# Patient Record
Sex: MALE | Race: WHITE | Employment: OTHER | URBAN - METROPOLITAN AREA
[De-identification: names, ages, dates, MRNs, and addresses within clinical notes are randomized per-mention and may not be internally consistent; named-entity substitution may affect disease eponyms.]

---

## 2017-01-12 ENCOUNTER — MYC REFILL (OUTPATIENT)
Dept: CARDIOLOGY | Facility: CLINIC | Age: 77
End: 2017-01-12

## 2017-01-12 ENCOUNTER — MYC REFILL (OUTPATIENT)
Dept: FAMILY MEDICINE | Facility: CLINIC | Age: 77
End: 2017-01-12

## 2017-01-12 DIAGNOSIS — I48.0 PAROXYSMAL ATRIAL FIBRILLATION (H): ICD-10-CM

## 2017-01-12 DIAGNOSIS — K21.9 GERD (GASTROESOPHAGEAL REFLUX DISEASE): ICD-10-CM

## 2017-01-12 RX ORDER — METOPROLOL SUCCINATE 100 MG/1
100 TABLET, EXTENDED RELEASE ORAL DAILY
Qty: 90 TABLET | Refills: 1 | Status: SHIPPED | OUTPATIENT
Start: 2017-01-12 | End: 2017-05-19

## 2017-01-12 NOTE — TELEPHONE ENCOUNTER
Prescription approved per [facility] Refill Protocol.  Marisela Pierson RN, BSN

## 2017-01-13 DIAGNOSIS — I48.91 ATRIAL FIBRILLATION (H): Primary | ICD-10-CM

## 2017-01-13 DIAGNOSIS — I48.0 PAROXYSMAL ATRIAL FIBRILLATION (H): Primary | ICD-10-CM

## 2017-01-13 RX ORDER — AMIODARONE HYDROCHLORIDE 200 MG/1
TABLET ORAL
Qty: 50 TABLET | Refills: 3 | Status: SHIPPED | OUTPATIENT
Start: 2017-01-13 | End: 2018-01-08

## 2017-01-13 NOTE — TELEPHONE ENCOUNTER
Pt needing refills of Amio. Did send RX and informed Pt need to do amio testing when he is back in spring from [place]. RUSSELL Ward RN

## 2017-02-01 ENCOUNTER — MYC MEDICAL ADVICE (OUTPATIENT)
Dept: NURSING | Facility: CLINIC | Age: 77
End: 2017-02-01

## 2017-02-01 ENCOUNTER — ANTICOAGULATION THERAPY VISIT (OUTPATIENT)
Dept: FAMILY MEDICINE | Facility: CLINIC | Age: 77
End: 2017-02-01
Payer: COMMERCIAL

## 2017-02-01 DIAGNOSIS — Z79.01 LONG-TERM (CURRENT) USE OF ANTICOAGULANTS: Primary | ICD-10-CM

## 2017-02-01 DIAGNOSIS — I47.29 PAROXYSMAL VENTRICULAR TACHYCARDIA (H): ICD-10-CM

## 2017-02-01 LAB — INR POINT OF CARE: 2.4 (ref 0.86–1.14)

## 2017-02-01 PROCEDURE — 85610 PROTHROMBIN TIME: CPT | Mod: QW | Performed by: FAMILY MEDICINE

## 2017-02-01 PROCEDURE — 36416 COLLJ CAPILLARY BLOOD SPEC: CPT | Performed by: FAMILY MEDICINE

## 2017-02-01 NOTE — PROGRESS NOTES
ANTICOAGULATION FOLLOW-UP CLINIC VISIT    Patient Name:  Shahid Villalta  Date:  2/1/2017  Contact Type:  my chart with pt     SUBJECTIVE:     Patient Findings     Positives Medication Changes    Comments Pt started back on amio this past week            OBJECTIVE    INR PROTIME   Date Value Ref Range Status   02/01/2017 2.4* 0.86 - 1.14 Final       ASSESSMENT / PLAN  INR assessment THER    Recheck INR In: 1 WEEK    INR Location Outside lab      Anticoagulation Summary as of 2/1/2017     INR goal 2.0-3.0   Selected INR 2.4 (2/1/2017)   Maintenance plan 2.5 mg (5 mg x 0.5) on Wed; 5 mg (5 mg x 1) all other days   Full instructions 2.5 mg on Wed; 5 mg all other days   Weekly total 32.5 mg   No change documented Isela Hua, RN   Plan last modified Isela Hua RN (4/20/2016)   Next INR check 2/8/2017   Target end date     Indications   Long-term (current) use of anticoagulants [Z79.01] [Z79.01]  Paroxysmal ventricular tachycardia (H) [I47.2]         Anticoagulation Episode Summary     INR check location     Preferred lab     Send INR reminders to [facility]    Comments             See the Encounter Report to view Anticoagulation Flowsheet and Dosing Calendar (Go to Encounters tab in chart review, and find the Anticoagulation Therapy Visit)        Isela Hua, RN

## 2017-02-07 ENCOUNTER — MYC MEDICAL ADVICE (OUTPATIENT)
Dept: FAMILY MEDICINE | Facility: CLINIC | Age: 77
End: 2017-02-07

## 2017-02-07 LAB — INR PPP: 1.9

## 2017-02-08 ENCOUNTER — TRANSFERRED RECORDS (OUTPATIENT)
Dept: HEALTH INFORMATION MANAGEMENT | Facility: CLINIC | Age: 77
End: 2017-02-08

## 2017-02-08 ENCOUNTER — ANTICOAGULATION THERAPY VISIT (OUTPATIENT)
Dept: FAMILY MEDICINE | Facility: CLINIC | Age: 77
End: 2017-02-08
Payer: COMMERCIAL

## 2017-02-08 DIAGNOSIS — Z79.01 LONG-TERM (CURRENT) USE OF ANTICOAGULANTS: Primary | ICD-10-CM

## 2017-02-08 DIAGNOSIS — I47.29 PAROXYSMAL VENTRICULAR TACHYCARDIA (H): ICD-10-CM

## 2017-02-08 PROCEDURE — 99207 ZZC NO CHARGE NURSE ONLY: CPT | Performed by: FAMILY MEDICINE

## 2017-02-08 NOTE — PROGRESS NOTES
ANTICOAGULATION FOLLOW-UP CLINIC VISIT    Patient Name:  Shahid Villalta  Date:  2/8/2017  Contact Type:  my chart with pt     SUBJECTIVE:     Patient Findings     Positives No Problem Findings           OBJECTIVE    INR   Date Value Ref Range Status   02/08/2017 1.9  Final       ASSESSMENT / PLAN  INR assessment SUB    Recheck INR In: 1 WEEK    INR Location Outside lab      Anticoagulation Summary as of 2/8/2017     INR goal 2.0-3.0   Selected INR 1.9! (2/8/2017)   Maintenance plan 2.5 mg (5 mg x 0.5) on Wed; 5 mg (5 mg x 1) all other days   Full instructions 2/8: 5 mg; 2/15: 5 mg; Otherwise 2.5 mg on Wed; 5 mg all other days   Weekly total 32.5 mg   Plan last modified Isela Hua RN (4/20/2016)   Next INR check 2/15/2017   Target end date     Indications   Long-term (current) use of anticoagulants [Z79.01] [Z79.01]  Paroxysmal ventricular tachycardia (H) [I47.2]         Anticoagulation Episode Summary     INR check location     Preferred lab     Send INR reminders to [facility]    Comments             See the Encounter Report to view Anticoagulation Flowsheet and Dosing Calendar (Go to Encounters tab in chart review, and find the Anticoagulation Therapy Visit)    Dosage adjustment made based on physician directed care plan.    Isela Hua, RN

## 2017-02-13 ENCOUNTER — ALLIED HEALTH/NURSE VISIT (OUTPATIENT)
Dept: CARDIOLOGY | Facility: CLINIC | Age: 77
End: 2017-02-13
Payer: COMMERCIAL

## 2017-02-13 DIAGNOSIS — Z95.810 ICD (IMPLANTABLE CARDIOVERTER-DEFIBRILLATOR) IN PLACE: Primary | ICD-10-CM

## 2017-02-13 PROCEDURE — 93295 DEV INTERROG REMOTE 1/2/MLT: CPT | Performed by: INTERNAL MEDICINE

## 2017-02-13 PROCEDURE — 93296 REM INTERROG EVL PM/IDS: CPT | Performed by: INTERNAL MEDICINE

## 2017-02-13 NOTE — MR AVS SNAPSHOT
After Visit Summary   2/13/2017    Shahid Villalta    MRN: [id number]           Patient Information     Date Of Birth          1940        Visit Information        Provider Department      2/13/2017 4:30 PM [facility] DCR2 [facility]        Today's Diagnoses     ICD (implantable cardioverter-defibrillator) in place    -  1       Follow-ups after your visit        Your next 10 appointments already scheduled     May 08, 2017  9:00 AM CDT   Return Visit with RH ONCOLOGY NURSE   [facility]    [facility]  [address]  [facility] [address]   [phone]            May 10, 2017  1:30 PM CDT   Return Visit with Nahun Hilario MD   [facility]    [facility]  [address]  [facility] [address]   [phone]            May 22, 2017  4:30 PM CDT   Remote ICD Check with STEWART DCR2   [facility] ([facility])    [address] [place] [address] [address]  [facility] [address] [phone]           This appointment is for a remote check of your debrillator.  This is not an appointment at the office.              Who to contact     If you have questions or need follow up information about today's clinic visit or your schedule please contact [facility] directly at [phone].  Normal or non-critical lab and imaging results will be communicated to you by MyChart, letter or phone within 4 business days after the clinic has received the results. If you do not hear from us within 7 days, please contact the clinic through MyChart or phone. If you have a critical or abnormal lab result, we will notify you by phone as soon as possible.  Submit refill requests through [employer] or call your pharmacy and they will forward the  refill request to us. Please allow 3 business days for your refill to be completed.          Additional Information About Your Visit        MyChart Information     [employer] gives you secure access to your electronic health record. If you see a primary care provider, you can also send messages to your care team and make appointments. If you have questions, please call your primary care clinic.  If you do not have a primary care provider, please call [phone] and they will assist you.        Care EveryWhere ID     This is your Care EveryWhere ID. This could be used by other organizations to access your [place] medical records  [id number]         Blood Pressure from Last 3 Encounters:   11/10/16 120/70   10/28/16 117/69   06/24/16 98/61    Weight from Last 3 Encounters:   11/10/16 114.3 kg (252 lb)   10/28/16 112.5 kg (248 lb)   06/24/16 109.6 kg (241 lb 9.6 oz)              We Performed the Following     ICD DEVICE INTERROGAT REMOTE (23982)     INTERROGATION DEVICE EVAL REMOTE, PACER/ICD (08293)        Primary Care Provider Office Phone # Fax #    Gume Brown [phone]       [facility] [address]  [facility] [address]        Thank you!     Thank you for choosing [facility] [place]  for your care. Our goal is always to provide you with excellent care. Hearing back from our patients is one way we can continue to improve our services. Please take a few minutes to complete the written survey that you may receive in the mail after your visit with us. Thank you!             Your Updated Medication List - Protect others around you: Learn how to safely use, store and throw away your medicines at www.disposemymeds.org.          This list is accurate as of: 2/13/17 11:59 PM.  Always use your most recent med list.                   Brand Name Dispense Instructions for use    acetaminophen 325 MG tablet    TYLENOL     Take 650 mg by mouth 2 times  daily       amiodarone 200 MG tablet    PACERONE/CODARONE    50 tablet    Take PO 1/2 tablet daily-Take extra prn per MD recommendations.       ASPIRIN NOT PRESCRIBED    INTENTIONAL    0 each    1 each daily Antiplatelet medication not prescribed intentionally due to Current anticoagulant therapy (warfarin/enoxaparin)       cetirizine 10 MG tablet    zyrTEC    90 tablet    Take 1 tablet (10 mg) by mouth daily       cholecalciferol 1000 UNIT tablet    vitamin D    180 tablet    Take 2 tablets (2,000 Units) by mouth daily       digoxin 125 MCG tablet    LANOXIN    45 tablet    Take 1 tablet (125 mcg) by mouth every 48 hours       lisinopril 2.5 MG tablet    PRINIVIL/Zestril    90 tablet    Take 1 tablet (2.5 mg) by mouth daily       metoprolol 100 MG 24 hr tablet    TOPROL-XL    90 tablet    Take 1 tablet (100 mg) by mouth daily       omeprazole 20 MG CR capsule    priLOSEC    90 capsule    Take 1 capsule (20 mg) by mouth daily       polyethylene glycol powder    MIRALAX/GLYCOLAX     Take 17 g by mouth daily as needed for constipation       potassium chloride 10 MEQ tablet    K-TAB,KLOR-CON    90 tablet    Take 1 tablet (10 mEq) by mouth daily       senna-docusate 8.6-50 MG per tablet    SENOKOT-S;PERICOLACE     Take 1 tablet by mouth daily       simethicone 80 MG chewable tablet    MYLICON    180 tablet    Take 1 tablet (80 mg) by mouth every 6 hours as needed for cramping       simvastatin 20 MG tablet    ZOCOR    90 tablet    Take 1 tablet (20 mg) by mouth At Bedtime       torsemide 20 MG tablet    DEMADEX    90 tablet    Take 1 tablet (20 mg) by mouth daily       VIAGRA 25 MG cap/tab   Generic drug:  sildenafil      Take 25 mg by mouth as needed       warfarin 5 MG tablet    COUMADIN    90 tablet    Take 5 mg 6 days week, take 2.5 mg one day week, and as directed.

## 2017-02-14 NOTE — PROGRESS NOTES
[employer] CRT ICD Remote Device Check  AP: 27 % BVP: 99 %  Mode: DDDR 60-95 bpm        Presenting Rhythm: Presenting EGM on transmission showed either AS/AP with BVP  Heart Rate: Heart rate stable with adequate variability.  Sensing: P waves WNL, not performed on the RV   Pacing Threshold: not performed, auto capture not on    Impedance: WNL  Battery Status: Approximately 6 years longevity.  Atrial Arrhythmia: 1526 episodes <1 minute, 338 episode from 1 min-<1 hr, stored EGMs showed PAT or PAC's falling in refractory. 25 PMT EGMs showed  at MTR. Patient is on warfarin.  Ventricular Arrhythmia: 27 episodes logged, 4 stored EGM for review showed 4-15 beats of NSVT rates of 140-160 bpm  ATP: 0    Shocks: 0    Care Plan: Follow up with  Q 3 month remote check. Will call the patient with today's results and date of next remote check.    ADDEM: Patient states he was without his amiodarone for about 8-10 day because switching pharmacy mail services.

## 2017-02-15 ENCOUNTER — MYC MEDICAL ADVICE (OUTPATIENT)
Dept: FAMILY MEDICINE | Facility: CLINIC | Age: 77
End: 2017-02-15

## 2017-02-15 ENCOUNTER — TRANSFERRED RECORDS (OUTPATIENT)
Dept: HEALTH INFORMATION MANAGEMENT | Facility: CLINIC | Age: 77
End: 2017-02-15

## 2017-02-15 LAB
INR PPP: 2.9
PT BLD: 30 SECONDS (ref 9–11.5)

## 2017-02-16 ENCOUNTER — ANTICOAGULATION THERAPY VISIT (OUTPATIENT)
Dept: FAMILY MEDICINE | Facility: CLINIC | Age: 77
End: 2017-02-16
Payer: COMMERCIAL

## 2017-02-16 DIAGNOSIS — I47.29 PAROXYSMAL VENTRICULAR TACHYCARDIA (H): ICD-10-CM

## 2017-02-16 DIAGNOSIS — Z79.01 LONG-TERM (CURRENT) USE OF ANTICOAGULANTS: ICD-10-CM

## 2017-02-16 LAB — INR PPP: 2.9

## 2017-02-16 PROCEDURE — 99207 ZZC NO CHARGE NURSE ONLY: CPT | Performed by: FAMILY MEDICINE

## 2017-02-16 NOTE — PROGRESS NOTES
ANTICOAGULATION FOLLOW-UP CLINIC VISIT    Patient Name:  Shahid Villalta  Date:  2/16/2017  Contact Type:  wtih pt via my chart    SUBJECTIVE:     Patient Findings     Positives No Problem Findings           OBJECTIVE    INR   Date Value Ref Range Status   02/15/2017 2.9  Final       ASSESSMENT / PLAN  INR assessment THER    Recheck INR In: 1 WEEK    INR Location Outside lab      Anticoagulation Summary as of 2/16/2017     INR goal 2.0-3.0   Today's INR 2.9 (2/15/2017)   Maintenance plan 2.5 mg (5 mg x 0.5) on Wed; 5 mg (5 mg x 1) all other days   Full instructions 2.5 mg on Wed; 5 mg all other days   Weekly total 32.5 mg   Plan last modified Isela Hua RN (4/20/2016)   Next INR check 2/22/2017   Target end date     Indications   Long-term (current) use of anticoagulants [Z79.01] [Z79.01]  Paroxysmal ventricular tachycardia (H) [I47.2]         Anticoagulation Episode Summary     INR check location     Preferred lab     Send INR reminders to [facility]    Comments             See the Encounter Report to view Anticoagulation Flowsheet and Dosing Calendar (Go to Encounters tab in chart review, and find the Anticoagulation Therapy Visit)      Isela Hua, RN

## 2017-02-16 NOTE — MR AVS SNAPSHOT
Shahid Villalta   2/16/2017   Anticoagulation Therapy Visit    Description:  76 year old male   Provider:  Gume Brown MD   Department:   Family Practice           INR as of 2/16/2017     Today's INR 2.9 (2/15/2017)      Anticoagulation Summary as of 2/16/2017     INR goal 2.0-3.0   Today's INR 2.9 (2/15/2017)   Full instructions 2.5 mg on Wed; 5 mg all other days   Next INR check 2/22/2017    Indications   Long-term (current) use of anticoagulants [Z79.01] [Z79.01]  Paroxysmal ventricular tachycardia (H) [I47.2]         February 2017 Details    Sun Mon Tue Wed Thu Fri Sat        1               2               3               4                 5               6               7               8               9               10               11                 12               13               14               15               16      5 mg   See details      17      5 mg         18      5 mg           19      5 mg         20      5 mg         21      5 mg         22            23               24               25                 26               27               28                    Date Details   02/16 This INR check       Date of next INR:  2/22/2017         How to take your warfarin dose     To take:  2.5 mg Take 0.5 of a 5 mg tablet.    To take:  5 mg Take 1 of the 5 mg tablets.

## 2017-02-23 DIAGNOSIS — I35.0 NONRHEUMATIC AORTIC (VALVE) STENOSIS: Primary | ICD-10-CM

## 2017-02-24 ENCOUNTER — TRANSFERRED RECORDS (OUTPATIENT)
Dept: HEALTH INFORMATION MANAGEMENT | Facility: CLINIC | Age: 77
End: 2017-02-24

## 2017-02-25 LAB — INR PPP: 1.9

## 2017-02-27 ENCOUNTER — ANTICOAGULATION THERAPY VISIT (OUTPATIENT)
Dept: NURSING | Facility: CLINIC | Age: 77
End: 2017-02-27
Payer: COMMERCIAL

## 2017-02-27 DIAGNOSIS — Z79.01 LONG-TERM (CURRENT) USE OF ANTICOAGULANTS: ICD-10-CM

## 2017-02-27 DIAGNOSIS — I47.29 PAROXYSMAL VENTRICULAR TACHYCARDIA (H): ICD-10-CM

## 2017-02-27 PROCEDURE — 99207 ZZC NO CHARGE NURSE ONLY: CPT | Performed by: FAMILY MEDICINE

## 2017-02-27 NOTE — MR AVS SNAPSHOT
Shahid Villalta   2/27/2017   Anticoagulation Therapy Visit    Description:  76 year old male   Provider:  Gume Brown MD   Department:  [facility]           INR as of 2/27/2017     Today's INR 1.9! (2/24/2017)      Anticoagulation Summary as of 2/27/2017     INR goal 2.0-3.0   Today's INR 1.9! (2/24/2017)   Full instructions 2.5 mg on Wed; 5 mg all other days   Next INR check 3/3/2017    Indications   Long-term (current) use of anticoagulants [Z79.01] [Z79.01]  Paroxysmal ventricular tachycardia (H) [I47.2]         Contact Numbers     [facility]  Please call [phone] with any problems or questions regarding your therapy, or to cancel or reschedule your appointment.          February 2017 Details    Sun Mon Tue Wed Thu Fri Sat        1               2               3               4                 5               6               7               8               9               10               11                 12               13               14               15               16               17               18                 19               20               21               22               23               24               25                 26               27      5 mg   See details      28      5 mg              Date Details   02/27 This INR check               How to take your warfarin dose     To take:  5 mg Take 1 of the 5 mg tablets.           March 2017 Details    Sun Mon Tue Wed Thu Fri Sat        1      2.5 mg         2      5 mg         3            4                 5               6               7               8               9               10               11                 12               13               14               15               16               17               18                 19               20               21               22               23               24               25                 26               27               28               29                30               31                 Date Details   No additional details    Date of next INR:  3/3/2017         How to take your warfarin dose     To take:  2.5 mg Take 0.5 of a 5 mg tablet.    To take:  5 mg Take 1 of the 5 mg tablets.

## 2017-03-07 ENCOUNTER — TRANSFERRED RECORDS (OUTPATIENT)
Dept: HEALTH INFORMATION MANAGEMENT | Facility: CLINIC | Age: 77
End: 2017-03-07

## 2017-03-08 ENCOUNTER — MYC MEDICAL ADVICE (OUTPATIENT)
Dept: NURSING | Facility: CLINIC | Age: 77
End: 2017-03-08

## 2017-03-08 ENCOUNTER — MYC REFILL (OUTPATIENT)
Dept: CARDIOLOGY | Facility: CLINIC | Age: 77
End: 2017-03-08

## 2017-03-08 DIAGNOSIS — I48.91 ATRIAL FIBRILLATION (H): ICD-10-CM

## 2017-03-08 DIAGNOSIS — I35.0 NONRHEUMATIC AORTIC VALVE STENOSIS: ICD-10-CM

## 2017-03-08 RX ORDER — LISINOPRIL 2.5 MG/1
2.5 TABLET ORAL DAILY
Qty: 90 TABLET | Refills: 1 | Status: CANCELLED | OUTPATIENT
Start: 2017-03-08

## 2017-03-08 RX ORDER — DIGOXIN 125 MCG
125 TABLET ORAL
Qty: 45 TABLET | Refills: 3 | Status: SHIPPED | OUTPATIENT
Start: 2017-03-08 | End: 2017-05-19

## 2017-03-08 NOTE — TELEPHONE ENCOUNTER
Message from [facility]:  Original authorizing provider: MD Shahid Jones would like a refill of the following medications:  digoxin (LANOXIN) 125 MCG tablet [Adiel Aden MD]    Preferred pharmacy: [facility] [place], [place] - [address] [facility] [address] [facility]    Comment:      Medication renewals requested in this message routed to other providers:  lisinopril (PRINIVIL,ZESTRIL) 2.5 MG tablet [Gume Brown MD]

## 2017-03-24 ENCOUNTER — TRANSFERRED RECORDS (OUTPATIENT)
Dept: HEALTH INFORMATION MANAGEMENT | Facility: CLINIC | Age: 77
End: 2017-03-24

## 2017-03-24 LAB — INR PPP: 2.8

## 2017-04-03 ENCOUNTER — TELEPHONE (OUTPATIENT)
Dept: FAMILY MEDICINE | Facility: CLINIC | Age: 77
End: 2017-04-03

## 2017-04-06 ENCOUNTER — ANTICOAGULATION THERAPY VISIT (OUTPATIENT)
Dept: NURSING | Facility: CLINIC | Age: 77
End: 2017-04-06
Payer: COMMERCIAL

## 2017-04-06 DIAGNOSIS — Z79.01 LONG-TERM (CURRENT) USE OF ANTICOAGULANTS: ICD-10-CM

## 2017-04-06 DIAGNOSIS — I47.29 PAROXYSMAL VENTRICULAR TACHYCARDIA (H): ICD-10-CM

## 2017-04-06 PROCEDURE — 99207 ZZC NO CHARGE NURSE ONLY: CPT | Performed by: FAMILY MEDICINE

## 2017-04-06 NOTE — MR AVS SNAPSHOT
Shahid Villalta   4/6/2017   Anticoagulation Therapy Visit    Description:  77 year old male   Provider:  Gume Brown MD   Department:  [facility] Nurse           INR as of 4/6/2017     Today's INR 2.8 (3/24/2017)      Anticoagulation Summary as of 4/6/2017     INR goal 2.0-3.0   Today's INR 2.8 (3/24/2017)   Full instructions 2.5 mg on Wed; 5 mg all other days   Next INR check 4/13/2017    Indications   Long-term (current) use of anticoagulants [Z79.01] [Z79.01]  Paroxysmal ventricular tachycardia (H) [I47.2]         Contact Numbers     [facility]  Please call [phone] with any problems or questions regarding your therapy, or to cancel or reschedule your appointment.          April 2017 Details    Sun Mon Tue Wed Thu Fri Sat           1                 2               3               4               5               6      5 mg   See details      7      5 mg         8      5 mg           9      5 mg         10      5 mg         11      5 mg         12      2.5 mg         13            14               15                 16               17               18               19               20               21               22                 23               24               25               26               27               28               29                 30                      Date Details   04/06 This INR check       Date of next INR:  4/13/2017         How to take your warfarin dose     To take:  2.5 mg Take 0.5 of a 5 mg tablet.    To take:  5 mg Take 1 of the 5 mg tablets.

## 2017-04-06 NOTE — PROGRESS NOTES
ANTICOAGULATION FOLLOW-UP CLINIC VISIT    Patient Name:  Shahid Villalta  Date:  4/6/2017  Contact Type:  [facility]    SUBJECTIVE:     Patient Findings     Positives No Problem Findings           OBJECTIVE    INR   Date Value Ref Range Status   03/24/2017 2.8  Final       ASSESSMENT / PLAN  INR assessment THER    Recheck INR In: 3 WEEKS    INR Location Outside lab      Anticoagulation Summary as of 4/6/2017     INR goal 2.0-3.0   Today's INR 2.8 (3/24/2017)   Maintenance plan 2.5 mg (5 mg x 0.5) on Wed; 5 mg (5 mg x 1) all other days   Full instructions 2.5 mg on Wed; 5 mg all other days   Weekly total 32.5 mg   No change documented Manisha Pierson RN   Plan last modified Isela Hua RN (4/20/2016)   Next INR check 4/13/2017   Target end date     Indications   Long-term (current) use of anticoagulants [Z79.01] [Z79.01]  Paroxysmal ventricular tachycardia (H) [I47.2]         Anticoagulation Episode Summary     INR check location     Preferred lab     Send INR reminders to [facility]    Comments             See the Encounter Report to view Anticoagulation Flowsheet and Dosing Calendar (Go to Encounters tab in chart review, and find the Anticoagulation Therapy Visit)        Manisha Pierson, JUDIT

## 2017-04-28 ENCOUNTER — TRANSFERRED RECORDS (OUTPATIENT)
Dept: HEALTH INFORMATION MANAGEMENT | Facility: CLINIC | Age: 77
End: 2017-04-28

## 2017-04-29 LAB — INR PPP: 3.4

## 2017-05-01 ENCOUNTER — ANTICOAGULATION THERAPY VISIT (OUTPATIENT)
Dept: NURSING | Facility: CLINIC | Age: 77
End: 2017-05-01
Payer: COMMERCIAL

## 2017-05-01 DIAGNOSIS — I47.29 PAROXYSMAL VENTRICULAR TACHYCARDIA (H): ICD-10-CM

## 2017-05-01 DIAGNOSIS — Z79.01 LONG-TERM (CURRENT) USE OF ANTICOAGULANTS: ICD-10-CM

## 2017-05-01 PROCEDURE — 99207 ZZC NO CHARGE NURSE ONLY: CPT | Performed by: FAMILY MEDICINE

## 2017-05-01 NOTE — MR AVS SNAPSHOT
Shahid Villalta   5/1/2017   Anticoagulation Therapy Visit    Description:  77 year old male   Provider:  Guem Brown MD   Department:  [facility] Nurse           INR as of 5/1/2017     Today's INR 3.4! (4/28/2017)      Anticoagulation Summary as of 5/1/2017     INR goal 2.0-3.0   Today's INR 3.4! (4/28/2017)   Full instructions 2.5 mg on Wed; 5 mg all other days   Next INR check 5/19/2017    Indications   Long-term (current) use of anticoagulants [Z79.01] [Z79.01]  Paroxysmal ventricular tachycardia (H) [I47.2]         Contact Numbers     [facility]  Please call [phone] with any problems or questions regarding your therapy, or to cancel or reschedule your appointment.          May 2017 Details    Sun Mon Tue Wed Thu Fri Sat      1      5 mg   See details      2      5 mg         3      2.5 mg         4      5 mg         5      5 mg         6      5 mg           7      5 mg         8      5 mg         9      5 mg         10      2.5 mg         11      5 mg         12      5 mg         13      5 mg           14      5 mg         15      5 mg         16      5 mg         17      2.5 mg         18      5 mg         19            20                 21               22               23               24               25               26               27                 28               29               30               31                   Date Details   05/01 This INR check       Date of next INR:  5/19/2017         How to take your warfarin dose     To take:  2.5 mg Take 0.5 of a 5 mg tablet.    To take:  5 mg Take 1 of the 5 mg tablets.

## 2017-05-01 NOTE — PROGRESS NOTES
ANTICOAGULATION FOLLOW-UP CLINIC VISIT    Patient Name:  Shahid Villalta  Date:  5/1/2017  Contact Type:  [facility]    SUBJECTIVE:     Patient Findings     Positives No Problem Findings           OBJECTIVE    INR   Date Value Ref Range Status   04/28/2017 3.4  Final       ASSESSMENT / PLAN  INR assessment SUPRA    Recheck INR In: 3 WEEKS    INR Location Outside lab      Anticoagulation Summary as of 5/1/2017     INR goal 2.0-3.0   Today's INR 3.4! (4/28/2017)   Maintenance plan 2.5 mg (5 mg x 0.5) on Wed; 5 mg (5 mg x 1) all other days   Full instructions 2.5 mg on Wed; 5 mg all other days   Weekly total 32.5 mg   No change documented Manisha Pierson RN   Plan last modified Isela Hua RN (4/20/2016)   Next INR check 5/19/2017   Target end date     Indications   Long-term (current) use of anticoagulants [Z79.01] [Z79.01]  Paroxysmal ventricular tachycardia (H) [I47.2]         Anticoagulation Episode Summary     INR check location     Preferred lab     Send INR reminders to [facility]    Comments             See the Encounter Report to view Anticoagulation Flowsheet and Dosing Calendar (Go to Encounters tab in chart review, and find the Anticoagulation Therapy Visit)        Manisha Pierson, JUDIT

## 2017-05-08 ENCOUNTER — HOSPITAL ENCOUNTER (OUTPATIENT)
Facility: CLINIC | Age: 77
Setting detail: SPECIMEN
Discharge: HOME | End: 2017-05-08
Attending: UROLOGY | Admitting: UROLOGY
Payer: MEDICARE

## 2017-05-08 ENCOUNTER — HOSPITAL ENCOUNTER (OUTPATIENT)
Dept: GENERAL RADIOLOGY | Facility: CLINIC | Age: 77
Discharge: HOME | End: 2017-05-08
Attending: INTERNAL MEDICINE | Admitting: INTERNAL MEDICINE
Payer: MEDICARE

## 2017-05-08 ENCOUNTER — HOSPITAL ENCOUNTER (OUTPATIENT)
Dept: CARDIOLOGY | Facility: CLINIC | Age: 77
Discharge: HOME | End: 2017-05-08
Attending: INTERNAL MEDICINE | Admitting: INTERNAL MEDICINE
Payer: MEDICARE

## 2017-05-08 ENCOUNTER — ONCOLOGY VISIT (OUTPATIENT)
Dept: ONCOLOGY | Facility: CLINIC | Age: 77
End: 2017-05-08
Attending: UROLOGY
Payer: MEDICARE

## 2017-05-08 DIAGNOSIS — I48.91 ATRIAL FIBRILLATION (H): ICD-10-CM

## 2017-05-08 DIAGNOSIS — C61 PROSTATE CANCER (H): ICD-10-CM

## 2017-05-08 DIAGNOSIS — I35.0 NONRHEUMATIC AORTIC VALVE STENOSIS: ICD-10-CM

## 2017-05-08 LAB — PSA SERPL-MCNC: 0.01 UG/L (ref 0–4)

## 2017-05-08 PROCEDURE — 36415 COLL VENOUS BLD VENIPUNCTURE: CPT

## 2017-05-08 PROCEDURE — 93306 TTE W/DOPPLER COMPLETE: CPT

## 2017-05-08 PROCEDURE — 93306 TTE W/DOPPLER COMPLETE: CPT | Mod: 26 | Performed by: INTERNAL MEDICINE

## 2017-05-08 NOTE — PROGRESS NOTES
Medical Assistant Note:  Shahid Villalta presents today for labs.    Patient seen by provider today: No.   present during visit today: Not Applicable.    Concerns: No Concerns.    Procedure:  Lab draw site: RAC, Needle type: 21, Gauge: BF.    Post Assessment:  Labs drawn without difficulty: No.    Discharge Plan:  Departure Mode: Ambulatory.    Face to Face Time: 5.    Nickie Templeton

## 2017-05-08 NOTE — MR AVS SNAPSHOT
After Visit Summary   5/8/2017    Shahid Villalta    MRN: [id number]           Patient Information     Date Of Birth          1940        Visit Information        Provider Department      5/8/2017 9:00 AM RH ONCOLOGY NURSE [facility]        Today's Diagnoses     Prostate cancer           Follow-ups after your visit        Your next 10 appointments already scheduled     May 08, 2017  9:00 AM CDT   Return Visit with RH ONCOLOGY NURSE   [facility] ([facility])    [facility]  [address]  [facility] [address]   [phone]            May 08, 2017  9:45 AM CDT   XR CHEST 2 VIEWS with RSCCXR1   [facility] ([facility])    [address]  [facility] [address] [phone]           Please bring a list of your current medicines to your exam. (Include vitamins, minerals and over-thecounter medicines.) Leave your valuables at home.  Tell your doctor if there is a chance you may be pregnant.  You do not need to do anything special for this exam.            May 09, 2017 12:30 PM CDT   Pulmonary Function with RH PULMONARY FUNCTION   [facility] ([facility])    [address]  [facility] [address] [phone]           No Inhalers for 6 hours prior to test No Smoking 2 hours prior to test            May 10, 2017  1:30 PM CDT   Return Visit with Nahun Hilario MD   [facility] ([facility])    [facility]  [address]  [facility] [address] [phone]            May 17, 2017  9:15 AM CDT   Return Visit with Adiel Aden MD   [facility] [place] ([facility])    [address]  [place] [address] [phone]            May 22, 2017  4:30 PM  CDT   Remote ICD Check with STEWART DCR2   [facility] [place] ([facility])    [address]  [place] [address] [phone]           This appointment is for a remote check of your debrillator.  This is not an appointment at the office.              Who to contact     If you have questions or need follow up information about today's clinic visit or your schedule please contact [facility] directly at [phone].  Normal or non-critical lab and imaging results will be communicated to you by [employer], letter or phone within 4 business days after the clinic has received the results. If you do not hear from us within 7 days, please contact the clinic through [employer] or phone. If you have a critical or abnormal lab result, we will notify you by phone as soon as possible.  Submit refill requests through [employer] or call your pharmacy and they will forward the refill request to us. Please allow 3 business days for your refill to be completed.          Additional Information About Your Visit        [employer] Information     [employer] gives you secure access to your electronic health record. If you see a primary care provider, you can also send messages to your care team and make appointments. If you have questions, please call your primary care clinic.  If you do not have a primary care provider, please call [phone] and they will assist you.        Care EveryWhere ID     This is your Care EveryWhere ID. This could be used by other organizations to access your [place] medical records  [id number]         Blood Pressure from Last 3 Encounters:   11/10/16 120/70   10/28/16 117/69   06/24/16 98/61    Weight from Last 3 Encounters:   11/10/16 114.3 kg (252 lb)   10/28/16 112.5 kg (248 lb)   06/24/16 109.6 kg (241 lb 9.6 oz)              We Performed the Following     PSA tumor marker        Primary Care Provider Office Phone # Fax #    Gume  Jose Brown [phone]       [facility] [address]  [facility] [address]        Thank you!     Thank you for choosing [facility]  for your care. Our goal is always to provide you with excellent care. Hearing back from our patients is one way we can continue to improve our services. Please take a few minutes to complete the written survey that you may receive in the mail after your visit with us. Thank you!             Your Updated Medication List - Protect others around you: Learn how to safely use, store and throw away your medicines at www.disposemymeds.org.          This list is accurate as of: 5/8/17  8:42 AM.  Always use your most recent med list.                   Brand Name Dispense Instructions for use    acetaminophen 325 MG tablet    TYLENOL     Take 650 mg by mouth 2 times daily       amiodarone 200 MG tablet    PACERONE/CODARONE    50 tablet    Take PO 1/2 tablet daily-Take extra prn per MD recommendations.       ASPIRIN NOT PRESCRIBED    INTENTIONAL    0 each    1 each daily Antiplatelet medication not prescribed intentionally due to Current anticoagulant therapy (warfarin/enoxaparin)       cetirizine 10 MG tablet    zyrTEC    90 tablet    Take 1 tablet (10 mg) by mouth daily       cholecalciferol 1000 UNIT tablet    vitamin D    180 tablet    Take 2 tablets (2,000 Units) by mouth daily       digoxin 125 MCG tablet    LANOXIN    45 tablet    Take 1 tablet (125 mcg) by mouth every 48 hours       lisinopril 2.5 MG tablet    PRINIVIL/Zestril    90 tablet    Take 1 tablet (2.5 mg) by mouth daily       metoprolol 100 MG 24 hr tablet    TOPROL-XL    90 tablet    Take 1 tablet (100 mg) by mouth daily       omeprazole 20 MG CR capsule    priLOSEC    90 capsule    Take 1 capsule (20 mg) by mouth daily       polyethylene glycol powder    MIRALAX/GLYCOLAX     Take 17 g by mouth daily as needed for constipation       potassium chloride 10 MEQ tablet     K-TAB,KLOR-CON    90 tablet    Take 1 tablet (10 mEq) by mouth daily       senna-docusate 8.6-50 MG per tablet    SENOKOT-S;PERICOLACE     Take 1 tablet by mouth daily       simethicone 80 MG chewable tablet    MYLICON    180 tablet    Take 1 tablet (80 mg) by mouth every 6 hours as needed for cramping       simvastatin 20 MG tablet    ZOCOR    90 tablet    Take 1 tablet (20 mg) by mouth At Bedtime       torsemide 20 MG tablet    DEMADEX    90 tablet    Take 1 tablet (20 mg) by mouth daily       VIAGRA 25 MG cap/tab   Generic drug:  sildenafil      Take 25 mg by mouth as needed       warfarin 5 MG tablet    COUMADIN    90 tablet    Take 5 mg 6 days week, take 2.5 mg one day week, and as directed.

## 2017-05-09 ENCOUNTER — HOSPITAL ENCOUNTER (OUTPATIENT)
Dept: RESPIRATORY THERAPY | Facility: CLINIC | Age: 77
Discharge: HOME | End: 2017-05-09
Attending: INTERNAL MEDICINE | Admitting: INTERNAL MEDICINE
Payer: MEDICARE

## 2017-05-09 DIAGNOSIS — I35.0 NONRHEUMATIC AORTIC (VALVE) STENOSIS: ICD-10-CM

## 2017-05-09 LAB
DLCOUNC-%PRED-PRE: 91 %
DLCOUNC-PRE: 21.01 ML/MIN/MMHG
DLCOUNC-PRED: 23.08 ML/MIN/MMHG
ERV-%PRED-PRE: 282 %
ERV-PRE: 0.31 L
ERV-PRED: 0.11 L
EXPTIME-PRE: 9.52 SEC
FEF2575-%PRED-PRE: 53 %
FEF2575-PRE: 1.01 L/SEC
FEF2575-PRED: 1.88 L/SEC
FEFMAX-%PRED-PRE: 97 %
FEFMAX-PRE: 6.63 L/SEC
FEFMAX-PRED: 6.83 L/SEC
FEV1-%PRED-PRE: 71 %
FEV1-PRE: 1.75 L
FEV1FEV6-PRE: 73 %
FEV1FEV6-PRED: 77 %
FEV1FVC-PRE: 69 %
FEV1FVC-PRED: 77 %
FEV1SVC-PRE: 67 %
FEV1SVC-PRED: 63 %
FIFMAX-PRE: 3.47 L/SEC
FRCPLETH-%PRED-PRE: 94 %
FRCPLETH-PRE: 3.29 L
FRCPLETH-PRED: 3.48 L
FVC-%PRED-PRE: 79 %
FVC-PRE: 2.53 L
FVC-PRED: 3.18 L
IC-%PRED-PRE: 61 %
IC-PRE: 2.29 L
IC-PRED: 3.74 L
RVPLETH-%PRED-PRE: 114 %
RVPLETH-PRE: 2.98 L
RVPLETH-PRED: 2.6 L
TLCPLETH-%PRED-PRE: 89 %
TLCPLETH-PRE: 5.58 L
TLCPLETH-PRED: 6.21 L
VA-%PRED-PRE: 67 %
VA-PRE: 4.03 L
VC-%PRED-PRE: 67 %
VC-PRE: 2.6 L
VC-PRED: 3.85 L

## 2017-05-09 PROCEDURE — 94729 DIFFUSING CAPACITY: CPT

## 2017-05-09 PROCEDURE — 94726 PLETHYSMOGRAPHY LUNG VOLUMES: CPT

## 2017-05-09 PROCEDURE — 94010 BREATHING CAPACITY TEST: CPT

## 2017-05-09 NOTE — PROGRESS NOTES
PFT Note:  Pt completed pulmonary function testing with DLCO.  Good Pt effort and cooperation.     Spirometry  Meets all ATS-ERS recommendations.    Plethysmography  All plethysmographic measurements meet ATS-ERS recommendations.    DLCO  Meets all ATS-ERS recommendations.  DLCO is an average of 2 maneuvers.  DLCO is not corrected for Hgb.    May 9, 2017.1:19 PM  Romeo Melgar

## 2017-05-10 ENCOUNTER — ONCOLOGY VISIT (OUTPATIENT)
Dept: ONCOLOGY | Facility: CLINIC | Age: 77
End: 2017-05-10
Attending: UROLOGY
Payer: COMMERCIAL

## 2017-05-10 VITALS
BODY MASS INDEX: 39.53 KG/M2 | WEIGHT: 246 LBS | HEART RATE: 69 BPM | RESPIRATION RATE: 16 BRPM | HEIGHT: 66 IN | DIASTOLIC BLOOD PRESSURE: 68 MMHG | OXYGEN SATURATION: 96 % | SYSTOLIC BLOOD PRESSURE: 112 MMHG | TEMPERATURE: 97.8 F

## 2017-05-10 DIAGNOSIS — C61 MALIGNANT NEOPLASM OF PROSTATE (H): Primary | ICD-10-CM

## 2017-05-10 PROCEDURE — 99213 OFFICE O/P EST LOW 20 MIN: CPT | Performed by: UROLOGY

## 2017-05-10 PROCEDURE — 99211 OFF/OP EST MAY X REQ PHY/QHP: CPT

## 2017-05-10 ASSESSMENT — PAIN SCALES - GENERAL: PAINLEVEL: NO PAIN (0)

## 2017-05-10 NOTE — MR AVS SNAPSHOT
After Visit Summary   5/10/2017    Shahid Villalta    MRN: [id number]           Patient Information     Date Of Birth          1940        Visit Information        Provider Department      5/10/2017 1:30 PM Nahun Hilario MD [facility] RH Oncology [facility]      Today's Diagnoses     Malignant neoplasm of prostate (H)    -  1      Care Instructions    Plan     Follow up in 6 months based on risk of recurrence with     PSA-scheduled for Dr. Hilario on 11/8/2017 @ 1:30 and labs on 11/6/2017 @ 9am. Deedee BARRETT     Clinic Exam  AVS printed and given to PtFran HENRIQUEZ.        Follow-ups after your visit        Your next 10 appointments already scheduled     May 17, 2017  9:15 AM CDT   Return Visit with Adiel Aden MD   [facility] ([facility])    [address] [place] [address] [address]  [facility] [address]   [phone]            May 22, 2017  4:30 PM CDT   Remote ICD Check with STEWART DCR2   [facility] ([facility])    [address] [place] [address] [address]  [facility] [address]   [phone]           This appointment is for a remote check of your debrillator.  This is not an appointment at the office.            Nov 06, 2017  9:00 AM CST   Return Visit with  ONCOLOGY NURSE   [facility] [place] [facility] ([facility])    [facility]  [address]  [place] [address]   [phone]            Nov 08, 2017  1:30 PM CST   Return Visit with Nahun Hilario MD   [facility] [place] [facility] ([facility])    [facility]  [address]  [place] [address]   [phone]              Future tests that were ordered for you today     Open Future Orders        Priority Expected Expires Ordered    PSA tumor marker Routine  5/10/2018 5/10/2017            Who to contact     If you  "have questions or need follow up information about today's clinic visit or your schedule please contact [facility] CANCER CARE directly at [phone].  Normal or non-critical lab and imaging results will be communicated to you by MyChart, letter or phone within 4 business days after the clinic has received the results. If you do not hear from us within 7 days, please contact the clinic through [employer] or phone. If you have a critical or abnormal lab result, we will notify you by phone as soon as possible.  Submit refill requests through [employer] or call your pharmacy and they will forward the refill request to us. Please allow 3 business days for your refill to be completed.          Additional Information About Your Visit        [employer] Information     [employer] gives you secure access to your electronic health record. If you see a primary care provider, you can also send messages to your care team and make appointments. If you have questions, please call your primary care clinic.  If you do not have a primary care provider, please call [phone] and they will assist you.        Care EveryWhere ID     This is your Care EveryWhere ID. This could be used by other organizations to access your [place] medical records  [id number]        Your Vitals Were     Pulse Temperature Respirations Height Pulse Oximetry BMI (Body Mass Index)    69 97.8  F (36.6  C) (Oral) 16 1.676 m (5' 6\") 96% 39.71 kg/m2       Blood Pressure from Last 3 Encounters:   05/10/17 112/68   11/10/16 120/70   10/28/16 117/69    Weight from Last 3 Encounters:   05/10/17 111.6 kg (246 lb)   11/10/16 114.3 kg (252 lb)   10/28/16 112.5 kg (248 lb)               Primary Care Provider Office Phone # Fax #    Gume Brown [phone]       [facility] [address]  [facility] [address]        Thank you!     Thank you for choosing [facility]  for your care. Our goal is always to " provide you with excellent care. Hearing back from our patients is one way we can continue to improve our services. Please take a few minutes to complete the written survey that you may receive in the mail after your visit with us. Thank you!             Your Updated Medication List - Protect others around you: Learn how to safely use, store and throw away your medicines at www.disposemymeds.org.          This list is accurate as of: 5/10/17  2:21 PM.  Always use your most recent med list.                   Brand Name Dispense Instructions for use    acetaminophen 325 MG tablet    TYLENOL     Take 650 mg by mouth 2 times daily       amiodarone 200 MG tablet    PACERONE/CODARONE    50 tablet    Take PO 1/2 tablet daily-Take extra prn per MD recommendations.       ASPIRIN NOT PRESCRIBED    INTENTIONAL    0 each    1 each daily Antiplatelet medication not prescribed intentionally due to Current anticoagulant therapy (warfarin/enoxaparin)       cetirizine 10 MG tablet    zyrTEC    90 tablet    Take 1 tablet (10 mg) by mouth daily       cholecalciferol 1000 UNIT tablet    vitamin D    180 tablet    Take 2 tablets (2,000 Units) by mouth daily       digoxin 125 MCG tablet    LANOXIN    45 tablet    Take 1 tablet (125 mcg) by mouth every 48 hours       lisinopril 2.5 MG tablet    PRINIVIL/Zestril    90 tablet    Take 1 tablet (2.5 mg) by mouth daily       metoprolol 100 MG 24 hr tablet    TOPROL-XL    90 tablet    Take 1 tablet (100 mg) by mouth daily       omeprazole 20 MG CR capsule    priLOSEC    90 capsule    Take 1 capsule (20 mg) by mouth daily       polyethylene glycol powder    MIRALAX/GLYCOLAX     Take 17 g by mouth daily as needed for constipation       potassium chloride 10 MEQ tablet    K-TAB,KLOR-CON    90 tablet    Take 1 tablet (10 mEq) by mouth daily       senna-docusate 8.6-50 MG per tablet    SENOKOT-S;PERICOLACE     Take 1 tablet by mouth daily       simethicone 80 MG chewable tablet    MYLICON    180  tablet    Take 1 tablet (80 mg) by mouth every 6 hours as needed for cramping       simvastatin 20 MG tablet    ZOCOR    90 tablet    Take 1 tablet (20 mg) by mouth At Bedtime       torsemide 20 MG tablet    DEMADEX    90 tablet    Take 1 tablet (20 mg) by mouth daily       VIAGRA 25 MG cap/tab   Generic drug:  sildenafil      Take 25 mg by mouth as needed       warfarin 5 MG tablet    COUMADIN    90 tablet    Take 5 mg 6 days week, take 2.5 mg one day week, and as directed.

## 2017-05-10 NOTE — PROGRESS NOTES
"Prostate Cancer Follow up after RRP    Post op complicated by an ileus requiring hospitalization, doing much better now and normal bowel      Shahid Villalta is a very pleasant 75 year old male who presents with a history of prostate cancer.    Initial PSA:29  Pathologic Roni Score was 4 + 3  Biopsy Myrtle Score was 4 + 3  Pathologic Stage T3b.   Positive Margins: Yes Location:right side apical  Number of Lymph Nodes Removed: 13  Number of Positive Lymph Nodes: 0  Patient is status post robotic radical prostatectomy on 11/20/2015.    Risk Group: High    Predicted progression free probability at 10 years: 9, i.e. 90% chance that the PSA will rise down the road    ~10% chance of death in the next 5-10 years  (J Clin Oncol. 2005 Oct 1;23(56):1497-12.  Http://nomograms.[facility].org/Prostate/PostRadicalProstatectomy.aspx)      PSA   Date Value Ref Range Status   05/08/2017 0.01 0 - 4 ug/L Final     Comment:     Assay Method:  Chemiluminescence using Siemens Vista analyzer   10/28/2016  0 - 4 ug/L Final    <0.01  Assay Method:  Chemiluminescence using Siemens Vista analyzer     06/15/2016  0 - 4 ug/L Final    <0.01  PSA results are about 7% lower than our prior method due to a methodology change   on August 30, 2011.     01/14/2016  0 - 4 ug/L Final    <0.01  PSA results are about 7% lower than our prior method due to a methodology change   on August 30, 2011.     05/19/2014 13.50 (H) 0 - 4 ug/L Final   06/15/2012 11.50 (H) 0 - 4 ug/L Final     Comment:     PSA results are about 7% lower than our prior method due to a methodology   change   on August 30, 2011.   07/18/2005 3.6 ng/mL    07/04/2004 3.4 ng/mL    03/06/2001 4.4 ng/mL        No incontinence except with certain heavy lifting, but frequency with diuretics, control better and urgency better.    There is  evidence of disease recurrence to date.    Physical Exam    /68  Pulse 69  Temp 97.8  F (36.6  C) (Oral)  Resp 16  Ht 1.676 m (5' 6\")  Wt 111.6 kg (246 " lb)  SpO2 96%  BMI 39.71 kg/m2   Incisions healed well and no sign of hernias      Assessment GS 7 prostate cancer with no evidence of disease s/p RRP  Plan     Follow up in 6 months based on risk of recurrence with     PSA     Clinic Exam          Sold pharmaceuticals in past, also worked as a mortician, lives in [place] during winter    Nahun Hilario MD  Department of Urology  [facility]

## 2017-05-10 NOTE — PATIENT INSTRUCTIONS
Plan     Follow up in 6 months based on risk of recurrence with     PSA-scheduled for Dr. Hilario on 11/8/2017 @ 1:30 and labs on 11/6/2017 @ 9am. Deedee HENRIQUEZ.     Clinic Exam  AVS printed and given to Jad HENRIQUEZ.

## 2017-05-10 NOTE — NURSING NOTE
"Oncology Rooming Note    May 10, 2017 1:26 PM   Shahid Villalta is a 77 year old male who presents for:    Chief Complaint   Patient presents with     Oncology Clinic Visit     Prostate Cancer - follow up     Initial Vitals: /68  Pulse 69  Temp 97.8  F (36.6  C) (Oral)  Resp 16  Ht 1.676 m (5' 6\")  Wt 111.6 kg (246 lb)  SpO2 96%  BMI 39.71 kg/m2 Estimated body mass index is 39.71 kg/(m^2) as calculated from the following:    Height as of this encounter: 1.676 m (5' 6\").    Weight as of this encounter: 111.6 kg (246 lb). Body surface area is 2.28 meters squared.  No Pain (0) Comment: Data Unavailable   No LMP for male patient.  Allergies reviewed: Yes  Medications reviewed: Yes    Medications: Medication refills not needed today.  Pharmacy name entered into [employer]:    [id number] - [place], [place] - [address] [place], [place] - [address] [facility]    Clinical concerns: Follow up     8 minutes for nursing intake (face to face time)     Joselin Finnegan CMA     DISCHARGE PLAN:  Next appointments: See patient instruction section  Departure Mode: Ambulatory  Accompanied by: self  0 minutes for nursing discharge (face to face time)   Joselin Finnegan CMA                  "

## 2017-05-11 NOTE — PROCEDURES
Please see medical chart for graphs and statistics related to this report.       REFERRING PHYSICIAN:   Adiel Aden                   TECHNICIAN:   Romeo Melgar   DIAGNOSIS:   Non-rheumatic aortic valve stenosis, V-tach, cardiomyopathy, HTN, GERD, A-Fib, recent prostate cancer   HEIGHT:   65.50 inches                      WEIGHT:   240.00 Lbs.        DYSPNEA: No dyspnea       COUGH: No cough       WHEEZE: No wheeze      TOBACCO PROD: Never smoked         YEARS SMOKED:               PKS/DAY:         MEDICATIONS:   Amiodarone, Lisinopril       POST-TEST COMMENTS:   Patient completed pulmonary function testing with DLCO.  Good patient effort and cooperation.  All testing meets ATS-ERS recommendations.   DLCO is an average of 2 maneuvers.  DLCO is not corrected for Hgb.       INTERPRETATION:      1.  Mild obstruction   2.  Air trapping   3.  Normal gas transfer   4.  Improved since 2016   5.  Rule out cardiac asthma versus mild obstructive disease         RAUL LOZANO MD             D: 2017 10:15   T: 2017 10:26   MT: [place]      Name:     LUCIO VINCENT   MRN:      [id number]        Account:        [id number]   :      1940           Procedure Date: 2017      Document: [id number]       cc: Adiel Aden MD

## 2017-05-14 ENCOUNTER — MYC REFILL (OUTPATIENT)
Dept: FAMILY MEDICINE | Facility: CLINIC | Age: 77
End: 2017-05-14

## 2017-05-14 DIAGNOSIS — I35.0 NONRHEUMATIC AORTIC VALVE STENOSIS: ICD-10-CM

## 2017-05-15 RX ORDER — LISINOPRIL 2.5 MG/1
2.5 TABLET ORAL DAILY
Qty: 90 TABLET | Refills: 0 | Status: SHIPPED | OUTPATIENT
Start: 2017-05-15 | End: 2017-07-13

## 2017-05-15 NOTE — TELEPHONE ENCOUNTER
Pending Prescriptions:                       Disp   Refills    lisinopril (PRINIVIL/ZESTRIL) 2.5 MG tabl*90 tab*1            Sig: Take 1 tablet (2.5 mg) by mouth daily       Last Written Prescription Date: 08/12/2016  Last Fill Quantity: 90, # refills: 1  [place] Office Visit with [facility] or [facility] prescribing provider: 06/03/2016  Next 5 appointments (look out 90 days)     May 17, 2017  9:15 AM CDT   Return Visit with Adiel Aden MD   [facility] ([facility])    [address] [place] [address] [phone]                   Potassium   Date Value Ref Range Status   11/02/2016 4.7 3.4 - 5.3 mmol/L Final     Creatinine   Date Value Ref Range Status   11/02/2016 1.58 (H) 0.66 - 1.25 mg/dL Final     BP Readings from Last 3 Encounters:   05/10/17 112/68   11/10/16 120/70   10/28/16 117/69     Liana Delong

## 2017-05-15 NOTE — TELEPHONE ENCOUNTER
Message from [employer]:  Original authorizing provider: MD Shahid Delarosa would like a refill of the following medications:  lisinopril (PRINIVIL,ZESTRIL) 2.5 MG tablet [Gume Brown MD]    Preferred pharmacy: [facility] [place], [place] - [address] AT [facility]    Comment:  Send to "[employer]. Errors are still not corrected at mail-order, thanks for your additional help. Shahid

## 2017-05-17 ENCOUNTER — TELEPHONE (OUTPATIENT)
Dept: CARDIOLOGY | Facility: CLINIC | Age: 77
End: 2017-05-17

## 2017-05-17 ENCOUNTER — OFFICE VISIT (OUTPATIENT)
Dept: CARDIOLOGY | Facility: CLINIC | Age: 77
End: 2017-05-17
Attending: INTERNAL MEDICINE
Payer: COMMERCIAL

## 2017-05-17 VITALS
HEIGHT: 66 IN | DIASTOLIC BLOOD PRESSURE: 68 MMHG | SYSTOLIC BLOOD PRESSURE: 124 MMHG | HEART RATE: 72 BPM | BODY MASS INDEX: 39.62 KG/M2 | WEIGHT: 246.5 LBS

## 2017-05-17 DIAGNOSIS — I10 HYPERTENSION GOAL BP (BLOOD PRESSURE) < 140/90: ICD-10-CM

## 2017-05-17 DIAGNOSIS — I25.10 CORONARY ARTERY DISEASE INVOLVING NATIVE CORONARY ARTERY OF NATIVE HEART WITHOUT ANGINA PECTORIS: ICD-10-CM

## 2017-05-17 DIAGNOSIS — E78.5 HYPERLIPIDEMIA LDL GOAL <100: ICD-10-CM

## 2017-05-17 DIAGNOSIS — I35.0 NONRHEUMATIC AORTIC VALVE STENOSIS: Primary | ICD-10-CM

## 2017-05-17 DIAGNOSIS — I35.0 NONRHEUMATIC AORTIC VALVE STENOSIS: ICD-10-CM

## 2017-05-17 DIAGNOSIS — I42.9 CARDIOMYOPATHY (H): ICD-10-CM

## 2017-05-17 DIAGNOSIS — I47.19 ATRIAL TACHYCARDIA, PAROXYSMAL (H): ICD-10-CM

## 2017-05-17 LAB
ALT SERPL W P-5'-P-CCNC: <5 U/L (ref 5–30)
AST SERPL W P-5'-P-CCNC: 14 U/L (ref 0–45)

## 2017-05-17 PROCEDURE — 99215 OFFICE O/P EST HI 40 MIN: CPT | Performed by: INTERNAL MEDICINE

## 2017-05-17 PROCEDURE — 36415 COLL VENOUS BLD VENIPUNCTURE: CPT | Performed by: INTERNAL MEDICINE

## 2017-05-17 PROCEDURE — 84460 ALANINE AMINO (ALT) (SGPT): CPT | Performed by: INTERNAL MEDICINE

## 2017-05-17 PROCEDURE — 84450 TRANSFERASE (AST) (SGOT): CPT | Performed by: INTERNAL MEDICINE

## 2017-05-17 NOTE — MR AVS SNAPSHOT
After Visit Summary   5/17/2017    Shahid Villalta    MRN: [id number]           Patient Information     Date Of Birth          1940        Visit Information        Provider Department      5/17/2017 9:15 AM Adiel Aden MD [facility] [place]        Today's Diagnoses     Nonrheumatic aortic valve stenosis    -  1    Coronary artery disease involving native coronary artery of native heart without angina pectoris        Hypertension goal BP (blood pressure) < 140/90        Hyperlipidemia LDL goal <100        Cardiomyopathy (H)        Atrial tachycardia, paroxysmal (H)           Follow-ups after your visit        Additional Services     Follow-Up with Cardiologist                 Your next 10 appointments already scheduled     May 22, 2017  4:30 PM CDT   Remote ICD Check with STEWART DCR2   [facility] ([facility])    [address] [place] [address] [address]  [facility] [address] [phone]           This appointment is for a remote check of your debrillator.  This is not an appointment at the office.            Nov 06, 2017  9:00 AM CST   Return Visit with [facility] ONCOLOGY NURSE   [facility] ([facility])    [facility]  [address] [place] [address] [address]  [place] [address]   [phone]            Nov 08, 2017  1:30 PM CST   Return Visit with Nahun Hilario MD   [facility] ([facility])    [facility]  [address]  [facility] [address]   [phone]              Future tests that were ordered for you today     Open Future Orders        Priority Expected Expires Ordered    Follow-Up with Cardiologist Routine 11/13/2017 5/17/2018 5/17/2017    Echocardiogram Routine 11/13/2017 5/17/2018 5/17/2017    AST Routine 5/17/2017 5/17/2018 5/17/2017    ALT Routine 5/17/2017 5/17/2018 5/17/2017     "Basic metabolic panel Routine 11/13/2017 5/17/2018 5/17/2017    Hepatic panel Routine 11/13/2017 5/17/2018 5/17/2017    TSH Routine 11/13/2017 5/17/2018 5/17/2017            Who to contact     If you have questions or need follow up information about today's clinic visit or your schedule please contact [facility] AT [place] directly at [phone].  Normal or non-critical lab and imaging results will be communicated to you by Dondehart, letter or phone within 4 business days after the clinic has received the results. If you do not hear from us within 7 days, please contact the clinic through Hot Dott or phone. If you have a critical or abnormal lab result, we will notify you by phone as soon as possible.  Submit refill requests through Sonopia or call your pharmacy and they will forward the refill request to us. Please allow 3 business days for your refill to be completed.          Additional Information About Your Visit        DondeharPasspack Information     Sonopia gives you secure access to your electronic health record. If you see a primary care provider, you can also send messages to your care team and make appointments. If you have questions, please call your primary care clinic.  If you do not have a primary care provider, please call [phone] and they will assist you.        Care EveryWhere ID     This is your Care EveryWhere ID. This could be used by other organizations to access your [place] medical records  [id number]        Your Vitals Were     Pulse Height BMI (Body Mass Index)             72 1.676 m (5' 6\") 39.79 kg/m2          Blood Pressure from Last 3 Encounters:   05/17/17 124/68   05/10/17 112/68   11/10/16 120/70    Weight from Last 3 Encounters:   05/17/17 111.8 kg (246 lb 8 oz)   05/10/17 111.6 kg (246 lb)   11/10/16 114.3 kg (252 lb)              We Performed the Following     Follow-Up with Cardiologist        Primary Care Provider Office Phone # Fax #    Gume " Jose Brown [phone]       [facility] [address]  [facility] [address]        Thank you!     Thank you for choosing [facility] AT [place]  for your care. Our goal is always to provide you with excellent care. Hearing back from our patients is one way we can continue to improve our services. Please take a few minutes to complete the written survey that you may receive in the mail after your visit with us. Thank you!             Your Updated Medication List - Protect others around you: Learn how to safely use, store and throw away your medicines at www.disposemymeds.org.          This list is accurate as of: 5/17/17  9:28 AM.  Always use your most recent med list.                   Brand Name Dispense Instructions for use    acetaminophen 325 MG tablet    TYLENOL     Take 650 mg by mouth 3 times daily       amiodarone 200 MG tablet    PACERONE/CODARONE    50 tablet    Take PO 1/2 tablet daily-Take extra prn per MD recommendations.       ASPIRIN NOT PRESCRIBED    INTENTIONAL    0 each    1 each daily Antiplatelet medication not prescribed intentionally due to Current anticoagulant therapy (warfarin/enoxaparin)       cetirizine 10 MG tablet    zyrTEC    90 tablet    Take 1 tablet (10 mg) by mouth daily       cholecalciferol 1000 UNIT tablet    vitamin D    180 tablet    Take 2 tablets (2,000 Units) by mouth daily       digoxin 125 MCG tablet    LANOXIN    45 tablet    Take 1 tablet (125 mcg) by mouth every 48 hours       lisinopril 2.5 MG tablet    PRINIVIL/Zestril    90 tablet    Take 1 tablet (2.5 mg) by mouth daily       metoprolol 100 MG 24 hr tablet    TOPROL-XL    90 tablet    Take 1 tablet (100 mg) by mouth daily       omeprazole 20 MG CR capsule    priLOSEC    90 capsule    Take 1 capsule (20 mg) by mouth daily       polyethylene glycol powder    MIRALAX/GLYCOLAX     Take 17 g by mouth daily as needed for constipation       potassium chloride  10 MEQ tablet    K-TAB,KLOR-CON    90 tablet    Take 1 tablet (10 mEq) by mouth daily       senna-docusate 8.6-50 MG per tablet    SENOKOT-S;PERICOLACE     Take 1 tablet by mouth daily       simethicone 80 MG chewable tablet    MYLICON    180 tablet    Take 1 tablet (80 mg) by mouth every 6 hours as needed for cramping       simvastatin 20 MG tablet    ZOCOR    90 tablet    Take 1 tablet (20 mg) by mouth At Bedtime       torsemide 20 MG tablet    DEMADEX    90 tablet    Take 1 tablet (20 mg) by mouth daily       VIAGRA 25 MG cap/tab   Generic drug:  sildenafil      Take 25 mg by mouth as needed       warfarin 5 MG tablet    COUMADIN    90 tablet    Take 5 mg 6 days week, take 2.5 mg one day week, and as directed.

## 2017-05-17 NOTE — PROGRESS NOTES
HPI and Plan:   See dictation  [id number]    Orders Placed This Encounter   Procedures     Hepatic panel     TSH     Basic metabolic panel     AST     ALT     Follow-Up with Cardiologist     Echocardiogram       No orders of the defined types were placed in this encounter.      There are no discontinued medications.      Encounter Diagnoses   Name Primary?     Nonrheumatic aortic valve stenosis Yes     Coronary artery disease involving native coronary artery of native heart without angina pectoris      Hypertension goal BP (blood pressure) < 140/90      Hyperlipidemia LDL goal <100      Cardiomyopathy (H)      Atrial tachycardia, paroxysmal (H)        CURRENT MEDICATIONS:  Current Outpatient Prescriptions   Medication Sig Dispense Refill     lisinopril (PRINIVIL/ZESTRIL) 2.5 MG tablet Take 1 tablet (2.5 mg) by mouth daily 90 tablet 0     digoxin (LANOXIN) 125 MCG tablet Take 1 tablet (125 mcg) by mouth every 48 hours 45 tablet 3     amiodarone (PACERONE/CODARONE) 200 MG tablet Take PO 1/2 tablet daily-Take extra prn per MD recommendations. 50 tablet 3     metoprolol (TOPROL-XL) 100 MG 24 hr tablet Take 1 tablet (100 mg) by mouth daily 90 tablet 1     omeprazole (PRILOSEC) 20 MG CR capsule Take 1 capsule (20 mg) by mouth daily 90 capsule 1     potassium chloride (K-TAB,KLOR-CON) 10 MEQ tablet Take 1 tablet (10 mEq) by mouth daily 90 tablet 1     acetaminophen (TYLENOL) 325 MG tablet Take 650 mg by mouth 3 times daily        warfarin (COUMADIN) 5 MG tablet Take 5 mg 6 days week, take 2.5 mg one day week, and as directed. 90 tablet 1     simvastatin (ZOCOR) 20 MG tablet Take 1 tablet (20 mg) by mouth At Bedtime 90 tablet 3     torsemide (DEMADEX) 20 MG tablet Take 1 tablet (20 mg) by mouth daily 90 tablet 0     senna-docusate (SENOKOT-S;PERICOLACE) 8.6-50 MG per tablet Take 1 tablet by mouth daily        polyethylene glycol (MIRALAX/GLYCOLAX) powder Take 17 g by mouth daily as needed for constipation       simethicone  (MYLICON) 80 MG chewable tablet Take 1 tablet (80 mg) by mouth every 6 hours as needed for cramping 180 tablet      cetirizine (ZYRTEC) 10 MG tablet Take 1 tablet (10 mg) by mouth daily 90 tablet 1     cholecalciferol (VITAMIN D) 1000 UNIT tablet Take 2 tablets (2,000 Units) by mouth daily 180 tablet 1     sildenafil (VIAGRA) 25 MG tablet Take 25 mg by mouth as needed       ASPIRIN NOT PRESCRIBED, INTENTIONAL, 1 each daily Antiplatelet medication not prescribed intentionally due to Current anticoagulant therapy (warfarin/enoxaparin) (Patient not taking: Reported on 5/17/2017) 0 each 0       ALLERGIES     Allergies   Allergen Reactions     Latex Rash     Seasonal Allergies      hayfever - wheezing -        PAST MEDICAL HISTORY:  Past Medical History:   Diagnosis Date     Aortic valve disorders      Aortic valve disorders      Arrhythmia      Atrial fibrillation (H)      Atrial fibrillation (H)      Automatic implantable cardiac defibrillator in situ      Cancer (H)      Congestive heart failure (H)      Coronary artery disease      Essential hypertension, benign      GERD (gastroesophageal reflux disease) 3/16/2010     Heart failure (H)      History of blood transfusion      Hyperlipidaemia      Hypertension      Obese      Other and unspecified hyperlipidemia      Other primary cardiomyopathies      Other primary cardiomyopathies      Pacemaker      Sleep apnea      Ventricular tachycardia (H) 6/17/2013       PAST SURGICAL HISTORY:  Past Surgical History:   Procedure Laterality Date     BIOPSY  annually    prostate biopsy     CARDIAC SURGERY  2012    A fib Ablation     CARDIAC SURGERY      cardioversion     COLONOSCOPY  2007     DAVINCI PROSTATECTOMY N/A 11/20/2015    Procedure: DAVINCI PROSTATECTOMY;  Surgeon: Nahun Hilario MD;  Location: [facility] OR     GENITOURINARY SURGERY      bladder surgery 2011     H ABLATION AV NODE  6/25/13     H ABLATION FOCAL AFIB  6/25/13     HC TOOTH EXTRACTION W/FORCEP        "TONSILLECTOMY & ADENOIDECTOMY         FAMILY HISTORY:  Family History   Problem Relation Age of Onset     HAIDER Father      CHF  at 74     CEREBROVASCULAR DISEASE Father      C.APRATIBHA Mother      CHF at 91 still living     CEREBROVASCULAR DISEASE Mother      TIAs     Breast Cancer Mother      HEART DISEASE Son      arrythmia       SOCIAL HISTORY:  Social History     Social History     Marital status:      Spouse name: N/A     Number of children: 2     Years of education: N/A     Occupational History      Retired     Social History Main Topics     Smoking status: Never Smoker     Smokeless tobacco: Never Used     Alcohol use No      Comment: AA since      Drug use: No     Sexual activity: Not Currently     Partners: Female     Birth control/ protection: Abstinence     Other Topics Concern     Parent/Sibling W/ Cabg, Mi Or Angioplasty Before 65f 55m? No     Caffeine Concern Yes     2-3 cups caffeine per day     Sleep Concern Yes     sleep apnea, wears cpap at night     Stress Concern No     Weight Concern No     weight fluctuates     Special Diet No     Exercise Yes     walking daily     Seat Belt Yes     Social History Narrative       Review of Systems:  Skin:  Negative       Eyes:  Positive for glasses    ENT:  Positive for hearing loss    Respiratory:  Positive for sleep apnea;CPAP     Cardiovascular:  Negative;chest pain;palpitations;lightheadedness;dizziness;cyanosis;syncope or near-syncope exercise intolerance;Positive for;edema    Gastroenterology: Negative      Genitourinary:  Positive for urinary frequency prostate removed  Musculoskeletal:  Positive for joint pain    Neurologic:  Negative      Psychiatric:  Positive for sleep disturbances    Heme/Lymph/Imm:  Positive for allergies    Endocrine:  Negative        Physical Exam:  Vitals: /68 (BP Location: Right arm, Cuff Size: Adult Large)  Pulse 72  Ht 1.676 m (5' 6\")  Wt 111.8 kg (246 lb 8 oz)  BMI 39.79 kg/m2    Constitutional:  " cooperative;alert and oriented overweight      Skin:  warm and dry to the touch        Head:  normocephalic        Eyes:  pupils equal and round        ENT:  no pallor or cyanosis        Neck:  carotid pulses are full and equal bilaterally;JVP normal        Chest:  clear to auscultation          Cardiac: normal S1 and S2   soft or inaudible A2   systolic ejection murmur;crescendo;grade 3;radiation to the carotid   diastolic murmur;early diastolic murmur;grade 2;RUSB      Abdomen:  abdomen soft;BS normoactive        Vascular: not assessed this visit                                        Extremities and Back:      trace;bilateral LE edema          Neurological:  no gross motor deficits              FEDERICO Aden MD   [facility]  [address]  [address]  [place] [address]

## 2017-05-17 NOTE — TELEPHONE ENCOUNTER
After speaking with sleep center was informed Pt needs appointment to see DR Ashraf to renew C-pap RX. Left phone number for  [phone] to call scheduling and make appointment. RUSSELL Ward RN

## 2017-05-17 NOTE — LETTER
5/17/2017    Gume Brown MD  [facility]   [address]  [facility] [address]    RE: Shahid Villalta       Dear Colleague,    I had the pleasure of seeing Shahid Pawan in Cardiology Clinic in followup, a 77-year-old male with history of moderate to severe aortic valve stenosis, mild cardiomyopathy, coronary artery disease, paroxysmal atrial fibrillation and tachycardia status post AV dario ablation and post-biventricular AICD placement, previous SVT ablation, returns for followup.  He is on amiodarone for management of atrial arrhythmias.  Today we discussed his PFTs, which have remained stable.  There is mild obstruction but normal gas transfer or normal DLCO.      Chest x-ray showed no evidence of scarring or fibrosis.  His TSH was normal.  His creatinine remained stable at 1.58.  He has mild chronic renal insufficiency.  With respect to his aortic valve, his echocardiogram results were reviewed with him.  It shows again an aortic valve area of 1.1 cm squared with an aortic valve area index of 0.5 cm2/m2, mean gradient remains stable at 38 mm.  Ejection fraction is mildly reduced at 45%-50%, unchanged from before.  He continues to have no increase in his symptoms.  He has class 1-2 exertional fatigue.  Lately he is limited by a sprain of his knee.  Denies any orthopnea or PND.  No palpitations.  His amiodarone is 100 mg daily.  His last device check was in February, and that notes was reviewed.  He had 99% BiV pacing.  There is some evidence of PATs.  He has been on warfarin.      PHYSICAL EXAMINATION:   VITAL SIGNS:  Blood pressure is 124/68, pulse 72 per minute and regular.   CARDIAC:  A 3/6 ejection systolic murmur in the aortic area with a soft A2 component of the second heart sound.   CHEST:  Clear to auscultation except for scattered right basal crackles.     Outpatient Encounter Prescriptions as of 5/17/2017   Medication Sig Dispense Refill     lisinopril (PRINIVIL/ZESTRIL) 2.5 MG tablet  Take 1 tablet (2.5 mg) by mouth daily 90 tablet 0     digoxin (LANOXIN) 125 MCG tablet Take 1 tablet (125 mcg) by mouth every 48 hours 45 tablet 3     amiodarone (PACERONE/CODARONE) 200 MG tablet Take PO 1/2 tablet daily-Take extra prn per MD recommendations. 50 tablet 3     metoprolol (TOPROL-XL) 100 MG 24 hr tablet Take 1 tablet (100 mg) by mouth daily 90 tablet 1     omeprazole (PRILOSEC) 20 MG CR capsule Take 1 capsule (20 mg) by mouth daily 90 capsule 1     potassium chloride (K-TAB,KLOR-CON) 10 MEQ tablet Take 1 tablet (10 mEq) by mouth daily 90 tablet 1     acetaminophen (TYLENOL) 325 MG tablet Take 650 mg by mouth 3 times daily        warfarin (COUMADIN) 5 MG tablet Take 5 mg 6 days week, take 2.5 mg one day week, and as directed. 90 tablet 1     simvastatin (ZOCOR) 20 MG tablet Take 1 tablet (20 mg) by mouth At Bedtime 90 tablet 3     torsemide (DEMADEX) 20 MG tablet Take 1 tablet (20 mg) by mouth daily 90 tablet 0     senna-docusate (SENOKOT-S;PERICOLACE) 8.6-50 MG per tablet Take 1 tablet by mouth daily        polyethylene glycol (MIRALAX/GLYCOLAX) powder Take 17 g by mouth daily as needed for constipation       simethicone (MYLICON) 80 MG chewable tablet Take 1 tablet (80 mg) by mouth every 6 hours as needed for cramping 180 tablet      cetirizine (ZYRTEC) 10 MG tablet Take 1 tablet (10 mg) by mouth daily 90 tablet 1     cholecalciferol (VITAMIN D) 1000 UNIT tablet Take 2 tablets (2,000 Units) by mouth daily 180 tablet 1     sildenafil (VIAGRA) 25 MG tablet Take 25 mg by mouth as needed       ASPIRIN NOT PRESCRIBED, INTENTIONAL, 1 each daily Antiplatelet medication not prescribed intentionally due to Current anticoagulant therapy (warfarin/enoxaparin) (Patient not taking: Reported on 5/17/2017) 0 each 0     No facility-administered encounter medications on file as of 5/17/2017.       IMPRESSION:   1.  Aortic stenosis.  Again, the patient has near severe aortic valve stenosis but no worsening symptoms.   His echocardiograms have been stable over the last 1-1/2 to 2 years.  Given the stability and no clear symptoms, I would suggest a followup echocardiogram in 6 months.  Once he develops symptoms, we will refer him to [facility] for TAVR.  He understands that.   2.  Mild cardiomyopathy, stable EF 40%-50%, no overt congestive heart failure.   3.  Paroxysmal atrial tachycardia and fibrillation, stable and in sinus rhythm on amiodarone 100 mg daily.  His PFTs and chest x-rays are stable.  I would recommend a repeat chest x-ray in a year and routine amiodarone labs in 6 months.  Recent ALT, AST is not available, and we will obtain that today.  He remains on warfarin for stroke prophylaxis.   4.  Chronic renal insufficiency, stable.   5.  Hypertension, well controlled on lisinopril and metoprolol.   6.  Status post BiV AICD.  Continue followup in the Device Clinic.        PLAN:   1.  Echocardiogram in 6 months.   2.  Amiodarone labs in 6 months.   3.  ALT, AST today.   4.  Call with worsening chest pain or shortness of breath or leg edema.  We will see him in followup in 6 months.  He has some concerns of not having followup for his sleep apnea and has some difficulty getting supplies.  I have asked my nurse to call our sleep lab in [place] and have them contact him.     Again, thank you for allowing me to participate in the care of your patient.      Sincerely,    Adiel Aden MD     [facility]

## 2017-05-18 NOTE — PROGRESS NOTES
HISTORY OF PRESENT ILLNESS:  I had the pleasure of seeing Shahid Villalta in Cardiology Clinic in followup, a 77-year-old male with history of moderate to severe aortic valve stenosis, mild cardiomyopathy, coronary artery disease, paroxysmal atrial fibrillation and tachycardia status post AV dario ablation and post-biventricular AICD placement, previous SVT ablation, returns for followup.  He is on amiodarone for management of atrial arrhythmias.  Today we discussed his PFTs, which have remained stable.  There is mild obstruction but normal gas transfer or normal DLCO.      Chest x-ray showed no evidence of scarring or fibrosis.  His TSH was normal.  His creatinine remained stable at 1.58.  He has mild chronic renal insufficiency.  With respect to his aortic valve, his echocardiogram results were reviewed with him.  It shows again an aortic valve area of 1.1 cm squared with an aortic valve area index of 0.5 cm2/m2, mean gradient remains stable at 38 mm.  Ejection fraction is mildly reduced at 45%-50%, unchanged from before.  He continues to have no increase in his symptoms.  He has class 1-2 exertional fatigue.  Lately he is limited by a sprain of his knee.  Denies any orthopnea or PND.  No palpitations.  His amiodarone is 100 mg daily.  His last device check was in February, and that notes was reviewed.  He had 99% BiV pacing.  There is some evidence of PATs.  He has been on warfarin.      PHYSICAL EXAMINATION:   VITAL SIGNS:  Blood pressure is 124/68, pulse 72 per minute and regular.   CARDIAC:  A 3/6 ejection systolic murmur in the aortic area with a soft A2 component of the second heart sound.   CHEST:  Clear to auscultation except for scattered right basal crackles.      IMPRESSION:   1.  Aortic stenosis.  Again, the patient has near severe aortic valve stenosis but no worsening symptoms.  His echocardiograms have been stable over the last 1-1/2 to 2 years.  Given the stability and no clear symptoms, I would  suggest a followup echocardiogram in 6 months.  Once he develops symptoms, we will refer him to [facility] for TAVR.  He understands that.   2.  Mild cardiomyopathy, stable EF 40%-50%, no overt congestive heart failure.   3.  Paroxysmal atrial tachycardia and fibrillation, stable and in sinus rhythm on amiodarone 100 mg daily.  His PFTs and chest x-rays are stable.  I would recommend a repeat chest x-ray in a year and routine amiodarone labs in 6 months.  Recent ALT, AST is not available, and we will obtain that today.  He remains on warfarin for stroke prophylaxis.   4.  Chronic renal insufficiency, stable.   5.  Hypertension, well controlled on lisinopril and metoprolol.   6.  Status post BiV AICD.  Continue followup in the Device Clinic.        PLAN:   1.  Echocardiogram in 6 months.   2.  Amiodarone labs in 6 months.   3.  ALT, AST today.   4.  Call with worsening chest pain or shortness of breath or leg edema.  We will see him in followup in 6 months.  He has some concerns of not having followup for his sleep apnea and has some difficulty getting supplies.  I have asked my nurse to call our sleep lab in [place] and have them contact him.      cc:   [place] [address]         KHAI WAGNER MD             D: 2017 09:37   T: 2017 23:54   MT: SUNNY      Name:     LUCIO VINCENT   MRN:      [id number]        Account:      [id number]   :      1940           Service Date: 2017      Document: [id number]

## 2017-05-19 ENCOUNTER — ANTICOAGULATION THERAPY VISIT (OUTPATIENT)
Dept: NURSING | Facility: CLINIC | Age: 77
End: 2017-05-19
Payer: COMMERCIAL

## 2017-05-19 ENCOUNTER — OFFICE VISIT (OUTPATIENT)
Dept: FAMILY MEDICINE | Facility: CLINIC | Age: 77
End: 2017-05-19
Payer: COMMERCIAL

## 2017-05-19 VITALS
WEIGHT: 247.3 LBS | HEART RATE: 72 BPM | SYSTOLIC BLOOD PRESSURE: 122 MMHG | BODY MASS INDEX: 39.92 KG/M2 | TEMPERATURE: 97.7 F | RESPIRATION RATE: 20 BRPM | DIASTOLIC BLOOD PRESSURE: 66 MMHG

## 2017-05-19 DIAGNOSIS — C61 PROSTATE CANCER (H): ICD-10-CM

## 2017-05-19 DIAGNOSIS — Z95.810 AUTOMATIC IMPLANTABLE CARDIOVERTER-DEFIBRILLATOR IN SITU: ICD-10-CM

## 2017-05-19 DIAGNOSIS — I42.9 CARDIOMYOPATHY (H): ICD-10-CM

## 2017-05-19 DIAGNOSIS — I25.10 CORONARY ARTERY DISEASE INVOLVING NATIVE CORONARY ARTERY OF NATIVE HEART WITHOUT ANGINA PECTORIS: ICD-10-CM

## 2017-05-19 DIAGNOSIS — I48.0 PAROXYSMAL ATRIAL FIBRILLATION (H): Primary | ICD-10-CM

## 2017-05-19 DIAGNOSIS — I50.22 CHRONIC SYSTOLIC HEART FAILURE (H): ICD-10-CM

## 2017-05-19 DIAGNOSIS — E78.5 HYPERLIPIDEMIA LDL GOAL <100: ICD-10-CM

## 2017-05-19 DIAGNOSIS — S83.411A SPRAIN OF MEDIAL COLLATERAL LIGAMENT OF RIGHT KNEE, INITIAL ENCOUNTER: ICD-10-CM

## 2017-05-19 DIAGNOSIS — I35.0 NONRHEUMATIC AORTIC VALVE STENOSIS: ICD-10-CM

## 2017-05-19 DIAGNOSIS — Z79.01 LONG-TERM (CURRENT) USE OF ANTICOAGULANTS: ICD-10-CM

## 2017-05-19 DIAGNOSIS — I47.29 PAROXYSMAL VENTRICULAR TACHYCARDIA (H): ICD-10-CM

## 2017-05-19 DIAGNOSIS — K56.609 SMALL BOWEL OBSTRUCTION (H): ICD-10-CM

## 2017-05-19 DIAGNOSIS — N18.30 CKD (CHRONIC KIDNEY DISEASE) STAGE 3, GFR 30-59 ML/MIN (H): ICD-10-CM

## 2017-05-19 DIAGNOSIS — K21.9 GASTROESOPHAGEAL REFLUX DISEASE WITHOUT ESOPHAGITIS: ICD-10-CM

## 2017-05-19 DIAGNOSIS — I42.9 PRIMARY CARDIOMYOPATHY (H): ICD-10-CM

## 2017-05-19 DIAGNOSIS — I10 HYPERTENSION GOAL BP (BLOOD PRESSURE) < 140/90: ICD-10-CM

## 2017-05-19 DIAGNOSIS — I35.2 NONRHEUMATIC AORTIC INSUFFICIENCY WITH AORTIC STENOSIS: ICD-10-CM

## 2017-05-19 LAB
CREAT UR-MCNC: 24 MG/DL
ERYTHROCYTE [DISTWIDTH] IN BLOOD BY AUTOMATED COUNT: 15.4 % (ref 10–15)
HCT VFR BLD AUTO: 41.3 % (ref 40–53)
HGB BLD-MCNC: 13 G/DL (ref 13.3–17.7)
INR POINT OF CARE: 4.8 (ref 0.86–1.14)
MCH RBC QN AUTO: 30.1 PG (ref 26.5–33)
MCHC RBC AUTO-ENTMCNC: 31.5 G/DL (ref 31.5–36.5)
MCV RBC AUTO: 96 FL (ref 78–100)
MICROALBUMIN UR-MCNC: 5 MG/L
MICROALBUMIN/CREAT UR: 22.97 MG/G CR (ref 0–17)
PLATELET # BLD AUTO: 260 10E9/L (ref 150–450)
RBC # BLD AUTO: 4.32 10E12/L (ref 4.4–5.9)
WBC # BLD AUTO: 8.1 10E9/L (ref 4–11)

## 2017-05-19 PROCEDURE — 82043 UR ALBUMIN QUANTITATIVE: CPT | Performed by: FAMILY MEDICINE

## 2017-05-19 PROCEDURE — 36416 COLLJ CAPILLARY BLOOD SPEC: CPT

## 2017-05-19 PROCEDURE — 99214 OFFICE O/P EST MOD 30 MIN: CPT | Performed by: FAMILY MEDICINE

## 2017-05-19 PROCEDURE — 85027 COMPLETE CBC AUTOMATED: CPT | Performed by: FAMILY MEDICINE

## 2017-05-19 PROCEDURE — 80048 BASIC METABOLIC PNL TOTAL CA: CPT | Performed by: FAMILY MEDICINE

## 2017-05-19 PROCEDURE — 85610 PROTHROMBIN TIME: CPT | Mod: QW

## 2017-05-19 RX ORDER — WARFARIN SODIUM 5 MG/1
TABLET ORAL
Qty: 90 TABLET | Refills: 1 | Status: CANCELLED | OUTPATIENT
Start: 2017-05-19

## 2017-05-19 RX ORDER — METOPROLOL SUCCINATE 100 MG/1
100 TABLET, EXTENDED RELEASE ORAL DAILY
Qty: 90 TABLET | Refills: 3 | Status: SHIPPED | OUTPATIENT
Start: 2017-05-19 | End: 2018-05-31

## 2017-05-19 RX ORDER — SIMVASTATIN 20 MG
20 TABLET ORAL AT BEDTIME
Qty: 90 TABLET | Refills: 3 | Status: SHIPPED | OUTPATIENT
Start: 2017-05-19 | End: 2018-05-31

## 2017-05-19 RX ORDER — DIGOXIN 125 MCG
125 TABLET ORAL
Qty: 45 TABLET | Refills: 3 | Status: SHIPPED | OUTPATIENT
Start: 2017-05-19 | End: 2018-05-14

## 2017-05-19 RX ORDER — POTASSIUM CHLORIDE 750 MG/1
10 TABLET, EXTENDED RELEASE ORAL DAILY
Qty: 90 TABLET | Refills: 3 | Status: SHIPPED | OUTPATIENT
Start: 2017-05-19 | End: 2018-05-31

## 2017-05-19 RX ORDER — TORSEMIDE 20 MG/1
20 TABLET ORAL DAILY
Qty: 90 TABLET | Refills: 3 | Status: SHIPPED | OUTPATIENT
Start: 2017-05-19 | End: 2018-05-31

## 2017-05-19 NOTE — PROGRESS NOTES
SUBJECTIVE:                                                    Shahid Villalta is a 77 year old male who presents to clinic today for the following health issues:    Patient presents with:  Musculoskeletal Problem    Patient with history of chronic systolic CHF with recent echocardiogram 5/2017 showing mildly reduced EF at 45-50%.  Biventricular ICD in place.    History of coronary artery disease with occluded right coronary artery and ischemic cardiomyopathy.    Patient with history of aortic stenosis that has been stable over the past 1-1/2 years but is severe. Followed by cardiology with echocardiogram for 6 months as he is continuing to be asymptomatic from a valve standpoint.    History of atrial fibrillation currently rate controlled on metoprolol and on Coumadin for thrombosis prevention.    History of prostate cancer status post prostatectomy with Dr. Hilario.     Previous small bowel obstruction with hospitalization last year after prostate surgery. Patient doing well from GI perspective.    Patient Active Problem List   Diagnosis     Atrial fibrillation (H)     Anticoagulation monitoring, INR range 2-3     Hyperlipidemia LDL goal <100     Chronic rhinitis     GERD (gastroesophageal reflux disease)     Prostate cancer     Microalbuminuria     Elevated serum creatinine     Impaired fasting glucose     Parathyroid hormone excess (H)     Vitamin D deficiency     Advanced directives, counseling/discussion     Aortic insufficiency with aortic stenosis     Hypertension goal BP (blood pressure) < 140/90     Health Care Home     Ventricular tachycardia     Heart failure (H)     CKD (chronic kidney disease) stage 3, GFR 30-59 ml/min     Cardiomyopathy (H)     Primary cardiomyopathy (H)     Automatic implantable cardioverter-defibrillator in situ     Aortic valve stenosis     Paroxysmal ventricular tachycardia (H)     Atrial tachycardia, paroxysmal (H)     Cardiomyopathy in other diseases classified elsewhere     Need  for prophylactic measure     Anemia     Coronary artery disease     Pelvic hematoma, male     S/P prostatectomy     Physical deconditioning     Small bowel obstruction (H)     Long-term (current) use of anticoagulants [Z79.01]     Past Surgical History:   Procedure Laterality Date     BIOPSY  annually    prostate biopsy     CARDIAC SURGERY  2012    A fib Ablation     CARDIAC SURGERY      cardioversion     COLONOSCOPY       DAVINCI PROSTATECTOMY N/A 2015    Procedure: DAVINCI PROSTATECTOMY;  Surgeon: Nahun Hilario MD;  Location: [facility] OR     GENITOURINARY SURGERY      bladder surgery      H ABLATION AV NODE  13     H ABLATION FOCAL AFIB  13     HC TOOTH EXTRACTION W/FORCEP       TONSILLECTOMY & ADENOIDECTOMY         Social History   Substance Use Topics     Smoking status: Never Smoker     Smokeless tobacco: Never Used     Alcohol use No      Comment: AA since      Family History   Problem Relation Age of Onset     C.A.D. Father      CHF  at 74     CEREBROVASCULAR DISEASE Father      C.A.D. Mother      CHF at 91 still living     CEREBROVASCULAR DISEASE Mother      TIAs     Breast Cancer Mother      HEART DISEASE Son      arrythmia         ROS:  RESP: Stable dyspnea with exertion, no cough  CV: Denies chest pain or palpitations    OBJECTIVE:                                                    /66 (BP Location: Right arm, Patient Position: Chair, Cuff Size: Adult Large)  Pulse 72  Temp 97.7  F (36.5  C) (Oral)  Resp 20  Wt 247 lb 4.8 oz (112.2 kg)  BMI 39.92 kg/m2Body mass index is 39.92 kg/(m^2).  GENERAL APPEARANCE: alert, no distress and obese  RESP: lungs clear to auscultation - no rales, rhonchi or wheezes  CV: regular rates and rhythm and no murmur, click or rub     ASSESSMENT/PLAN:                                                    1. Paroxysmal atrial fibrillation (H)  Continue beta blocker, digoxin, Coumadin for thrombosis prevention. Continue amiodarone.  Follow up with cardiology as planned.  - metoprolol (TOPROL-XL) 100 MG 24 hr tablet; Take 1 tablet (100 mg) by mouth daily  Dispense: 90 tablet; Refill: 3    2. Cardiomyopathy (H)  Stable with current medications, continue diuretic.  - torsemide (DEMADEX) 20 MG tablet; Take 1 tablet (20 mg) by mouth daily  Dispense: 90 tablet; Refill: 3    3. Chronic systolic heart failure (H)  - digoxin (LANOXIN) 125 MCG tablet; Take 1 tablet (125 mcg) by mouth every 48 hours  Dispense: 45 tablet; Refill: 3  - potassium chloride (K-TAB,KLOR-CON) 10 MEQ tablet; Take 1 tablet (10 mEq) by mouth daily  Dispense: 90 tablet; Refill: 3  - torsemide (DEMADEX) 20 MG tablet; Take 1 tablet (20 mg) by mouth daily  Dispense: 90 tablet; Refill: 3  - CBC with platelets    4. Hypertension goal BP (blood pressure) < 140/90  - Basic metabolic panel    5. CKD (chronic kidney disease) stage 3, GFR 30-59 ml/min  - Albumin Random Urine Quantitative  - CBC with platelets    6. Hyperlipidemia LDL goal <100  - simvastatin (ZOCOR) 20 MG tablet; Take 1 tablet (20 mg) by mouth At Bedtime  Dispense: 90 tablet; Refill: 3    7. Gastroesophageal reflux disease without esophagitis  - omeprazole (PRILOSEC) 20 MG CR capsule; Take 1 capsule (20 mg) by mouth daily  Dispense: 90 capsule; Refill: 3    8. Prostate cancer    9. Coronary artery disease involving native coronary artery of native heart without angina pectoris    10. Small bowel obstruction (H)    11. Sprain of medial collateral ligament of right knee, initial encounter    Gume Brown MD  [facility]

## 2017-05-19 NOTE — PROGRESS NOTES
ANTICOAGULATION FOLLOW-UP CLINIC VISIT    Patient Name:  Shahid Villalta  Date:  5/19/2017  Contact Type:  Face to Face    SUBJECTIVE:     Patient Findings     Positives Extra doses           OBJECTIVE    INR Protime   Date Value Ref Range Status   05/19/2017 4.8 (A) 0.86 - 1.14 Final       ASSESSMENT / PLAN  INR assessment SUPRA    Recheck INR In: 10 DAYS    INR Location Clinic      Anticoagulation Summary as of 5/19/2017     INR goal 2.0-3.0   Today's INR 4.8!   Maintenance plan 2.5 mg (5 mg x 0.5) on Wed; 5 mg (5 mg x 1) all other days   Full instructions 5/19: Hold; Otherwise 2.5 mg on Wed; 5 mg all other days   Weekly total 32.5 mg   Plan last modified Isela Hua RN (4/20/2016)   Next INR check 5/31/2017   Target end date     Indications   Long-term (current) use of anticoagulants [Z79.01] [Z79.01]  Paroxysmal ventricular tachycardia (H) [I47.2]         Anticoagulation Episode Summary     INR check location     Preferred lab     Send INR reminders to [facility] TRI[facility]    Comments             See the Encounter Report to view Anticoagulation Flowsheet and Dosing Calendar (Go to Encounters tab in chart review, and find the Anticoagulation Therapy Visit)    Dosage adjustment made based on physician directed care plan.    Manisha Pierson RN

## 2017-05-19 NOTE — MR AVS SNAPSHOT
Shahid Villalta   5/19/2017 9:00 AM   Anticoagulation Therapy Visit    Description:  77 year old male   Provider:   ANTICOAGULATION CLINIC   Department:  [facility]           INR as of 5/19/2017     Today's INR 4.8!      Anticoagulation Summary as of 5/19/2017     INR goal 2.0-3.0   Today's INR 4.8!   Full instructions 5/19: Hold; Otherwise 2.5 mg on Wed; 5 mg all other days   Next INR check 5/31/2017    Indications   Long-term (current) use of anticoagulants [Z79.01] [Z79.01]  Paroxysmal ventricular tachycardia (H) [I47.2]         Your next Anticoagulation Clinic appointment(s)     May 31, 2017  8:30 AM CDT   Anticoagulation Visit with  ANTICOAGULATION CLINIC   [facility]    [address] [facility] [address] [phone]              Contact Numbers     [facility]  Please call [phone] with any problems or questions regarding your therapy, or to cancel or reschedule your appointment.          May 2017 Details    Sun Mon Tue Wed Thu Fri Sat      1               2               3               4               5               6                 7               8               9               10               11               12               13                 14               15               16               17               18               19      Hold   See details      20      5 mg           21      5 mg         22      5 mg         23      5 mg         24      2.5 mg         25      5 mg         26      5 mg         27      5 mg           28      5 mg         29      5 mg         30      5 mg         31                Date Details   05/19 This INR check       Date of next INR:  5/31/2017         How to take your warfarin dose     To take:  2.5 mg Take 0.5 of a 5 mg tablet.    To take:  5 mg Take 1 of the 5 mg tablets.    Hold Do not take your warfarin dose. See the Details table to the right for additional instructions.

## 2017-05-19 NOTE — MR AVS SNAPSHOT
After Visit Summary   5/19/2017    Shahid Villalta    MRN: [id number]           Patient Information     Date Of Birth          1940        Visit Information        Provider Department      5/19/2017 8:30 AM Gume Brown MD [facility]        Today's Diagnoses     Paroxysmal atrial fibrillation (H)    -  1    Cardiomyopathy (H)        Chronic systolic heart failure (H)        Hypertension goal BP (blood pressure) < 140/90        CKD (chronic kidney disease) stage 3, GFR 30-59 ml/min        Hyperlipidemia LDL goal <100        Gastroesophageal reflux disease without esophagitis        Prostate cancer        Coronary artery disease involving native coronary artery of native heart without angina pectoris        Small bowel obstruction (H)        Sprain of medial collateral ligament of right knee, initial encounter           Follow-ups after your visit        Your next 10 appointments already scheduled     May 22, 2017  4:30 PM CDT   Remote ICD Check with STEWART DCR2   [facility] [place] ([facility])    [address] [place] [address] [address]  [facility] [address]   [phone]           This appointment is for a remote check of your debrillator.  This is not an appointment at the office.            Nov 06, 2017  9:00 AM CST   Return Visit with [facility] ONCOLOGY NURSE   [facility] ([facility])    [facility]  [address]  [facility] [address]   [phone]            Nov 08, 2017  1:30 PM CST   Return Visit with Nahun Hilario MD   [facility] ([facility])    [facility]  [address]  [place] [address]   [phone]              Who to contact     If you have questions or need follow up information about today's clinic visit or your schedule please contact [facility] directly at  [phone].  Normal or non-critical lab and imaging results will be communicated to you by MyChart, letter or phone within 4 business days after the clinic has received the results. If you do not hear from us within 7 days, please contact the clinic through [employer] or phone. If you have a critical or abnormal lab result, we will notify you by phone as soon as possible.  Submit refill requests through [employer] or call your pharmacy and they will forward the refill request to us. Please allow 3 business days for your refill to be completed.          Additional Information About Your Visit        [employer] Information     [employer] gives you secure access to your electronic health record. If you see a primary care provider, you can also send messages to your care team and make appointments. If you have questions, please call your primary care clinic.  If you do not have a primary care provider, please call [phone] and they will assist you.        Care EveryWhere ID     This is your Care EveryWhere ID. This could be used by other organizations to access your [place] medical records  [id number]        Your Vitals Were     Pulse Temperature Respirations BMI (Body Mass Index)          72 97.7  F (36.5  C) (Oral) 20 39.92 kg/m2         Blood Pressure from Last 3 Encounters:   05/19/17 122/66   05/17/17 124/68   05/10/17 112/68    Weight from Last 3 Encounters:   05/19/17 247 lb 4.8 oz (112.2 kg)   05/17/17 246 lb 8 oz (111.8 kg)   05/10/17 246 lb (111.6 kg)              We Performed the Following     Albumin Random Urine Quantitative     Basic metabolic panel     CBC with platelets          Where to get your medicines      These medications were sent to [facility] - MARIANO Dietz - [address] AT [facility]  [address] [place] [address]     Phone:  [phone]     digoxin 125 MCG tablet    metoprolol 100 MG 24 hr tablet    omeprazole 20 MG CR capsule    potassium chloride  10 MEQ tablet    simvastatin 20 MG tablet    torsemide 20 MG tablet          Primary Care Provider Office Phone # Fax #    Gume Brown [phone]       [facility] [address]  [facility] [address]        Thank you!     Thank you for choosing [facility]  for your care. Our goal is always to provide you with excellent care. Hearing back from our patients is one way we can continue to improve our services. Please take a few minutes to complete the written survey that you may receive in the mail after your visit with us. Thank you!             Your Updated Medication List - Protect others around you: Learn how to safely use, store and throw away your medicines at www.disposemymeds.org.          This list is accurate as of: 5/19/17  9:01 AM.  Always use your most recent med list.                   Brand Name Dispense Instructions for use    acetaminophen 325 MG tablet    TYLENOL     Take 650 mg by mouth 3 times daily       amiodarone 200 MG tablet    PACERONE/CODARONE    50 tablet    Take PO 1/2 tablet daily-Take extra prn per MD recommendations.       ASPIRIN NOT PRESCRIBED    INTENTIONAL    0 each    1 each daily Antiplatelet medication not prescribed intentionally due to Current anticoagulant therapy (warfarin/enoxaparin)       cetirizine 10 MG tablet    zyrTEC    90 tablet    Take 1 tablet (10 mg) by mouth daily       cholecalciferol 1000 UNIT tablet    vitamin D    180 tablet    Take 2 tablets (2,000 Units) by mouth daily       digoxin 125 MCG tablet    LANOXIN    45 tablet    Take 1 tablet (125 mcg) by mouth every 48 hours       lisinopril 2.5 MG tablet    PRINIVIL/Zestril    90 tablet    Take 1 tablet (2.5 mg) by mouth daily       metoprolol 100 MG 24 hr tablet    TOPROL-XL    90 tablet    Take 1 tablet (100 mg) by mouth daily       omeprazole 20 MG CR capsule    priLOSEC    90 capsule    Take 1 capsule (20 mg) by mouth daily       polyethylene glycol  powder    MIRALAX/GLYCOLAX     Take 17 g by mouth daily as needed for constipation       potassium chloride 10 MEQ tablet    K-TAB,KLOR-CON    90 tablet    Take 1 tablet (10 mEq) by mouth daily       senna-docusate 8.6-50 MG per tablet    SENOKOT-S;PERICOLACE     Take 1 tablet by mouth daily       simethicone 80 MG chewable tablet    MYLICON    180 tablet    Take 1 tablet (80 mg) by mouth every 6 hours as needed for cramping       simvastatin 20 MG tablet    ZOCOR    90 tablet    Take 1 tablet (20 mg) by mouth At Bedtime       torsemide 20 MG tablet    DEMADEX    90 tablet    Take 1 tablet (20 mg) by mouth daily       VIAGRA 25 MG cap/tab   Generic drug:  sildenafil      Take 25 mg by mouth as needed       warfarin 5 MG tablet    COUMADIN    90 tablet    Take 5 mg 6 days week, take 2.5 mg one day week, and as directed.

## 2017-05-20 LAB
ANION GAP SERPL CALCULATED.3IONS-SCNC: 7 MMOL/L (ref 3–14)
BUN SERPL-MCNC: 32 MG/DL (ref 7–30)
CALCIUM SERPL-MCNC: 9.5 MG/DL (ref 8.5–10.1)
CHLORIDE SERPL-SCNC: 107 MMOL/L (ref 94–109)
CO2 SERPL-SCNC: 29 MMOL/L (ref 20–32)
CREAT SERPL-MCNC: 1.58 MG/DL (ref 0.66–1.25)
GFR SERPL CREATININE-BSD FRML MDRD: 43 ML/MIN/1.7M2
GLUCOSE SERPL-MCNC: 102 MG/DL (ref 70–99)
POTASSIUM SERPL-SCNC: 5 MMOL/L (ref 3.4–5.3)
SODIUM SERPL-SCNC: 143 MMOL/L (ref 133–144)

## 2017-05-22 ENCOUNTER — ALLIED HEALTH/NURSE VISIT (OUTPATIENT)
Dept: CARDIOLOGY | Facility: CLINIC | Age: 77
End: 2017-05-22
Payer: COMMERCIAL

## 2017-05-22 DIAGNOSIS — Z95.810 ICD (IMPLANTABLE CARDIOVERTER-DEFIBRILLATOR) IN PLACE: Primary | ICD-10-CM

## 2017-05-22 PROCEDURE — 93296 REM INTERROG EVL PM/IDS: CPT | Performed by: INTERNAL MEDICINE

## 2017-05-22 PROCEDURE — 93295 DEV INTERROG REMOTE 1/2/MLT: CPT | Performed by: INTERNAL MEDICINE

## 2017-05-22 NOTE — PROGRESS NOTES
[employer]/ ICD Remote Device Check  AP: 32 % BVP: 99 %  Mode: DDDR        Presenting Rhythm: AS/BVP  Heart Rate: Adequate variation per histogram. But upper rate set at 95.   Sensing: stable    Pacing Threshold: na    Impedance: stable  Battery Status: 6 years  Atrial Arrhythmia: 863 episodes of PACs or ST stored as mode switches. No afib. Mode switch rate is at 100.   Ventricular Arrhythmia: 10 NSVT episodes. One with EGM was 3 beats of VT at rate 180s.   ATP: none    Shocks: none    Care Plan: f/u 3 months remote ICD check. Called and informed pt of results. He is feeling well. IRENE

## 2017-05-22 NOTE — MR AVS SNAPSHOT
After Visit Summary   5/22/2017    Shahid Villalta    MRN: [id number]           Patient Information     Date Of Birth          1940        Visit Information        Provider Department      5/22/2017 4:30 PM STEWART DCR2 [facility]        Today's Diagnoses     ICD (implantable cardioverter-defibrillator) in place    -  1       Follow-ups after your visit        Your next 10 appointments already scheduled     May 22, 2017  4:30 PM CDT   Remote ICD Check with STEWART DCR2   [facility] ([facility])    [address] [place] [address]   [phone]           This appointment is for a remote check of your debrillator.  This is not an appointment at the office.            May 31, 2017  8:30 AM CDT   Anticoagulation Visit with [facility]   [facility]    [address] [facility] [address]   [phone]            Aug 28, 2017  4:30 PM CDT   Remote ICD Check with STEWART DCR2   [facility] ([facility])    [address] [place] [address] [address]  [facility] [address] [phone]           This appointment is for a remote check of your debrillator.  This is not an appointment at the office.            Nov 06, 2017  9:00 AM CST   Return Visit with  ONCOLOGY NURSE   [facility] ([facility])    [facility]  [address]  [facility] [address]   [phone]            Nov 08, 2017  1:30 PM CST   Return Visit with Nahun Hilario MD   [facility] ([facility])    [facility]  [address]  [facility] [address]   [phone]              Who to contact     If you have questions or need follow up information about today's clinic visit or your  schedule please contact [facility] directly at [phone].  Normal or non-critical lab and imaging results will be communicated to you by MyChart, letter or phone within 4 business days after the clinic has received the results. If you do not hear from us within 7 days, please contact the clinic through [employer] or phone. If you have a critical or abnormal lab result, we will notify you by phone as soon as possible.  Submit refill requests through [employer] or call your pharmacy and they will forward the refill request to us. Please allow 3 business days for your refill to be completed.          Additional Information About Your Visit        [employer] Information     [employer] gives you secure access to your electronic health record. If you see a primary care provider, you can also send messages to your care team and make appointments. If you have questions, please call your primary care clinic.  If you do not have a primary care provider, please call [phone] and they will assist you.        Care EveryWhere ID     This is your Care EveryWhere ID. This could be used by other organizations to access your [place] medical records  [id number]         Blood Pressure from Last 3 Encounters:   05/19/17 122/66   05/17/17 124/68   05/10/17 112/68    Weight from Last 3 Encounters:   05/19/17 112.2 kg (247 lb 4.8 oz)   05/17/17 111.8 kg (246 lb 8 oz)   05/10/17 111.6 kg (246 lb)              We Performed the Following     ICD DEVICE INTERROGAT REMOTE (17053)     INTERROGATION DEVICE EVAL REMOTE, PACER/ICD (33022)        Primary Care Provider Office Phone # Fax #    Gume Brown [phone]       [facility] [address]  [facility] [address]        Thank you!     Thank you for choosing [facility]  for your care. Our goal is always to provide you with excellent care. Hearing back from our patients is one  way we can continue to improve our services. Please take a few minutes to complete the written survey that you may receive in the mail after your visit with us. Thank you!             Your Updated Medication List - Protect others around you: Learn how to safely use, store and throw away your medicines at www.disposemymeds.org.          This list is accurate as of: 5/22/17  2:02 PM.  Always use your most recent med list.                   Brand Name Dispense Instructions for use    acetaminophen 325 MG tablet    TYLENOL     Take 650 mg by mouth 3 times daily       amiodarone 200 MG tablet    PACERONE/CODARONE    50 tablet    Take PO 1/2 tablet daily-Take extra prn per MD recommendations.       ASPIRIN NOT PRESCRIBED    INTENTIONAL    0 each    1 each daily Antiplatelet medication not prescribed intentionally due to Current anticoagulant therapy (warfarin/enoxaparin)       cetirizine 10 MG tablet    zyrTEC    90 tablet    Take 1 tablet (10 mg) by mouth daily       cholecalciferol 1000 UNIT tablet    vitamin D    180 tablet    Take 2 tablets (2,000 Units) by mouth daily       digoxin 125 MCG tablet    LANOXIN    45 tablet    Take 1 tablet (125 mcg) by mouth every 48 hours       lisinopril 2.5 MG tablet    PRINIVIL/Zestril    90 tablet    Take 1 tablet (2.5 mg) by mouth daily       metoprolol 100 MG 24 hr tablet    TOPROL-XL    90 tablet    Take 1 tablet (100 mg) by mouth daily       omeprazole 20 MG CR capsule    priLOSEC    90 capsule    Take 1 capsule (20 mg) by mouth daily       polyethylene glycol powder    MIRALAX/GLYCOLAX     Take 17 g by mouth daily as needed for constipation       potassium chloride 10 MEQ tablet    K-TAB,KLOR-CON    90 tablet    Take 1 tablet (10 mEq) by mouth daily       senna-docusate 8.6-50 MG per tablet    SENOKOT-S;PERICOLACE     Take 1 tablet by mouth daily       simethicone 80 MG chewable tablet    MYLICON    180 tablet    Take 1 tablet (80 mg) by mouth every 6 hours as needed for  cramping       simvastatin 20 MG tablet    ZOCOR    90 tablet    Take 1 tablet (20 mg) by mouth At Bedtime       torsemide 20 MG tablet    DEMADEX    90 tablet    Take 1 tablet (20 mg) by mouth daily       VIAGRA 25 MG cap/tab   Generic drug:  sildenafil      Take 25 mg by mouth as needed       warfarin 5 MG tablet    COUMADIN    90 tablet    Take 5 mg 6 days week, take 2.5 mg one day week, and as directed.

## 2017-05-31 ENCOUNTER — ANTICOAGULATION THERAPY VISIT (OUTPATIENT)
Dept: NURSING | Facility: CLINIC | Age: 77
End: 2017-05-31
Payer: COMMERCIAL

## 2017-05-31 DIAGNOSIS — I47.29 PAROXYSMAL VENTRICULAR TACHYCARDIA (H): ICD-10-CM

## 2017-05-31 DIAGNOSIS — Z79.01 LONG-TERM (CURRENT) USE OF ANTICOAGULANTS: ICD-10-CM

## 2017-05-31 LAB — INR POINT OF CARE: 3.9 (ref 0.86–1.14)

## 2017-05-31 PROCEDURE — 36416 COLLJ CAPILLARY BLOOD SPEC: CPT

## 2017-05-31 PROCEDURE — 85610 PROTHROMBIN TIME: CPT | Mod: QW

## 2017-05-31 PROCEDURE — 99207 ZZC NO CHARGE NURSE ONLY: CPT

## 2017-05-31 NOTE — MR AVS SNAPSHOT
Shahid Villalta   5/31/2017 8:30 AM   Anticoagulation Therapy Visit    Description:  77 year old male   Provider:   ANTICOAGULATION CLINIC   Department:   Nurse           INR as of 5/31/2017     Today's INR 3.9!      Anticoagulation Summary as of 5/31/2017     INR goal 2.0-3.0   Today's INR 3.9!   Full instructions 5/31: Hold; 6/2: 2.5 mg; 6/9: 2.5 mg; Otherwise 2.5 mg on Wed; 5 mg all other days   Next INR check 6/12/2017    Indications   Long-term (current) use of anticoagulants [Z79.01] [Z79.01]  Paroxysmal ventricular tachycardia (H) [I47.2]         Your next Anticoagulation Clinic appointment(s)     Jun 12, 2017  8:30 AM CDT   Anticoagulation Visit with  ANTICOAGULATION CLINIC   [facility]    [address] [address] [phone]              Contact Numbers     [facility]  Please call [phone] with any problems or questions regarding your therapy, or to cancel or reschedule your appointment.          May 2017 Details    Sun Mon Tue Wed Thu Fri Sat      1               2               3               4               5               6                 7               8               9               10               11               12               13                 14               15               16               17               18               19               20                 21               22               23               24               25               26               27                 28               29               30               31      Hold   See details          Date Details   05/31 This INR check               How to take your warfarin dose     Hold Do not take your warfarin dose. See the Details table to the right for additional instructions.                June 2017 Details    Sun Mon Tue Wed Thu Fri Sat         1      5 mg         2      2.5 mg         3      5 mg           4      5 mg         5       5 mg         6      5 mg         7      2.5 mg         8      5 mg         9      2.5 mg         10      5 mg           11      5 mg         12            13               14               15               16               17                 18               19               20               21               22               23               24                 25               26               27               28               29               30                 Date Details   No additional details    Date of next INR:  6/12/2017         How to take your warfarin dose     To take:  2.5 mg Take 0.5 of a 5 mg tablet.    To take:  5 mg Take 1 of the 5 mg tablets.

## 2017-05-31 NOTE — PROGRESS NOTES
ANTICOAGULATION FOLLOW-UP CLINIC VISIT    Patient Name:  Shahid Villalta  Date:  5/31/2017  Contact Type:  Face to Face    SUBJECTIVE:     Patient Findings     Positives Change in diet/appetite, No Problem Findings    Comments Pt cuts his amio in half and states that it doesn't cut evenly so sometimes he thinks he gets more one day and less the other.           OBJECTIVE    INR Protime   Date Value Ref Range Status   05/31/2017 3.9 (A) 0.86 - 1.14 Final       ASSESSMENT / PLAN  INR assessment SUPRA    Recheck INR In: 10 DAYS    INR Location Clinic      Anticoagulation Summary as of 5/31/2017     INR goal 2.0-3.0   Today's INR 3.9!   Maintenance plan 2.5 mg (5 mg x 0.5) on Wed; 5 mg (5 mg x 1) all other days   Full instructions 5/31: Hold; 6/2: 2.5 mg; 6/9: 2.5 mg; Otherwise 2.5 mg on Wed; 5 mg all other days   Weekly total 32.5 mg   Plan last modified Isela Hua RN (4/20/2016)   Next INR check 6/12/2017   Target end date     Indications   Long-term (current) use of anticoagulants [Z79.01] [Z79.01]  Paroxysmal ventricular tachycardia (H) [I47.2]         Anticoagulation Episode Summary     INR check location     Preferred lab     Send INR reminders to [facility]    Comments             See the Encounter Report to view Anticoagulation Flowsheet and Dosing Calendar (Go to Encounters tab in chart review, and find the Anticoagulation Therapy Visit)    Dosage adjustment made based on physician directed care plan.    Manisha Pierson RN

## 2017-06-12 ENCOUNTER — ANTICOAGULATION THERAPY VISIT (OUTPATIENT)
Dept: NURSING | Facility: CLINIC | Age: 77
End: 2017-06-12
Payer: COMMERCIAL

## 2017-06-12 DIAGNOSIS — Z79.01 LONG-TERM (CURRENT) USE OF ANTICOAGULANTS: ICD-10-CM

## 2017-06-12 DIAGNOSIS — I47.29 PAROXYSMAL VENTRICULAR TACHYCARDIA (H): ICD-10-CM

## 2017-06-12 LAB — INR POINT OF CARE: 3 (ref 0.86–1.14)

## 2017-06-12 PROCEDURE — 36416 COLLJ CAPILLARY BLOOD SPEC: CPT

## 2017-06-12 PROCEDURE — 99207 ZZC NO CHARGE NURSE ONLY: CPT

## 2017-06-12 PROCEDURE — 85610 PROTHROMBIN TIME: CPT | Mod: QW

## 2017-06-12 RX ORDER — WARFARIN SODIUM 5 MG/1
TABLET ORAL
Qty: 90 TABLET | Refills: 1 | Status: SHIPPED | OUTPATIENT
Start: 2017-06-12 | End: 2017-09-06

## 2017-06-12 NOTE — MR AVS SNAPSHOT
Shahid Villalta   6/12/2017 8:30 AM   Anticoagulation Therapy Visit    Description:  77 year old male   Provider:   ANTICOAGULATION CLINIC   Department:  [facility] Nurse           INR as of 6/12/2017     Today's INR 3.0      Anticoagulation Summary as of 6/12/2017     INR goal 2.0-3.0   Today's INR 3.0   Full instructions 2.5 mg on Wed, Fri; 5 mg all other days   Next INR check 6/26/2017    Indications   Long-term (current) use of anticoagulants [Z79.01] [Z79.01]  Paroxysmal ventricular tachycardia (H) [I47.2]         Your next Anticoagulation Clinic appointment(s)     Jun 26, 2017  8:30 AM CDT   Anticoagulation Visit with  ANTICOAGULATION CLINIC   [facility]    [address] [address] [phone]              Contact Numbers     [facility]  Please call [phone] with any problems or questions regarding your therapy, or to cancel or reschedule your appointment.          June 2017 Details    Sun Mon Tue Wed Thu Fri Sat         1               2               3                 4               5               6               7               8               9               10                 11               12      5 mg   See details      13      5 mg         14      2.5 mg         15      5 mg         16      2.5 mg         17      5 mg           18      5 mg         19      5 mg         20      5 mg         21      2.5 mg         22      5 mg         23      2.5 mg         24      5 mg           25      5 mg         26            27               28               29               30                 Date Details   06/12 This INR check       Date of next INR:  6/26/2017         How to take your warfarin dose     To take:  2.5 mg Take 0.5 of a 5 mg tablet.    To take:  5 mg Take 1 of the 5 mg tablets.

## 2017-06-12 NOTE — PROGRESS NOTES
ANTICOAGULATION FOLLOW-UP CLINIC VISIT    Patient Name:  Shahid Villalta  Date:  6/12/2017  Contact Type:  Face to Face    SUBJECTIVE:     Patient Findings     Positives Missed doses           OBJECTIVE    INR Protime   Date Value Ref Range Status   06/12/2017 3.0 (A) 0.86 - 1.14 Final       ASSESSMENT / PLAN  INR assessment THER    Recheck INR In: 2 WEEKS    INR Location Clinic      Anticoagulation Summary as of 6/12/2017     INR goal 2.0-3.0   Today's INR 3.0   Maintenance plan 2.5 mg (5 mg x 0.5) on Wed, Fri; 5 mg (5 mg x 1) all other days   Full instructions 2.5 mg on Wed, Fri; 5 mg all other days   Weekly total 30 mg   Plan last modified Manisha Pierson RN (6/12/2017)   Next INR check 6/26/2017   Target end date     Indications   Long-term (current) use of anticoagulants [Z79.01] [Z79.01]  Paroxysmal ventricular tachycardia (H) [I47.2]         Anticoagulation Episode Summary     INR check location     Preferred lab     Send INR reminders to [facility]    Comments             See the Encounter Report to view Anticoagulation Flowsheet and Dosing Calendar (Go to Encounters tab in chart review, and find the Anticoagulation Therapy Visit)        Manisha Pierson RN

## 2017-06-23 ENCOUNTER — OFFICE VISIT (OUTPATIENT)
Dept: SLEEP MEDICINE | Facility: CLINIC | Age: 77
End: 2017-06-23
Payer: COMMERCIAL

## 2017-06-23 VITALS
OXYGEN SATURATION: 98 % | WEIGHT: 247 LBS | TEMPERATURE: 98.1 F | SYSTOLIC BLOOD PRESSURE: 98 MMHG | DIASTOLIC BLOOD PRESSURE: 69 MMHG | HEART RATE: 80 BPM | BODY MASS INDEX: 37.44 KG/M2 | HEIGHT: 68 IN

## 2017-06-23 DIAGNOSIS — G47.31 CENTRAL SLEEP APNEA: Primary | ICD-10-CM

## 2017-06-23 DIAGNOSIS — G47.33 OSA (OBSTRUCTIVE SLEEP APNEA): ICD-10-CM

## 2017-06-23 PROCEDURE — 99213 OFFICE O/P EST LOW 20 MIN: CPT | Performed by: INTERNAL MEDICINE

## 2017-06-23 NOTE — PROGRESS NOTES
Sleep Apnea - PAP Follow-Up Visit:    Chief complaint: Follow up of ASV therapy for central sleep apnea    Shahid Villalta comes in today for follow-up of their severe sleep apnea, managed with ASV. He has been on ASV since 07/2013.  His sleep study which was done on 07/16/2013 had shown an RDI of 60 and residual central events on CPAP titration.    Overall, he rates the experience with ASV as 10 (0 poor, 10 great). The mask is comfortable. The mask is not leaking.  He is not snoring with the mask on. He is not having gasp arousals.  He is not having significant oral/nasal dryness. The pressure settings are comfortable.     He uses full-face mask.      Patient is using PAP therapy 7-8 hours per night. The patient is usually getting 7-8 hours of sleep per night.    He does feel rested in the morning.    Total score - [place]: 9 (6/23/2017  9:00 AM)    Respironics  ASV download, min EPAP 5 cm, max EPAP 15 cm, min PS 0, max PS 20 and max pressure 25:  30 total days of use. 0 nonuse days.  Average use 7 hours 26 minutes per day.  96.7% days with >4 hours use.  Large leak 2 hrs 22mins per day average.  AHI 3.4.       Reviewed by team: Allergies  Meds       Reviewed by provider:        Problem List:  Patient Active Problem List    Diagnosis Date Noted     Prostate cancer 05/14/2010     Priority: High     Stage T1c prostate cancer - followed by Urology Dr. Abarca       Chronic systolic congestive heart failure (H) 05/19/2017     Priority: Medium     Long-term (current) use of anticoagulants [Z79.01] 12/21/2015     Priority: Medium     S/P prostatectomy 12/12/2015     Priority: Medium     Pelvic hematoma, male 11/24/2015     Priority: Medium     Coronary artery disease      Priority: Medium     Occluded right coronary artery       Anemia 11/14/2015     Priority: Medium     Need for prophylactic measure 06/24/2015     Priority: Medium     Problem list name updated by automated process. Provider to review       CKD (chronic  kidney disease) stage 3, GFR 30-59 ml/min 06/06/2014     Priority: Medium     Health Care Home 07/20/2011     Priority: Low     Jacinta Nowak RN-BSN, [facility]  [phone].  FPA / [facility]G [facility]             Advanced directives, counseling/discussion 07/18/2011     Priority: Low     Advance Directive Problem List Overview:   Name Relationship Phone    Primary Health Care Agent Gume segovia [phone]         Alternative Health Care Agent          Patient states has Advance Directive and will bring in a copy to clinic. 7/18/2011   Advance Care Planning:   Receipt of ACP document:  Received: Health Care Directive which was witnessed or notarized on 7/10/2013.  Document not previously scanned.  Validation form completed and sent with document to be scanned.  Code Status reflects choices in most recent ACP document.  Confirmed/documented designated decision maker(s). See permanent comments section of demographics in clinical tab. View document(s) and details by clicking on code status.   Added by Manisha Pierson on 7/26/2013.             Cardiomyopathy in other diseases classified elsewhere 06/02/2015     Aortic valve stenosis 10/29/2014     Severe, followed with echocardiogram every 6 months       Paroxysmal ventricular tachycardia (H) 10/29/2014     Atrial tachycardia, paroxysmal (H) 10/29/2014     Cardiomyopathy (H)      Automatic implantable cardioverter-defibrillator in situ      Ventricular tachycardia 06/17/2013     Aortic insufficiency with aortic stenosis 07/18/2011     Hypertension goal BP (blood pressure) < 140/90 07/18/2011     Parathyroid hormone excess (H) 04/25/2011     Vitamin D deficiency 04/25/2011     Microalbuminuria 03/20/2011     Impaired fasting glucose 03/20/2011     GERD (gastroesophageal reflux disease) 03/16/2010     Hyperlipidemia LDL goal <100 08/07/2009     Atrial fibrillation (H) 10/08/2008     Anticoagulation monitoring, INR range 2-3 10/08/2008      "     BP 98/69  Pulse 80  Temp 98.1  F (36.7  C) (Oral)  Ht 1.727 m (5' 8\")  Wt 112 kg (247 lb)  SpO2 98%  BMI 37.56 kg/m2    Impression/Plan:     Severe sleep apnea.   CHF    Tolerating ASV well. Daytime symptoms are improved. AHI on download is 3.4 per hour.     Patient is fully informed about safety risk identified with the use of ASV therapy in patient with heart failure. Today, we agian had a discussion regarding this. Most recent echocardiogram was reviewed. Echo from 5/8/2017 showed ejection fraction at 45-50%. We decided to continue ASV therapy but reassess if EF drops below 45%.     Plan:     1. Continue ASV therapy   2. Obtain new mask and supplies       Shahid Villalta will follow up in about 1 year(s).     Fifteen minutes spent with patient, all of which were spent face-to-face counseling, consulting, coordinating plan of care.      Nadir Ashraf MD, MD    CC:  Gume Brown,   "

## 2017-06-23 NOTE — NURSING NOTE
"No chief complaint on file.      Initial BP 98/69  Pulse 80  Temp 98.1  F (36.7  C) (Oral)  Ht 1.727 m (5' 8\")  Wt 112 kg (247 lb)  SpO2 98%  BMI 37.56 kg/m2 Estimated body mass index is 37.56 kg/(m^2) as calculated from the following:    Height as of this encounter: 1.727 m (5' 8\").    Weight as of this encounter: 112 kg (247 lb).  Medication Reconciliation: complete   ESS 9/24  Patsy Malone MA      "

## 2017-06-23 NOTE — PATIENT INSTRUCTIONS

## 2017-06-23 NOTE — MR AVS SNAPSHOT
After Visit Summary   6/23/2017    Shahid Villalta    MRN: [id number]           Patient Information     Date Of Birth          1940        Visit Information        Provider Department      6/23/2017 9:30 AM Nadir Ashraf MD [facility]        Today's Diagnoses     Central sleep apnea    -  1    SHIMON (obstructive sleep apnea)          Care Instructions      Your BMI is Body mass index is 37.56 kg/(m^2).  Weight management is a personal decision.  If you are interested in exploring weight loss strategies, the following discussion covers the approaches that may be successful. Body mass index (BMI) is one way to tell whether you are at a healthy weight, overweight, or obese. It measures your weight in relation to your height.  A BMI of 18.5 to 24.9 is in the healthy range. A person with a BMI of 25 to 29.9 is considered overweight, and someone with a BMI of 30 or greater is considered obese. More than two-thirds of American adults are considered overweight or obese.  Being overweight or obese increases the risk for further weight gain. Excess weight may lead to heart disease and diabetes.  Creating and following plans for healthy eating and physical activity may help you improve your health.  Weight control is part of healthy lifestyle and includes exercise, emotional health, and healthy eating habits. Careful eating habits lifelong are the mainstay of weight control. Though there are significant health benefits from weight loss, long-term weight loss with diet alone may be very difficult to achieve- studies show long-term success with dietary management in less than 10% of people. Attaining a healthy weight may be especially difficult to achieve in those with severe obesity. In some cases, medications, devices and surgical management might be considered.  What can you do?  If you are overweight or obese and are interested in methods for weight loss, you should discuss this with your  provider.     Consider reducing daily calorie intake by 500 calories.     Keep a food journal.     Avoiding skipping meals, consider cutting portions instead.    Diet combined with exercise helps maintain muscle while optimizing fat loss. Strength training is particularly important for building and maintaining muscle mass. Exercise helps reduce stress, increase energy, and improves fitness. Increasing exercise without diet control, however, may not burn enough calories to loose weight.       Start walking three days a week 10-20 minutes at a time    Work towards walking thirty minutes five days a week     Eventually, increase the speed of your walking for 1-2 minutes at time    In addition, we recommend that you review healthy lifestyles and methods for weight loss available through the National Institutes of Health patient information sites:  http://win.niddk.nih.gov/publications/index.htm    And look into health and wellness programs that may be available through your health insurance provider, employer, local community center, or farzaneh club.    Weight management plan: Patient was referred to their PCP to discuss a diet and exercise plan.              Follow-ups after your visit        Your next 10 appointments already scheduled     Jun 26, 2017  8:30 AM CDT   Anticoagulation Visit with [facility]   [facility]    [id number] [place] [address] [phone]            Aug 28, 2017  4:30 PM CDT   Remote ICD Check with STEWART DCR2   [facility] ([facility])    [address] [place] [id number] [phone]           This appointment is for a remote check of your debrillator.  This is not an appointment at the office.            Nov 06, 2017  9:00 AM CST   Return Visit with [facility] ONCOLOGY NURSE   [facility] ([facility])    [facility]  "[facility]  [address]  [facility] [id number] [phone]            Nov 08, 2017  1:30 PM CST   Return Visit with Nahun Hilario MD   [facility] ([facility])    [facility]  [address]  [facility] [id number] [phone]              Who to contact     If you have questions or need follow up information about today's clinic visit or your schedule please contact [facility] directly at [phone].  Normal or non-critical lab and imaging results will be communicated to you by Treeve[employer]art, letter or phone within 4 business days after the clinic has received the results. If you do not hear from us within 7 days, please contact the clinic through Treeveohart or phone. If you have a critical or abnormal lab result, we will notify you by phone as soon as possible.  Submit refill requests through [employer] or call your pharmacy and they will forward the refill request to us. Please allow 3 business days for your refill to be completed.          Additional Information About Your Visit        MyChart Information     [employer] gives you secure access to your electronic health record. If you see a primary care provider, you can also send messages to your care team and make appointments. If you have questions, please call your primary care clinic.  If you do not have a primary care provider, please call [phone] and they will assist you.        Care EveryWhere ID     This is your Care EveryWhere ID. This could be used by other organizations to access your [facility] medical records  [id number]        Your Vitals Were     Pulse Temperature Height Pulse Oximetry BMI (Body Mass Index)       80 98.1  F (36.7  C) (Oral) 1.727 m (5' 8\") 98% 37.56 kg/m2        Blood Pressure from Last 3 Encounters:   06/23/17 98/69   05/19/17 122/66   05/17/17 124/68    Weight from Last 3 Encounters:   06/23/17 112 kg (247 lb) "   05/19/17 112.2 kg (247 lb 4.8 oz)   05/17/17 111.8 kg (246 lb 8 oz)              We Performed the Following     Comprehensive DME        Primary Care Provider Office Phone # Fax #    Gume Brown [phone]       [facility] [address]  [facility] [address]        Equal Access to Services     [place]A SHEARER : Hadii aad ku hadasho Soomaali, waaxda luqadaha, qaybta kaalmada adeegyada, waxay idiin hayaan adeeg kharash la'aan ah. So [facility] [phone].    ATENCIÓN: Si habla español, tiene a irving disposición servicios gratuitos de asistencia lingüística. Llame al [phone].    We comply with applicable federal civil rights laws and [place] laws. We do not discriminate on the basis of race, color, national origin, age, disability sex, sexual orientation or gender identity.            Thank you!     Thank you for choosing [facility]  for your care. Our goal is always to provide you with excellent care. Hearing back from our patients is one way we can continue to improve our services. Please take a few minutes to complete the written survey that you may receive in the mail after your visit with us. Thank you!             Your Updated Medication List - Protect others around you: Learn how to safely use, store and throw away your medicines at www.disposemymeds.org.          This list is accurate as of: 6/23/17  9:59 AM.  Always use your most recent med list.                   Brand Name Dispense Instructions for use Diagnosis    acetaminophen 325 MG tablet    TYLENOL     Take 650 mg by mouth 3 times daily        amiodarone 200 MG tablet    PACERONE/CODARONE    50 tablet    Take PO 1/2 tablet daily-Take extra prn per MD recommendations.    Paroxysmal atrial fibrillation (H)       ASPIRIN NOT PRESCRIBED    INTENTIONAL    0 each    1 each daily Antiplatelet medication not prescribed intentionally due to Current anticoagulant therapy (warfarin/enoxaparin)    Atrial  fibrillation, unspecified       cetirizine 10 MG tablet    zyrTEC    90 tablet    Take 1 tablet (10 mg) by mouth daily    Allergic rhinitis       cholecalciferol 1000 UNIT tablet    vitamin D    180 tablet    Take 2 tablets (2,000 Units) by mouth daily    Vitamin D deficiency, Parathyroid hormone excess (H)       digoxin 125 MCG tablet    LANOXIN    45 tablet    Take 1 tablet (125 mcg) by mouth every 48 hours    Chronic systolic heart failure (H)       lisinopril 2.5 MG tablet    PRINIVIL/Zestril    90 tablet    Take 1 tablet (2.5 mg) by mouth daily    Nonrheumatic aortic valve stenosis       metoprolol 100 MG 24 hr tablet    TOPROL-XL    90 tablet    Take 1 tablet (100 mg) by mouth daily    Paroxysmal atrial fibrillation (H)       omeprazole 20 MG CR capsule    priLOSEC    90 capsule    Take 1 capsule (20 mg) by mouth daily    Gastroesophageal reflux disease without esophagitis       polyethylene glycol powder    MIRALAX/GLYCOLAX     Take 17 g by mouth daily as needed for constipation        potassium chloride 10 MEQ tablet    K-TAB,KLOR-CON    90 tablet    Take 1 tablet (10 mEq) by mouth daily    Chronic systolic heart failure (H)       senna-docusate 8.6-50 MG per tablet    SENOKOT-S;PERICOLACE     Take 1 tablet by mouth daily        simethicone 80 MG chewable tablet    MYLICON    180 tablet    Take 1 tablet (80 mg) by mouth every 6 hours as needed for cramping    Abdominal bloating       simvastatin 20 MG tablet    ZOCOR    90 tablet    Take 1 tablet (20 mg) by mouth At Bedtime    Hyperlipidemia LDL goal <100       torsemide 20 MG tablet    DEMADEX    90 tablet    Take 1 tablet (20 mg) by mouth daily    Cardiomyopathy (H), Chronic systolic heart failure (H)       VIAGRA 25 MG cap/tab   Generic drug:  sildenafil      Take 25 mg by mouth as needed        * warfarin 5 MG tablet    COUMADIN    90 tablet    Take 5 mg 6 days week, take 2.5 mg one day week, and as directed.    Long-term (current) use of anticoagulants        * warfarin 5 MG tablet    COUMADIN    90 tablet    2.5mg W/F and 5 AOD or as directed    Long-term (current) use of anticoagulants, Paroxysmal ventricular tachycardia (H)       * Notice:  This list has 2 medication(s) that are the same as other medications prescribed for you. Read the directions carefully, and ask your doctor or other care provider to review them with you.

## 2017-06-26 ENCOUNTER — ANTICOAGULATION THERAPY VISIT (OUTPATIENT)
Dept: NURSING | Facility: CLINIC | Age: 77
End: 2017-06-26
Payer: COMMERCIAL

## 2017-06-26 DIAGNOSIS — I47.29 PAROXYSMAL VENTRICULAR TACHYCARDIA (H): ICD-10-CM

## 2017-06-26 DIAGNOSIS — Z79.01 LONG-TERM (CURRENT) USE OF ANTICOAGULANTS: ICD-10-CM

## 2017-06-26 LAB — INR POINT OF CARE: 1.8 (ref 0.86–1.14)

## 2017-06-26 PROCEDURE — 85610 PROTHROMBIN TIME: CPT | Mod: QW

## 2017-06-26 PROCEDURE — 99207 ZZC NO CHARGE NURSE ONLY: CPT

## 2017-06-26 PROCEDURE — 36416 COLLJ CAPILLARY BLOOD SPEC: CPT

## 2017-06-26 NOTE — MR AVS SNAPSHOT
Shahid Villalta   6/26/2017 8:30 AM   Anticoagulation Therapy Visit    Description:  77 year old male   Provider:   ANTICOAGULATION CLINIC   Department:  Lv Nurse           INR as of 6/26/2017     Today's INR 1.8!      Anticoagulation Summary as of 6/26/2017     INR goal 2.0-3.0   Today's INR 1.8!   Full instructions 6/26: 7.5 mg; Otherwise 2.5 mg on Wed, Fri; 5 mg all other days   Next INR check 7/3/2017    Indications   Long-term (current) use of anticoagulants [Z79.01] [Z79.01]  Paroxysmal ventricular tachycardia (H) [I47.2]         Your next Anticoagulation Clinic appointment(s)     Jul 03, 2017  8:45 AM CDT   Anticoagulation Visit with  ANTICOAGULATION CLINIC   [facility] ([facility])    [address] [address] [phone]              Contact Numbers     [facility]  Please call [phone] with any problems or questions regarding your therapy, or to cancel or reschedule your appointment.          June 2017 Details    Sun Mon Tue Wed Thu Fri Sat         1               2               3                 4               5               6               7               8               9               10                 11               12               13               14               15               16               17                 18               19               20               21               22               23               24                 25               26      7.5 mg   See details      27      5 mg         28      2.5 mg         29      5 mg         30      2.5 mg           Date Details   06/26 This INR check               How to take your warfarin dose     To take:  2.5 mg Take 0.5 of a 5 mg tablet.    To take:  5 mg Take 1 of the 5 mg tablets.    To take:  7.5 mg Take 1.5 of the 5 mg tablets.           July 2017 Details    Sun Mon Tue Wed Thu Fri Sat           1      5 mg           2      5 mg         3            4                5               6               7               8                 9               10               11               12               13               14               15                 16               17               18               19               20               21               22                 23               24               25               26               27               28               29                 30               31                     Date Details   No additional details    Date of next INR:  7/3/2017         How to take your warfarin dose     To take:  5 mg Take 1 of the 5 mg tablets.

## 2017-06-26 NOTE — PROGRESS NOTES
ANTICOAGULATION FOLLOW-UP CLINIC VISIT    Patient Name:  Shahid Villalta  Date:  6/26/2017  Contact Type:  Face to Face    SUBJECTIVE:     Patient Findings     Positives No Problem Findings           OBJECTIVE    INR Protime   Date Value Ref Range Status   06/26/2017 1.8 (A) 0.86 - 1.14 Final       ASSESSMENT / PLAN  INR assessment SUB    Recheck INR In: 1 WEEK    INR Location Clinic      Anticoagulation Summary as of 6/26/2017     INR goal 2.0-3.0   Today's INR 1.8!   Maintenance plan 2.5 mg (5 mg x 0.5) on Wed, Fri; 5 mg (5 mg x 1) all other days   Full instructions 6/26: 7.5 mg; Otherwise 2.5 mg on Wed, Fri; 5 mg all other days   Weekly total 30 mg   Plan last modified Manisha Pierson RN (6/12/2017)   Next INR check    Target end date     Indications   Long-term (current) use of anticoagulants [Z79.01] [Z79.01]  Paroxysmal ventricular tachycardia (H) [I47.2]         Anticoagulation Episode Summary     INR check location     Preferred lab     Send INR reminders to [facility]    Comments             See the Encounter Report to view Anticoagulation Flowsheet and Dosing Calendar (Go to Encounters tab in chart review, and find the Anticoagulation Therapy Visit)    Dosage adjustment made based on physician directed care plan.    Isela Hua, RN

## 2017-07-03 ENCOUNTER — ANTICOAGULATION THERAPY VISIT (OUTPATIENT)
Dept: NURSING | Facility: CLINIC | Age: 77
End: 2017-07-03
Payer: COMMERCIAL

## 2017-07-03 DIAGNOSIS — Z79.01 LONG-TERM (CURRENT) USE OF ANTICOAGULANTS: ICD-10-CM

## 2017-07-03 DIAGNOSIS — I47.29 PAROXYSMAL VENTRICULAR TACHYCARDIA (H): ICD-10-CM

## 2017-07-03 LAB — INR POINT OF CARE: 2.5 (ref 0.86–1.14)

## 2017-07-03 PROCEDURE — 99207 ZZC NO CHARGE NURSE ONLY: CPT

## 2017-07-03 PROCEDURE — 36416 COLLJ CAPILLARY BLOOD SPEC: CPT

## 2017-07-03 PROCEDURE — 85610 PROTHROMBIN TIME: CPT | Mod: QW

## 2017-07-03 NOTE — PROGRESS NOTES
ANTICOAGULATION FOLLOW-UP CLINIC VISIT    Patient Name:  Shahid Villalta  Date:  7/3/2017  Contact Type:  Face to Face    SUBJECTIVE:     Patient Findings     Positives No Problem Findings           OBJECTIVE    INR Protime   Date Value Ref Range Status   07/03/2017 2.5 (A) 0.86 - 1.14 Final       ASSESSMENT / PLAN  INR assessment THER    Recheck INR In: 2 WEEKS    INR Location Clinic      Anticoagulation Summary as of 7/3/2017     INR goal 2.0-3.0   Today's INR 2.5   Maintenance plan 2.5 mg (5 mg x 0.5) on Wed, Fri; 5 mg (5 mg x 1) all other days   Full instructions 7/5: 5 mg; 7/12: 5 mg; Otherwise 2.5 mg on Wed, Fri; 5 mg all other days   Weekly total 30 mg   Plan last modified Manisha Pierson RN (6/12/2017)   Next INR check 7/17/2017   Target end date     Indications   Long-term (current) use of anticoagulants [Z79.01] [Z79.01]  Paroxysmal ventricular tachycardia (H) [I47.2]         Anticoagulation Episode Summary     INR check location     Preferred lab     Send INR reminders to [facility]    Comments             See the Encounter Report to view Anticoagulation Flowsheet and Dosing Calendar (Go to Encounters tab in chart review, and find the Anticoagulation Therapy Visit)        Manisha Pierson RN

## 2017-07-03 NOTE — MR AVS SNAPSHOT
Shahid Villalta   7/3/2017 8:45 AM   Anticoagulation Therapy Visit    Description:  77 year old male   Provider:   ANTICOAGULATION CLINIC   Department:   Nurse           INR as of 7/3/2017     Today's INR 2.5      Anticoagulation Summary as of 7/3/2017     INR goal 2.0-3.0   Today's INR 2.5   Full instructions 7/5: 5 mg; 7/12: 5 mg; Otherwise 2.5 mg on Wed, Fri; 5 mg all other days   Next INR check 7/17/2017    Indications   Long-term (current) use of anticoagulants [Z79.01] [Z79.01]  Paroxysmal ventricular tachycardia (H) [I47.2]         Your next Anticoagulation Clinic appointment(s)     Jul 17, 2017  8:45 AM CDT   Anticoagulation Visit with  ANTICOAGULATION CLINIC   [facility] ([facility])    [address] [address] [phone]              Contact Numbers     [facility]  Please call [phone] with any problems or questions regarding your therapy, or to cancel or reschedule your appointment.          July 2017 Details    Sun Mon Tue Wed Thu Fri Sat           1                 2               3      5 mg   See details      4      5 mg         5      5 mg         6      5 mg         7      2.5 mg         8      5 mg           9      5 mg         10      5 mg         11      5 mg         12      5 mg         13      5 mg         14      2.5 mg         15      5 mg           16      5 mg         17            18               19               20               21               22                 23               24               25               26               27               28               29                 30               31                     Date Details   07/03 This INR check       Date of next INR:  7/17/2017         How to take your warfarin dose     To take:  2.5 mg Take 0.5 of a 5 mg tablet.    To take:  5 mg Take 1 of the 5 mg tablets.

## 2017-07-13 DIAGNOSIS — I35.0 NONRHEUMATIC AORTIC VALVE STENOSIS: ICD-10-CM

## 2017-07-13 RX ORDER — LISINOPRIL 2.5 MG/1
2.5 TABLET ORAL DAILY
Qty: 90 TABLET | Refills: 3 | Status: SHIPPED | OUTPATIENT
Start: 2017-07-13 | End: 2018-05-14

## 2017-07-13 NOTE — TELEPHONE ENCOUNTER
lisinopril      Last Written Prescription Date: 5-15-17  Last Fill Quantity: 90, # refills: 0  Last Office Visit with [facility], [facility] or [facility] prescribing provider: 5-19-17       Potassium   Date Value Ref Range Status   05/19/2017 5.0 3.4 - 5.3 mmol/L Final     Creatinine   Date Value Ref Range Status   05/19/2017 1.58 (H) 0.66 - 1.25 mg/dL Final     BP Readings from Last 3 Encounters:   06/23/17 98/69   05/19/17 122/66   05/17/17 124/68

## 2017-07-13 NOTE — TELEPHONE ENCOUNTER
Prescription approved per [facility] Refill Protocol.  Marisela Pierson RN, BSN

## 2017-07-17 ENCOUNTER — ANTICOAGULATION THERAPY VISIT (OUTPATIENT)
Dept: NURSING | Facility: CLINIC | Age: 77
End: 2017-07-17
Payer: COMMERCIAL

## 2017-07-17 DIAGNOSIS — I47.29 PAROXYSMAL VENTRICULAR TACHYCARDIA (H): ICD-10-CM

## 2017-07-17 DIAGNOSIS — Z79.01 LONG-TERM (CURRENT) USE OF ANTICOAGULANTS: ICD-10-CM

## 2017-07-17 LAB — INR POINT OF CARE: 3.3 (ref 0.86–1.14)

## 2017-07-17 PROCEDURE — 36416 COLLJ CAPILLARY BLOOD SPEC: CPT

## 2017-07-17 PROCEDURE — 85610 PROTHROMBIN TIME: CPT | Mod: QW

## 2017-07-17 NOTE — MR AVS SNAPSHOT
Shahid Villalta   7/17/2017 8:45 AM   Anticoagulation Therapy Visit    Description:  77 year old male   Provider:   ANTICOAGULATION CLINIC   Department:  Lv Nurse           INR as of 7/17/2017     Today's INR 3.3!      Anticoagulation Summary as of 7/17/2017     INR goal 2.0-3.0   Today's INR 3.3!   Full instructions 2.5 mg on Wed, Fri; 5 mg all other days   Next INR check 7/31/2017    Indications   Long-term (current) use of anticoagulants [Z79.01] [Z79.01]  Paroxysmal ventricular tachycardia (H) [I47.2]         Your next Anticoagulation Clinic appointment(s)     Jul 31, 2017  8:45 AM CDT   Anticoagulation Visit with  ANTICOAGULATION CLINIC   [facility] ([facility])    [address] [address] [phone]              Contact Numbers     [facility]  Please call [phone] with any problems or questions regarding your therapy, or to cancel or reschedule your appointment.          July 2017 Details    Sun Mon Tue Wed Thu Fri Sat           1                 2               3               4               5               6               7               8                 9               10               11               12               13               14               15                 16               17      5 mg   See details      18      5 mg         19      2.5 mg         20      5 mg         21      2.5 mg         22      5 mg           23      5 mg         24      5 mg         25      5 mg         26      2.5 mg         27      5 mg         28      2.5 mg         29      5 mg           30      5 mg         31                  Date Details   07/17 This INR check       Date of next INR:  7/31/2017         How to take your warfarin dose     To take:  2.5 mg Take 0.5 of a 5 mg tablet.    To take:  5 mg Take 1 of the 5 mg tablets.

## 2017-07-17 NOTE — PROGRESS NOTES
ANTICOAGULATION FOLLOW-UP CLINIC VISIT    Patient Name:  Shahid Villalta  Date:  7/17/2017  Contact Type:  Face to Face    SUBJECTIVE:     Patient Findings     Positives No Problem Findings    Comments Pt had a cut on his lip that bled slowly most of the day yesterday so will adjust back to maintenance dosing           OBJECTIVE    INR Protime   Date Value Ref Range Status   07/17/2017 3.3 (A) 0.86 - 1.14 Final       ASSESSMENT / PLAN  INR assessment SUPRA    Recheck INR In: 2 WEEKS    INR Location Clinic      Anticoagulation Summary as of 7/17/2017     INR goal 2.0-3.0   Today's INR 3.3!   Maintenance plan 2.5 mg (5 mg x 0.5) on Wed, Fri; 5 mg (5 mg x 1) all other days   Full instructions 2.5 mg on Wed, Fri; 5 mg all other days   Weekly total 30 mg   No change documented Manisha Pierson RN   Plan last modified Manisha Pierson RN (6/12/2017)   Next INR check 7/31/2017   Target end date     Indications   Long-term (current) use of anticoagulants [Z79.01] [Z79.01]  Paroxysmal ventricular tachycardia (H) [I47.2]         Anticoagulation Episode Summary     INR check location     Preferred lab     Send INR reminders to [facility]    Comments             See the Encounter Report to view Anticoagulation Flowsheet and Dosing Calendar (Go to Encounters tab in chart review, and find the Anticoagulation Therapy Visit)    Dosage adjustment made based on physician directed care plan.    Manisha Pierson RN

## 2017-07-31 ENCOUNTER — ANTICOAGULATION THERAPY VISIT (OUTPATIENT)
Dept: NURSING | Facility: CLINIC | Age: 77
End: 2017-07-31
Payer: COMMERCIAL

## 2017-07-31 DIAGNOSIS — Z79.01 LONG-TERM (CURRENT) USE OF ANTICOAGULANTS: ICD-10-CM

## 2017-07-31 DIAGNOSIS — I47.29 PAROXYSMAL VENTRICULAR TACHYCARDIA (H): ICD-10-CM

## 2017-07-31 DIAGNOSIS — R79.1 ELEVATED INR: Primary | ICD-10-CM

## 2017-07-31 LAB
FACT X ACT/NOR PPP CHRO: 17 % (ref 70–130)
INR POINT OF CARE: 4 (ref 0.86–1.14)

## 2017-07-31 PROCEDURE — 36416 COLLJ CAPILLARY BLOOD SPEC: CPT

## 2017-07-31 PROCEDURE — 85610 PROTHROMBIN TIME: CPT | Mod: QW

## 2017-07-31 PROCEDURE — 85260 CLOT FACTOR X STUART-POWER: CPT | Performed by: FAMILY MEDICINE

## 2017-07-31 NOTE — PROGRESS NOTES
ANTICOAGULATION FOLLOW-UP CLINIC VISIT    Patient Name:  Shahid Villalta  Date:  7/31/2017  Contact Type:  Face to Face Factor 10 pending.  Results came back and pt elevated.  Pt informed of plan via telephone    SUBJECTIVE:     Patient Findings     Positives Unexplained INR or factor level change           OBJECTIVE    INR Protime   Date Value Ref Range Status   07/31/2017 4.0 (A) 0.86 - 1.14 Final       ASSESSMENT / PLAN  INR assessment SUPRA    Recheck INR In: 1 WEEK    INR Location Clinic      Anticoagulation Summary as of 7/31/2017     INR goal 2.0-3.0   Today's INR 4.0!   Maintenance plan 2.5 mg (5 mg x 0.5) on Wed, Fri; 5 mg (5 mg x 1) all other days   Full instructions 2.5 mg on Wed, Fri; 5 mg all other days   Weekly total 30 mg   Plan last modified Manisha Pierson RN (6/12/2017)   Next INR check    Target end date     Indications   Long-term (current) use of anticoagulants [Z79.01] [Z79.01]  Paroxysmal ventricular tachycardia (H) [I47.2]         Anticoagulation Episode Summary     INR check location     Preferred lab     Send INR reminders to [facility]    Comments             See the Encounter Report to view Anticoagulation Flowsheet and Dosing Calendar (Go to Encounters tab in chart review, and find the Anticoagulation Therapy Visit)    Dosage adjustment made based on physician directed care plan.    Manisha Pierson, RN

## 2017-07-31 NOTE — MR AVS SNAPSHOT
Shahid Villalta   7/31/2017 8:45 AM   Anticoagulation Therapy Visit    Description:  77 year old male   Provider:   ANTICOAGULATION CLINIC   Department:   Nurse           INR as of 7/31/2017     Today's INR 4.0!      Anticoagulation Summary as of 7/31/2017     INR goal 2.0-3.0   Today's INR 4.0!   Full instructions 7/31: Hold; Otherwise 2.5 mg on Wed, Fri; 5 mg all other days   Next INR check 8/7/2017    Indications   Long-term (current) use of anticoagulants [Z79.01] [Z79.01]  Paroxysmal ventricular tachycardia (H) [I47.2]         Description     Factor x came back elevated as well      Your next Anticoagulation Clinic appointment(s)     Aug 07, 2017  8:45 AM CDT   Anticoagulation Visit with  ANTICOAGULATION CLINIC   [facility] ([facility]    [address] [place] [address] [phone]              Contact Numbers     [facility]  Please call [phone] with any problems or questions regarding your therapy, or to cancel or reschedule your appointment.          July 2017 Details    Sun Mon Tue Wed Thu Fri Sat           1                 2               3               4               5               6               7               8                 9               10               11               12               13               14               15                 16               17               18               19               20               21               22                 23               24               25               26               27               28               29                 30               31      Hold   See details            Date Details   07/31 This INR check               How to take your warfarin dose     Hold Do not take your warfarin dose. See the Details table to the right for additional instructions.                August 2017 Details    Sun Mon Tue Wed Thu Fri Sat       1      5 mg         2      2.5 mg          3      5 mg         4      2.5 mg         5      5 mg           6      5 mg         7            8               9               10               11               12                 13               14               15               16               17               18               19                 20               21               22               23               24               25               26                 27               28               29               30               31                  Date Details   No additional details    Date of next INR:  8/7/2017         How to take your warfarin dose     To take:  2.5 mg Take 0.5 of a 5 mg tablet.    To take:  5 mg Take 1 of the 5 mg tablets.

## 2017-08-07 ENCOUNTER — ANTICOAGULATION THERAPY VISIT (OUTPATIENT)
Dept: NURSING | Facility: CLINIC | Age: 77
End: 2017-08-07
Payer: COMMERCIAL

## 2017-08-07 DIAGNOSIS — I47.29 PAROXYSMAL VENTRICULAR TACHYCARDIA (H): ICD-10-CM

## 2017-08-07 DIAGNOSIS — Z79.01 LONG-TERM (CURRENT) USE OF ANTICOAGULANTS: ICD-10-CM

## 2017-08-07 LAB — INR POINT OF CARE: 1.4 (ref 0.86–1.14)

## 2017-08-07 PROCEDURE — 36416 COLLJ CAPILLARY BLOOD SPEC: CPT

## 2017-08-07 PROCEDURE — 85610 PROTHROMBIN TIME: CPT | Mod: QW

## 2017-08-07 PROCEDURE — 99207 ZZC NO CHARGE NURSE ONLY: CPT

## 2017-08-07 NOTE — PROGRESS NOTES
ANTICOAGULATION FOLLOW-UP CLINIC VISIT    Patient Name:  Shahid Villalta  Date:  8/7/2017  Contact Type:  Face to Face    SUBJECTIVE:     Patient Findings     Positives No Problem Findings           OBJECTIVE    INR Protime   Date Value Ref Range Status   08/07/2017 1.4 (A) 0.86 - 1.14 Final     Chromogenic Factor 10   Date Value Ref Range Status   07/31/2017 17 (L) 70 - 130 % Final     Comment:     Therapeutic Range:  A Chromogenic Factor 10 level of approximately 20-40%   inversely correlates with an INR of 2-3 for patients receiving Warfarin.   Chromogenic Factor 10 levels below 20% indicate an INR greater than 3 and   levels above 40% indicate an INR less than 2.         ASSESSMENT / PLAN  INR assessment SUB    Recheck INR In: 1 WEEK    INR Location Clinic      Anticoagulation Summary as of 8/7/2017     INR goal 2.0-3.0   Today's INR 1.4!   Maintenance plan 2.5 mg (5 mg x 0.5) on Wed, Fri; 5 mg (5 mg x 1) all other days   Full instructions 8/7: 10 mg; Otherwise 2.5 mg on Wed, Fri; 5 mg all other days   Weekly total 30 mg   Plan last modified Manisha Pierson RN (6/12/2017)   Next INR check 8/14/2017   Target end date     Indications   Long-term (current) use of anticoagulants [Z79.01] [Z79.01]  Paroxysmal ventricular tachycardia (H) [I47.2]         Anticoagulation Episode Summary     INR check location     Preferred lab     Send INR reminders to [facility]    Comments             See the Encounter Report to view Anticoagulation Flowsheet and Dosing Calendar (Go to Encounters tab in chart review, and find the Anticoagulation Therapy Visit)    Dosage adjustment made based on physician directed care plan.    Isela Hua, RN

## 2017-08-07 NOTE — MR AVS SNAPSHOT
Shahid Villalta   8/7/2017 8:45 AM   Anticoagulation Therapy Visit    Description:  77 year old male   Provider:   ANTICOAGULATION CLINIC   Department:   Nurse           INR as of 8/7/2017     Today's INR 1.4!      Anticoagulation Summary as of 8/7/2017     INR goal 2.0-3.0   Today's INR 1.4!   Full instructions 8/7: 10 mg; Otherwise 2.5 mg on Wed, Fri; 5 mg all other days   Next INR check 8/14/2017    Indications   Long-term (current) use of anticoagulants [Z79.01] [Z79.01]  Paroxysmal ventricular tachycardia (H) [I47.2]         Your next Anticoagulation Clinic appointment(s)     Aug 14, 2017  8:45 AM CDT   Anticoagulation Visit with  ANTICOAGULATION CLINIC   [facility]    [address] [address] [phone]              Contact Numbers     [facility]  Please call [phone] with any problems or questions regarding your therapy, or to cancel or reschedule your appointment.          August 2017 Details    Sun Mon Tue Wed Thu Fri Sat       1               2               3               4               5                 6               7      10 mg   See details      8      5 mg         9      2.5 mg         10      5 mg         11      2.5 mg         12      5 mg           13      5 mg         14            15               16               17               18               19                 20               21               22               23               24               25               26                 27               28               29               30               31                  Date Details   08/07 This INR check       Date of next INR:  8/14/2017         How to take your warfarin dose     To take:  2.5 mg Take 0.5 of a 5 mg tablet.    To take:  5 mg Take 1 of the 5 mg tablets.    To take:  10 mg Take 2 of the 5 mg tablets.

## 2017-08-07 NOTE — MR AVS SNAPSHOT
After Visit Summary   8/7/2017    Shahid Villalta    MRN: [id number]           Patient Information     Date Of Birth          1940        Visit Information        Provider Department      8/7/2017 8:45 AM [facility] ANTICOAGULATION CLINIC [facility]        Today's Diagnoses     Long-term (current) use of anticoagulants        Paroxysmal ventricular tachycardia (H)           Follow-ups after your visit        Your next 10 appointments already scheduled     Aug 14, 2017  8:45 AM CDT   Anticoagulation Visit with [facility] ANTICOAGULATION CLINIC   [facility]    [address] [facility] [address] [phone]            Aug 28, 2017  4:30 PM CDT   Remote ICD Check with STEWART DCR2   [facility] [place] ([facility])    [address]  [place] [id number] [phone]           This appointment is for a remote check of your debrillator.  This is not an appointment at the office.            Nov 06, 2017  9:00 AM CST   Return Visit with RH ONCOLOGY NURSE   [facility] ([facility])    [facility]  [address]  [facility] [address]   [phone]            Nov 08, 2017  1:30 PM CST   Return Visit with Nahun Hilario MD   [facility] ([facility])    [facility]  [address]  [facility] [address]   [phone]            Jun 29, 2018  9:30 AM CDT   Return Sleep Patient with Nadir Ashraf MD   [facility] ([facility])    [address]  [facility] [address] [phone]              Who to contact     If you have questions or need follow up information about today's clinic visit or your schedule please contact [facility] directly at [phone].  Normal or non-critical  lab and imaging results will be communicated to you by MyChart, letter or phone within 4 business days after the clinic has received the results. If you do not hear from us within 7 days, please contact the clinic through Good Deal[employer] or phone. If you have a critical or abnormal lab result, we will notify you by phone as soon as possible.  Submit refill requests through BeeTV or call your pharmacy and they will forward the refill request to us. Please allow 3 business days for your refill to be completed.          Additional Information About Your Visit        Good Deal[employer] Information     BeeTV gives you secure access to your electronic health record. If you see a primary care provider, you can also send messages to your care team and make appointments. If you have questions, please call your primary care clinic.  If you do not have a primary care provider, please call [phone] and they will assist you.        Care EveryWhere ID     This is your Care EveryWhere ID. This could be used by other organizations to access your [facility] medical records  [id number]         Blood Pressure from Last 3 Encounters:   06/23/17 98/69   05/19/17 122/66   05/17/17 124/68    Weight from Last 3 Encounters:   06/23/17 247 lb (112 kg)   05/19/17 247 lb 4.8 oz (112.2 kg)   05/17/17 246 lb 8 oz (111.8 kg)              We Performed the Following     INR point of care        Primary Care Provider Office Phone # Fax #    Gume Brown [phone]       [facility] [address] [address] [address]        Equal Access to Services     AUGUSTA SHEARER : Hadii aad ku hadasho Soomaali, waaxda luqadaha, qaybta kaalmada adeegyada, emily garcia. So [facility] [phone].    ATENCIÓN: Si habla español, tiene a irving disposición servicios gratuitos de asistencia lingüística. Llame al [phone].    We comply with applicable federal civil rights laws and [place] laws. We do not discriminate  on the basis of race, color, national origin, age, disability sex, sexual orientation or gender identity.            Thank you!     Thank you for choosing [facility]  for your care. Our goal is always to provide you with excellent care. Hearing back from our patients is one way we can continue to improve our services. Please take a few minutes to complete the written survey that you may receive in the mail after your visit with us. Thank you!             Your Updated Medication List - Protect others around you: Learn how to safely use, store and throw away your medicines at www.disposemymeds.org.          This list is accurate as of: 8/7/17  9:02 AM.  Always use your most recent med list.                   Brand Name Dispense Instructions for use Diagnosis    acetaminophen 325 MG tablet    TYLENOL     Take 650 mg by mouth 3 times daily        amiodarone 200 MG tablet    PACERONE/CODARONE    50 tablet    Take PO 1/2 tablet daily-Take extra prn per MD recommendations.    Paroxysmal atrial fibrillation (H)       ASPIRIN NOT PRESCRIBED    INTENTIONAL    0 each    1 each daily Antiplatelet medication not prescribed intentionally due to Current anticoagulant therapy (warfarin/enoxaparin)    Atrial fibrillation, unspecified       cetirizine 10 MG tablet    zyrTEC    90 tablet    Take 1 tablet (10 mg) by mouth daily    Allergic rhinitis       cholecalciferol 1000 UNIT tablet    vitamin D    180 tablet    Take 2 tablets (2,000 Units) by mouth daily    Vitamin D deficiency, Parathyroid hormone excess (H)       digoxin 125 MCG tablet    LANOXIN    45 tablet    Take 1 tablet (125 mcg) by mouth every 48 hours    Chronic systolic heart failure (H)       lisinopril 2.5 MG tablet    PRINIVIL/Zestril    90 tablet    Take 1 tablet (2.5 mg) by mouth daily    Nonrheumatic aortic valve stenosis       metoprolol 100 MG 24 hr tablet    TOPROL-XL    90 tablet    Take 1 tablet (100 mg) by mouth daily    Paroxysmal atrial  fibrillation (H)       omeprazole 20 MG CR capsule    priLOSEC    90 capsule    Take 1 capsule (20 mg) by mouth daily    Gastroesophageal reflux disease without esophagitis       polyethylene glycol powder    MIRALAX/GLYCOLAX     Take 17 g by mouth daily as needed for constipation        potassium chloride 10 MEQ tablet    K-TAB,KLOR-CON    90 tablet    Take 1 tablet (10 mEq) by mouth daily    Chronic systolic heart failure (H)       senna-docusate 8.6-50 MG per tablet    SENOKOT-S;PERICOLACE     Take 1 tablet by mouth daily        simethicone 80 MG chewable tablet    MYLICON    180 tablet    Take 1 tablet (80 mg) by mouth every 6 hours as needed for cramping    Abdominal bloating       simvastatin 20 MG tablet    ZOCOR    90 tablet    Take 1 tablet (20 mg) by mouth At Bedtime    Hyperlipidemia LDL goal <100       torsemide 20 MG tablet    DEMADEX    90 tablet    Take 1 tablet (20 mg) by mouth daily    Cardiomyopathy (H), Chronic systolic heart failure (H)       VIAGRA 25 MG cap/tab   Generic drug:  sildenafil      Take 25 mg by mouth as needed        warfarin 5 MG tablet    COUMADIN    90 tablet    2.5mg W/F and 5 AOD or as directed    Long-term (current) use of anticoagulants, Paroxysmal ventricular tachycardia (H)

## 2017-08-14 ENCOUNTER — ANTICOAGULATION THERAPY VISIT (OUTPATIENT)
Dept: NURSING | Facility: CLINIC | Age: 77
End: 2017-08-14
Payer: COMMERCIAL

## 2017-08-14 DIAGNOSIS — Z79.01 LONG-TERM (CURRENT) USE OF ANTICOAGULANTS: ICD-10-CM

## 2017-08-14 DIAGNOSIS — I47.29 PAROXYSMAL VENTRICULAR TACHYCARDIA (H): ICD-10-CM

## 2017-08-14 LAB — INR POINT OF CARE: 2.1 (ref 0.86–1.14)

## 2017-08-14 PROCEDURE — 85610 PROTHROMBIN TIME: CPT | Mod: QW

## 2017-08-14 PROCEDURE — 36416 COLLJ CAPILLARY BLOOD SPEC: CPT

## 2017-08-14 NOTE — MR AVS SNAPSHOT
Shahid Villalat   8/14/2017 8:45 AM   Anticoagulation Therapy Visit    Description:  77 year old male   Provider:   ANTICOAGULATION CLINIC   Department:   Nurse           INR as of 8/14/2017     Today's INR 2.1      Anticoagulation Summary as of 8/14/2017     INR goal 2.0-3.0   Today's INR 2.1   Full instructions 8/16: 5 mg; 8/18: 5 mg; Otherwise 2.5 mg on Wed, Fri; 5 mg all other days   Next INR check 8/21/2017    Indications   Long-term (current) use of anticoagulants [Z79.01] [Z79.01]  Paroxysmal ventricular tachycardia (H) [I47.2]         Your next Anticoagulation Clinic appointment(s)     Aug 21, 2017  8:30 AM CDT   Anticoagulation Visit with  ANTICOAGULATION CLINIC   [facility]    [address] [facility] [address] [phone]              Contact Numbers     [facility]  Please call [phone] with any problems or questions regarding your therapy, or to cancel or reschedule your appointment.          August 2017 Details    Sun Mon Tue Wed Thu Fri Sat       1               2               3               4               5                 6               7               8               9               10               11               12                 13               14      5 mg   See details      15      5 mg         16      5 mg         17      5 mg         18      5 mg         19      5 mg           20      5 mg         21 22               23               24               25               26                 27               28               29               30               31                  Date Details   08/14 This INR check       Date of next INR:  8/21/2017         How to take your warfarin dose     To take:  5 mg Take 1 of the 5 mg tablets.

## 2017-08-14 NOTE — PROGRESS NOTES
ANTICOAGULATION FOLLOW-UP CLINIC VISIT    Patient Name:  Shhaid Villalta  Date:  8/14/2017  Contact Type:  Face to Face    SUBJECTIVE:     Patient Findings     Positives No Problem Findings           OBJECTIVE    INR Protime   Date Value Ref Range Status   08/14/2017 2.1 (A) 0.86 - 1.14 Final     Chromogenic Factor 10   Date Value Ref Range Status   07/31/2017 17 (L) 70 - 130 % Final     Comment:     Therapeutic Range:  A Chromogenic Factor 10 level of approximately 20-40%   inversely correlates with an INR of 2-3 for patients receiving Warfarin.   Chromogenic Factor 10 levels below 20% indicate an INR greater than 3 and   levels above 40% indicate an INR less than 2.         ASSESSMENT / PLAN  INR assessment THER    Recheck INR In: 1 WEEK    INR Location Clinic      Anticoagulation Summary as of 8/14/2017     INR goal 2.0-3.0   Today's INR 2.1   Maintenance plan 2.5 mg (5 mg x 0.5) on Wed, Fri; 5 mg (5 mg x 1) all other days   Full instructions 8/16: 5 mg; 8/18: 5 mg; Otherwise 2.5 mg on Wed, Fri; 5 mg all other days   Weekly total 30 mg   Plan last modified Manisha Pierson RN (6/12/2017)   Next INR check 8/21/2017   Target end date     Indications   Long-term (current) use of anticoagulants [Z79.01] [Z79.01]  Paroxysmal ventricular tachycardia (H) [I47.2]         Anticoagulation Episode Summary     INR check location     Preferred lab     Send INR reminders to LV TRIAGE    Comments             See the Encounter Report to view Anticoagulation Flowsheet and Dosing Calendar (Go to Encounters tab in chart review, and find the Anticoagulation Therapy Visit)    Dosage adjustment made based on physician directed care plan.    Manisha Pierson RN

## 2017-08-21 ENCOUNTER — ANTICOAGULATION THERAPY VISIT (OUTPATIENT)
Dept: NURSING | Facility: CLINIC | Age: 77
End: 2017-08-21
Payer: COMMERCIAL

## 2017-08-21 DIAGNOSIS — I47.29 PAROXYSMAL VENTRICULAR TACHYCARDIA (H): ICD-10-CM

## 2017-08-21 DIAGNOSIS — Z79.01 LONG-TERM (CURRENT) USE OF ANTICOAGULANTS: ICD-10-CM

## 2017-08-21 LAB — INR POINT OF CARE: 2.3 (ref 0.86–1.14)

## 2017-08-21 PROCEDURE — 36416 COLLJ CAPILLARY BLOOD SPEC: CPT

## 2017-08-21 PROCEDURE — 85610 PROTHROMBIN TIME: CPT | Mod: QW

## 2017-08-21 NOTE — PROGRESS NOTES
ANTICOAGULATION FOLLOW-UP CLINIC VISIT    Patient Name:  Shahid Villalta  Date:  8/21/2017  Contact Type:  Face to Face    SUBJECTIVE:     Patient Findings     Positives No Problem Findings           OBJECTIVE    INR Protime   Date Value Ref Range Status   08/21/2017 2.3 (A) 0.86 - 1.14 Final     Chromogenic Factor 10   Date Value Ref Range Status   07/31/2017 17 (L) 70 - 130 % Final     Comment:     Therapeutic Range:  A Chromogenic Factor 10 level of approximately 20-40%   inversely correlates with an INR of 2-3 for patients receiving Warfarin.   Chromogenic Factor 10 levels below 20% indicate an INR greater than 3 and   levels above 40% indicate an INR less than 2.         ASSESSMENT / PLAN  No question data found.  Anticoagulation Summary as of 8/21/2017     INR goal 2.0-3.0   Today's INR 2.3   Maintenance plan 5 mg (5 mg x 1) every day   Full instructions 5 mg every day   Weekly total 35 mg   Plan last modified Manisha Pierson RN (8/21/2017)   Next INR check 9/6/2017   Target end date     Indications   Long-term (current) use of anticoagulants [Z79.01] [Z79.01]  Paroxysmal ventricular tachycardia (H) [I47.2]         Anticoagulation Episode Summary     INR check location     Preferred lab     Send INR reminders to [facility] TRIAGE    Comments             See the Encounter Report to view Anticoagulation Flowsheet and Dosing Calendar (Go to Encounters tab in chart review, and find the Anticoagulation Therapy Visit)        Manisha Pierson RN

## 2017-08-21 NOTE — MR AVS SNAPSHOT
Shahid Villalta   8/21/2017 8:30 AM   Anticoagulation Therapy Visit    Description:  77 year old male   Provider:   ANTICOAGULATION CLINIC   Department:  [facility] Nurse           INR as of 8/21/2017     Today's INR 2.3      Anticoagulation Summary as of 8/21/2017     INR goal 2.0-3.0   Today's INR 2.3   Full instructions 5 mg every day   Next INR check 9/6/2017    Indications   Long-term (current) use of anticoagulants [Z79.01] [Z79.01]  Paroxysmal ventricular tachycardia (H) [I47.2]         Your next Anticoagulation Clinic appointment(s)     Sep 06, 2017  8:30 AM CDT   Anticoagulation Visit with  ANTICOAGULATION CLINIC   [facility]    [address] [address] [phone]              Contact Numbers     [facility]  Please call [phone] with any problems or questions regarding your therapy, or to cancel or reschedule your appointment.          August 2017 Details    Sun Mon Tue Wed Thu Fri Sat       1               2               3               4               5                 6               7               8               9               10               11               12                 13               14               15               16               17               18               19                 20               21      5 mg   See details      22      5 mg         23      5 mg         24      5 mg         25      5 mg         26      5 mg           27      5 mg         28      5 mg         29      5 mg         30      5 mg         31      5 mg            Date Details   08/21 This INR check               How to take your warfarin dose     To take:  5 mg Take 1 of the 5 mg tablets.           September 2017 Details    Sun Mon Tue Wed Thu Fri Sat          1      5 mg         2      5 mg           3      5 mg         4      5 mg         5      5 mg         6            7               8               9                 10                11               12               13               14               15               16                 17               18               19               20               21               22               23                 24               25               26               27               28               29               30                Date Details   No additional details    Date of next INR:  9/6/2017         How to take your warfarin dose     To take:  5 mg Take 1 of the 5 mg tablets.

## 2017-08-28 ENCOUNTER — ALLIED HEALTH/NURSE VISIT (OUTPATIENT)
Dept: CARDIOLOGY | Facility: CLINIC | Age: 77
End: 2017-08-28
Payer: COMMERCIAL

## 2017-08-28 DIAGNOSIS — Z95.810 ICD (IMPLANTABLE CARDIOVERTER-DEFIBRILLATOR) IN PLACE: Primary | ICD-10-CM

## 2017-08-28 PROCEDURE — 93295 DEV INTERROG REMOTE 1/2/MLT: CPT | Performed by: INTERNAL MEDICINE

## 2017-08-28 PROCEDURE — 93296 REM INTERROG EVL PM/IDS: CPT | Performed by: INTERNAL MEDICINE

## 2017-08-28 NOTE — PROGRESS NOTES
[employer]/ ICD Remote Device Check  AP: 32 % BVP: 99 %  Mode: DDDR        Presenting Rhythm: AS/BVP  Heart Rate: Adequate variation per histogram  Sensing: stable    Pacing Threshold: na    Impedance: stable  Battery Status: 5.5 years  Atrial Arrhythmia: frequent mode switches (1212)due to PACs and rates up to 100. PMT episodes were due to pt reaching URL that is set to 95.   Ventricular Arrhythmia: 83 NSVT episodes for VT lasting 4 - 6 beats at rate 160s.   ATP: none    Shocks: none    Care Plan: fu 3 months office ICD check. Called and reviewed results and set up next office ICD check.  Neha

## 2017-08-28 NOTE — MR AVS SNAPSHOT
After Visit Summary   8/28/2017    Shahid Villalta    MRN: [id number]           Patient Information     Date Of Birth          1940        Visit Information        Provider Department      8/28/2017 4:30 PM STEWART DCR2 [facility] AT [place]        Today's Diagnoses     ICD (implantable cardioverter-defibrillator) in place    -  1       Follow-ups after your visit        Your next 10 appointments already scheduled     Sep 06, 2017  8:30 AM CDT   Anticoagulation Visit with LV ANTICOAGULATION CLINIC   [facility]    [address] [address] [phone]            Oct 11, 2017  8:10 AM CDT   ICD Check with RU DCRN   [facility] ([facility])    [address]  [facility] [address] [phone]            Nov 06, 2017  9:00 AM CST   Return Visit with RH ONCOLOGY NURSE   [facility])    [facility]  [address]  [facility] [address] [phone]            Nov 08, 2017  1:30 PM CST   Return Visit with Nahun Hilario MD   [facility] ([facility])    [facility]  [address]  [facility] [id number] [phone]            Jun 29, 2018  9:30 AM CDT   Return Sleep Patient with Nadir Ashraf MD   [facility]    [address]  [facility] [id number] [phone]              Who to contact     If you have questions or need follow up information about today's clinic visit or your schedule please contact [facility] directly at [phone].  Normal or non-critical lab and imaging results will be communicated to you by MyChart, letter or phone within 4 business days after the  clinic has received the results. If you do not hear from us within 7 days, please contact the clinic through [employer] or phone. If you have a critical or abnormal lab result, we will notify you by phone as soon as possible.  Submit refill requests through Seeking [employer] or call your pharmacy and they will forward the refill request to us. Please allow 3 business days for your refill to be completed.          Additional Information About Your Visit        CogbooksharSpime Information     [employer] gives you secure access to your electronic health record. If you see a primary care provider, you can also send messages to your care team and make appointments. If you have questions, please call your primary care clinic.  If you do not have a primary care provider, please call [phone] and they will assist you.        Care EveryWhere ID     This is your Care EveryWhere ID. This could be used by other organizations to access your [facility] medical records  [id number]         Blood Pressure from Last 3 Encounters:   06/23/17 98/69   05/19/17 122/66   05/17/17 124/68    Weight from Last 3 Encounters:   06/23/17 112 kg (247 lb)   05/19/17 112.2 kg (247 lb 4.8 oz)   05/17/17 111.8 kg (246 lb 8 oz)              We Performed the Following     ICD DEVICE INTERROGAT REMOTE (49413)     INTERROGATION DEVICE EVAL REMOTE, PACER/ICD (60188)        Primary Care Provider Office Phone # Fax #    Gume Brown [phone] [address]  [facility] [address]        Equal Access to Services     [place] SHEARER : Hadii aad ku hadasho Soomaali, waaxda luqadaha, qaybta kaalmada adeegyada, emily girard haysee mesa . So [facility] [phone].    ATENCIÓN: Si habla español, tiene a irving disposición servicios gratuitos de asistencia lingüística. Llame al [phone].    We comply with applicable federal civil rights laws and [place] laws. We do not discriminate on the basis of race, color, national origin, age, disability  sex, sexual orientation or gender identity.            Thank you!     Thank you for choosing [facility] [place]  for your care. Our goal is always to provide you with excellent care. Hearing back from our patients is one way we can continue to improve our services. Please take a few minutes to complete the written survey that you may receive in the mail after your visit with us. Thank you!             Your Updated Medication List - Protect others around you: Learn how to safely use, store and throw away your medicines at www.disposemymeds.org.          This list is accurate as of: 8/28/17 11:59 PM.  Always use your most recent med list.                   Brand Name Dispense Instructions for use Diagnosis    acetaminophen 325 MG tablet    TYLENOL     Take 650 mg by mouth 3 times daily        amiodarone 200 MG tablet    PACERONE/CODARONE    50 tablet    Take PO 1/2 tablet daily-Take extra prn per MD recommendations.    Paroxysmal atrial fibrillation (H)       ASPIRIN NOT PRESCRIBED    INTENTIONAL    0 each    1 each daily Antiplatelet medication not prescribed intentionally due to Current anticoagulant therapy (warfarin/enoxaparin)    Atrial fibrillation, unspecified       cetirizine 10 MG tablet    zyrTEC    90 tablet    Take 1 tablet (10 mg) by mouth daily    Allergic rhinitis       cholecalciferol 1000 UNIT tablet    vitamin D    180 tablet    Take 2 tablets (2,000 Units) by mouth daily    Vitamin D deficiency, Parathyroid hormone excess (H)       digoxin 125 MCG tablet    LANOXIN    45 tablet    Take 1 tablet (125 mcg) by mouth every 48 hours    Chronic systolic heart failure (H)       lisinopril 2.5 MG tablet    PRINIVIL/Zestril    90 tablet    Take 1 tablet (2.5 mg) by mouth daily    Nonrheumatic aortic valve stenosis       metoprolol 100 MG 24 hr tablet    TOPROL-XL    90 tablet    Take 1 tablet (100 mg) by mouth daily    Paroxysmal atrial fibrillation (H)       omeprazole 20  MG CR capsule    priLOSEC    90 capsule    Take 1 capsule (20 mg) by mouth daily    Gastroesophageal reflux disease without esophagitis       polyethylene glycol powder    MIRALAX/GLYCOLAX     Take 17 g by mouth daily as needed for constipation        potassium chloride 10 MEQ tablet    K-TAB,KLOR-CON    90 tablet    Take 1 tablet (10 mEq) by mouth daily    Chronic systolic heart failure (H)       senna-docusate 8.6-50 MG per tablet    SENOKOT-S;PERICOLACE     Take 1 tablet by mouth daily        simethicone 80 MG chewable tablet    MYLICON    180 tablet    Take 1 tablet (80 mg) by mouth every 6 hours as needed for cramping    Abdominal bloating       simvastatin 20 MG tablet    ZOCOR    90 tablet    Take 1 tablet (20 mg) by mouth At Bedtime    Hyperlipidemia LDL goal <100       torsemide 20 MG tablet    DEMADEX    90 tablet    Take 1 tablet (20 mg) by mouth daily    Cardiomyopathy (H), Chronic systolic heart failure (H)       VIAGRA 25 MG cap/tab   Generic drug:  sildenafil      Take 25 mg by mouth as needed        warfarin 5 MG tablet    COUMADIN    90 tablet    2.5mg W/F and 5 AOD or as directed    Long-term (current) use of anticoagulants, Paroxysmal ventricular tachycardia (H)

## 2017-09-06 ENCOUNTER — ANTICOAGULATION THERAPY VISIT (OUTPATIENT)
Dept: NURSING | Facility: CLINIC | Age: 77
End: 2017-09-06
Payer: COMMERCIAL

## 2017-09-06 DIAGNOSIS — Z79.01 LONG-TERM (CURRENT) USE OF ANTICOAGULANTS: ICD-10-CM

## 2017-09-06 DIAGNOSIS — I47.29 PAROXYSMAL VENTRICULAR TACHYCARDIA (H): ICD-10-CM

## 2017-09-06 LAB — INR POINT OF CARE: 3.3 (ref 0.86–1.14)

## 2017-09-06 PROCEDURE — 36416 COLLJ CAPILLARY BLOOD SPEC: CPT

## 2017-09-06 PROCEDURE — 85610 PROTHROMBIN TIME: CPT | Mod: QW

## 2017-09-06 PROCEDURE — 99207 ZZC NO CHARGE NURSE ONLY: CPT

## 2017-09-06 RX ORDER — WARFARIN SODIUM 5 MG/1
TABLET ORAL
Qty: 90 TABLET | Refills: 1 | Status: SHIPPED | OUTPATIENT
Start: 2017-09-06 | End: 2018-05-29

## 2017-09-06 NOTE — PROGRESS NOTES
"  ANTICOAGULATION FOLLOW-UP CLINIC VISIT    Patient Name:  Shahid Villalta  Date:  9/6/2017  Contact Type:  Face to Face    SUBJECTIVE:        OBJECTIVE    INR Protime   Date Value Ref Range Status   09/06/2017 3.3 (A) 0.86 - 1.14 Final     Chromogenic Factor 10   Date Value Ref Range Status   07/31/2017 17 (L) 70 - 130 % Final     Comment:     Therapeutic Range:  A Chromogenic Factor 10 level of approximately 20-40%   inversely correlates with an INR of 2-3 for patients receiving Warfarin.   Chromogenic Factor 10 levels below 20% indicate an INR greater than 3 and   levels above 40% indicate an INR less than 2.         ASSESSMENT / PLAN    Pt will increase greens in diet this week -   Likely increased INR due to fluctuation amio dosing as he cuts pill in half and \"the pills are not equal in size\"  INR assessment SUPRA    Recheck INR In: 4 WEEKS    INR Location Clinic      Anticoagulation Summary as of 9/6/2017     INR goal 2.0-3.0   Today's INR 3.3!   Maintenance plan 5 mg (5 mg x 1) every day   Full instructions 5 mg every day   Weekly total 35 mg   No change documented Isela Hua RN   Plan last modified Manisha Pierson RN (8/21/2017)   Next INR check 10/4/2017   Target end date     Indications   Long-term (current) use of anticoagulants [Z79.01] [Z79.01]  Paroxysmal ventricular tachycardia (H) [I47.2]         Anticoagulation Episode Summary     INR check location     Preferred lab     Send INR reminders to [facility] TRI[facility]    Comments             See the Encounter Report to view Anticoagulation Flowsheet and Dosing Calendar (Go to Encounters tab in chart review, and find the Anticoagulation Therapy Visit)      Isela Hua RN               "

## 2017-09-06 NOTE — MR AVS SNAPSHOT
"     Shahid Villalta   9/6/2017 8:30 AM   Anticoagulation Therapy Visit    Description:  77 year old male   Provider:   ANTICOAGULATION CLINIC   Department:  [facility] Nurse           INR as of 9/6/2017     Today's INR 3.3!      Anticoagulation Summary as of 9/6/2017     INR goal 2.0-3.0   Today's INR 3.3!   Full instructions 5 mg every day   Next INR check 10/4/2017    Indications   Long-term (current) use of anticoagulants [Z79.01] [Z79.01]  Paroxysmal ventricular tachycardia (H) [I47.2]         Description     Pt cuts amio and \"the halves are not always the same size\"      Your next Anticoagulation Clinic appointment(s)     Oct 04, 2017  8:30 AM CDT   Anticoagulation Visit with  ANTICOAGULATION CLINIC   [facility] ([facility]    [address] [address] [phone]              Contact Numbers     [facility]  Please call [phone] with any problems or questions regarding your therapy, or to cancel or reschedule your appointment.          September 2017 Details    Sun Mon Tue Wed Thu Fri Sat          1               2                 3               4               5               6      5 mg   See details      7      5 mg         8      5 mg         9      5 mg           10      5 mg         11      5 mg         12      5 mg         13      5 mg         14      5 mg         15      5 mg         16      5 mg           17      5 mg         18      5 mg         19      5 mg         20      5 mg         21      5 mg         22      5 mg         23      5 mg           24      5 mg         25      5 mg         26      5 mg         27      5 mg         28      5 mg         29      5 mg         30      5 mg          Date Details   09/06 This INR check               How to take your warfarin dose     To take:  5 mg Take 1 of the 5 mg tablets.           October 2017 Details    Sun Mon Tue Wed Thu Fri Sat     1      5 mg         2      5 mg         3      5 mg    "      4            5               6               7                 8               9               10               11               12               13               14                 15               16               17               18               19               20               21                 22               23               24               25               26               27               28                 29               30               31                    Date Details   No additional details    Date of next INR:  10/4/2017         How to take your warfarin dose     To take:  5 mg Take 1 of the 5 mg tablets.

## 2017-10-04 ENCOUNTER — ANTICOAGULATION THERAPY VISIT (OUTPATIENT)
Dept: NURSING | Facility: CLINIC | Age: 77
End: 2017-10-04
Payer: COMMERCIAL

## 2017-10-04 DIAGNOSIS — Z79.01 LONG-TERM (CURRENT) USE OF ANTICOAGULANTS: ICD-10-CM

## 2017-10-04 DIAGNOSIS — I47.29 PAROXYSMAL VENTRICULAR TACHYCARDIA (H): ICD-10-CM

## 2017-10-04 LAB — INR POINT OF CARE: 2.9 (ref 0.86–1.14)

## 2017-10-04 PROCEDURE — 85610 PROTHROMBIN TIME: CPT | Mod: QW

## 2017-10-04 PROCEDURE — 36416 COLLJ CAPILLARY BLOOD SPEC: CPT

## 2017-10-04 NOTE — PROGRESS NOTES
ANTICOAGULATION FOLLOW-UP CLINIC VISIT    Patient Name:  Shahid Villalta  Date:  10/4/2017  Contact Type:  Face to Face    SUBJECTIVE:     Patient Findings     Positives No Problem Findings           OBJECTIVE    INR Protime   Date Value Ref Range Status   10/04/2017 2.9 (A) 0.86 - 1.14 Final     Chromogenic Factor 10   Date Value Ref Range Status   07/31/2017 17 (L) 70 - 130 % Final     Comment:     Therapeutic Range:  A Chromogenic Factor 10 level of approximately 20-40%   inversely correlates with an INR of 2-3 for patients receiving Warfarin.   Chromogenic Factor 10 levels below 20% indicate an INR greater than 3 and   levels above 40% indicate an INR less than 2.         ASSESSMENT / PLAN  INR assessment THER    Recheck INR In: 4 WEEKS    INR Location Clinic      Anticoagulation Summary as of 10/4/2017     INR goal 2.0-3.0   Today's INR 2.9   Maintenance plan 5 mg (5 mg x 1) every day   Full instructions 5 mg every day   Weekly total 35 mg   No change documented Manisha Pierson RN   Plan last modified Manisha Pierson RN (8/21/2017)   Next INR check 11/1/2017   Target end date     Indications   Long-term (current) use of anticoagulants [Z79.01] [Z79.01]  Paroxysmal ventricular tachycardia (H) [I47.2]         Anticoagulation Episode Summary     INR check location     Preferred lab     Send INR reminders to [facility]    Comments             See the Encounter Report to view Anticoagulation Flowsheet and Dosing Calendar (Go to Encounters tab in chart review, and find the Anticoagulation Therapy Visit)        Manisha Pierson RN

## 2017-10-04 NOTE — MR AVS SNAPSHOT
Shahid Villalta   10/4/2017 8:30 AM   Anticoagulation Therapy Visit    Description:  77 year old male   Provider:   ANTICOAGULATION CLINIC   Department:   Nurse           INR as of 10/4/2017     Today's INR 2.9      Anticoagulation Summary as of 10/4/2017     INR goal 2.0-3.0   Today's INR 2.9   Full instructions 5 mg every day   Next INR check 11/1/2017    Indications   Long-term (current) use of anticoagulants [Z79.01] [Z79.01]  Paroxysmal ventricular tachycardia (H) [I47.2]         Your next Anticoagulation Clinic appointment(s)     Nov 01, 2017  9:00 AM CDT   Anticoagulation Visit with  ANTICOAGULATION CLINIC   [facility]    [address] [address] [phone]              Contact Numbers     [facility]  Please call [phone] with any problems or questions regarding your therapy, or to cancel or reschedule your appointment.          October 2017 Details    Sun Mon Tue Wed Thu Fri Sat     1               2               3               4      5 mg   See details      5      5 mg         6      5 mg         7      5 mg           8      5 mg         9      5 mg         10      5 mg         11      5 mg         12      5 mg         13      5 mg         14      5 mg           15      5 mg         16      5 mg         17      5 mg         18      5 mg         19      5 mg         20      5 mg         21      5 mg           22      5 mg         23      5 mg         24      5 mg         25      5 mg         26      5 mg         27      5 mg         28      5 mg           29      5 mg         30      5 mg         31      5 mg              Date Details   10/04 This INR check               How to take your warfarin dose     To take:  5 mg Take 1 of the 5 mg tablets.           November 2017 Details    Sun Mon Tue Wed Thu Fri Sat        1            2               3               4                 5               6               7                8               9               10               11                 12               13               14               15               16               17               18                 19               20               21               22               23               24               25                 26               27               28               29               30                  Date Details   No additional details    Date of next INR:  11/1/2017         How to take your warfarin dose     To take:  5 mg Take 1 of the 5 mg tablets.

## 2017-10-11 ENCOUNTER — ALLIED HEALTH/NURSE VISIT (OUTPATIENT)
Dept: CARDIOLOGY | Facility: CLINIC | Age: 77
End: 2017-10-11
Payer: COMMERCIAL

## 2017-10-11 DIAGNOSIS — Z95.810 ICD (IMPLANTABLE CARDIOVERTER-DEFIBRILLATOR) IN PLACE: Primary | ICD-10-CM

## 2017-10-11 PROCEDURE — 93284 PRGRMG EVAL IMPLANTABLE DFB: CPT | Performed by: INTERNAL MEDICINE

## 2017-10-11 NOTE — PROGRESS NOTES
[place] [employer] CRT-D Device Check  AP: 32 % BIV Paced: 99 %  Mode: DDDR 60        Underlying Rhythm: CHB with junctional escape rhythm at VVI 30  Heart Rate: Stable with adequate variability  Sensing: Stable    Pacing Threshold: Stable    Impedance: Stable  Battery Status: 5.5 yrs with charge time of 10.1 seconds  Atrial Arrhythmia: 10 AHR logged since last device check in August, comprising less than 1% of the total time, longest lasting 5 minutes (No EGM for this episode). Available EGM's show short runs of AT, rates 100's bpm. Mode switch rate is already set for 100 bpm secondary to frequent AT.  Ventricular Arrhythmia: 10 episodes detected and logged as NSVT. 2 EGM 's for review and show 6 and 7 beat runs of NSVT, rates 150's bpm.  ATP: None    Shocks: None  Setting Change: Atrial pacing threshold has increased slightly to 1.6 V at 1.0 ms. Threshold adjusted per protocol.    Care Plan: Pt has no complaints. Next Lat[employer] scheduled for Edil Lovelace RN.

## 2017-10-11 NOTE — MR AVS SNAPSHOT
After Visit Summary   10/11/2017    Shahid Villalta    MRN: [id number]           Patient Information     Date Of Birth          1940        Visit Information        Provider Department      10/11/2017 8:10 AM RU DCRN [facility] [place]        Today's Diagnoses     ICD (implantable cardioverter-defibrillator) in place    -  1       Follow-ups after your visit        Your next 10 appointments already scheduled     Nov 01, 2017  9:00 AM CDT   Anticoagulation Visit with [facility] ANTICOAGULATION CLINIC   [facility] ([facility])    [address] [place] [address]   [phone]            Nov 06, 2017  9:00 AM CST   Return Visit with RH ONCOLOGY NURSE   [facility] ([facility])    [facility]  [address] [place] [address] [address]  [facility] [address]   [phone]            Nov 08, 2017  1:30 PM CST   Return Visit with Nahun Hilario MD   [facility] ([facility])    [facility]  [address]  [facility] [address]   [phone]            Nov 13, 2017 10:00 AM CST   Ech Complete with [id number] ([facility])    [address]  [facility] [address]   [phone]           1. Please bring or wear a comfortable two-piece outfit. 2. You may eat, drink and take your normal medicines. 3. For any questions that cannot be answered, please contact the ordering physician ***Please check-in at the [place] Registration Office located in [address] in the [facility] building. When you are finished registering, please go to [address] and have a seat. The technician will call your name for the test.            Nov 13, 2017 11:00 AM CST   LAB with RU LAB   [facility] ([facility])    [address]  [facility] [id number] [phone]           Patient must bring picture ID. Patient should be prepared to give a urine specimen  Please do not eat 10-12 hours before your appointment if you are coming in fasting for labs on lipids, cholesterol, or glucose (sugar). Pregnant women should follow their Care Team instructions. Water with medications is okay. Do not drink coffee or other fluids. If you have concerns about taking  your medications, please ask at office or if scheduling via [employer], send a message by clicking on Secure Messaging, Message Your Care Team.            Nov 15, 2017 10:15 AM CST   Return Visit with Adiel Aden MD   [facility] ([facility])    [address] [place] [address] [address]  [facility] [address] [phone]            Jan 31, 2018  4:30 PM CST   Remote ICD Check with STEWART DCR2   [facility] ([facility])    [address] [place], [place] [address]  [facility] [id number] [phone]           This appointment is for a remote check of your debrillator.  This is not an appointment at the office.            Jun 29, 2018  9:30 AM CDT   Return Sleep Patient with Nadir Ashraf MD   [facility] ([facility])    [address]  [facility] [address] [phone]              Who to contact     If you have questions or need follow up information about today's clinic visit or your schedule please contact [facility] directly at [phone].  Normal or non-critical lab and imaging results will be communicated to you by MyChart, letter or phone within 4 business days after the clinic has received the results. If you do not hear from us within 7 days, please contact the clinic through MyChart or phone. If you have a critical or abnormal lab result, we will notify you  by phone as soon as possible.  Submit refill requests through [employer] or call your pharmacy and they will forward the refill request to us. Please allow 3 business days for your refill to be completed.          Additional Information About Your Visit        [employer] Information     [employer] gives you secure access to your electronic health record. If you see a primary care provider, you can also send messages to your care team and make appointments. If you have questions, please call your primary care clinic.  If you do not have a primary care provider, please call [phone] and they will assist you.        Care EveryWhere ID     This is your Care EveryWhere ID. This could be used by other organizations to access your [facility] medical records  [id number]         Blood Pressure from Last 3 Encounters:   06/23/17 98/69   05/19/17 122/66   05/17/17 124/68    Weight from Last 3 Encounters:   06/23/17 112 kg (247 lb)   05/19/17 112.2 kg (247 lb 4.8 oz)   05/17/17 111.8 kg (246 lb 8 oz)              We Performed the Following     ICD DEVICE PROGRAMMING EVAL, MULTI LEAD ICD (68452)        Primary Care Provider Office Phone # Fax #    Gume Brown [phone] [address]  [place] [id number]        Equal Access to Services     [place] SHEARER : Hadii aad ku hadasho Soomaali, waaxda luqadaha, qaybta kaalmada adeegyada, waxay idiin hayaan mitchel kharafarnaz garcia. So [facility] [phone].    ATENCIÓN: Si habla español, tiene a irving disposición servicios gratuitos de asistencia lingüística. Llame al [phone].    We comply with applicable federal civil rights laws and [place] laws. We do not discriminate on the basis of race, color, national origin, age, disability, sex, sexual orientation, or gender identity.            Thank you!     Thank you for choosing [facility] [place]  for your care. Our goal is always to provide you with excellent care. Hearing back from  our patients is one way we can continue to improve our services. Please take a few minutes to complete the written survey that you may receive in the mail after your visit with us. Thank you!             Your Updated Medication List - Protect others around you: Learn how to safely use, store and throw away your medicines at www.disposemymeds.org.          This list is accurate as of: 10/11/17  8:50 AM.  Always use your most recent med list.                   Brand Name Dispense Instructions for use Diagnosis    acetaminophen 325 MG tablet    TYLENOL     Take 650 mg by mouth 3 times daily        amiodarone 200 MG tablet    PACERONE/CODARONE    50 tablet    Take PO 1/2 tablet daily-Take extra prn per MD recommendations.    Paroxysmal atrial fibrillation (H)       ASPIRIN NOT PRESCRIBED    INTENTIONAL    0 each    1 each daily Antiplatelet medication not prescribed intentionally due to Current anticoagulant therapy (warfarin/enoxaparin)    Atrial fibrillation, unspecified       cetirizine 10 MG tablet    zyrTEC    90 tablet    Take 1 tablet (10 mg) by mouth daily    Allergic rhinitis       cholecalciferol 1000 UNIT tablet    vitamin D    180 tablet    Take 2 tablets (2,000 Units) by mouth daily    Vitamin D deficiency, Parathyroid hormone excess (H)       digoxin 125 MCG tablet    LANOXIN    45 tablet    Take 1 tablet (125 mcg) by mouth every 48 hours    Chronic systolic heart failure (H)       lisinopril 2.5 MG tablet    PRINIVIL/Zestril    90 tablet    Take 1 tablet (2.5 mg) by mouth daily    Nonrheumatic aortic valve stenosis       metoprolol 100 MG 24 hr tablet    TOPROL-XL    90 tablet    Take 1 tablet (100 mg) by mouth daily    Paroxysmal atrial fibrillation (H)       omeprazole 20 MG CR capsule    priLOSEC    90 capsule    Take 1 capsule (20 mg) by mouth daily    Gastroesophageal reflux disease without esophagitis       polyethylene glycol powder    MIRALAX/GLYCOLAX     Take 17 g by mouth daily as needed for  constipation        potassium chloride 10 MEQ tablet    K-TAB,KLOR-CON    90 tablet    Take 1 tablet (10 mEq) by mouth daily    Chronic systolic heart failure (H)       senna-docusate 8.6-50 MG per tablet    SENOKOT-S;PERICOLACE     Take 1 tablet by mouth daily        simethicone 80 MG chewable tablet    MYLICON    180 tablet    Take 1 tablet (80 mg) by mouth every 6 hours as needed for cramping    Abdominal bloating       simvastatin 20 MG tablet    ZOCOR    90 tablet    Take 1 tablet (20 mg) by mouth At Bedtime    Hyperlipidemia LDL goal <100       torsemide 20 MG tablet    DEMADEX    90 tablet    Take 1 tablet (20 mg) by mouth daily    Cardiomyopathy (H), Chronic systolic heart failure (H)       VIAGRA 25 MG tablet   Generic drug:  sildenafil      Take 25 mg by mouth as needed        warfarin 5 MG tablet    COUMADIN    90 tablet    5 mg daily    Long-term (current) use of anticoagulants, Paroxysmal ventricular tachycardia (H)

## 2017-11-01 ENCOUNTER — ANTICOAGULATION THERAPY VISIT (OUTPATIENT)
Dept: NURSING | Facility: CLINIC | Age: 77
End: 2017-11-01
Payer: COMMERCIAL

## 2017-11-01 DIAGNOSIS — Z23 NEED FOR PROPHYLACTIC VACCINATION AND INOCULATION AGAINST INFLUENZA: Primary | ICD-10-CM

## 2017-11-01 DIAGNOSIS — Z79.01 LONG-TERM (CURRENT) USE OF ANTICOAGULANTS: ICD-10-CM

## 2017-11-01 DIAGNOSIS — I47.29 PAROXYSMAL VENTRICULAR TACHYCARDIA (H): ICD-10-CM

## 2017-11-01 LAB — INR POINT OF CARE: 3.9 (ref 0.86–1.14)

## 2017-11-01 PROCEDURE — 36416 COLLJ CAPILLARY BLOOD SPEC: CPT

## 2017-11-01 PROCEDURE — G0008 ADMIN INFLUENZA VIRUS VAC: HCPCS

## 2017-11-01 PROCEDURE — 90662 IIV NO PRSV INCREASED AG IM: CPT

## 2017-11-01 PROCEDURE — 85610 PROTHROMBIN TIME: CPT | Mod: QW

## 2017-11-01 NOTE — MR AVS SNAPSHOT
Shahid Villalta   11/1/2017 9:00 AM   Anticoagulation Therapy Visit    Description:  77 year old male   Provider:   ANTICOAGULATION CLINIC   Department:   Nurse           INR as of 11/1/2017     Today's INR 3.9!      Anticoagulation Summary as of 11/1/2017     INR goal 2.0-3.0   Today's INR 3.9!   Full instructions 11/1: 2.5 mg; Otherwise 5 mg every day   Next INR check 11/13/2017    Indications   Long-term (current) use of anticoagulants [Z79.01] [Z79.01]  Paroxysmal ventricular tachycardia (H) [I47.2]         Contact Numbers     [facility]  Please call [phone] with any problems or questions regarding your therapy, or to cancel or reschedule your appointment.          November 2017 Details    Sun Mon Tue Wed Thu Fri Sat        1      2.5 mg   See details      2      5 mg         3      5 mg         4      5 mg           5      5 mg         6      5 mg         7      5 mg         8      5 mg         9      5 mg         10      5 mg         11      5 mg           12      5 mg         13            14               15               16               17               18                 19               20               21               22               23               24               25                 26               27               28               29               30                  Date Details   11/01 This INR check       Date of next INR:  11/13/2017         How to take your warfarin dose     To take:  2.5 mg Take 0.5 of a 5 mg tablet.    To take:  5 mg Take 1 of the 5 mg tablets.

## 2017-11-01 NOTE — PROGRESS NOTES
ANTICOAGULATION FOLLOW-UP CLINIC VISIT    Patient Name:  Shahid Villalta  Date:  11/1/2017  Contact Type:  Face to Face    SUBJECTIVE:     Patient Findings     Positives Unexplained INR or factor level change           OBJECTIVE    INR Protime   Date Value Ref Range Status   11/01/2017 3.9 (A) 0.86 - 1.14 Final     Chromogenic Factor 10   Date Value Ref Range Status   07/31/2017 17 (L) 70 - 130 % Final     Comment:     Therapeutic Range:  A Chromogenic Factor 10 level of approximately 20-40%   inversely correlates with an INR of 2-3 for patients receiving Warfarin.   Chromogenic Factor 10 levels below 20% indicate an INR greater than 3 and   levels above 40% indicate an INR less than 2.         ASSESSMENT / PLAN  No question data found.  Anticoagulation Summary as of 11/1/2017     INR goal 2.0-3.0   Today's INR 3.9!   Maintenance plan 5 mg (5 mg x 1) every day   Full instructions 11/1: 2.5 mg; Otherwise 5 mg every day   Weekly total 35 mg   Plan last modified Manisha Pierson RN (8/21/2017)   Next INR check 11/13/2017   Target end date     Indications   Long-term (current) use of anticoagulants [Z79.01] [Z79.01]  Paroxysmal ventricular tachycardia (H) [I47.2]         Anticoagulation Episode Summary     INR check location     Preferred lab     Send INR reminders to [facility] TRIAGE    Comments             See the Encounter Report to view Anticoagulation Flowsheet and Dosing Calendar (Go to Encounters tab in chart review, and find the Anticoagulation Therapy Visit)    Dosage adjustment made based on physician directed care plan.    Manisha Pierson RN               Injectable Influenza Immunization Documentation    1.  Is the person to be vaccinated sick today?   No    2. Does the person to be vaccinated have an allergy to a component   of the vaccine?   No  Egg Allergy Algorithm Link    3. Has the person to be vaccinated ever had a serious reaction   to influenza vaccine in the past?   No    4. Has the person to be vaccinated  ever had Guillain-Barré syndrome?   No    Form completed by patient

## 2017-11-06 ENCOUNTER — HOSPITAL ENCOUNTER (OUTPATIENT)
Facility: CLINIC | Age: 77
Setting detail: SPECIMEN
Discharge: HOME | End: 2017-11-06
Attending: UROLOGY | Admitting: UROLOGY
Payer: MEDICARE

## 2017-11-06 ENCOUNTER — ONCOLOGY VISIT (OUTPATIENT)
Dept: ONCOLOGY | Facility: CLINIC | Age: 77
End: 2017-11-06
Attending: UROLOGY
Payer: MEDICARE

## 2017-11-06 DIAGNOSIS — C61 PROSTATE CANCER (H): ICD-10-CM

## 2017-11-06 LAB — PSA SERPL-MCNC: 0.02 UG/L (ref 0–4)

## 2017-11-06 PROCEDURE — 84153 ASSAY OF PSA TOTAL: CPT | Performed by: UROLOGY

## 2017-11-06 PROCEDURE — 36415 COLL VENOUS BLD VENIPUNCTURE: CPT

## 2017-11-06 NOTE — MR AVS SNAPSHOT
After Visit Summary   11/6/2017    Shahid Villalta    MRN: [id number]           Patient Information     Date Of Birth          1940        Visit Information        Provider Department      11/6/2017 9:00 AM  ONCOLOGY NURSE [facility]        Today's Diagnoses     Prostate cancer          Care Instructions    Labs drawn - ljt          Follow-ups after your visit        Your next 10 appointments already scheduled     Nov 08, 2017  1:30 PM CST   Return Visit with Nahun Hilario MD   [facility] ([facility])    [facility]  [address]  [facility] [address] [phone]            Nov 13, 2017 10:00 AM CST   Ech Complete with [address] ([facility])    [address]  [facility] [address] [phone]           1. Please bring or wear a comfortable two-piece outfit. 2. You may eat, drink and take your normal medicines. 3. For any questions that cannot be answered, please contact the ordering physician ***Please check-in at the [place] Registration Office located in [address] in the [facility] building. When you are finished registering, please go to [address] and have a seat. The technician will call your name for the test.            Nov 13, 2017 11:00 AM CST   LAB with RU LAB   [facility] PHYSICIANS [facility] AT [place] ([facility])    [address]  [facility] [id number] [phone]           Please do not eat 10-12 hours before your appointment if you are coming in fasting for labs on lipids, cholesterol, or glucose (sugar). This does not apply to pregnant women. Water, hot tea and black coffee (with nothing added) are okay. Do not drink other fluids, diet soda or chew gum.            Nov 17, 2017 12:15 PM CST   Return Visit with Adiel Aden MD    [facility] ([facility])    [address]  [place] [address] [phone]            Jan 31, 2018  4:30 PM CST   Remote ICD Check with STEWART DCR2   [facility] ([facility])    [address]  [place] [address] [phone]           This appointment is for a remote check of your debrillator.  This is not an appointment at the office.            Jun 29, 2018  9:30 AM CDT   Return Sleep Patient with Nadir Ashraf MD   [facility] ([facility])    [address]  [address]  [place] [address] [phone]              Who to contact     If you have questions or need follow up information about today's clinic visit or your schedule please contact [facility] CARE directly at [phone].  Normal or non-critical lab and imaging results will be communicated to you by AQSt, letter or phone within 4 business days after the clinic has received the results. If you do not hear from us within 7 days, please contact the clinic through Band Digital or phone. If you have a critical or abnormal lab result, we will notify you by phone as soon as possible.  Submit refill requests through Band Digital or call your pharmacy and they will forward the refill request to us. Please allow 3 business days for your refill to be completed.          Additional Information About Your Visit        Band Digital Information     Band Digital gives you secure access to your electronic health record. If you see a primary care provider, you can also send messages to your care team and make appointments. If you have questions, please call your primary care clinic.  If you do not have a primary care provider, please call [phone] and they will assist you.        Care EveryWhere ID     This is your Care EveryWhere ID. This could be used by other organizations to  access your [place] medical records  [id number]         Blood Pressure from Last 3 Encounters:   06/23/17 98/69   05/19/17 122/66   05/17/17 124/68    Weight from Last 3 Encounters:   06/23/17 112 kg (247 lb)   05/19/17 112.2 kg (247 lb 4.8 oz)   05/17/17 111.8 kg (246 lb 8 oz)              We Performed the Following     PSA tumor marker        Primary Care Provider Office Phone # Fax #    Gume Brown [phone] [address]  [facility] [address]        Equal Access to Services     [facility]: Hadii aad ku hadasho Soangelina, waaxda luqadaha, qaybta kaalmada adeegyada, emily mesa . So [facility] [phone].    ATENCIÓN: Si habla español, tiene a irving disposición servicios gratuitos de asistencia lingüística. [facility] [phone].    We comply with applicable federal civil rights laws and [place] laws. We do not discriminate on the basis of race, color, national origin, age, disability, sex, sexual orientation, or gender identity.            Thank you!     Thank you for choosing [facility]CER CARE  for your care. Our goal is always to provide you with excellent care. Hearing back from our patients is one way we can continue to improve our services. Please take a few minutes to complete the written survey that you may receive in the mail after your visit with us. Thank you!             Your Updated Medication List - Protect others around you: Learn how to safely use, store and throw away your medicines at www.disposemymeds.org.          This list is accurate as of: 11/6/17  9:27 AM.  Always use your most recent med list.                   Brand Name Dispense Instructions for use Diagnosis    acetaminophen 325 MG tablet    TYLENOL     Take 650 mg by mouth 3 times daily        amiodarone 200 MG tablet    PACERONE/CODARONE    50 tablet    Take PO 1/2 tablet daily-Take extra prn per MD recommendations.    Paroxysmal atrial fibrillation (H)        ASPIRIN NOT PRESCRIBED    INTENTIONAL    0 each    1 each daily Antiplatelet medication not prescribed intentionally due to Current anticoagulant therapy (warfarin/enoxaparin)    Atrial fibrillation, unspecified       cetirizine 10 MG tablet    zyrTEC    90 tablet    Take 1 tablet (10 mg) by mouth daily    Allergic rhinitis       cholecalciferol 1000 UNIT tablet    vitamin D3    180 tablet    Take 2 tablets (2,000 Units) by mouth daily    Vitamin D deficiency, Parathyroid hormone excess (H)       digoxin 125 MCG tablet    LANOXIN    45 tablet    Take 1 tablet (125 mcg) by mouth every 48 hours    Chronic systolic heart failure (H)       lisinopril 2.5 MG tablet    PRINIVIL/Zestril    90 tablet    Take 1 tablet (2.5 mg) by mouth daily    Nonrheumatic aortic valve stenosis       metoprolol 100 MG 24 hr tablet    TOPROL-XL    90 tablet    Take 1 tablet (100 mg) by mouth daily    Paroxysmal atrial fibrillation (H)       omeprazole 20 MG CR capsule    priLOSEC    90 capsule    Take 1 capsule (20 mg) by mouth daily    Gastroesophageal reflux disease without esophagitis       polyethylene glycol powder    MIRALAX/GLYCOLAX     Take 17 g by mouth daily as needed for constipation        potassium chloride 10 MEQ tablet    K-TAB,KLOR-CON    90 tablet    Take 1 tablet (10 mEq) by mouth daily    Chronic systolic heart failure (H)       senna-docusate 8.6-50 MG per tablet    SENOKOT-S;PERICOLACE     Take 1 tablet by mouth daily        simethicone 80 MG chewable tablet    MYLICON    180 tablet    Take 1 tablet (80 mg) by mouth every 6 hours as needed for cramping    Abdominal bloating       simvastatin 20 MG tablet    ZOCOR    90 tablet    Take 1 tablet (20 mg) by mouth At Bedtime    Hyperlipidemia LDL goal <100       torsemide 20 MG tablet    DEMADEX    90 tablet    Take 1 tablet (20 mg) by mouth daily    Cardiomyopathy (H), Chronic systolic heart failure (H)       VIAGRA 25 MG tablet   Generic drug:  sildenafil      Take 25 mg  by mouth as needed        warfarin 5 MG tablet    COUMADIN    90 tablet    5 mg daily    Long-term (current) use of anticoagulants, Paroxysmal ventricular tachycardia (H)

## 2017-11-06 NOTE — PROGRESS NOTES
Medical Assistant Note:  Shahid Villalta presents today for lab draw.    Patient seen by provider today: No.   present during visit today: Not Applicable.    Concerns: No Concerns.    Procedure:  Labs drawn: .    Post Assessment:  Labs drawn without difficulty: Yes.    Face to Face Time: 10.    Joselin Finnegan

## 2017-11-06 NOTE — LETTER
11/6/2017         RE: Shahid Villalta  [address] [facility] [address]        Dear Colleague,    Thank you for referring your patient, Shahid Villalta, to the [facility]. Please see a copy of my visit note below.    Medical Assistant Note:  Shahid Villalta presents today for lab draw.    Patient seen by provider today: No.   present during visit today: Not Applicable.    Concerns: No Concerns.    Procedure:  Labs drawn: .    Post Assessment:  Labs drawn without difficulty: Yes.    Face to Face Time: 10.    Joselin Finnegan              Again, thank you for allowing me to participate in the care of your patient.        Sincerely,        [facility] Nurse

## 2017-11-08 ENCOUNTER — ONCOLOGY VISIT (OUTPATIENT)
Dept: ONCOLOGY | Facility: CLINIC | Age: 77
End: 2017-11-08
Attending: UROLOGY
Payer: COMMERCIAL

## 2017-11-08 VITALS
OXYGEN SATURATION: 85 % | WEIGHT: 248 LBS | TEMPERATURE: 96.7 F | HEIGHT: 68 IN | RESPIRATION RATE: 16 BRPM | SYSTOLIC BLOOD PRESSURE: 127 MMHG | DIASTOLIC BLOOD PRESSURE: 80 MMHG | BODY MASS INDEX: 37.59 KG/M2 | HEART RATE: 85 BPM

## 2017-11-08 DIAGNOSIS — C61 PROSTATE CANCER (H): Primary | ICD-10-CM

## 2017-11-08 PROCEDURE — 99213 OFFICE O/P EST LOW 20 MIN: CPT | Performed by: UROLOGY

## 2017-11-08 PROCEDURE — 99211 OFF/OP EST MAY X REQ PHY/QHP: CPT

## 2017-11-08 ASSESSMENT — PAIN SCALES - GENERAL: PAINLEVEL: NO PAIN (0)

## 2017-11-08 NOTE — PROGRESS NOTES
Prostate Cancer Follow up after RRP    Post op complicated by an ileus requiring hospitalization, doing much better now and normal bowel      Shahid Villalta is a very pleasant 75 year old male who presents with a history of prostate cancer.    Initial PSA:29  Pathologic Sells Score was 4 + 3  Biopsy Sells Score was 4 + 3  Pathologic Stage T3b.   Positive Margins: Yes Location:right side apical  Number of Lymph Nodes Removed: 13  Number of Positive Lymph Nodes: 0  Patient is status post robotic radical prostatectomy on 11/20/2015.    Risk Group: High    Predicted progression free probability at 10 years: 9, i.e. 90% chance that the PSA will rise down the road    ~10% chance of death in the next 5-10 years  (J Clin Oncol. 2005 Oct 1;23(58):1899-12.  Http://nomograms.[facility].org/Prostate/PostRadicalProstatectomy.aspx)      PSA   Date Value Ref Range Status   11/06/2017 0.02 0 - 4 ug/L Final     Comment:     Assay Method:  Chemiluminescence using Siemens Vista analyzer   05/08/2017 0.01 0 - 4 ug/L Final     Comment:     Assay Method:  Chemiluminescence using Siemens Vista analyzer   10/28/2016  0 - 4 ug/L Final    <0.01  Assay Method:  Chemiluminescence using Siemens Vista analyzer     06/15/2016  0 - 4 ug/L Final    <0.01  PSA results are about 7% lower than our prior method due to a methodology change   on August 30, 2011.     01/14/2016  0 - 4 ug/L Final    <0.01  PSA results are about 7% lower than our prior method due to a methodology change   on August 30, 2011.     05/19/2014 13.50 (H) 0 - 4 ug/L Final   06/15/2012 11.50 (H) 0 - 4 ug/L Final     Comment:     PSA results are about 7% lower than our prior method due to a methodology   change   on August 30, 2011.   07/18/2005 3.6 ng/mL    07/04/2004 3.4 ng/mL    03/06/2001 4.4 ng/mL        No incontinence except with certain heavy lifting, but frequency with diuretics, control better and urgency better.    There is evidence of slightly rising PSA    Physical  "Exam    /80  Pulse 85  Temp 96.7  F (35.9  C) (Tympanic)  Resp 16  Ht 1.727 m (5' 8\")  Wt 112.5 kg (248 lb)  SpO2 (!) 85%  BMI 37.71 kg/m2   Incisions healed well and no sign of hernias      Assessment GS 7 prostate cancer with evidence of very low PSA of disease s/p robotic radical prostatectomy 11/2017    Plan     Follow up in 6 months      PSA prior to visit     Clinic Exam      Discussed radiation again but okay to hold off right now given his rather low PSAs          Sold pharmaceuticals in past, also worked as a mortician, lives in [place] during winter    Nahun Hilario MD  Department of Urology  [facility]  "

## 2017-11-08 NOTE — LETTER
11/8/2017         RE: Shahid Villalta  [address] [facility] [facility] [address]        Dear Colleague,    Thank you for referring your patient, Shahid Villalta, to the [facility]. Please see a copy of my visit note below.    Prostate Cancer Follow up after RRP    Post op complicated by an ileus requiring hospitalization, doing much better now and normal bowel      Shahid Villalta is a very pleasant 75 year old male who presents with a history of prostate cancer.    Initial PSA:29  Pathologic Yulan Score was 4 + 3  Biopsy Yulan Score was 4 + 3  Pathologic Stage T3b.   Positive Margins: Yes Location:right side apical  Number of Lymph Nodes Removed: 13  Number of Positive Lymph Nodes: 0  Patient is status post robotic radical prostatectomy on 11/20/2015.    Risk Group: High    Predicted progression free probability at 10 years: 9, i.e. 90% chance that the PSA will rise down the road    ~10% chance of death in the next 5-10 years  (J Clin Oncol. 2005 Oct 1;23(84):7000-12.  Http://nomograms.[facility].org/Prostate/PostRadicalProstatectomy.aspx)      PSA   Date Value Ref Range Status   11/06/2017 0.02 0 - 4 ug/L Final     Comment:     Assay Method:  Chemiluminescence using Siemens Vista analyzer   05/08/2017 0.01 0 - 4 ug/L Final     Comment:     Assay Method:  Chemiluminescence using Siemens Vista analyzer   10/28/2016  0 - 4 ug/L Final    <0.01  Assay Method:  Chemiluminescence using Siemens Vista analyzer     06/15/2016  0 - 4 ug/L Final    <0.01  PSA results are about 7% lower than our prior method due to a methodology change   on August 30, 2011.     01/14/2016  0 - 4 ug/L Final    <0.01  PSA results are about 7% lower than our prior method due to a methodology change   on August 30, 2011.     05/19/2014 13.50 (H) 0 - 4 ug/L Final   06/15/2012 11.50 (H) 0 - 4 ug/L Final     Comment:     PSA results are about 7% lower than our prior method due to a methodology   change   on August 30, 2011.  "  07/18/2005 3.6 ng/mL    07/04/2004 3.4 ng/mL    03/06/2001 4.4 ng/mL        No incontinence except with certain heavy lifting, but frequency with diuretics, control better and urgency better.    There is evidence of slightly rising PSA    Physical Exam    /80  Pulse 85  Temp 96.7  F (35.9  C) (Tympanic)  Resp 16  Ht 1.727 m (5' 8\")  Wt 112.5 kg (248 lb)  SpO2 (!) 85%  BMI 37.71 kg/m2   Incisions healed well and no sign of hernias      Assessment GS 7 prostate cancer with evidence of very low PSA of disease s/p robotic radical prostatectomy 11/2017    Plan     Follow up in 6 months      PSA prior to visit     Clinic Exam      Discussed radiation again but okay to hold off right now given his rather low PSAs          Sold pharmaceuticals in past, also worked as a mortician, lives in [place] during winter    Nahun Hilario MD  Department of Urology  [facility]    Again, thank you for allowing me to participate in the care of your patient.        Sincerely,        Nahun Hilario MD    "

## 2017-11-08 NOTE — MR AVS SNAPSHOT
After Visit Summary   11/8/2017    Shahid Villalta    MRN: [id number]           Patient Information     Date Of Birth          1940        Visit Information        Provider Department      11/8/2017 1:30 PM Weight, Nahun Ashraf MD [facility]      Today's Diagnoses     Prostate cancer    -  1      Care Instructions    Plan     Follow up in 6 months      PSA     Clinic Exam  This was placed in the remind me           Follow-ups after your visit        Your next 10 appointments already scheduled     Nov 13, 2017 10:00 AM CST   Ech Complete with [address] ([facility])    [address]  [facility] [address] [phone]           1. Please bring or wear a comfortable two-piece outfit. 2. You may eat, drink and take your normal medicines. 3. For any questions that cannot be answered, please contact the ordering physician ***Please check-in at the [place] Registration Office located in [address] in the [facility] building. When you are finished registering, please go to [address] and have a seat. The technician will call your name for the test.            Nov 13, 2017 11:00 AM CST   LAB with RU LAB   [facility] AT [place] ([facility])    [address]  [facility] [address] [phone]           Please do not eat 10-12 hours before your appointment if you are coming in fasting for labs on lipids, cholesterol, or glucose (sugar). This does not apply to pregnant women. Water, hot tea and black coffee (with nothing added) are okay. Do not drink other fluids, diet soda or chew gum.            Nov 17, 2017 12:15 PM CST   Return Visit with Adiel Aden MD   [facility]   [place] ([facility])    [address] [place] [address] [address]  [facility] [address] [phone]             Jan 31, 2018  4:30 PM CST   Remote ICD Check with STEWART DCR2   [facility]   [place] ([facility])    [address]  [place] [address] [phone]           This appointment is for a remote check of your debrillator.  This is not an appointment at the office.            Jun 29, 2018  9:30 AM CDT   Return Sleep Patient with Nadir Ashraf MD   [facility] [place] ([facility])    [address]  [address]  [place] [address] [phone]              Who to contact     If you have questions or need follow up information about today's clinic visit or your schedule please contact [facility] CANCER CARE directly at [phone].  Normal or non-critical lab and imaging results will be communicated to you by Zinghart, letter or phone within 4 business days after the clinic has received the results. If you do not hear from us within 7 days, please contact the clinic through Zinghart or phone. If you have a critical or abnormal lab result, we will notify you by phone as soon as possible.  Submit refill requests through Elasticsearch or call your pharmacy and they will forward the refill request to us. Please allow 3 business days for your refill to be completed.          Additional Information About Your Visit        Elasticsearch Information     Elasticsearch gives you secure access to your electronic health record. If you see a primary care provider, you can also send messages to your care team and make appointments. If you have questions, please call your primary care clinic.  If you do not have a primary care provider, please call [phone] and they will assist you.        Care EveryWhere ID     This is your Care EveryWhere ID. This could be used by other organizations to access your [place] medical records  [id number]        Your Vitals Were     Pulse Temperature Respirations Height Pulse Oximetry BMI (Body Mass Index)     "85 96.7  F (35.9  C) (Tympanic) 16 1.727 m (5' 8\") 85% 37.71 kg/m2       Blood Pressure from Last 3 Encounters:   11/08/17 127/80   06/23/17 98/69   05/19/17 122/66    Weight from Last 3 Encounters:   11/08/17 112.5 kg (248 lb)   06/23/17 112 kg (247 lb)   05/19/17 112.2 kg (247 lb 4.8 oz)              Today, you had the following     No orders found for display       Primary Care Provider Office Phone # Fax #    Gume Brown [phone] [address]  [facility] [address]        Equal Access to Services     [facility] : Hadii aad ku hadasho Soomaali, waaxda luqadaha, qaybta kaalmada adeegyada, waxay shaji mesa . So [facility] [phone].    ATENCIÓN: Si habla español, tiene a irving disposición servicios gratuitos de asistencia lingüística. LlCleveland Clinic Foundation [phone].    We comply with applicable federal civil rights laws and [place] laws. We do not discriminate on the basis of race, color, national origin, age, disability, sex, sexual orientation, or gender identity.            Thank you!     Thank you for choosing [facility] CANCER [facility]  for your care. Our goal is always to provide you with excellent care. Hearing back from our patients is one way we can continue to improve our services. Please take a few minutes to complete the written survey that you may receive in the mail after your visit with us. Thank you!             Your Updated Medication List - Protect others around you: Learn how to safely use, store and throw away your medicines at www.disposemymeds.org.          This list is accurate as of: 11/8/17  3:15 PM.  Always use your most recent med list.                   Brand Name Dispense Instructions for use Diagnosis    acetaminophen 325 MG tablet    TYLENOL     Take 650 mg by mouth 3 times daily        amiodarone 200 MG tablet    PACERONE/CODARONE    50 tablet    Take PO 1/2 tablet daily-Take extra prn per MD recommendations.    Paroxysmal atrial " fibrillation (H)       ASPIRIN NOT PRESCRIBED    INTENTIONAL    0 each    1 each daily Antiplatelet medication not prescribed intentionally due to Current anticoagulant therapy (warfarin/enoxaparin)    Atrial fibrillation, unspecified       cetirizine 10 MG tablet    zyrTEC    90 tablet    Take 1 tablet (10 mg) by mouth daily    Allergic rhinitis       cholecalciferol 1000 UNIT tablet    vitamin D3    180 tablet    Take 2 tablets (2,000 Units) by mouth daily    Vitamin D deficiency, Parathyroid hormone excess (H)       digoxin 125 MCG tablet    LANOXIN    45 tablet    Take 1 tablet (125 mcg) by mouth every 48 hours    Chronic systolic heart failure (H)       lisinopril 2.5 MG tablet    PRINIVIL/Zestril    90 tablet    Take 1 tablet (2.5 mg) by mouth daily    Nonrheumatic aortic valve stenosis       metoprolol 100 MG 24 hr tablet    TOPROL-XL    90 tablet    Take 1 tablet (100 mg) by mouth daily    Paroxysmal atrial fibrillation (H)       omeprazole 20 MG CR capsule    priLOSEC    90 capsule    Take 1 capsule (20 mg) by mouth daily    Gastroesophageal reflux disease without esophagitis       polyethylene glycol powder    MIRALAX/GLYCOLAX     Take 17 g by mouth daily as needed for constipation        potassium chloride 10 MEQ tablet    K-TAB,KLOR-CON    90 tablet    Take 1 tablet (10 mEq) by mouth daily    Chronic systolic heart failure (H)       senna-docusate 8.6-50 MG per tablet    SENOKOT-S;PERICOLACE     Take 1 tablet by mouth daily        simethicone 80 MG chewable tablet    MYLICON    180 tablet    Take 1 tablet (80 mg) by mouth every 6 hours as needed for cramping    Abdominal bloating       simvastatin 20 MG tablet    ZOCOR    90 tablet    Take 1 tablet (20 mg) by mouth At Bedtime    Hyperlipidemia LDL goal <100       torsemide 20 MG tablet    DEMADEX    90 tablet    Take 1 tablet (20 mg) by mouth daily    Cardiomyopathy (H), Chronic systolic heart failure (H)       VIAGRA 25 MG tablet   Generic drug:   sildenafil      Take 25 mg by mouth as needed        warfarin 5 MG tablet    COUMADIN    90 tablet    5 mg daily    Long-term (current) use of anticoagulants, Paroxysmal ventricular tachycardia (H)

## 2017-11-08 NOTE — NURSING NOTE
"Oncology Rooming Note    November 8, 2017 1:34 PM   Shahid Villalta is a 77 year old male who presents for:    Chief Complaint   Patient presents with     Oncology Clinic Visit     Follow up     Initial Vitals: /80  Pulse 85  Temp 96.7  F (35.9  C) (Tympanic)  Resp 16  Ht 1.727 m (5' 8\")  Wt 112.5 kg (248 lb)  SpO2 (!) 85%  BMI 37.71 kg/m2 Estimated body mass index is 37.71 kg/(m^2) as calculated from the following:    Height as of this encounter: 1.727 m (5' 8\").    Weight as of this encounter: 112.5 kg (248 lb). Body surface area is 2.32 meters squared.  No Pain (0) Comment: Data Unavailable   No LMP for male patient.  Allergies reviewed: Yes  Medications reviewed: Yes    Medications: Medication refills not needed today.  Pharmacy name entered into [facility]:    [id number] - [place], [place] - [address] [facility] - [place], [place] - [address] AT [facility]    Clinical concerns: Follow up     8 minutes for nursing intake (face to face time)     Joselin Finnegan CMA     DISCHARGE PLAN:  Next appointments: See patient instruction section  Departure Mode: Ambulatory  Accompanied by: self  0 minutes for nursing discharge (face to face time)   Joselin Finnegan CMA                  "

## 2017-11-13 ENCOUNTER — ANTICOAGULATION THERAPY VISIT (OUTPATIENT)
Dept: NURSING | Facility: CLINIC | Age: 77
End: 2017-11-13
Payer: COMMERCIAL

## 2017-11-13 ENCOUNTER — HOSPITAL ENCOUNTER (OUTPATIENT)
Dept: CARDIOLOGY | Facility: CLINIC | Age: 77
Discharge: HOME | End: 2017-11-13
Attending: INTERNAL MEDICINE | Admitting: INTERNAL MEDICINE
Payer: MEDICARE

## 2017-11-13 DIAGNOSIS — I35.0 NONRHEUMATIC AORTIC VALVE STENOSIS: ICD-10-CM

## 2017-11-13 DIAGNOSIS — I42.9 CARDIOMYOPATHY (H): ICD-10-CM

## 2017-11-13 DIAGNOSIS — Z79.01 LONG-TERM (CURRENT) USE OF ANTICOAGULANTS: ICD-10-CM

## 2017-11-13 DIAGNOSIS — I47.29 PAROXYSMAL VENTRICULAR TACHYCARDIA (H): ICD-10-CM

## 2017-11-13 DIAGNOSIS — I47.19 ATRIAL TACHYCARDIA, PAROXYSMAL (H): ICD-10-CM

## 2017-11-13 LAB
ALBUMIN SERPL-MCNC: 3.4 G/DL (ref 3.4–5)
ALP SERPL-CCNC: 55 U/L (ref 40–150)
ALT SERPL W P-5'-P-CCNC: 17 U/L (ref 0–70)
ANION GAP SERPL CALCULATED.3IONS-SCNC: 6 MMOL/L (ref 3–14)
AST SERPL W P-5'-P-CCNC: 14 U/L (ref 0–45)
BILIRUB DIRECT SERPL-MCNC: 0.1 MG/DL (ref 0–0.2)
BILIRUB SERPL-MCNC: 0.4 MG/DL (ref 0.2–1.3)
BUN SERPL-MCNC: 29 MG/DL (ref 7–30)
CALCIUM SERPL-MCNC: 8.7 MG/DL (ref 8.5–10.1)
CHLORIDE SERPL-SCNC: 107 MMOL/L (ref 94–109)
CO2 SERPL-SCNC: 29 MMOL/L (ref 20–32)
CREAT SERPL-MCNC: 1.49 MG/DL (ref 0.66–1.25)
GFR SERPL CREATININE-BSD FRML MDRD: 46 ML/MIN/1.7M2
GLUCOSE SERPL-MCNC: 105 MG/DL (ref 70–99)
INR PPP: 2.93 (ref 0.86–1.14)
POTASSIUM SERPL-SCNC: 4.6 MMOL/L (ref 3.4–5.3)
PROT SERPL-MCNC: 6.8 G/DL (ref 6.8–8.8)
SODIUM SERPL-SCNC: 142 MMOL/L (ref 133–144)
TSH SERPL DL<=0.005 MIU/L-ACNC: 0.41 MU/L (ref 0.4–4)

## 2017-11-13 PROCEDURE — 93306 TTE W/DOPPLER COMPLETE: CPT | Mod: 26 | Performed by: INTERNAL MEDICINE

## 2017-11-13 PROCEDURE — 25500064 ZZH RX 255 OP 636: Performed by: INTERNAL MEDICINE

## 2017-11-13 PROCEDURE — 40000264 ECHO COMPLETE WITH OPTISON

## 2017-11-13 PROCEDURE — 99207 ZZC NO CHARGE NURSE ONLY: CPT | Performed by: FAMILY MEDICINE

## 2017-11-13 PROCEDURE — 80048 BASIC METABOLIC PNL TOTAL CA: CPT | Performed by: INTERNAL MEDICINE

## 2017-11-13 PROCEDURE — 80076 HEPATIC FUNCTION PANEL: CPT | Performed by: INTERNAL MEDICINE

## 2017-11-13 PROCEDURE — 84443 ASSAY THYROID STIM HORMONE: CPT | Performed by: INTERNAL MEDICINE

## 2017-11-13 PROCEDURE — 36415 COLL VENOUS BLD VENIPUNCTURE: CPT | Performed by: INTERNAL MEDICINE

## 2017-11-13 PROCEDURE — 85610 PROTHROMBIN TIME: CPT | Performed by: INTERNAL MEDICINE

## 2017-11-13 RX ADMIN — HUMAN ALBUMIN MICROSPHERES AND PERFLUTREN 3 ML: 10; .22 INJECTION, SOLUTION INTRAVENOUS at 11:15

## 2017-11-13 NOTE — PROGRESS NOTES
ANTICOAGULATION FOLLOW-UP CLINIC VISIT    Patient Name:  Shahid Villalta  Date:  11/13/2017  Contact Type:  Rosemaryhart    SUBJECTIVE:     Patient Findings     Positives No Problem Findings           OBJECTIVE    INR   Date Value Ref Range Status   11/13/2017 2.93 (H) 0.86 - 1.14 Final     Chromogenic Factor 10   Date Value Ref Range Status   07/31/2017 17 (L) 70 - 130 % Final     Comment:     Therapeutic Range:  A Chromogenic Factor 10 level of approximately 20-40%   inversely correlates with an INR of 2-3 for patients receiving Warfarin.   Chromogenic Factor 10 levels below 20% indicate an INR greater than 3 and   levels above 40% indicate an INR less than 2.         ASSESSMENT / PLAN  No question data found.  Anticoagulation Summary as of 11/13/2017     INR goal 2.0-3.0   Today's INR 2.93   Maintenance plan 5 mg (5 mg x 1) every day   Full instructions 5 mg every day   Weekly total 35 mg   Plan last modified Manisha Pierson RN (8/21/2017)   Next INR check 12/11/2017   Target end date     Indications   Long-term (current) use of anticoagulants [Z79.01] [Z79.01]  Paroxysmal ventricular tachycardia (H) [I47.2]         Anticoagulation Episode Summary     INR check location     Preferred lab     Send INR reminders to [facility] TRIAGE    Comments             See the Encounter Report to view Anticoagulation Flowsheet and Dosing Calendar (Go to Encounters tab in chart review, and find the Anticoagulation Therapy Visit)        Manisha Pierson, RN

## 2017-11-13 NOTE — MR AVS SNAPSHOT
Shahid Villalta   11/13/2017   Anticoagulation Therapy Visit    Description:  77 year old male   Provider:  Gume Brown MD   Department:  [facility]           INR as of 11/13/2017     Today's INR 2.93      Anticoagulation Summary as of 11/13/2017     INR goal 2.0-3.0   Today's INR 2.93   Full instructions 5 mg every day   Next INR check 12/11/2017    Indications   Long-term (current) use of anticoagulants [Z79.01] [Z79.01]  Paroxysmal ventricular tachycardia (H) [I47.2]         Contact Numbers     [facility]  Please call [phone] with any problems or questions regarding your therapy, or to cancel or reschedule your appointment.          November 2017 Details    Sun Mon Tue Wed Thu Fri Sat        1               2               3               4                 5               6               7               8               9               10               11                 12               13      5 mg   See details      14      5 mg         15      5 mg         16      5 mg         17      5 mg         18      5 mg           19      5 mg         20      5 mg         21      5 mg         22      5 mg         23      5 mg         24      5 mg         25      5 mg           26      5 mg         27      5 mg         28      5 mg         29      5 mg         30      5 mg            Date Details   11/13 This INR check               How to take your warfarin dose     To take:  5 mg Take 1 of the 5 mg tablets.           December 2017 Details    Sun Mon Tue Wed Thu Fri Sat          1      5 mg         2      5 mg           3      5 mg         4      5 mg         5      5 mg         6      5 mg         7      5 mg         8      5 mg         9      5 mg           10      5 mg         11            12               13               14               15               16                 17               18               19               20               21               22               23                 24                25               26               27               28               29               30                 31                      Date Details   No additional details    Date of next INR:  12/11/2017         How to take your warfarin dose     To take:  5 mg Take 1 of the 5 mg tablets.

## 2017-11-17 ENCOUNTER — CARE COORDINATION (OUTPATIENT)
Dept: CARDIOLOGY | Facility: CLINIC | Age: 77
End: 2017-11-17

## 2017-11-17 ENCOUNTER — OFFICE VISIT (OUTPATIENT)
Dept: CARDIOLOGY | Facility: CLINIC | Age: 77
End: 2017-11-17
Attending: INTERNAL MEDICINE
Payer: COMMERCIAL

## 2017-11-17 VITALS
BODY MASS INDEX: 37.86 KG/M2 | HEART RATE: 60 BPM | SYSTOLIC BLOOD PRESSURE: 118 MMHG | DIASTOLIC BLOOD PRESSURE: 79 MMHG | WEIGHT: 249.8 LBS | HEIGHT: 68 IN

## 2017-11-17 DIAGNOSIS — I42.8 OTHER CARDIOMYOPATHY (H): ICD-10-CM

## 2017-11-17 DIAGNOSIS — I48.0 PAROXYSMAL ATRIAL FIBRILLATION (H): ICD-10-CM

## 2017-11-17 DIAGNOSIS — I35.1 NONRHEUMATIC AORTIC VALVE INSUFFICIENCY: ICD-10-CM

## 2017-11-17 DIAGNOSIS — I35.0 NONRHEUMATIC AORTIC VALVE STENOSIS: Primary | ICD-10-CM

## 2017-11-17 PROCEDURE — 99214 OFFICE O/P EST MOD 30 MIN: CPT | Performed by: INTERNAL MEDICINE

## 2017-11-17 NOTE — MR AVS SNAPSHOT
After Visit Summary   11/17/2017    Shahid Villalta    MRN: [id number]           Patient Information     Date Of Birth          1940        Visit Information        Provider Department      11/17/2017 12:15 PM Adiel Aden MD [facility]        Today's Diagnoses     Other cardiomyopathy (H)    -  1    Nonrheumatic aortic valve stenosis        Paroxysmal atrial fibrillation (H)        Nonrheumatic aortic valve insufficiency           Follow-ups after your visit        Additional Services     Follow-Up with Cardiologist                 Your next 10 appointments already scheduled     Jan 31, 2018  4:30 PM CST   Remote ICD Check with STEWART DCR2   [facility] ([facility])    [address]  [place] [id number] [phone]           This appointment is for a remote check of your debrillator.  This is not an appointment at the office.            Jun 29, 2018  9:30 AM CDT   Return Sleep Patient with Nadir Ashraf MD   [facility] [place] ([facility] [place])    [address]  [address]  [place] [id number] [phone]              Future tests that were ordered for you today     Open Future Orders        Priority Expected Expires Ordered    TSH Routine 3/17/2018 11/17/2018 11/17/2017    Basic metabolic panel Routine 5/16/2018 11/17/2018 11/17/2017    Follow-Up with Cardiologist Routine 3/17/2018 11/17/2018 11/17/2017    Echocardiogram Routine 3/17/2018 11/17/2018 11/17/2017            Who to contact     If you have questions or need follow up information about today's clinic visit or your schedule please contact [facility] directly at [phone].  Normal or non-critical lab and imaging results will be communicated to you by MyChart, letter or phone within 4 business days after the clinic has received the results. If you do  "not hear from us within 7 days, please contact the clinic through [employer] or phone. If you have a critical or abnormal lab result, we will notify you by phone as soon as possible.  Submit refill requests through [employer] or call your pharmacy and they will forward the refill request to us. Please allow 3 business days for your refill to be completed.          Additional Information About Your Visit        iStory[employer] Information     [employer] gives you secure access to your electronic health record. If you see a primary care provider, you can also send messages to your care team and make appointments. If you have questions, please call your primary care clinic.  If you do not have a primary care provider, please call [phone] and they will assist you.        Care EveryWhere ID     This is your Care EveryWhere ID. This could be used by other organizations to access your [place] medical records  [id number]        Your Vitals Were     Pulse Height BMI (Body Mass Index)             60 1.727 m (5' 8\") 37.98 kg/m2          Blood Pressure from Last 3 Encounters:   11/17/17 118/79   11/08/17 127/80   06/23/17 98/69    Weight from Last 3 Encounters:   11/17/17 113.3 kg (249 lb 12.8 oz)   11/08/17 112.5 kg (248 lb)   06/23/17 112 kg (247 lb)              We Performed the Following     Follow-Up with Cardiologist        Primary Care Provider Office Phone # Fax #    Gume Brown [phone] [address]  [facility] [address]        Equal Access to Services     JEFFERSON SHEARER AH: Hadii aad ku hadasho Soomaali, waaxda luqadaha, qaybta kaalmada adeegyada, waxay shaji garcia. So [facility] [phone].    ATENCIÓN: Si habla español, tiene a irving disposición servicios gratuitos de asistencia lingüística. Llame al [phone].    We comply with applicable federal civil rights laws and [place] laws. We do not discriminate on the basis of race, color, national origin, age, disability, sex, " sexual orientation, or gender identity.            Thank you!     Thank you for choosing [facility]  for your care. Our goal is always to provide you with excellent care. Hearing back from our patients is one way we can continue to improve our services. Please take a few minutes to complete the written survey that you may receive in the mail after your visit with us. Thank you!             Your Updated Medication List - Protect others around you: Learn how to safely use, store and throw away your medicines at www.disposemymeds.org.          This list is accurate as of: 11/17/17 12:45 PM.  Always use your most recent med list.                   Brand Name Dispense Instructions for use Diagnosis    acetaminophen 325 MG tablet    TYLENOL     Take 1,300 mg by mouth 2 times daily        amiodarone 200 MG tablet    PACERONE/CODARONE    50 tablet    Take PO 1/2 tablet daily-Take extra prn per MD recommendations.    Paroxysmal atrial fibrillation (H)       ASPIRIN NOT PRESCRIBED    INTENTIONAL    0 each    1 each daily Antiplatelet medication not prescribed intentionally due to Current anticoagulant therapy (warfarin/enoxaparin)    Atrial fibrillation, unspecified       cetirizine 10 MG tablet    zyrTEC    90 tablet    Take 1 tablet (10 mg) by mouth daily    Allergic rhinitis       cholecalciferol 1000 UNIT tablet    vitamin D3    180 tablet    Take 2 tablets (2,000 Units) by mouth daily    Vitamin D deficiency, Parathyroid hormone excess (H)       digoxin 125 MCG tablet    LANOXIN    45 tablet    Take 1 tablet (125 mcg) by mouth every 48 hours    Chronic systolic heart failure (H)       lisinopril 2.5 MG tablet    PRINIVIL/Zestril    90 tablet    Take 1 tablet (2.5 mg) by mouth daily    Nonrheumatic aortic valve stenosis       metoprolol 100 MG 24 hr tablet    TOPROL-XL    90 tablet    Take 1 tablet (100 mg) by mouth daily    Paroxysmal atrial fibrillation (H)       omeprazole 20 MG  CR capsule    priLOSEC    90 capsule    Take 1 capsule (20 mg) by mouth daily    Gastroesophageal reflux disease without esophagitis       polyethylene glycol powder    MIRALAX/GLYCOLAX     Take 17 g by mouth daily as needed for constipation        potassium chloride 10 MEQ tablet    K-TAB,KLOR-CON    90 tablet    Take 1 tablet (10 mEq) by mouth daily    Chronic systolic heart failure (H)       senna-docusate 8.6-50 MG per tablet    SENOKOT-S;PERICOLACE     Take 2 tablets by mouth daily        simethicone 80 MG chewable tablet    MYLICON    180 tablet    Take 1 tablet (80 mg) by mouth every 6 hours as needed for cramping    Abdominal bloating       simvastatin 20 MG tablet    ZOCOR    90 tablet    Take 1 tablet (20 mg) by mouth At Bedtime    Hyperlipidemia LDL goal <100       torsemide 20 MG tablet    DEMADEX    90 tablet    Take 1 tablet (20 mg) by mouth daily    Cardiomyopathy (H), Chronic systolic heart failure (H)       VIAGRA 25 MG tablet   Generic drug:  sildenafil      Take 25 mg by mouth as needed        warfarin 5 MG tablet    COUMADIN    90 tablet    5 mg daily    Long-term (current) use of anticoagulants, Paroxysmal ventricular tachycardia (H)

## 2017-11-17 NOTE — PROGRESS NOTES
Patient calling with questions regarding TAVR. All questions answered to the patients satisfaction. Plan for the patient to come back in the spring 2018 for follow up and possible TAVR workup. patient will call in the interim if any questions or concerns arise. Sangeeta Ivory

## 2017-11-17 NOTE — PROGRESS NOTES
HPI and Plan:    I had the pleasure of seeing Shahid Villalta in Cardiology Clinic in followup, a 77-year-old male with history of moderate to severe aortic valve stenosis, mild cardiomyopathy, coronary artery disease, paroxysmal atrial fibrillation and tachycardia status post AV dario ablation and post-biventricular AICD placement, previous SVT ablation, returns for followup.  He is on amiodarone for management of atrial arrhythmias.  Today we discussed his PFTs, which have remained stable.  There is mild obstruction but normal gas transfer or normal DLCO.       He has had a good summer. He's had no worsening of his chest pain or shortness of breath. No orthopnea or PND. I reviewed the repeat echocardiogram that was done on 11/13/17. It shows again moderate severe aortic valve stenosis with a mean rate of 36 mm. On my review of the echo, the aortic regurgitation appears at least moderate to moderately severe. Ejection fraction was 45%.  LV was mildly dilated although it appears to be an off axis measurement and therefore overestimated.    His BMP shows stable creatinine 1.49. K was 4.6. TSH, ALT and AST were normal. Repeat PFT showed 91% DLCO and is stable.       PHYSICAL EXAMINATION:   VITAL SIGNS:  Blood pressure is 118/79, pulse 60 per minute and regular.   CARDIAC:  A 3/6 ejection systolic murmur in the aortic area with a soft A2 component of the second heart sound. 2 x 6 early diastolic murmur was noted.  CHEST:  Clear to auscultation.       IMPRESSION:   1.  Aortic stenosis and aortic regurgitation consistent with mixed aortic valve disease .  Again, the patient has near severe aortic valve stenosis but no worsening symptoms.  His echocardiograms have been stable over the last 1-1/2 to 2 years except for some worsening of the aortic regurgitation. I think he is getting close to a point where we might have to consider transfemoral aortic valve replacement. I discussed that with him. Although they'll be measurement  was off axis, increased LV size is somewhat concerning. I offered him a visit to TAVR in the next month or so but he wants to go to [place] and come back in March or April and then will visit with me with a repeat echocardiogram as well as be seen in TAVR clinic.  If there is worsening shortness of breath or worsening edema in the interim, he'll call us and come back from [place] earlier.    2.  Mild cardiomyopathy, stable EF 40%-50%, no overt congestive heart failure.     3.  Paroxysmal atrial tachycardia and fibrillation, stable and in sinus rhythm on amiodarone 100 mg daily.  His PFTs and chest x-rays are stable.   we will repeat TSH in 6 months.    4.  Chronic renal insufficiency, stable.     5.  Hypertension, well controlled on lisinopril and metoprolol.     6.  Status post BiV AICD.  Continue followup in the Device Clinic.         Thank you for allowing us to part spelled in the care of this nice patient. I spent a long time discussing the implications of worsening aortic valve disease, transfemoral aortic valve replacement, reviewed his echo from last week and in May. At this time, he prefers to go to [place] and come back in March or April to see me as well as the aortic stenosis intervention clinic.      cc:   [place] [address]           KHAI WAGNER MD              Orders Placed This Encounter   Procedures     Basic metabolic panel     TSH     Follow-Up with Cardiologist     Follow-Up with [facility]     Echocardiogram       No orders of the defined types were placed in this encounter.      There are no discontinued medications.      Encounter Diagnoses   Name Primary?     Nonrheumatic aortic valve stenosis Yes     Other cardiomyopathy (H)      Paroxysmal atrial fibrillation (H)      Nonrheumatic aortic valve insufficiency        CURRENT MEDICATIONS:  Current Outpatient Prescriptions   Medication Sig Dispense  Refill     warfarin (COUMADIN) 5 MG tablet 5 mg daily 90 tablet 1     lisinopril (PRINIVIL/ZESTRIL) 2.5 MG tablet Take 1 tablet (2.5 mg) by mouth daily 90 tablet 3     digoxin (LANOXIN) 125 MCG tablet Take 1 tablet (125 mcg) by mouth every 48 hours 45 tablet 3     metoprolol (TOPROL-XL) 100 MG 24 hr tablet Take 1 tablet (100 mg) by mouth daily 90 tablet 3     omeprazole (PRILOSEC) 20 MG CR capsule Take 1 capsule (20 mg) by mouth daily 90 capsule 3     potassium chloride (K-TAB,KLOR-CON) 10 MEQ tablet Take 1 tablet (10 mEq) by mouth daily 90 tablet 3     simvastatin (ZOCOR) 20 MG tablet Take 1 tablet (20 mg) by mouth At Bedtime 90 tablet 3     torsemide (DEMADEX) 20 MG tablet Take 1 tablet (20 mg) by mouth daily 90 tablet 3     amiodarone (PACERONE/CODARONE) 200 MG tablet Take PO 1/2 tablet daily-Take extra prn per MD recommendations. 50 tablet 3     acetaminophen (TYLENOL) 325 MG tablet Take 1,300 mg by mouth 2 times daily        senna-docusate (SENOKOT-S;PERICOLACE) 8.6-50 MG per tablet Take 2 tablets by mouth daily        polyethylene glycol (MIRALAX/GLYCOLAX) powder Take 17 g by mouth daily as needed for constipation       simethicone (MYLICON) 80 MG chewable tablet Take 1 tablet (80 mg) by mouth every 6 hours as needed for cramping 180 tablet      cetirizine (ZYRTEC) 10 MG tablet Take 1 tablet (10 mg) by mouth daily 90 tablet 1     cholecalciferol (VITAMIN D) 1000 UNIT tablet Take 2 tablets (2,000 Units) by mouth daily 180 tablet 1     sildenafil (VIAGRA) 25 MG tablet Take 25 mg by mouth as needed       ASPIRIN NOT PRESCRIBED, INTENTIONAL, 1 each daily Antiplatelet medication not prescribed intentionally due to Current anticoagulant therapy (warfarin/enoxaparin) (Patient not taking: Reported on 11/17/2017) 0 each 0       ALLERGIES     Allergies   Allergen Reactions     Latex Rash     Seasonal Allergies      hayfever - wheezing -        PAST MEDICAL HISTORY:  Past Medical History:   Diagnosis Date     Aortic  valve disorders      Aortic valve disorders      Arrhythmia      Atrial fibrillation (H)      Atrial fibrillation (H)      Automatic implantable cardiac defibrillator in situ      Cancer (H)      Congestive heart failure (H)      Coronary artery disease      Essential hypertension, benign      GERD (gastroesophageal reflux disease) 3/16/2010     Heart failure (H)      History of blood transfusion      Hyperlipidaemia      Hypertension      Obese      Other and unspecified hyperlipidemia      Other primary cardiomyopathies      Other primary cardiomyopathies      Pacemaker      Sleep apnea      Small bowel obstruction 2015     Ventricular tachycardia (H) 2013       PAST SURGICAL HISTORY:  Past Surgical History:   Procedure Laterality Date     BIOPSY  annually    prostate biopsy     CARDIAC SURGERY      A fib Ablation     CARDIAC SURGERY      cardioversion     COLONOSCOPY       DAVINCI PROSTATECTOMY N/A 2015    Procedure: DAVINCI PROSTATECTOMY;  Surgeon: Nahun Hilario MD;  Location: [facility] OR     GENITOURINARY SURGERY      bladder surgery      H ABLATION AV NODE  13     H ABLATION FOCAL AFIB  13     HC TOOTH EXTRACTION W/FORCEP       TONSILLECTOMY & ADENOIDECTOMY         FAMILY HISTORY:  Family History   Problem Relation Age of Onset     C.A.D. Father      CHF  at 74     CEREBROVASCULAR DISEASE Father      C.A.D. Mother      CHF at 91 still living     CEREBROVASCULAR DISEASE Mother      TIAs     Breast Cancer Mother      HEART DISEASE Son      arrythmia       SOCIAL HISTORY:  Social History     Social History     Marital status:      Spouse name: N/A     Number of children: 2     Years of education: N/A     Occupational History      Retired     Social History Main Topics     Smoking status: Never Smoker     Smokeless tobacco: Never Used     Alcohol use No      Comment: AA since      Drug use: No     Sexual activity: Not Currently     Partners: Female      "Birth control/ protection: Abstinence     Other Topics Concern     Parent/Sibling W/ Cabg, Mi Or Angioplasty Before 65f 55m? No     Caffeine Concern Yes     2-3 cups caffeine per day     Sleep Concern Yes     sleep apnea, wears cpap at night     Stress Concern No     Weight Concern No     weight fluctuates     Special Diet No     Exercise Yes     walking daily     Seat Belt Yes     Social History Narrative       Review of Systems:  Skin:  Negative       Eyes:  Positive for glasses;cataracts    ENT:  Positive for hearing loss    Respiratory:  Positive for sleep apnea;CPAP     Cardiovascular:  Negative;palpitations;chest pain;dizziness;lightheadedness;syncope or near-syncope;cyanosis;fatigue;exercise intolerance edema;Positive for    Gastroenterology: Negative      Genitourinary:  Positive for urinary frequency    Musculoskeletal:  Positive for joint pain    Neurologic:  Negative      Psychiatric:  Negative      Heme/Lymph/Imm:  Positive for allergies    Endocrine:  Negative        Physical Exam:  Vitals: /79 (BP Location: Left arm, Cuff Size: Adult Large)  Pulse 60  Ht 1.727 m (5' 8\")  Wt 113.3 kg (249 lb 12.8 oz)  BMI 37.98 kg/m2    Constitutional:           Skin:             Head:           Eyes:           Lymph:      ENT:           Neck:           Respiratory:            Cardiac:                                                           GI:           Extremities and Muscular Skeletal:                 Neurological:           Psych:           CC  Adiel Aden MD  [address]  [address]  [place] [phone]              "

## 2017-11-17 NOTE — LETTER
11/17/2017    Gume Brown MD  [address]  [facility] [address]    RE: Shahid DEE Villalta       Dear Colleague,    I had the pleasure of seeing Shahid Villalta in the [facility].    HPI and Plan:    I had the pleasure of seeing Shahid Villalta in Cardiology Clinic in followup, a 77-year-old male with history of moderate to severe aortic valve stenosis, mild cardiomyopathy, coronary artery disease, paroxysmal atrial fibrillation and tachycardia status post AV dario ablation and post-biventricular AICD placement, previous SVT ablation, returns for followup.  He is on amiodarone for management of atrial arrhythmias.  Today we discussed his PFTs, which have remained stable.  There is mild obstruction but normal gas transfer or normal DLCO.       He has had a good summer. He's had no worsening of his chest pain or shortness of breath. No orthopnea or PND. I reviewed the repeat echocardiogram that was done on 11/13/17. It shows again moderate severe aortic valve stenosis with a mean rate of 36 mm. On my review of the echo, the aortic regurgitation appears at least moderate to moderately severe. Ejection fraction was 45%.  LV was mildly dilated although it appears to be an off axis measurement and therefore overestimated.    His BMP shows stable creatinine 1.49. K was 4.6. TSH, ALT and AST were normal. Repeat PFT showed 91% DLCO and is stable.       PHYSICAL EXAMINATION:   VITAL SIGNS:  Blood pressure is 118/79, pulse 60 per minute and regular.   CARDIAC:  A 3/6 ejection systolic murmur in the aortic area with a soft A2 component of the second heart sound. 2 x 6 early diastolic murmur was noted.  CHEST:  Clear to auscultation.       IMPRESSION:   1.  Aortic stenosis and aortic regurgitation consistent with mixed aortic valve disease .  Again, the patient has near severe aortic valve stenosis but no worsening symptoms.  His echocardiograms have been stable over the last 1-1/2 to 2 years except  for some worsening of the aortic regurgitation. I think he is getting close to a point where we might have to consider transfemoral aortic valve replacement. I discussed that with him. Although they'll be measurement was off axis, increased LV size is somewhat concerning. I offered him a visit to [facility] in the next month or so but he wants to go to [place] and come back in March or April and then will visit with me with a repeat echocardiogram as well as be seen in [facility] clinic.  If there is worsening shortness of breath or worsening edema in the interim, he'll call us and come back from [place] earlier.    2.  Mild cardiomyopathy, stable EF 40%-50%, no overt congestive heart failure.     3.  Paroxysmal atrial tachycardia and fibrillation, stable and in sinus rhythm on amiodarone 100 mg daily.  His PFTs and chest x-rays are stable.    we will repeat TSH in 6 months.    4.  Chronic renal insufficiency, stable.     5.  Hypertension, well controlled on lisinopril and metoprolol.     6.  Status post BiV AICD.  Continue followup in the Device Clinic.         Thank you for allowing us to part spelled in the care of this nice patient. I spent a long time discussing the implications of worsening aortic valve disease, transfemoral aortic valve replacement, reviewed his echo from last week and in May. At this time, he prefers to go to [place] and come back in March or April to see me as well as the aortic stenosis intervention clinic.      cc:   Gume Brown MD    [facility]    [address] [place]  [address]           KHAI WAGNER MD              Orders Placed This Encounter   Procedures     Basic metabolic panel     TSH     Follow-Up with Cardiologist     Follow-Up with [facility]     Echocardiogram       No orders of the defined types were placed in this encounter.      There are no discontinued medications.      Encounter Diagnoses   Name Primary?     Nonrheumatic aortic  valve stenosis Yes     Other cardiomyopathy (H)      Paroxysmal atrial fibrillation (H)      Nonrheumatic aortic valve insufficiency        CURRENT MEDICATIONS:  Current Outpatient Prescriptions   Medication Sig Dispense Refill     warfarin (COUMADIN) 5 MG tablet 5 mg daily 90 tablet 1     lisinopril (PRINIVIL/ZESTRIL) 2.5 MG tablet Take 1 tablet (2.5 mg) by mouth daily 90 tablet 3     digoxin (LANOXIN) 125 MCG tablet Take 1 tablet (125 mcg) by mouth every 48 hours 45 tablet 3     metoprolol (TOPROL-XL) 100 MG 24 hr tablet Take 1 tablet (100 mg) by mouth daily 90 tablet 3     omeprazole (PRILOSEC) 20 MG CR capsule Take 1 capsule (20 mg) by mouth daily 90 capsule 3     potassium chloride (K-TAB,KLOR-CON) 10 MEQ tablet Take 1 tablet (10 mEq) by mouth daily 90 tablet 3     simvastatin (ZOCOR) 20 MG tablet Take 1 tablet (20 mg) by mouth At Bedtime 90 tablet 3     torsemide (DEMADEX) 20 MG tablet Take 1 tablet (20 mg) by mouth daily 90 tablet 3     amiodarone (PACERONE/CODARONE) 200 MG tablet Take PO 1/2 tablet daily-Take extra prn per MD recommendations. 50 tablet 3     acetaminophen (TYLENOL) 325 MG tablet Take 1,300 mg by mouth 2 times daily        senna-docusate (SENOKOT-S;PERICOLACE) 8.6-50 MG per tablet Take 2 tablets by mouth daily        polyethylene glycol (MIRALAX/GLYCOLAX) powder Take 17 g by mouth daily as needed for constipation       simethicone (MYLICON) 80 MG chewable tablet Take 1 tablet (80 mg) by mouth every 6 hours as needed for cramping 180 tablet      cetirizine (ZYRTEC) 10 MG tablet Take 1 tablet (10 mg) by mouth daily 90 tablet 1     cholecalciferol (VITAMIN D) 1000 UNIT tablet Take 2 tablets (2,000 Units) by mouth daily 180 tablet 1     sildenafil (VIAGRA) 25 MG tablet Take 25 mg by mouth as needed       ASPIRIN NOT PRESCRIBED, INTENTIONAL, 1 each daily Antiplatelet medication not prescribed intentionally due to Current anticoagulant therapy (warfarin/enoxaparin) (Patient not taking: Reported on  2017) 0 each 0       ALLERGIES     Allergies   Allergen Reactions     Latex Rash     Seasonal Allergies      hayfever - wheezing -        PAST MEDICAL HISTORY:  Past Medical History:   Diagnosis Date     Aortic valve disorders      Aortic valve disorders      Arrhythmia      Atrial fibrillation (H)      Atrial fibrillation (H)      Automatic implantable cardiac defibrillator in situ      Cancer (H)      Congestive heart failure (H)      Coronary artery disease      Essential hypertension, benign      GERD (gastroesophageal reflux disease) 3/16/2010     Heart failure (H)      History of blood transfusion      Hyperlipidaemia      Hypertension      Obese      Other and unspecified hyperlipidemia      Other primary cardiomyopathies      Other primary cardiomyopathies      Pacemaker      Sleep apnea      Small bowel obstruction 2015     Ventricular tachycardia (H) 2013       PAST SURGICAL HISTORY:  Past Surgical History:   Procedure Laterality Date     BIOPSY  annually    prostate biopsy     CARDIAC SURGERY      A fib Ablation     CARDIAC SURGERY      cardioversion     COLONOSCOPY       DAVINCI PROSTATECTOMY N/A 2015    Procedure: DAVINCI PROSTATECTOMY;  Surgeon: Nahun Hilario MD;  Location: [facility] OR     GENITOURINARY SURGERY      bladder surgery      H ABLATION AV NODE  13     H ABLATION FOCAL AFIB  13     HC TOOTH EXTRACTION W/FORCEP       TONSILLECTOMY & ADENOIDECTOMY         FAMILY HISTORY:  Family History   Problem Relation Age of Onset     C.A.D. Father      CHF  at 74     CEREBROVASCULAR DISEASE Father      C.A.D. Mother      CHF at 91 still living     CEREBROVASCULAR DISEASE Mother      TIAs     Breast Cancer Mother      HEART DISEASE Son      arrythmia       SOCIAL HISTORY:  Social History     Social History     Marital status:      Spouse name: N/A     Number of children: 2     Years of education: N/A     Occupational History      Retired  "    Social History Main Topics     Smoking status: Never Smoker     Smokeless tobacco: Never Used     Alcohol use No      Comment: AA since 1975     Drug use: No     Sexual activity: Not Currently     Partners: Female     Birth control/ protection: Abstinence     Other Topics Concern     Parent/Sibling W/ Cabg, Mi Or Angioplasty Before 65f 55m? No     Caffeine Concern Yes     2-3 cups caffeine per day     Sleep Concern Yes     sleep apnea, wears cpap at night     Stress Concern No     Weight Concern No     weight fluctuates     Special Diet No     Exercise Yes     walking daily     Seat Belt Yes     Social History Narrative       Review of Systems:  Skin:  Negative       Eyes:  Positive for glasses;cataracts    ENT:  Positive for hearing loss    Respiratory:  Positive for sleep apnea;CPAP     Cardiovascular:  Negative;palpitations;chest pain;dizziness;lightheadedness;syncope or near-syncope;cyanosis;fatigue;exercise intolerance edema;Positive for    Gastroenterology: Negative      Genitourinary:  Positive for urinary frequency    Musculoskeletal:  Positive for joint pain    Neurologic:  Negative      Psychiatric:  Negative      Heme/Lymph/Imm:  Positive for allergies    Endocrine:  Negative        Physical Exam:  Vitals: /79 (BP Location: Left arm, Cuff Size: Adult Large)  Pulse 60  Ht 1.727 m (5' 8\")  Wt 113.3 kg (249 lb 12.8 oz)  BMI 37.98 kg/m2    Constitutional:           Skin:             Head:           Eyes:           Lymph:      ENT:           Neck:           Respiratory:            Cardiac:                                                           GI:           Extremities and Muscular Skeletal:                 Neurological:           Psych:         Thank you for allowing me to participate in the care of your patient.    Sincerely,     Adiel Aden MD     [facility] [place]    "

## 2017-12-08 ENCOUNTER — TRANSFERRED RECORDS (OUTPATIENT)
Dept: HEALTH INFORMATION MANAGEMENT | Facility: CLINIC | Age: 77
End: 2017-12-08

## 2017-12-08 LAB — INR PPP: 2.9

## 2017-12-11 ENCOUNTER — ANTICOAGULATION THERAPY VISIT (OUTPATIENT)
Dept: FAMILY MEDICINE | Facility: CLINIC | Age: 77
End: 2017-12-11
Payer: COMMERCIAL

## 2017-12-11 DIAGNOSIS — I47.29 PAROXYSMAL VENTRICULAR TACHYCARDIA (H): ICD-10-CM

## 2017-12-11 DIAGNOSIS — Z79.01 LONG-TERM (CURRENT) USE OF ANTICOAGULANTS: ICD-10-CM

## 2017-12-11 PROCEDURE — 99207 ZZC NO CHARGE NURSE ONLY: CPT | Performed by: FAMILY MEDICINE

## 2017-12-11 NOTE — MR AVS SNAPSHOT
Shahid Villalta   12/11/2017   Anticoagulation Therapy Visit    Description:  77 year old male   Provider:  Gume Brown MD   Department:   Family Practice           INR as of 12/11/2017     Today's INR 2.9 (12/8/2017)      Anticoagulation Summary as of 12/11/2017     INR goal 2.0-3.0   Today's INR 2.9 (12/8/2017)   Full instructions 5 mg every day   Next INR check 1/8/2018    Indications   Long-term (current) use of anticoagulants [Z79.01] [Z79.01]  Paroxysmal ventricular tachycardia (H) [I47.2]         Description     Copy of lab sent to abstraction       December 2017 Details    Sun Mon Tue Wed Thu Fri Sat          1               2                 3               4               5               6               7               8               9                 10               11      5 mg   See details      12      5 mg         13      5 mg         14      5 mg         15      5 mg         16      5 mg           17      5 mg         18      5 mg         19      5 mg         20      5 mg         21      5 mg         22      5 mg         23      5 mg           24      5 mg         25      5 mg         26      5 mg         27      5 mg         28      5 mg         29      5 mg         30      5 mg           31      5 mg                Date Details   12/11 This INR check               How to take your warfarin dose     To take:  5 mg Take 1 of the 5 mg tablets.           January 2018 Details    Sun Mon Tue Wed Thu Fri Sat      1      5 mg         2      5 mg         3      5 mg         4      5 mg         5      5 mg         6      5 mg           7      5 mg         8            9               10               11               12               13                 14               15               16               17               18               19               20                 21               22               23               24               25               26               27                 28                29               30               31                   Date Details   No additional details    Date of next INR:  1/8/2018         How to take your warfarin dose     To take:  5 mg Take 1 of the 5 mg tablets.

## 2017-12-11 NOTE — PROGRESS NOTES
ANTICOAGULATION FOLLOW-UP CLINIC VISIT    Patient Name:  Shahid Villalta  Date:  12/11/2017  Contact Type:  My chart with pt-       SUBJECTIVE:     Patient Findings     Positives No Problem Findings    Comments [place] is now in [place] -              OBJECTIVE    INR   Date Value Ref Range Status   11/13/2017 2.93 (H) 0.86 - 1.14 Final     Chromogenic Factor 10   Date Value Ref Range Status   07/31/2017 17 (L) 70 - 130 % Final     Comment:     Therapeutic Range:  A Chromogenic Factor 10 level of approximately 20-40%   inversely correlates with an INR of 2-3 for patients receiving Warfarin.   Chromogenic Factor 10 levels below 20% indicate an INR greater than 3 and   levels above 40% indicate an INR less than 2.         ASSESSMENT / PLAN  INR assessment THER    Recheck INR In: 4 WEEKS    INR Location Outside lab      Anticoagulation Summary as of 12/11/2017     INR goal 2.0-3.0   Today's INR    Plan last modified Manisha Pierson RN (8/21/2017)   Next INR check    Target end date     Indications   Long-term (current) use of anticoagulants [Z79.01] [Z79.01]  Paroxysmal ventricular tachycardia (H) [I47.2]         Anticoagulation Episode Summary     INR check location     Preferred lab     Send INR reminders to [facility]    Comments             See the Encounter Report to view Anticoagulation Flowsheet and Dosing Calendar (Go to Encounters tab in chart review, and find the Anticoagulation Therapy Visit)    Isela Hua, RN

## 2018-01-08 DIAGNOSIS — I48.0 PAROXYSMAL ATRIAL FIBRILLATION (H): ICD-10-CM

## 2018-01-08 RX ORDER — AMIODARONE HYDROCHLORIDE 200 MG/1
TABLET ORAL
Qty: 50 TABLET | Refills: 3 | Status: SHIPPED | OUTPATIENT
Start: 2018-01-08 | End: 2018-06-06

## 2018-01-09 ENCOUNTER — TRANSFERRED RECORDS (OUTPATIENT)
Dept: HEALTH INFORMATION MANAGEMENT | Facility: CLINIC | Age: 78
End: 2018-01-09

## 2018-01-10 ENCOUNTER — ANTICOAGULATION THERAPY VISIT (OUTPATIENT)
Dept: FAMILY MEDICINE | Facility: CLINIC | Age: 78
End: 2018-01-10
Payer: COMMERCIAL

## 2018-01-10 DIAGNOSIS — Z79.01 LONG-TERM (CURRENT) USE OF ANTICOAGULANTS: ICD-10-CM

## 2018-01-10 DIAGNOSIS — I47.29 PAROXYSMAL VENTRICULAR TACHYCARDIA (H): ICD-10-CM

## 2018-01-10 LAB — INR PPP: 4.5

## 2018-01-10 PROCEDURE — 99207 ZZC NO CHARGE NURSE ONLY: CPT | Performed by: FAMILY MEDICINE

## 2018-01-10 NOTE — MR AVS SNAPSHOT
Shahid Villalta   1/10/2018   Anticoagulation Therapy Visit    Description:  77 year old male   Provider:  Gume Brown MD   Department:   Family Practice           INR as of 1/10/2018     Today's INR 4.5! (1/9/2018)      Anticoagulation Summary as of 1/10/2018     INR goal 2.0-3.0   Today's INR 4.5! (1/9/2018)   Full instructions 1/10: Hold; Otherwise 5 mg every day   Next INR check 1/19/2018    Indications   Long-term (current) use of anticoagulants [Z79.01] [Z79.01]  Paroxysmal ventricular tachycardia (H) [I47.2]         January 2018 Details    Sun Mon Tue Wed Thu Fri Sat      1               2               3               4               5               6                 7               8               9               10      Hold   See details      11      5 mg         12      5 mg         13      5 mg           14      5 mg         15      5 mg         16      5 mg         17      5 mg         18      5 mg         19            20                 21               22               23               24               25               26               27                 28               29               30               31                   Date Details   01/10 This INR check       Date of next INR:  1/19/2018         How to take your warfarin dose     To take:  5 mg Take 1 of the 5 mg tablets.    Hold Do not take your warfarin dose. See the Details table to the right for additional instructions.

## 2018-01-10 NOTE — PROGRESS NOTES
ANTICOAGULATION FOLLOW-UP CLINIC VISIT    Patient Name:  Shahid Villalta  Date:  1/10/2018  Contact Type:  Rosemary[facility]    SUBJECTIVE:     Patient Findings     Positives Unexplained INR or factor level change    Comments [place] is currently in [place]           OBJECTIVE    INR   Date Value Ref Range Status   01/09/2018 4.5  Final     Chromogenic Factor 10   Date Value Ref Range Status   07/31/2017 17 (L) 70 - 130 % Final     Comment:     Therapeutic Range:  A Chromogenic Factor 10 level of approximately 20-40%   inversely correlates with an INR of 2-3 for patients receiving Warfarin.   Chromogenic Factor 10 levels below 20% indicate an INR greater than 3 and   levels above 40% indicate an INR less than 2.         ASSESSMENT / PLAN  No question data found.  Anticoagulation Summary as of 1/10/2018     INR goal 2.0-3.0   Today's INR 4.5! (1/9/2018)   Maintenance plan 5 mg (5 mg x 1) every day   Full instructions 1/10: Hold; Otherwise 5 mg every day   Weekly total 35 mg   Plan last modified Manisha Pierson RN (8/21/2017)   Next INR check 1/19/2018   Target end date     Indications   Long-term (current) use of anticoagulants [Z79.01] [Z79.01]  Paroxysmal ventricular tachycardia (H) [I47.2]         Anticoagulation Episode Summary     INR check location     Preferred lab     Send INR reminders to [facility]    Comments             See the Encounter Report to view Anticoagulation Flowsheet and Dosing Calendar (Go to Encounters tab in chart review, and find the Anticoagulation Therapy Visit)    Dosage adjustment made based on physician directed care plan.    Manisha Pierson RN

## 2018-01-16 ENCOUNTER — TRANSFERRED RECORDS (OUTPATIENT)
Dept: HEALTH INFORMATION MANAGEMENT | Facility: CLINIC | Age: 78
End: 2018-01-16

## 2018-01-16 LAB — INR PPP: 2.2 (ref 0.9–1.1)

## 2018-01-17 ENCOUNTER — ANTICOAGULATION THERAPY VISIT (OUTPATIENT)
Dept: NURSING | Facility: CLINIC | Age: 78
End: 2018-01-17
Payer: COMMERCIAL

## 2018-01-17 DIAGNOSIS — I47.29 PAROXYSMAL VENTRICULAR TACHYCARDIA (H): ICD-10-CM

## 2018-01-17 DIAGNOSIS — Z79.01 LONG-TERM (CURRENT) USE OF ANTICOAGULANTS: ICD-10-CM

## 2018-01-17 PROCEDURE — 99207 ZZC NO CHARGE NURSE ONLY: CPT | Performed by: FAMILY MEDICINE

## 2018-01-17 NOTE — MR AVS SNAPSHOT
Shahid Villalta   1/17/2018   Anticoagulation Therapy Visit    Description:  77 year old male   Provider:  Gume Brown MD   Department:  [facility]           INR as of 1/17/2018     Today's INR 2.2 (1/16/2018)      Anticoagulation Summary as of 1/17/2018     INR goal 2.0-3.0   Today's INR 2.2 (1/16/2018)   Full instructions 1/17: 2.5 mg; 1/19: 2.5 mg; 1/24: 2.5 mg; Otherwise 5 mg every day   Next INR check 1/26/2018    Indications   Long-term (current) use of anticoagulants [Z79.01] [Z79.01]  Paroxysmal ventricular tachycardia (H) [I47.2]         Contact Numbers     [facility]  Please call [phone] with any problems or questions regarding your therapy, or to cancel or reschedule your appointment.          January 2018 Details    Sun Mon Tue Wed Thu Fri Sat      1               2               3               4               5               6                 7               8               9               10               11               12               13                 14               15               16               17      2.5 mg   See details      18      5 mg         19      2.5 mg         20      5 mg           21      5 mg         22      5 mg         23      5 mg         24      2.5 mg         25      5 mg         26            27                 28               29               30               31                   Date Details   01/17 This INR check       Date of next INR:  1/26/2018         How to take your warfarin dose     To take:  2.5 mg Take 0.5 of a 5 mg tablet.    To take:  5 mg Take 1 of the 5 mg tablets.

## 2018-01-17 NOTE — PROGRESS NOTES
ANTICOAGULATION FOLLOW-UP CLINIC VISIT    Patient Name:  Shahid Villalat  Date:  1/17/2018  Contact Type:  Mychart    SUBJECTIVE:     Patient Findings     Positives Intentional hold of therapy           OBJECTIVE    INR   Date Value Ref Range Status   01/16/2018 2.2  Final     Chromogenic Factor 10   Date Value Ref Range Status   07/31/2017 17 (L) 70 - 130 % Final     Comment:     Therapeutic Range:  A Chromogenic Factor 10 level of approximately 20-40%   inversely correlates with an INR of 2-3 for patients receiving Warfarin.   Chromogenic Factor 10 levels below 20% indicate an INR greater than 3 and   levels above 40% indicate an INR less than 2.         ASSESSMENT / PLAN  No question data found.  Anticoagulation Summary as of 1/17/2018     INR goal 2.0-3.0   Today's INR 2.2 (1/16/2018)   Maintenance plan 5 mg (5 mg x 1) every day   Full instructions 1/17: 2.5 mg; 1/19: 2.5 mg; 1/24: 2.5 mg; Otherwise 5 mg every day   Weekly total 35 mg   Plan last modified Manisha Pierson RN (8/21/2017)   Next INR check 1/26/2018   Target end date     Indications   Long-term (current) use of anticoagulants [Z79.01] [Z79.01]  Paroxysmal ventricular tachycardia (H) [I47.2]         Anticoagulation Episode Summary     INR check location     Preferred lab     Send INR reminders to [facility]    Comments             See the Encounter Report to view Anticoagulation Flowsheet and Dosing Calendar (Go to Encounters tab in chart review, and find the Anticoagulation Therapy Visit)        Manisha Pierson RN

## 2018-01-30 ENCOUNTER — TRANSFERRED RECORDS (OUTPATIENT)
Dept: HEALTH INFORMATION MANAGEMENT | Facility: CLINIC | Age: 78
End: 2018-01-30

## 2018-01-31 ENCOUNTER — ALLIED HEALTH/NURSE VISIT (OUTPATIENT)
Dept: CARDIOLOGY | Facility: CLINIC | Age: 78
End: 2018-01-31
Payer: COMMERCIAL

## 2018-01-31 ENCOUNTER — TELEPHONE (OUTPATIENT)
Dept: NURSING | Facility: CLINIC | Age: 78
End: 2018-01-31

## 2018-01-31 ENCOUNTER — ANTICOAGULATION THERAPY VISIT (OUTPATIENT)
Dept: NURSING | Facility: CLINIC | Age: 78
End: 2018-01-31

## 2018-01-31 DIAGNOSIS — I42.9 CARDIOMYOPATHY (H): ICD-10-CM

## 2018-01-31 DIAGNOSIS — Z95.810 ICD (IMPLANTABLE CARDIOVERTER-DEFIBRILLATOR) IN PLACE: Primary | ICD-10-CM

## 2018-01-31 DIAGNOSIS — Z79.01 LONG-TERM (CURRENT) USE OF ANTICOAGULANTS: ICD-10-CM

## 2018-01-31 DIAGNOSIS — I47.29 PAROXYSMAL VENTRICULAR TACHYCARDIA (H): ICD-10-CM

## 2018-01-31 LAB — INR PPP: 3.9

## 2018-01-31 PROCEDURE — 99207 ZZC NO CHARGE NURSE ONLY: CPT | Performed by: FAMILY MEDICINE

## 2018-01-31 PROCEDURE — 93296 REM INTERROG EVL PM/IDS: CPT | Performed by: INTERNAL MEDICINE

## 2018-01-31 PROCEDURE — 93295 DEV INTERROG REMOTE 1/2/MLT: CPT | Performed by: INTERNAL MEDICINE

## 2018-01-31 NOTE — PROGRESS NOTES
[employer] CRT-D Remote Device Check    AP: 22 % BiVP: 99 %  Mode: DDDR 60-95        Presenting Rhythm: AS/BiVP  Heart Rate: stable with adequate variability, patient is active 1.9 hours/day  Sensing: WNL    Pacing Threshold: not obtained    Impedance: WNL  Battery Status: estimated 5 years longevity remaining with a 10.2 second charge time  Atrial Arrhythmia: 19 AHR episodes logged, EGMs for 6 of them. EGMs show AT/PACs, regular, A's mostly equal to V's. Rates in the 100s. 1920 total episodes since 10/11/2017. 10 episodes logged as PMT, 6 with EGMs - all show rate at upper limit of 95.   Ventricular Arrhythmia: 10 episodes logged as NSVT, 2 with EGMs- 4-6 beats, starting with a PVC, rates in the 150s.  ATP: 0    Shocks: 0    Care Plan: Continue with Q3 month remote checks on 5/17/2018. Called patient to update him with results, verbalized understanding. He was a bit annoyed with being wait listed for Dr. Aden - stated that he was supposed to see him in March, but scheduling didn't call him in time and they had to wait list him for the May schedule, and he says they told him May is already full. Let him know that if there is a cancellation scheduling should be calling him. Anuel

## 2018-01-31 NOTE — MR AVS SNAPSHOT
After Visit Summary   1/31/2018    Shahid Villalta    MRN: [id number]           Patient Information     Date Of Birth          1940        Visit Information        Provider Department      1/31/2018 4:30 PM ST[facility] DCR2 [facility]        Today's Diagnoses     ICD (implantable cardioverter-defibrillator) in place    -  1    Cardiomyopathy (H)           Follow-ups after your visit        Your next 10 appointments already scheduled     Jan 31, 2018  4:30 PM CST   Remote ICD Check with STEWART DCR2   [facility] ([facility])    [address]  [place] [id number] [phone]           This appointment is for a remote check of your debrillator.  This is not an appointment at the office.            May 17, 2018  4:30 PM CDT   Remote ICD Check with STEWART DCR2   [facility] ([facility])    [address]  [place] [id number] [phone]           This appointment is for a remote check of your debrillator.  This is not an appointment at the office.            Jun 29, 2018  9:30 AM CDT   Return Sleep Patient with Nadir Ashraf MD   [facility] ([facility]    [address]  [place] [id number] [phone]              Who to contact     If you have questions or need follow up information about today's clinic visit or your schedule please contact [facility] directly at [phone].  Normal or non-critical lab and imaging results will be communicated to you by MyChart, letter or phone within 4 business days after the clinic has received the results. If you do not hear from us within 7 days, please contact the clinic through MyChart or phone. If you have a critical or abnormal lab result, we will notify you by phone as soon as possible.  Submit refill  requests through Supportie or call your pharmacy and they will forward the refill request to us. Please allow 3 business days for your refill to be completed.          Additional Information About Your Visit        Other Machine[employer] Information     Supportie gives you secure access to your electronic health record. If you see a primary care provider, you can also send messages to your care team and make appointments. If you have questions, please call your primary care clinic.  If you do not have a primary care provider, please call [phone] and they will assist you.        Care EveryWhere ID     This is your Care EveryWhere ID. This could be used by other organizations to access your [place] medical records  [id number]         Blood Pressure from Last 3 Encounters:   11/17/17 118/79   11/08/17 127/80   06/23/17 98/69    Weight from Last 3 Encounters:   11/17/17 113.3 kg (249 lb 12.8 oz)   11/08/17 112.5 kg (248 lb)   06/23/17 112 kg (247 lb)              We Performed the Following     ICD DEVICE INTERROGAT REMOTE (26875)     INTERROGATION DEVICE EVAL REMOTE, PACER/ICD (89514)        Primary Care Provider Office Phone # Fax #    Gume Brown [phone] [address]  [place] [id number]        Equal Access to Services     AUGUSTA SHEARER : Hadii aad ku hadasho Soomaali, waaxda luqadaha, qaybta kaalmada adeegyada, waxay shaji haysee garcia. So [facility] [phone].    ATENCIÓN: Si habla español, tiene a irving disposición servicios gratuitos de asistencia lingüística. ame al [phone].    We comply with applicable federal civil rights laws and [place] laws. We do not discriminate on the basis of race, color, national origin, age, disability, sex, sexual orientation, or gender identity.            Thank you!     Thank you for choosing [facility]  for your care. Our goal is always to provide you with excellent care. Hearing back from our  patients is one way we can continue to improve our services. Please take a few minutes to complete the written survey that you may receive in the mail after your visit with us. Thank you!             Your Updated Medication List - Protect others around you: Learn how to safely use, store and throw away your medicines at www.disposemymeds.org.          This list is accurate as of 1/31/18 10:43 AM.  Always use your most recent med list.                   Brand Name Dispense Instructions for use Diagnosis    acetaminophen 325 MG tablet    TYLENOL     Take 1,300 mg by mouth 2 times daily        amiodarone 200 MG tablet    PACERONE/CODARONE    50 tablet    Take PO 1/2 tablet daily-Take extra prn per MD recommendations.    Paroxysmal atrial fibrillation (H)       ASPIRIN NOT PRESCRIBED    INTENTIONAL    0 each    1 each daily Antiplatelet medication not prescribed intentionally due to Current anticoagulant therapy (warfarin/enoxaparin)    Atrial fibrillation, unspecified       cetirizine 10 MG tablet    zyrTEC    90 tablet    Take 1 tablet (10 mg) by mouth daily    Allergic rhinitis       cholecalciferol 1000 UNIT tablet    vitamin D3    180 tablet    Take 2 tablets (2,000 Units) by mouth daily    Vitamin D deficiency, Parathyroid hormone excess (H)       digoxin 125 MCG tablet    LANOXIN    45 tablet    Take 1 tablet (125 mcg) by mouth every 48 hours    Chronic systolic heart failure (H)       lisinopril 2.5 MG tablet    PRINIVIL/Zestril    90 tablet    Take 1 tablet (2.5 mg) by mouth daily    Nonrheumatic aortic valve stenosis       metoprolol succinate 100 MG 24 hr tablet    TOPROL-XL    90 tablet    Take 1 tablet (100 mg) by mouth daily    Paroxysmal atrial fibrillation (H)       omeprazole 20 MG CR capsule    priLOSEC    90 capsule    Take 1 capsule (20 mg) by mouth daily    Gastroesophageal reflux disease without esophagitis       polyethylene glycol powder    MIRALAX/GLYCOLAX     Take 17 g by mouth daily as needed  for constipation        potassium chloride 10 MEQ tablet    K-TAB,KLOR-CON    90 tablet    Take 1 tablet (10 mEq) by mouth daily    Chronic systolic heart failure (H)       senna-docusate 8.6-50 MG per tablet    SENOKOT-S;PERICOLACE     Take 2 tablets by mouth daily        simethicone 80 MG chewable tablet    MYLICON    180 tablet    Take 1 tablet (80 mg) by mouth every 6 hours as needed for cramping    Abdominal bloating       simvastatin 20 MG tablet    ZOCOR    90 tablet    Take 1 tablet (20 mg) by mouth At Bedtime    Hyperlipidemia LDL goal <100       torsemide 20 MG tablet    DEMADEX    90 tablet    Take 1 tablet (20 mg) by mouth daily    Cardiomyopathy (H), Chronic systolic heart failure (H)       VIAGRA 25 MG tablet   Generic drug:  sildenafil      Take 25 mg by mouth as needed        warfarin 5 MG tablet    COUMADIN    90 tablet    5 mg daily    Long-term (current) use of anticoagulants, Paroxysmal ventricular tachycardia (H)

## 2018-01-31 NOTE — MR AVS SNAPSHOT
Shahid Villalta   1/31/2018   Anticoagulation Therapy Visit    Description:  77 year old male   Provider:  Gume Brown MD   Department:  [facility]           INR as of 1/31/2018     Today's INR 3.9! (1/30/2018)      Anticoagulation Summary as of 1/31/2018     INR goal 2.0-3.0   Today's INR 3.9! (1/30/2018)   Full instructions 2/1: 2.5 mg; 2/6: 2.5 mg; 2/8: 2.5 mg; Otherwise 5 mg every day   Next INR check 2/13/2018    Indications   Long-term (current) use of anticoagulants [Z79.01] [Z79.01]  Paroxysmal ventricular tachycardia (H) [I47.2]         Contact Numbers     [facility]  Please call [phone] with any problems or questions regarding your therapy, or to cancel or reschedule your appointment.          January 2018 Details    Sun Mon Tue Wed Thu Fri Sat      1               2               3               4               5               6                 7               8               9               10               11               12               13                 14               15               16               17               18               19               20                 21               22               23               24               25               26               27                 28               29               30               31      5 mg   See details          Date Details   01/31 This INR check               How to take your warfarin dose     To take:  5 mg Take 1 of the 5 mg tablets.           February 2018 Details    Sun Mon Tue Wed Thu Fri Sat         1      2.5 mg         2      5 mg         3      5 mg           4      5 mg         5      5 mg         6      2.5 mg         7      5 mg         8      2.5 mg         9      5 mg         10      5 mg           11      5 mg         12      5 mg         13            14               15               16               17                 18               19               20               21               22                23               24                 25               26               27               28                   Date Details   No additional details    Date of next INR:  2/13/2018         How to take your warfarin dose     To take:  2.5 mg Take 0.5 of a 5 mg tablet.    To take:  5 mg Take 1 of the 5 mg tablets.

## 2018-01-31 NOTE — PROGRESS NOTES
ANTICOAGULATION FOLLOW-UP CLINIC VISIT    Patient Name:  Shahid Villalta  Date:  1/31/2018  Contact Type:  Telephone    SUBJECTIVE:     Patient Findings     Positives Med error    Comments Pt went back to regular maintenance dosing and checked later than previously directed.           OBJECTIVE    INR   Date Value Ref Range Status   01/30/2018 3.9  Final     Chromogenic Factor 10   Date Value Ref Range Status   07/31/2017 17 (L) 70 - 130 % Final     Comment:     Therapeutic Range:  A Chromogenic Factor 10 level of approximately 20-40%   inversely correlates with an INR of 2-3 for patients receiving Warfarin.   Chromogenic Factor 10 levels below 20% indicate an INR greater than 3 and   levels above 40% indicate an INR less than 2.         ASSESSMENT / PLAN  No question data found.  Anticoagulation Summary as of 1/31/2018     INR goal 2.0-3.0   Today's INR 3.9! (1/30/2018)   Maintenance plan 5 mg (5 mg x 1) every day   Full instructions 2/1: 2.5 mg; 2/6: 2.5 mg; 2/8: 2.5 mg; Otherwise 5 mg every day   Weekly total 35 mg   Plan last modified Manisha Pierson RN (8/21/2017)   Next INR check 2/13/2018   Target end date     Indications   Long-term (current) use of anticoagulants [Z79.01] [Z79.01]  Paroxysmal ventricular tachycardia (H) [I47.2]         Anticoagulation Episode Summary     INR check location     Preferred lab     Send INR reminders to [facility]    Comments             See the Encounter Report to view Anticoagulation Flowsheet and Dosing Calendar (Go to Encounters tab in chart review, and find the Anticoagulation Therapy Visit)    Dosage adjustment made based on physician directed care plan.    Manisha Pierson RN

## 2018-02-13 ENCOUNTER — TRANSFERRED RECORDS (OUTPATIENT)
Dept: HEALTH INFORMATION MANAGEMENT | Facility: CLINIC | Age: 78
End: 2018-02-13

## 2018-02-13 LAB — INR PPP: 3.3

## 2018-02-14 ENCOUNTER — ANTICOAGULATION THERAPY VISIT (OUTPATIENT)
Dept: FAMILY MEDICINE | Facility: CLINIC | Age: 78
End: 2018-02-14
Payer: COMMERCIAL

## 2018-02-14 DIAGNOSIS — I47.29 PAROXYSMAL VENTRICULAR TACHYCARDIA (H): ICD-10-CM

## 2018-02-14 DIAGNOSIS — Z79.01 LONG-TERM (CURRENT) USE OF ANTICOAGULANTS: ICD-10-CM

## 2018-02-14 PROCEDURE — 99207 ZZC NO CHARGE NURSE ONLY: CPT | Performed by: FAMILY MEDICINE

## 2018-02-14 NOTE — PROGRESS NOTES
ANTICOAGULATION FOLLOW-UP CLINIC VISIT    Patient Name:  Shahid Villalta  Date:  2/14/2018  Contact Type:  my chart with pt     SUBJECTIVE:     Patient Findings     Positives No Problem Findings           OBJECTIVE    INR   Date Value Ref Range Status   02/13/2018 3.3  Final     Chromogenic Factor 10   Date Value Ref Range Status   07/31/2017 17 (L) 70 - 130 % Final     Comment:     Therapeutic Range:  A Chromogenic Factor 10 level of approximately 20-40%   inversely correlates with an INR of 2-3 for patients receiving Warfarin.   Chromogenic Factor 10 levels below 20% indicate an INR greater than 3 and   levels above 40% indicate an INR less than 2.         ASSESSMENT / PLAN  No question data found.  Anticoagulation Summary as of 2/14/2018     INR goal 2.0-3.0   Today's INR 3.3! (2/13/2018)   Maintenance plan 5 mg (5 mg x 1) every day   Full instructions 5 mg every day   Weekly total 35 mg   Plan last modified Manisha Pierson RN (8/21/2017)   Next INR check 2/27/2018   Target end date     Indications   Long-term (current) use of anticoagulants [Z79.01] [Z79.01]  Paroxysmal ventricular tachycardia (H) [I47.2]         Anticoagulation Episode Summary     INR check location     Preferred lab     Send INR reminders to [facility]    Comments             See the Encounter Report to view Anticoagulation Flowsheet and Dosing Calendar (Go to Encounters tab in chart review, and find the Anticoagulation Therapy Visit)      Isela Hua, RN

## 2018-02-14 NOTE — MR AVS SNAPSHOT
Shahid Villalta   2/14/2018   Anticoagulation Therapy Visit    Description:  77 year old male   Provider:  Gume Brown MD   Department:   Family Practice           INR as of 2/14/2018     Today's INR 3.3! (2/13/2018)      Anticoagulation Summary as of 2/14/2018     INR goal 2.0-3.0   Today's INR 3.3! (2/13/2018)   Full instructions 2.5 mg on Wed, Fri; 5 mg all other days   Next INR check 2/27/2018    Indications   Long-term (current) use of anticoagulants [Z79.01] [Z79.01]  Paroxysmal ventricular tachycardia (H) [I47.2]         February 2018 Details    Sun Mon Tue Wed Thu Fri Sat         1               2               3                 4               5               6               7               8               9               10                 11               12               13               14      2.5 mg   See details      15      5 mg         16      2.5 mg         17      5 mg           18      5 mg         19      5 mg         20      5 mg         21      2.5 mg         22      5 mg         23      2.5 mg         24      5 mg           25      5 mg         26      5 mg         27            28                   Date Details   02/14 This INR check       Date of next INR:  2/27/2018         How to take your warfarin dose     To take:  2.5 mg Take 0.5 of a 5 mg tablet.    To take:  5 mg Take 1 of the 5 mg tablets.

## 2018-03-06 ENCOUNTER — TRANSFERRED RECORDS (OUTPATIENT)
Dept: HEALTH INFORMATION MANAGEMENT | Facility: CLINIC | Age: 78
End: 2018-03-06

## 2018-03-06 LAB
INR PPP: 2.8 (ref 0.9–1.1)
INR PPP: 2.8 (ref 0.9–1.11)

## 2018-03-07 ENCOUNTER — ANTICOAGULATION THERAPY VISIT (OUTPATIENT)
Dept: FAMILY MEDICINE | Facility: CLINIC | Age: 78
End: 2018-03-07
Payer: COMMERCIAL

## 2018-03-07 ENCOUNTER — TELEPHONE (OUTPATIENT)
Dept: FAMILY MEDICINE | Facility: CLINIC | Age: 78
End: 2018-03-07

## 2018-03-07 DIAGNOSIS — I48.0 PAROXYSMAL ATRIAL FIBRILLATION (H): Primary | ICD-10-CM

## 2018-03-07 DIAGNOSIS — Z79.01 LONG-TERM (CURRENT) USE OF ANTICOAGULANTS: ICD-10-CM

## 2018-03-07 DIAGNOSIS — I47.29 PAROXYSMAL VENTRICULAR TACHYCARDIA (H): ICD-10-CM

## 2018-03-07 PROCEDURE — 99207 ZZC NO CHARGE NURSE ONLY: CPT | Performed by: FAMILY MEDICINE

## 2018-03-07 NOTE — MR AVS SNAPSHOT
Shahid Villalta   3/7/2018   Anticoagulation Therapy Visit    Description:  77 year old male   Provider:  Gume Brown MD   Department:   Family Practice           INR as of 3/7/2018     Today's INR 2.8 (3/6/2018)      Anticoagulation Summary as of 3/7/2018     INR goal 2.0-3.0   Today's INR 2.8 (3/6/2018)   Full instructions 2.5 mg on Wed, Fri; 5 mg all other days   Next INR check 3/28/2018    Indications   Long-term (current) use of anticoagulants [Z79.01] [Z79.01]  Paroxysmal ventricular tachycardia (H) [I47.2]         March 2018 Details    Sun Mon Tue Wed Thu Fri Sat         1               2               3                 4               5               6               7      2.5 mg   See details      8      5 mg         9      2.5 mg         10      5 mg           11      5 mg         12      5 mg         13      5 mg         14      2.5 mg         15      5 mg         16      2.5 mg         17      5 mg           18      5 mg         19      5 mg         20      5 mg         21      2.5 mg         22      5 mg         23      2.5 mg         24      5 mg           25      5 mg         26      5 mg         27      5 mg         28            29               30               31                Date Details   03/07 This INR check       Date of next INR:  3/28/2018         How to take your warfarin dose     To take:  2.5 mg Take 0.5 of a 5 mg tablet.    To take:  5 mg Take 1 of the 5 mg tablets.

## 2018-03-07 NOTE — PROGRESS NOTES
ANTICOAGULATION FOLLOW-UP CLINIC VISIT    Patient Name:  Shahid Villalta  Date:  3/7/2018  Contact Type:  my chart with pt     SUBJECTIVE:     Patient Findings     Positives No Problem Findings           OBJECTIVE    INR   Date Value Ref Range Status   03/06/2018 2.8  Final     Chromogenic Factor 10   Date Value Ref Range Status   07/31/2017 17 (L) 70 - 130 % Final     Comment:     Therapeutic Range:  A Chromogenic Factor 10 level of approximately 20-40%   inversely correlates with an INR of 2-3 for patients receiving Warfarin.   Chromogenic Factor 10 levels below 20% indicate an INR greater than 3 and   levels above 40% indicate an INR less than 2.         ASSESSMENT / PLAN  No question data found.    Copy of lab to abstraction     Anticoagulation Summary as of 3/7/2018     INR goal 2.0-3.0   Today's INR 2.8 (3/6/2018)   Maintenance plan 2.5 mg (5 mg x 0.5) on Wed, Fri; 5 mg (5 mg x 1) all other days   Full instructions 2.5 mg on Wed, Fri; 5 mg all other days   Weekly total 30 mg   No change documented Isela Hua RN   Plan last modified Isela Hua RN (2/14/2018)   Next INR check 3/28/2018   Target end date     Indications   Long-term (current) use of anticoagulants [Z79.01] [Z79.01]  Paroxysmal ventricular tachycardia (H) [I47.2]         Anticoagulation Episode Summary     INR check location     Preferred lab     Send INR reminders to [facility]    Comments             See the Encounter Report to view Anticoagulation Flowsheet and Dosing Calendar (Go to Encounters tab in chart review, and find the Anticoagulation Therapy Visit)        Isela Hua RN

## 2018-04-03 ENCOUNTER — TRANSFERRED RECORDS (OUTPATIENT)
Dept: HEALTH INFORMATION MANAGEMENT | Facility: CLINIC | Age: 78
End: 2018-04-03

## 2018-04-04 ENCOUNTER — ANTICOAGULATION THERAPY VISIT (OUTPATIENT)
Dept: FAMILY MEDICINE | Facility: CLINIC | Age: 78
End: 2018-04-04
Payer: COMMERCIAL

## 2018-04-04 DIAGNOSIS — I47.29 PAROXYSMAL VENTRICULAR TACHYCARDIA (H): ICD-10-CM

## 2018-04-04 DIAGNOSIS — Z79.01 LONG-TERM (CURRENT) USE OF ANTICOAGULANTS: ICD-10-CM

## 2018-04-04 LAB — INR PPP: 3.8

## 2018-04-04 PROCEDURE — 99207 ZZC NO CHARGE NURSE ONLY: CPT | Performed by: FAMILY MEDICINE

## 2018-04-04 NOTE — PROGRESS NOTES
ANTICOAGULATION FOLLOW-UP CLINIC VISIT    Patient Name:  Shahid Villalta  Date:  4/4/2018  Contact Type:  my chart with pt     SUBJECTIVE:        OBJECTIVE    INR   Date Value Ref Range Status   04/03/2018 3.8  Final     Chromogenic Factor 10   Date Value Ref Range Status   07/31/2017 17 (L) 70 - 130 % Final     Comment:     Therapeutic Range:  A Chromogenic Factor 10 level of approximately 20-40%   inversely correlates with an INR of 2-3 for patients receiving Warfarin.   Chromogenic Factor 10 levels below 20% indicate an INR greater than 3 and   levels above 40% indicate an INR less than 2.         ASSESSMENT / PLAN      Pt reporting increased allergy symptoms.   Will decrease dosing and recheck in 1 week.       Anticoagulation Summary as of 4/4/2018     INR goal 2.0-3.0   Today's INR 3.8! (4/3/2018)   Maintenance plan 2.5 mg (5 mg x 0.5) on Wed, Fri; 5 mg (5 mg x 1) all other days   Full instructions 2.5 mg on Wed, Fri; 5 mg all other days   Weekly total 30 mg   Plan last modified Isela Hua RN (2/14/2018)   Next INR check    Target end date     Indications   Long-term (current) use of anticoagulants [Z79.01] [Z79.01]  Paroxysmal ventricular tachycardia (H) [I47.2]         Anticoagulation Episode Summary     INR check location     Preferred lab     Send INR reminders to [facility]    Comments             See the Encounter Report to view Anticoagulation Flowsheet and Dosing Calendar (Go to Encounters tab in chart review, and find the Anticoagulation Therapy Visit)    Dosage adjustment made based on physician directed care plan.    Isela Hua, RN

## 2018-04-04 NOTE — MR AVS SNAPSHOT
Shahid Villalta   4/4/2018   Anticoagulation Therapy Visit    Description:  78 year old male   Provider:  Gume Brown MD   Department:   Family Practice           INR as of 4/4/2018     Today's INR 3.8! (4/3/2018)      Anticoagulation Summary as of 4/4/2018     INR goal 2.0-3.0   Today's INR 3.8! (4/3/2018)   Full instructions 4/4: Hold; 4/9: 2.5 mg; Otherwise 2.5 mg on Wed, Fri; 5 mg all other days   Next INR check 4/16/2018    Indications   Long-term (current) use of anticoagulants [Z79.01] [Z79.01]  Paroxysmal ventricular tachycardia (H) [I47.2]         April 2018 Details    Sun Mon Tue Wed Thu Fri Sat     1               2               3               4      Hold   See details      5      5 mg         6      2.5 mg         7      5 mg           8      5 mg         9      2.5 mg         10      5 mg         11      2.5 mg         12      5 mg         13      2.5 mg         14      5 mg           15      5 mg         16            17               18               19               20               21                 22               23               24               25               26               27               28                 29               30                     Date Details   04/04 This INR check       Date of next INR:  4/16/2018         How to take your warfarin dose     To take:  2.5 mg Take 0.5 of a 5 mg tablet.    To take:  5 mg Take 1 of the 5 mg tablets.    Hold Do not take your warfarin dose. See the Details table to the right for additional instructions.

## 2018-04-23 ENCOUNTER — ANTICOAGULATION THERAPY VISIT (OUTPATIENT)
Dept: NURSING | Facility: CLINIC | Age: 78
End: 2018-04-23
Payer: COMMERCIAL

## 2018-04-23 DIAGNOSIS — Z79.01 LONG-TERM (CURRENT) USE OF ANTICOAGULANTS: ICD-10-CM

## 2018-04-23 DIAGNOSIS — I47.29 PAROXYSMAL VENTRICULAR TACHYCARDIA (H): ICD-10-CM

## 2018-04-23 LAB — INR POINT OF CARE: 3.5 (ref 0.86–1.14)

## 2018-04-23 PROCEDURE — 85610 PROTHROMBIN TIME: CPT | Mod: QW

## 2018-04-23 PROCEDURE — 36416 COLLJ CAPILLARY BLOOD SPEC: CPT

## 2018-04-23 NOTE — MR AVS SNAPSHOT
Shahid Villalta   4/23/2018 8:45 AM   Anticoagulation Therapy Visit    Description:  78 year old male   Provider:   ANTICOAGULATION CLINIC   Department:  [facility]           INR as of 4/23/2018     Today's INR 3.5!      Anticoagulation Summary as of 4/23/2018     INR goal 2.0-3.0   Today's INR 3.5!   Full instructions 4/23: Hold; Otherwise 2.5 mg on Mon, Wed, Fri; 5 mg all other days   Next INR check 5/7/2018    Indications   Long-term (current) use of anticoagulants [Z79.01] [Z79.01]  Paroxysmal ventricular tachycardia (H) [I47.2]         Your next Anticoagulation Clinic appointment(s)     May 08, 2018  9:30 AM CDT   Anticoagulation Visit with  ANTICOAGULATION CLINIC   [facility] ([facility]    [address] [address] [phone]              Contact Numbers     [facility]  Please call [phone] with any problems or questions regarding your therapy, or to cancel or reschedule your appointment.          April 2018 Details    Sun Mon Tue Wed Thu Fri Sat     1               2               3               4               5               6               7                 8               9               10               11               12               13               14                 15               16               17               18               19               20               21                 22               23      Hold   See details      24      5 mg         25      2.5 mg         26      5 mg         27      2.5 mg         28      5 mg           29      5 mg         30      2.5 mg               Date Details   04/23 This INR check               How to take your warfarin dose     To take:  2.5 mg Take 0.5 of a 5 mg tablet.    To take:  5 mg Take 1 of the 5 mg tablets.    Hold Do not take your warfarin dose. See the Details table to the right for additional instructions.                May 2018 Details    Sun Mon Tue Wed Thu Fri Sat        1      5 mg         2      2.5 mg         3      5 mg         4      2.5 mg         5      5 mg           6      5 mg         7            8               9               10               11               12                 13               14               15               16               17               18               19                 20               21               22               23               24               25               26                 27               28               29               30               31                  Date Details   No additional details    Date of next INR:  5/7/2018         How to take your warfarin dose     To take:  2.5 mg Take 0.5 of a 5 mg tablet.    To take:  5 mg Take 1 of the 5 mg tablets.

## 2018-04-23 NOTE — PROGRESS NOTES
ANTICOAGULATION FOLLOW-UP CLINIC VISIT    Patient Name:  Shahid Villalta  Date:  4/23/2018  Contact Type:  Face to Face    SUBJECTIVE:     Patient Findings     Positives Change in diet/appetite    Comments Less greens           OBJECTIVE    INR Protime   Date Value Ref Range Status   04/23/2018 3.5 (A) 0.86 - 1.14 Final     Chromogenic Factor 10   Date Value Ref Range Status   07/31/2017 17 (L) 70 - 130 % Final     Comment:     Therapeutic Range:  A Chromogenic Factor 10 level of approximately 20-40%   inversely correlates with an INR of 2-3 for patients receiving Warfarin.   Chromogenic Factor 10 levels below 20% indicate an INR greater than 3 and   levels above 40% indicate an INR less than 2.         ASSESSMENT / PLAN  INR assessment SUPRA    Recheck INR In: 2 WEEKS    INR Location Clinic      Anticoagulation Summary as of 4/23/2018     INR goal 2.0-3.0   Today's INR 3.5!   Maintenance plan 2.5 mg (5 mg x 0.5) on Mon, Wed, Fri; 5 mg (5 mg x 1) all other days   Full instructions 4/23: Hold; Otherwise 2.5 mg on Mon, Wed, Fri; 5 mg all other days   Weekly total 27.5 mg   Plan last modified Manisha Pierson RN (4/23/2018)   Next INR check 5/7/2018   Target end date     Indications   Long-term (current) use of anticoagulants [Z79.01] [Z79.01]  Paroxysmal ventricular tachycardia (H) [I47.2]         Anticoagulation Episode Summary     INR check location     Preferred lab     Send INR reminders to [facility] TRI[facility]    Comments             See the Encounter Report to view Anticoagulation Flowsheet and Dosing Calendar (Go to Encounters tab in chart review, and find the Anticoagulation Therapy Visit)    Dosage adjustment made based on physician directed care plan.    Manisha Pierson, JUDIT

## 2018-05-01 ENCOUNTER — CARE COORDINATION (OUTPATIENT)
Dept: CARDIOLOGY | Facility: CLINIC | Age: 78
End: 2018-05-01

## 2018-05-01 NOTE — PROGRESS NOTES
TAVR clinic appointment was originally set up for 5/10/18.  Patient scheduled to have echo 5/18 and then see Dr. Aden 5/23/18.  Has h/o kidney insufficency.  Will change TAVR clinic appointment until after his echo and OV with Dr. Aden.  Pt. states he walks 2 miles every day.  He denies lightheadedness, dizziness or chest discomfort.  Will plan TAVR clinic foir 6/14/18 if Dr. Aden is in agreement.

## 2018-05-04 ENCOUNTER — HOSPITAL ENCOUNTER (OUTPATIENT)
Facility: CLINIC | Age: 78
Setting detail: SPECIMEN
Discharge: HOME | End: 2018-05-04
Attending: UROLOGY | Admitting: UROLOGY
Payer: MEDICARE

## 2018-05-04 ENCOUNTER — ONCOLOGY VISIT (OUTPATIENT)
Dept: ONCOLOGY | Facility: CLINIC | Age: 78
End: 2018-05-04
Attending: UROLOGY
Payer: MEDICARE

## 2018-05-04 DIAGNOSIS — C61 PROSTATE CANCER (H): ICD-10-CM

## 2018-05-04 LAB — PSA SERPL-MCNC: 0.02 UG/L (ref 0–4)

## 2018-05-04 PROCEDURE — 84153 ASSAY OF PSA TOTAL: CPT | Performed by: UROLOGY

## 2018-05-04 PROCEDURE — 36415 COLL VENOUS BLD VENIPUNCTURE: CPT

## 2018-05-04 NOTE — MR AVS SNAPSHOT
After Visit Summary   5/4/2018    Shahid Villalta    MRN: [id number]           Patient Information     Date Of Birth          1940        Visit Information        Provider Department      5/4/2018 10:00 AM RH ONCOLOGY NURSE [facility]        Today's Diagnoses     Prostate cancer           Follow-ups after your visit        Your next 10 appointments already scheduled     May 08, 2018  9:30 AM CDT   Anticoagulation Visit with [facility]   [facility]    [address] [address] [phone]            May 09, 2018  9:30 AM CDT   Return Visit with Nahun Hilario MD   [facility] ([facility])    [facility]  [address]  [facility] [address] [phone]            May 17, 2018  4:30 PM CDT   Remote ICD Check with STEWART DCR2   [facility] ([facility])    [address]  [facility] [id number] [phone] OPT 2           This appointment is for a remote check of your debrillator.  This is not an appointment at the office.            May 18, 2018 10:30 AM CDT   LAB with RU LAB   [facility] [place] ([facility])    [address]  [facility] [id number] [phone]           Please do not eat 10-12 hours before your appointment if you are coming in fasting for labs on lipids, cholesterol, or glucose (sugar). This does not apply to pregnant women. Water, hot tea and black coffee (with nothing added) are okay. Do not drink other fluids, diet soda or chew gum.            May 18, 2018 10:45 AM CDT   Ech Complete with RSCCECHO1   [facility] [place] ([facility])    [address]  [facility] [address] [phone]           1. Please bring or  wear a comfortable two-piece outfit. 2. You may eat, drink and take your normal medicines. 3. For any questions that cannot be answered, please contact the ordering physician ***Please check-in at the [place] Registration Office located in [address] in the [facility] building. When you are finished registering, please go to [address] and have a seat. The technician will call your name for the test.            May 23, 2018 10:45 AM CDT   Return Visit with Adiel Aden MD   [facility] ([facility])    [address]  [facility] [address]   [phone] OPT 2            Jun 14, 2018  3:45 PM CDT   TAVR New with Catia Nation MD   [facility] ([facility])    [address]  [facility] [address]   [phone] OPT 2            Jun 29, 2018  9:30 AM CDT   Return Sleep Patient with Nadir Ashraf MD   [facility] ([facility])    [address]  [address]  [facility] [address] [phone]              Who to contact     If you have questions or need follow up information about today's clinic visit or your schedule please contact [facility] directly at [phone].  Normal or non-critical lab and imaging results will be communicated to you by Torrent LoadingSystemshart, letter or phone within 4 business days after the clinic has received the results. If you do not hear from us within 7 days, please contact the clinic through MobilePakst or phone. If you have a critical or abnormal lab result, we will notify you by phone as soon as possible.  Submit refill requests through TestSoup or call your pharmacy and they will forward the refill request to us. Please allow 3 business days for your refill to be completed.          Additional Information About Your Visit        TestSoup Information     TestSoup lets you send messages to  "your doctor, view your test results, renew your prescriptions, schedule appointments and more. To sign up, go to [email or web address] . Click on \"Log in\" on the left side of the screen, which will take you to the Welcome page. Then click on \"Sign up Now\" on the right side of the page.     You will be asked to enter the access code listed below, as well as some personal information. Please follow the directions to create your username and password.     Your access code is: [email or web address]  Expires: 2018 10:01 AM     Your access code will  in 90 days. If you need help or a new code, please call your [place] clinic or [phone].        Care EveryWhere ID     This is your Care EveryWhere ID. This could be used by other organizations to access your [place] medical records  [id number]         Blood Pressure from Last 3 Encounters:   17 118/79   17 127/80   17 98/69    Weight from Last 3 Encounters:   17 113.3 kg (249 lb 12.8 oz)   17 112.5 kg (248 lb)   17 112 kg (247 lb)              We Performed the Following     PSA tumor marker        Primary Care Provider Office Phone # Fax #    Gume Brown [phone] [address] [facility] [address]        Equal Access to Services     JEFFERSON [facility] CenterNICHOLAS : Hadii aad ku hadasho Soomaali, waaxda luqadaha, qaybta kaalmada adeegyada, emily mesa . So [facility] [phone].    ATENCIÓN: Si habla español, tiene a irving disposición servicios gratuitos de asistencia lingüística. Llame al [phone].    We comply with applicable federal civil rights laws and [place] laws. We do not discriminate on the basis of race, color, national origin, age, disability, sex, sexual orientation, or gender identity.            Thank you!     Thank you for choosing [facility]  for your care. Our goal is always to provide you with excellent care. Hearing back from our patients is " one way we can continue to improve our services. Please take a few minutes to complete the written survey that you may receive in the mail after your visit with us. Thank you!             Your Updated Medication List - Protect others around you: Learn how to safely use, store and throw away your medicines at www.disposemymeds.org.          This list is accurate as of 5/4/18 10:01 AM.  Always use your most recent med list.                   Brand Name Dispense Instructions for use Diagnosis    acetaminophen 325 MG tablet    TYLENOL     Take 1,300 mg by mouth 2 times daily        amiodarone 200 MG tablet    PACERONE/CODARONE    50 tablet    Take PO 1/2 tablet daily-Take extra prn per MD recommendations.    Paroxysmal atrial fibrillation (H)       ASPIRIN NOT PRESCRIBED    INTENTIONAL    0 each    1 each daily Antiplatelet medication not prescribed intentionally due to Current anticoagulant therapy (warfarin/enoxaparin)    Atrial fibrillation, unspecified       cetirizine 10 MG tablet    zyrTEC    90 tablet    Take 1 tablet (10 mg) by mouth daily    Allergic rhinitis       cholecalciferol 1000 UNIT tablet    vitamin D3    180 tablet    Take 2 tablets (2,000 Units) by mouth daily    Vitamin D deficiency, Parathyroid hormone excess (H)       digoxin 125 MCG tablet    LANOXIN    45 tablet    Take 1 tablet (125 mcg) by mouth every 48 hours    Chronic systolic heart failure (H)       lisinopril 2.5 MG tablet    PRINIVIL/Zestril    90 tablet    Take 1 tablet (2.5 mg) by mouth daily    Nonrheumatic aortic valve stenosis       metoprolol succinate 100 MG 24 hr tablet    TOPROL-XL    90 tablet    Take 1 tablet (100 mg) by mouth daily    Paroxysmal atrial fibrillation (H)       omeprazole 20 MG CR capsule    priLOSEC    90 capsule    Take 1 capsule (20 mg) by mouth daily    Gastroesophageal reflux disease without esophagitis       polyethylene glycol powder    MIRALAX/GLYCOLAX     Take 17 g by mouth daily as needed for  constipation        potassium chloride 10 MEQ tablet    K-TAB,KLOR-CON    90 tablet    Take 1 tablet (10 mEq) by mouth daily    Chronic systolic heart failure (H)       senna-docusate 8.6-50 MG per tablet    SENOKOT-S;PERICOLACE     Take 2 tablets by mouth daily        simethicone 80 MG chewable tablet    MYLICON    180 tablet    Take 1 tablet (80 mg) by mouth every 6 hours as needed for cramping    Abdominal bloating       simvastatin 20 MG tablet    ZOCOR    90 tablet    Take 1 tablet (20 mg) by mouth At Bedtime    Hyperlipidemia LDL goal <100       torsemide 20 MG tablet    DEMADEX    90 tablet    Take 1 tablet (20 mg) by mouth daily    Cardiomyopathy (H), Chronic systolic heart failure (H)       VIAGRA 25 MG tablet   Generic drug:  sildenafil      Take 25 mg by mouth as needed        warfarin 5 MG tablet    COUMADIN    90 tablet    5 mg daily    Long-term (current) use of anticoagulants, Paroxysmal ventricular tachycardia (H)

## 2018-05-04 NOTE — PROGRESS NOTES
Medical Assistant Note:  Shahid Villalta presents today for Blood Draw.    Patient seen by provider today: No.   present during visit today: Not Applicable.    Concerns: No Concerns.    Procedure:  Labs drawn: Yes.    Post Assessment:  Labs drawn without difficulty: Yes.    Discharge Plan:  Patient discharged in stable condition accompanied by: self.    Face to Face Time: 10 mins.    Marleni Gandhi

## 2018-05-04 NOTE — LETTER
5/4/2018         RE: Shahid Villalta  [address] [address]        Dear Colleague,    Thank you for referring your patient, Shahid Villalta, to the [facility]. Please see a copy of my visit note below.    Medical Assistant Note:  Shahid Villalta presents today for Blood Draw.    Patient seen by provider today: No.   present during visit today: Not Applicable.    Concerns: No Concerns.    Procedure:  Labs drawn: Yes.    Post Assessment:  Labs drawn without difficulty: Yes.    Discharge Plan:  Patient discharged in stable condition accompanied by: self.    Face to Face Time: 10 mins.    Marleni Gandhi              Again, thank you for allowing me to participate in the care of your patient.        Sincerely,        Nikki Oncology Nurse

## 2018-05-08 ENCOUNTER — ANTICOAGULATION THERAPY VISIT (OUTPATIENT)
Dept: NURSING | Facility: CLINIC | Age: 78
End: 2018-05-08
Payer: COMMERCIAL

## 2018-05-08 DIAGNOSIS — Z79.01 LONG-TERM (CURRENT) USE OF ANTICOAGULANTS: ICD-10-CM

## 2018-05-08 DIAGNOSIS — I47.29 PAROXYSMAL VENTRICULAR TACHYCARDIA (H): ICD-10-CM

## 2018-05-08 LAB — INR POINT OF CARE: 1.8 (ref 0.86–1.14)

## 2018-05-08 PROCEDURE — 36416 COLLJ CAPILLARY BLOOD SPEC: CPT

## 2018-05-08 PROCEDURE — 85610 PROTHROMBIN TIME: CPT | Mod: QW

## 2018-05-08 NOTE — MR AVS SNAPSHOT
Shahid Villalta   5/8/2018 9:30 AM   Anticoagulation Therapy Visit    Description:  78 year old male   Provider:   ANTICOAGULATION CLINIC   Department:   Nurse           INR as of 5/8/2018     Today's INR 1.8!      Anticoagulation Summary as of 5/8/2018     INR goal 2.0-3.0   Today's INR 1.8!   Full instructions 5/9: 5 mg; 5/16: 5 mg; Otherwise 2.5 mg on Mon, Wed, Fri; 5 mg all other days   Next INR check 5/18/2018    Indications   Long-term (current) use of anticoagulants [Z79.01] [Z79.01]  Paroxysmal ventricular tachycardia (H) [I47.2]         Contact Numbers     [facility]  Please call [phone] with any problems or questions regarding your therapy, or to cancel or reschedule your appointment.          May 2018 Details    Sun Mon Tue Wed Thu Fri Sat       1               2               3               4               5                 6               7               8      5 mg   See details      9      5 mg         10      5 mg         11      2.5 mg         12      5 mg           13      5 mg         14      2.5 mg         15      5 mg         16      5 mg         17      5 mg         18            19                 20               21               22               23               24               25               26                 27               28               29               30               31                  Date Details   05/08 This INR check       Date of next INR:  5/18/2018         How to take your warfarin dose     To take:  2.5 mg Take 0.5 of a 5 mg tablet.    To take:  5 mg Take 1 of the 5 mg tablets.

## 2018-05-08 NOTE — PROGRESS NOTES
ANTICOAGULATION FOLLOW-UP CLINIC VISIT    Patient Name:  Shahid Villalta  Date:  5/8/2018  Contact Type:  Face to Face    SUBJECTIVE:     Patient Findings     Positives Change in diet/appetite    Comments Pt is back to his regular diet with his greens           OBJECTIVE    INR Protime   Date Value Ref Range Status   05/08/2018 1.8 (A) 0.86 - 1.14 Final     Chromogenic Factor 10   Date Value Ref Range Status   07/31/2017 17 (L) 70 - 130 % Final     Comment:     Therapeutic Range:  A Chromogenic Factor 10 level of approximately 20-40%   inversely correlates with an INR of 2-3 for patients receiving Warfarin.   Chromogenic Factor 10 levels below 20% indicate an INR greater than 3 and   levels above 40% indicate an INR less than 2.         ASSESSMENT / PLAN  No question data found.  Anticoagulation Summary as of 5/8/2018     INR goal 2.0-3.0   Today's INR 1.8!   Maintenance plan 2.5 mg (5 mg x 0.5) on Mon, Wed, Fri; 5 mg (5 mg x 1) all other days   Full instructions 5/9: 5 mg; 5/16: 5 mg; Otherwise 2.5 mg on Mon, Wed, Fri; 5 mg all other days   Weekly total 27.5 mg   Plan last modified Manisha Pierson RN (4/23/2018)   Next INR check 5/18/2018   Target end date     Indications   Long-term (current) use of anticoagulants [Z79.01] [Z79.01]  Paroxysmal ventricular tachycardia (H) [I47.2]         Anticoagulation Episode Summary     INR check location     Preferred lab     Send INR reminders to [facility] TRI[facility]    Comments             See the Encounter Report to view Anticoagulation Flowsheet and Dosing Calendar (Go to Encounters tab in chart review, and find the Anticoagulation Therapy Visit)    Dosage adjustment made based on physician directed care plan.    Manisha Pierson, RN

## 2018-05-09 ENCOUNTER — ONCOLOGY VISIT (OUTPATIENT)
Dept: ONCOLOGY | Facility: CLINIC | Age: 78
End: 2018-05-09
Attending: UROLOGY
Payer: MEDICARE

## 2018-05-09 VITALS
BODY MASS INDEX: 37.44 KG/M2 | HEART RATE: 69 BPM | TEMPERATURE: 97 F | HEIGHT: 68 IN | DIASTOLIC BLOOD PRESSURE: 64 MMHG | SYSTOLIC BLOOD PRESSURE: 102 MMHG | RESPIRATION RATE: 16 BRPM | WEIGHT: 247 LBS | OXYGEN SATURATION: 97 %

## 2018-05-09 DIAGNOSIS — C61 MALIGNANT NEOPLASM OF PROSTATE (H): Primary | ICD-10-CM

## 2018-05-09 PROCEDURE — 99213 OFFICE O/P EST LOW 20 MIN: CPT | Performed by: UROLOGY

## 2018-05-09 PROCEDURE — G0463 HOSPITAL OUTPT CLINIC VISIT: HCPCS

## 2018-05-09 ASSESSMENT — PAIN SCALES - GENERAL: PAINLEVEL: NO PAIN (0)

## 2018-05-09 NOTE — MR AVS SNAPSHOT
After Visit Summary   5/9/2018    Shahid Villalta    MRN: [id number]           Patient Information     Date Of Birth          1940        Visit Information        Provider Department      5/9/2018 9:30 AM Nahun Hilario MD [facility] Oncology [facility]      Today's Diagnoses     Malignant neoplasm of prostate (H)    -  1      Care Instructions    Follow up in 6 months      PSA prior to visit     Clinic Exam     Will order the decipher test.          Follow-ups after your visit        Your next 10 appointments already scheduled     May 17, 2018  4:30 PM CDT   Remote ICD Check with STEWART DCR2   [facility] ([facility])    [address]  [place] [id number] [phone] OPT 2           This appointment is for a remote check of your debrillator.  This is not an appointment at the office.            May 18, 2018 10:30 AM CDT   LAB with RU LAB   [facility] [place] ([facility])    [address]  [facility] [id number] [phone]           Please do not eat 10-12 hours before your appointment if you are coming in fasting for labs on lipids, cholesterol, or glucose (sugar). This does not apply to pregnant women. Water, hot tea and black coffee (with nothing added) are okay. Do not drink other fluids, diet soda or chew gum.            May 18, 2018 10:45 AM CDT   Ech Complete with RSCCECHO1   [facility] ([facility])    [address]  [facility] [id number] [phone]           1. Please bring or wear a comfortable two-piece outfit. 2. You may eat, drink and take your normal medicines. 3. For any questions that cannot be answered, please contact the ordering physician ***Please check-in at the [place] Registration Office located in [address] in the [facility] building. When  you are finished registering, please go to [address] and have a seat. The technician will call your name for the test.            May 23, 2018 10:45 AM CDT   Return Visit with Adiel Aden MD   [facility] ([facility])    [address]  [place] [address]   [phone] OPT 2            Jun 14, 2018  3:45 PM CDT   TAVR New with Catia Nation MD   [facility] ([facility])    [address]  [place] [address]   [phone] OPT 2            Jun 29, 2018  9:30 AM CDT   Return Sleep Patient with Nadir Ashraf MD   [facility] ([facility])    [address]  [address]  [place] [address]   [phone]            Nov 09, 2018  9:00 AM CST   Return Visit with  ONCOLOGY NURSE   [facility] ([facility])    [facility]  [address]  [place] [address]   [phone]            Nov 14, 2018  9:30 AM CST   Return Visit with Nahun Hilario MD   [facility] ([facility])    [facility]  [address]  [place] [address]   [phone]              Future tests that were ordered for you today     Open Future Orders        Priority Expected Expires Ordered    INR Routine 5/18/2018 5/8/2019 5/8/2018            Who to contact     If you have questions or need follow up information about today's clinic visit or your schedule please contact [facility] directly at [phone].  Normal or non-critical lab and imaging results will be communicated to you by MyChart, letter or phone within 4 business days after the clinic has received the results. If you do not hear from us within 7 days, please contact the clinic through MyChart or phone. If you have a  "critical or abnormal lab result, we will notify you by phone as soon as possible.  Submit refill requests through [employer] or call your pharmacy and they will forward the refill request to us. Please allow 3 business days for your refill to be completed.          Additional Information About Your Visit        66. comhart Information     [employer] gives you secure access to your electronic health record. If you see a primary care provider, you can also send messages to your care team and make appointments. If you have questions, please call your primary care clinic.  If you do not have a primary care provider, please call [phone] and they will assist you.        Care EveryWhere ID     This is your Care EveryWhere ID. This could be used by other organizations to access your [facility] medical records  [id number]        Your Vitals Were     Pulse Temperature Respirations Height Pulse Oximetry BMI (Body Mass Index)    69 97  F (36.1  C) (Oral) 16 1.727 m (5' 8\") 97% 37.56 kg/m2       Blood Pressure from Last 3 Encounters:   05/09/18 102/64   11/17/17 118/79   11/08/17 127/80    Weight from Last 3 Encounters:   05/09/18 112 kg (247 lb)   11/17/17 113.3 kg (249 lb 12.8 oz)   11/08/17 112.5 kg (248 lb)              We Performed the Following     Decipher Prostate Cancer Test: Clinic Lab Order        Primary Care Provider Office Phone # Fax #    Gume Brown [phone] [address]  [facility] [address]        Equal Access to Services     JEFFERSON SHEARER : Hadii aad ku hadasho Soomaali, waaxda luqadaha, qaybta kaalmada adeegyada, waxay shaji garcia. So [facility] [phone].    ATENCIÓN: Si habla español, tiene a irving disposición servicios gratuitos de asistencia lingüística. Llame al [phone].    We comply with applicable federal civil rights laws and [place] laws. We do not discriminate on the basis of race, color, national origin, age, disability, sex, sexual orientation, " or gender identity.            Thank you!     Thank you for choosing [facility]  for your care. Our goal is always to provide you with excellent care. Hearing back from our patients is one way we can continue to improve our services. Please take a few minutes to complete the written survey that you may receive in the mail after your visit with us. Thank you!             Your Updated Medication List - Protect others around you: Learn how to safely use, store and throw away your medicines at www.disposemymeds.org.          This list is accurate as of 5/9/18 10:19 AM.  Always use your most recent med list.                   Brand Name Dispense Instructions for use Diagnosis    acetaminophen 325 MG tablet    TYLENOL     Take 1,300 mg by mouth 2 times daily        amiodarone 200 MG tablet    PACERONE/CODARONE    50 tablet    Take PO 1/2 tablet daily-Take extra prn per MD recommendations.    Paroxysmal atrial fibrillation (H)       ASPIRIN NOT PRESCRIBED    INTENTIONAL    0 each    1 each daily Antiplatelet medication not prescribed intentionally due to Current anticoagulant therapy (warfarin/enoxaparin)    Atrial fibrillation, unspecified       cetirizine 10 MG tablet    zyrTEC    90 tablet    Take 1 tablet (10 mg) by mouth daily    Allergic rhinitis       cholecalciferol 1000 UNIT tablet    vitamin D3    180 tablet    Take 2 tablets (2,000 Units) by mouth daily    Vitamin D deficiency, Parathyroid hormone excess (H)       digoxin 125 MCG tablet    LANOXIN    45 tablet    Take 1 tablet (125 mcg) by mouth every 48 hours    Chronic systolic heart failure (H)       lisinopril 2.5 MG tablet    PRINIVIL/Zestril    90 tablet    Take 1 tablet (2.5 mg) by mouth daily    Nonrheumatic aortic valve stenosis       metoprolol succinate 100 MG 24 hr tablet    TOPROL-XL    90 tablet    Take 1 tablet (100 mg) by mouth daily    Paroxysmal atrial fibrillation (H)       omeprazole 20 MG CR capsule    priLOSEC    90  capsule    Take 1 capsule (20 mg) by mouth daily    Gastroesophageal reflux disease without esophagitis       polyethylene glycol powder    MIRALAX/GLYCOLAX     Take 17 g by mouth daily as needed for constipation        potassium chloride 10 MEQ tablet    K-TAB,KLOR-CON    90 tablet    Take 1 tablet (10 mEq) by mouth daily    Chronic systolic heart failure (H)       senna-docusate 8.6-50 MG per tablet    SENOKOT-S;PERICOLACE     Take 2 tablets by mouth daily        simethicone 80 MG chewable tablet    MYLICON    180 tablet    Take 1 tablet (80 mg) by mouth every 6 hours as needed for cramping    Abdominal bloating       simvastatin 20 MG tablet    ZOCOR    90 tablet    Take 1 tablet (20 mg) by mouth At Bedtime    Hyperlipidemia LDL goal <100       torsemide 20 MG tablet    DEMADEX    90 tablet    Take 1 tablet (20 mg) by mouth daily    Cardiomyopathy (H), Chronic systolic heart failure (H)       VIAGRA 25 MG tablet   Generic drug:  sildenafil      Take 25 mg by mouth as needed        warfarin 5 MG tablet    COUMADIN    90 tablet    5 mg daily    Long-term (current) use of anticoagulants, Paroxysmal ventricular tachycardia (H)

## 2018-05-09 NOTE — NURSING NOTE
"Oncology Rooming Note    May 9, 2018 9:34 AM   Shahid Villalta is a 78 year old male who presents for:    Chief Complaint   Patient presents with     Oncology Clinic Visit     Follow up - Prostate Cancer     Initial Vitals: Ht 1.727 m (5' 8\")  Wt 112 kg (247 lb)  BMI 37.56 kg/m2 Estimated body mass index is 37.56 kg/(m^2) as calculated from the following:    Height as of this encounter: 1.727 m (5' 8\").    Weight as of this encounter: 112 kg (247 lb). Body surface area is 2.32 meters squared.  No Pain (0) Comment: Data Unavailable   No LMP for male patient.  Allergies reviewed: Yes  Medications reviewed: Yes    Medications: Medication refills not needed today.  Pharmacy name entered into [email or web address]:    [id number] - [place], [place] - [address] [facility] [place] - [address] AT [facility]    Clinical concerns: Follow-up     8 minutes for nursing intake (face to face time)     DISCHARGE PLAN:  Next appointments: See patient instruction section  Departure Mode: Ambulatory  Accompanied by: self  0 minutes for nursing discharge (face to face time)   Aranza Flores                "

## 2018-05-09 NOTE — PATIENT INSTRUCTIONS
Follow up in 6 months - Md visit scheduled for Nov 14th      PSA prior to visit- Scheduled for Nov 9th--Mikayla Moreau       Clinic Exam     Will order the decipher test.

## 2018-05-09 NOTE — LETTER
5/9/2018         RE: Shahid Villalta  [address] [address]        Dear Colleague,    Thank you for referring your patient, Shahid Villalta, to the [facility]. Please see a copy of my visit note below.    Prostate Cancer Follow up after RRP     Shahid Villalta is a very pleasant 75 year old male who presents with a history of prostate cancer.    Initial PSA:29  Pathologic Roni Score was 4 + 3  Biopsy Calabash Score was 4 + 3  Pathologic Stage T3b.   Positive Margins: Yes Location:right side apical  Number of Lymph Nodes Removed: 13  Number of Positive Lymph Nodes: 0  Patient is status post robotic radical prostatectomy on 11/20/2015.    Risk Group: High    No hormones or radiation    Predicted progression free probability at 10 years: 9, i.e. 90% chance that the PSA will rise down the road    ~10% chance of death in the next 5-10 years  (J Clin Oncol. 2005 Oct 1;23(28):7008-12.  Http://nomograms.[facility].org/Prostate/PostRadicalProstatectomy.aspx)      PSA   Date Value Ref Range Status   05/04/2018 0.02 0 - 4 ug/L Final     Comment:     Assay Method:  Chemiluminescence using Siemens Vista analyzer   11/06/2017 0.02 0 - 4 ug/L Final     Comment:     Assay Method:  Chemiluminescence using Siemens Vista analyzer   05/08/2017 0.01 0 - 4 ug/L Final     Comment:     Assay Method:  Chemiluminescence using Siemens Vista analyzer   10/28/2016  0 - 4 ug/L Final    <0.01  Assay Method:  Chemiluminescence using Siemens Vista analyzer     06/15/2016  0 - 4 ug/L Final    <0.01  PSA results are about 7% lower than our prior method due to a methodology change   on August 30, 2011.     01/14/2016  0 - 4 ug/L Final    <0.01  PSA results are about 7% lower than our prior method due to a methodology change   on August 30, 2011.     05/19/2014 13.50 (H) 0 - 4 ug/L Final   06/15/2012 11.50 (H) 0 - 4 ug/L Final     Comment:     PSA results are about 7% lower than our prior method due to a  "methodology   change   on August 30, 2011.   07/18/2005 3.6 ng/mL    07/04/2004 3.4 ng/mL        No incontinence except with certain heavy lifting, but frequency with diuretics, control better and urgency better.    There is evidence of slightly rising PSA but stable    Physical Exam    /64  Pulse 69  Temp 97  F (36.1  C) (Oral)  Resp 16  Ht 1.727 m (5' 8\")  Wt 112 kg (247 lb)  SpO2 97%  BMI 37.56 kg/m2   Incisions healed well and no sign of hernias      2018  AUASI 5 QOL 1  2018  ROGER 1       Assessment GS 7 prostate cancer with evidence of very low PSA of disease s/p robotic radical prostatectomy 11/2017    Plan     Follow up in 6 months      PSA prior to visit     Clinic Exam      Discussed radiation again but okay to hold off right now given his rather low and stable PSAs, but will order the decipher test.          Sold pharmaceuticals in past, also worked as a mortician, lives in [place] during winter    Nahun Hilario MD  Department of Urology  [facility]    Again, thank you for allowing me to participate in the care of your patient.        Sincerely,        Nahun Hilario MD    "

## 2018-05-09 NOTE — PROGRESS NOTES
Prostate Cancer Follow up after RRP     Shahid Villalta is a very pleasant 75 year old male who presents with a history of prostate cancer.    Initial PSA:29  Pathologic Roni Score was 4 + 3  Biopsy Iron Score was 4 + 3  Pathologic Stage T3b.   Positive Margins: Yes Location:right side apical  Number of Lymph Nodes Removed: 13  Number of Positive Lymph Nodes: 0  Patient is status post robotic radical prostatectomy on 11/20/2015.    Risk Group: High    No hormones or radiation    Predicted progression free probability at 10 years: 9, i.e. 90% chance that the PSA will rise down the road    ~10% chance of death in the next 5-10 years  (J Clin Oncol. 2005 Oct 1;23):4203-12.  Http://nomograms.[facility].org/Prostate/PostRadicalProstatectomy.aspx)      PSA   Date Value Ref Range Status   05/04/2018 0.02 0 - 4 ug/L Final     Comment:     Assay Method:  Chemiluminescence using Siemens Vista analyzer   11/06/2017 0.02 0 - 4 ug/L Final     Comment:     Assay Method:  Chemiluminescence using Siemens Vista analyzer   05/08/2017 0.01 0 - 4 ug/L Final     Comment:     Assay Method:  Chemiluminescence using Siemens Vista analyzer   10/28/2016  0 - 4 ug/L Final    <0.01  Assay Method:  Chemiluminescence using Siemens Vista analyzer     06/15/2016  0 - 4 ug/L Final    <0.01  PSA results are about 7% lower than our prior method due to a methodology change   on August 30, 2011.     01/14/2016  0 - 4 ug/L Final    <0.01  PSA results are about 7% lower than our prior method due to a methodology change   on August 30, 2011.     05/19/2014 13.50 (H) 0 - 4 ug/L Final   06/15/2012 11.50 (H) 0 - 4 ug/L Final     Comment:     PSA results are about 7% lower than our prior method due to a methodology   change   on August 30, 2011.   07/18/2005 3.6 ng/mL    07/04/2004 3.4 ng/mL        No incontinence except with certain heavy lifting, but frequency with diuretics, control better and urgency better.    There is evidence of slightly rising PSA  "but stable    Physical Exam    /64  Pulse 69  Temp 97  F (36.1  C) (Oral)  Resp 16  Ht 1.727 m (5' 8\")  Wt 112 kg (247 lb)  SpO2 97%  BMI 37.56 kg/m2   Incisions healed well and no sign of hernias      2018  AUASI 5 QOL 1  2018  ROGER 1       Assessment GS 7 prostate cancer with evidence of very low PSA of disease s/p robotic radical prostatectomy 11/2017    Plan     Follow up in 6 months      PSA prior to visit     Clinic Exam      Discussed radiation again but okay to hold off right now given his rather low and stable PSAs, but will order the decipher test.          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

## 2018-05-14 DIAGNOSIS — I50.22 CHRONIC SYSTOLIC HEART FAILURE (H): ICD-10-CM

## 2018-05-14 DIAGNOSIS — I35.0 NONRHEUMATIC AORTIC VALVE STENOSIS: ICD-10-CM

## 2018-05-14 RX ORDER — LISINOPRIL 2.5 MG/1
2.5 TABLET ORAL DAILY
Qty: 90 TABLET | Refills: 0 | Status: SHIPPED | OUTPATIENT
Start: 2018-05-14 | End: 2018-05-31

## 2018-05-14 RX ORDER — DIGOXIN 125 MCG
125 TABLET ORAL
Qty: 45 TABLET | Refills: 0 | Status: SHIPPED | OUTPATIENT
Start: 2018-05-14 | End: 2018-10-23

## 2018-05-14 NOTE — TELEPHONE ENCOUNTER
Routing refill request to provider for review/approval because:  Labs not current:  See below.  Pt is also due for a yearly visit with PCP this month  Myckishat sent  Marisela Pierson RN, BSN

## 2018-05-14 NOTE — TELEPHONE ENCOUNTER
"Requested Prescriptions   Pending Prescriptions Disp Refills     digoxin (LANOXIN) 125 MCG tablet 45 tablet 3    Last Written Prescription Date:  05/19/2017  Last Fill Quantity: 45 tablet,  # refills: 3   Last office visit: 5/19/2017 with prescribing provider:  05/19/2017   Future Office Visit:   Next 5 appointments (look out 90 days)     May 23, 2018 10:45 AM CDT   Return Visit with Adiel Aden MD   [facility])    [address] [place] [id number] [phone] OPT 2                  Sig: Take 1 tablet (125 mcg) by mouth every 48 hours    Cardiac Glycoside Agents Protocol Failed    5/14/2018  8:52 AM       Failed - Normal digoxin level on file in past 12 mos    Recent Labs   Lab Test  12/11/15   1430   DIGOXIN  0.7            Failed - Normal serum creatinine on file in past 12 mos    Recent Labs   Lab Test  11/13/17   1049   CR  1.49*            Failed - Recent (6 mo) or future (30 days) visit within the authorizing provider's specialty    Patient had office visit in the last 6 months or has a visit in the next 30 days with authorizing provider or within the authorizing provider's specialty.  See \"Patient Info\" tab in inbasket, or \"Choose Columns\" in Meds & Orders section of the refill encounter.           Passed - Patient is 18 years of age or older            lisinopril (PRINIVIL/ZESTRIL) 2.5 MG tablet 90 tablet 3    Last Written Prescription Date:  07/13/2017  Last Fill Quantity: 90 tablet,  # refills: 3   Last office visit: 5/19/2017 with prescribing provider:  05/19/2017   Future Office Visit:   Next 5 appointments (look out 90 days)     May 23, 2018 10:45 AM CDT   Return Visit with Adiel Aden MD   [facility] ([facility])    [address] [place] [id number] [phone] OPT 2                  Sig: Take 1 tablet (2.5 mg) by mouth daily    ACE " "Inhibitors (Including Combos) Protocol Failed    5/14/2018  8:52 AM       Failed - Normal serum creatinine on file in past 12 months    Recent Labs   Lab Test  11/13/17   1049   CR  1.49*            Passed - Blood pressure under 140/90 in past 12 months    BP Readings from Last 3 Encounters:   05/09/18 102/64   11/17/17 118/79   11/08/17 127/80                Passed - Recent (12 mo) or future (30 days) visit within the authorizing provider's specialty    Patient had office visit in the last 12 months or has a visit in the next 30 days with authorizing provider or within the authorizing provider's specialty.  See \"Patient Info\" tab in inbasket, or \"Choose Columns\" in Meds & Orders section of the refill encounter.           Passed - Patient is age 18 or older       Passed - Normal serum potassium on file in past 12 months    Recent Labs   Lab Test  11/13/17   1049   POTASSIUM  4.6               Ethan STILES  "

## 2018-05-17 ENCOUNTER — ALLIED HEALTH/NURSE VISIT (OUTPATIENT)
Dept: CARDIOLOGY | Facility: CLINIC | Age: 78
End: 2018-05-17
Payer: COMMERCIAL

## 2018-05-17 DIAGNOSIS — Z95.810 ICD (IMPLANTABLE CARDIOVERTER-DEFIBRILLATOR) IN PLACE: Primary | ICD-10-CM

## 2018-05-17 DIAGNOSIS — I47.20 VENTRICULAR TACHYCARDIA (H): ICD-10-CM

## 2018-05-17 PROCEDURE — 93296 REM INTERROG EVL PM/IDS: CPT | Performed by: INTERNAL MEDICINE

## 2018-05-17 PROCEDURE — 93295 DEV INTERROG REMOTE 1/2/MLT: CPT | Performed by: INTERNAL MEDICINE

## 2018-05-17 NOTE — PROGRESS NOTES
[employer] CRT ICD Remote Device Check  AP: 21 % BVP: 99 %  Mode: DDDR 60-95        Presenting Rhythm: AS/BVP  Heart Rate: excellent variability  Sensing: WNL    Pacing Threshold: not obtained    Impedance: WNL  Battery Status: 4.5 yrs estimated longevity, 10.2 second charge time  Atrial Arrhythmia: 16 episodes saved for ATR, 6 with EGMs for review. EGMs show atrial tachycardia with A rates 100 bpm and V rates 70-90 bpm (BVP). Longest episode 43 seconds in duration. Mode switch rate is set at 100 bpm due to known atrial tachycardia. 7 episodes for PMT, 5 with EGMs for review all showing sinus rate at MTR of 95 bpm.   Ventricular Arrhythmia: 10 episodes saved for NSVT, 2 with EGMs for review showing 4-5 beats NSVT at -180 bpm.   ATP: none    Shocks: none    Care Plan: remote in 3 months, scheduled. OV with Dr. Aden scheduled 5/23/2018. Called pt, no answer, left message with results and plan.   SONJA SPAIN

## 2018-05-17 NOTE — MR AVS SNAPSHOT
After Visit Summary   5/17/2018    Shahid Villalta    MRN: [id number]           Patient Information     Date Of Birth          1940        Visit Information        Provider Department      5/17/2018 4:30 PM STEWART DCR2 [facility]        Today's Diagnoses     ICD (implantable cardioverter-defibrillator) in place    -  1    Ventricular tachycardia           Follow-ups after your visit        Your next 10 appointments already scheduled     May 17, 2018  4:30 PM CDT   Remote ICD Check with STEWART DCR2   [facility] ([facility])    [address]  [place] [facility] [id number] [phone] OPT 2           This appointment is for a remote check of your debrillator.  This is not an appointment at the office.            May 18, 2018 10:30 AM CDT   LAB with RU LAB   [facility] [place] ([facility])    [address]  [place] [address] [phone]           Please do not eat 10-12 hours before your appointment if you are coming in fasting for labs on lipids, cholesterol, or glucose (sugar). This does not apply to pregnant women. Water, hot tea and black coffee (with nothing added) are okay. Do not drink other fluids, diet soda or chew gum.            May 18, 2018 10:45 AM CDT   Ech Complete with [id number] ([facility])    [address]  [place] [address] [phone]           1. Please bring or wear a comfortable two-piece outfit. 2. You may eat, drink and take your normal medicines. 3. For any questions that cannot be answered, please contact the ordering physician ***Please check-in at the [place] Registration Office located in [address] in the [facility] building. When you are finished registering, please go to [address] and have a seat. The technician will call  your name for the test.            May 23, 2018 10:45 AM CDT   Return Visit with Adiel Aden MD   [facility] ([facility])    [address]  [place] [address] [phone] OPT 2            Jun 14, 2018  3:45 PM CDT   TAVR New with Catia Nation MD   [facility] ([facility])    [address]  [place] [address] [phone] OPT 2            Jun 29, 2018  9:30 AM CDT   Return Sleep Patient with Nadir Ashraf MD   [facility] ([facility])    [address]  [address]  [place] [address] [phone]            Sep 12, 2018  4:30 PM CDT   Remote ICD Check with STEWART DCR2   [facility] ([facility])    [address]  [place] [address] [phone] OPT 2           This appointment is for a remote check of your debrillator.  This is not an appointment at the office.            Nov 09, 2018  9:00 AM CST   Return Visit with RH ONCOLOGY NURSE   [facility] ([facility])    [facility]  [address]  [facility] [id number] [phone]            Nov 14, 2018  9:30 AM CST   Return Visit with Nahun Hilario MD   [facility] ([facility])    [facility]  [address]  [facility] [address] [phone]              Who to contact     If you have questions or need follow up information about today's clinic visit or your schedule please contact [facility] directly at [phone].  Normal or non-critical lab and imaging results will be communicated to you by MyChart, letter or phone within 4 business days after the clinic has received the results. If you do not hear from  us within 7 days, please contact the clinic through Sirigen or phone. If you have a critical or abnormal lab result, we will notify you by phone as soon as possible.  Submit refill requests through Sirigen or call your pharmacy and they will forward the refill request to us. Please allow 3 business days for your refill to be completed.          Additional Information About Your Visit        Figure 8 Surgicalhart Information     Sirigen gives you secure access to your electronic health record. If you see a primary care provider, you can also send messages to your care team and make appointments. If you have questions, please call your primary care clinic.  If you do not have a primary care provider, please call [phone] and they will assist you.        Care EveryWhere ID     This is your Care EveryWhere ID. This could be used by other organizations to access your [place] medical records  [id number]         Blood Pressure from Last 3 Encounters:   05/09/18 102/64   11/17/17 118/79   11/08/17 127/80    Weight from Last 3 Encounters:   05/09/18 112 kg (247 lb)   11/17/17 113.3 kg (249 lb 12.8 oz)   11/08/17 112.5 kg (248 lb)              We Performed the Following     ICD DEVICE INTERROGAT REMOTE (94048)     INTERROGATION DEVICE EVAL REMOTE, PACER/ICD (63299)        Primary Care Provider Office Phone # Fax #    Gume Brown [phone] [address] [address]        Equal Access to Services     JEFF[facility] CenterNICHOLAS : Hadii aad ku hadasho Soomaali, waaxda luqadaha, qaybta kaalmada adeegyada, emily garcia. So [facility] [phone].    ATENCIÓN: Si habla español, tiene a irving disposición servicios gratuitos de asistencia lingüística. Llame al [phone].    We comply with applicable federal civil rights laws and [place] laws. We do not discriminate on the basis of race, color, national origin, age, disability, sex, sexual orientation, or gender identity.            Thank  you!     Thank you for choosing [facility]  for your care. Our goal is always to provide you with excellent care. Hearing back from our patients is one way we can continue to improve our services. Please take a few minutes to complete the written survey that you may receive in the mail after your visit with us. Thank you!             Your Updated Medication List - Protect others around you: Learn how to safely use, store and throw away your medicines at www.disposemymeds.org.          This list is accurate as of 5/17/18 11:41 AM.  Always use your most recent med list.                   Brand Name Dispense Instructions for use Diagnosis    acetaminophen 325 MG tablet    TYLENOL     Take 1,300 mg by mouth 2 times daily        amiodarone 200 MG tablet    PACERONE/CODARONE    50 tablet    Take PO 1/2 tablet daily-Take extra prn per MD recommendations.    Paroxysmal atrial fibrillation (H)       ASPIRIN NOT PRESCRIBED    INTENTIONAL    0 each    1 each daily Antiplatelet medication not prescribed intentionally due to Current anticoagulant therapy (warfarin/enoxaparin)    Atrial fibrillation, unspecified       cetirizine 10 MG tablet    zyrTEC    90 tablet    Take 1 tablet (10 mg) by mouth daily    Allergic rhinitis       cholecalciferol 1000 UNIT tablet    vitamin D3    180 tablet    Take 2 tablets (2,000 Units) by mouth daily    Vitamin D deficiency, Parathyroid hormone excess (H)       digoxin 125 MCG tablet    LANOXIN    45 tablet    Take 1 tablet (125 mcg) by mouth every 48 hours    Chronic systolic heart failure (H)       lisinopril 2.5 MG tablet    PRINIVIL/Zestril    90 tablet    Take 1 tablet (2.5 mg) by mouth daily    Nonrheumatic aortic valve stenosis       metoprolol succinate 100 MG 24 hr tablet    TOPROL-XL    90 tablet    Take 1 tablet (100 mg) by mouth daily    Paroxysmal atrial fibrillation (H)       omeprazole 20 MG CR capsule    priLOSEC    90 capsule    Take 1  capsule (20 mg) by mouth daily    Gastroesophageal reflux disease without esophagitis       polyethylene glycol powder    MIRALAX/GLYCOLAX     Take 17 g by mouth daily as needed for constipation        potassium chloride 10 MEQ tablet    K-TAB,KLOR-CON    90 tablet    Take 1 tablet (10 mEq) by mouth daily    Chronic systolic heart failure (H)       senna-docusate 8.6-50 MG per tablet    SENOKOT-S;PERICOLACE     Take 2 tablets by mouth daily        simethicone 80 MG chewable tablet    MYLICON    180 tablet    Take 1 tablet (80 mg) by mouth every 6 hours as needed for cramping    Abdominal bloating       simvastatin 20 MG tablet    ZOCOR    90 tablet    Take 1 tablet (20 mg) by mouth At Bedtime    Hyperlipidemia LDL goal <100       torsemide 20 MG tablet    DEMADEX    90 tablet    Take 1 tablet (20 mg) by mouth daily    Cardiomyopathy (H), Chronic systolic heart failure (H)       VIAGRA 25 MG tablet   Generic drug:  sildenafil      Take 25 mg by mouth as needed        warfarin 5 MG tablet    COUMADIN    90 tablet    5 mg daily    Long-term (current) use of anticoagulants, Paroxysmal ventricular tachycardia (H)

## 2018-05-18 ENCOUNTER — ANTICOAGULATION THERAPY VISIT (OUTPATIENT)
Dept: FAMILY MEDICINE | Facility: CLINIC | Age: 78
End: 2018-05-18

## 2018-05-18 ENCOUNTER — HOSPITAL ENCOUNTER (OUTPATIENT)
Dept: CARDIOLOGY | Facility: CLINIC | Age: 78
Discharge: HOME | End: 2018-05-18
Attending: INTERNAL MEDICINE | Admitting: INTERNAL MEDICINE
Payer: MEDICARE

## 2018-05-18 DIAGNOSIS — I48.0 PAROXYSMAL ATRIAL FIBRILLATION (H): ICD-10-CM

## 2018-05-18 DIAGNOSIS — I35.1 NONRHEUMATIC AORTIC VALVE INSUFFICIENCY: ICD-10-CM

## 2018-05-18 DIAGNOSIS — I47.29 PAROXYSMAL VENTRICULAR TACHYCARDIA (H): ICD-10-CM

## 2018-05-18 DIAGNOSIS — I42.8 OTHER CARDIOMYOPATHY (H): ICD-10-CM

## 2018-05-18 DIAGNOSIS — I35.0 NONRHEUMATIC AORTIC VALVE STENOSIS: ICD-10-CM

## 2018-05-18 DIAGNOSIS — Z79.01 LONG-TERM (CURRENT) USE OF ANTICOAGULANTS: ICD-10-CM

## 2018-05-18 LAB
ANION GAP SERPL CALCULATED.3IONS-SCNC: 8 MMOL/L (ref 3–14)
BUN SERPL-MCNC: 28 MG/DL (ref 7–30)
CALCIUM SERPL-MCNC: 9.2 MG/DL (ref 8.5–10.1)
CHLORIDE SERPL-SCNC: 109 MMOL/L (ref 94–109)
CO2 SERPL-SCNC: 25 MMOL/L (ref 20–32)
CREAT SERPL-MCNC: 1.47 MG/DL (ref 0.66–1.25)
GFR SERPL CREATININE-BSD FRML MDRD: 47 ML/MIN/1.7M2
GLUCOSE SERPL-MCNC: 115 MG/DL (ref 70–99)
INR PPP: 2.23 (ref 0.86–1.14)
POTASSIUM SERPL-SCNC: 4.5 MMOL/L (ref 3.4–5.3)
SODIUM SERPL-SCNC: 142 MMOL/L (ref 133–144)
TSH SERPL DL<=0.005 MIU/L-ACNC: 0.47 MU/L (ref 0.4–4)

## 2018-05-18 PROCEDURE — 93306 TTE W/DOPPLER COMPLETE: CPT

## 2018-05-18 PROCEDURE — 80048 BASIC METABOLIC PNL TOTAL CA: CPT | Performed by: INTERNAL MEDICINE

## 2018-05-18 PROCEDURE — 99207 ZZC NO CHARGE NURSE ONLY: CPT | Performed by: FAMILY MEDICINE

## 2018-05-18 PROCEDURE — 85610 PROTHROMBIN TIME: CPT | Performed by: INTERNAL MEDICINE

## 2018-05-18 PROCEDURE — 84443 ASSAY THYROID STIM HORMONE: CPT | Performed by: INTERNAL MEDICINE

## 2018-05-18 PROCEDURE — 93306 TTE W/DOPPLER COMPLETE: CPT | Mod: 26 | Performed by: INTERNAL MEDICINE

## 2018-05-18 PROCEDURE — 36415 COLL VENOUS BLD VENIPUNCTURE: CPT | Performed by: INTERNAL MEDICINE

## 2018-05-18 NOTE — PROGRESS NOTES
ANTICOAGULATION FOLLOW-UP CLINIC VISIT    Patient Name:  Shahid Villalta  Date:  5/18/2018  Contact Type:  Telephone/ with pt     SUBJECTIVE:     Patient Findings     Positives No Problem Findings           OBJECTIVE    INR   Date Value Ref Range Status   05/18/2018 2.23 (H) 0.86 - 1.14 Final     Chromogenic Factor 10   Date Value Ref Range Status   07/31/2017 17 (L) 70 - 130 % Final     Comment:     Therapeutic Range:  A Chromogenic Factor 10 level of approximately 20-40%   inversely correlates with an INR of 2-3 for patients receiving Warfarin.   Chromogenic Factor 10 levels below 20% indicate an INR greater than 3 and   levels above 40% indicate an INR less than 2.         ASSESSMENT / PLAN  No question data found.  Anticoagulation Summary as of 5/18/2018     INR goal 2.0-3.0   Today's INR    Maintenance plan 2.5 mg (5 mg x 0.5) on Mon, Fri; 5 mg (5 mg x 1) all other days   Full instructions 2.5 mg on Mon, Fri; 5 mg all other days   Weekly total 30 mg   Plan last modified Isela Hua RN (5/18/2018)   Next INR check 6/1/2018   Target end date     Indications   Long-term (current) use of anticoagulants [Z79.01] [Z79.01]  Paroxysmal ventricular tachycardia (H) [I47.2]         Anticoagulation Episode Summary     INR check location     Preferred lab     Send INR reminders to [facility] TRIAGE    Comments             See the Encounter Report to view Anticoagulation Flowsheet and Dosing Calendar (Go to Encounters tab in chart review, and find the Anticoagulation Therapy Visit)    Isela Hua RN

## 2018-05-18 NOTE — MR AVS SNAPSHOT
Shahid Villalta   5/18/2018   Anticoagulation Therapy Visit    Description:  78 year old male   Provider:  Gume Brown MD   Department:   Family Practice           INR as of 5/18/2018     Today's INR No new INR was available at the time of this encounter.      Anticoagulation Summary as of 5/18/2018     INR goal 2.0-3.0   Today's INR No new INR was available at the time of this encounter.   Full instructions 2.5 mg on Mon, Fri; 5 mg all other days   Next INR check 6/1/2018    Indications   Long-term (current) use of anticoagulants [Z79.01] [Z79.01]  Paroxysmal ventricular tachycardia (H) [I47.2]         May 2018 Details    Sun Mon Tue Wed Thu Fri Sat       1               2               3               4               5                 6               7               8               9               10               11               12                 13               14               15               16               17               18      2.5 mg   See details      19      5 mg           20      5 mg         21      2.5 mg         22      5 mg         23      5 mg         24      5 mg         25      2.5 mg         26      5 mg           27      5 mg         28      2.5 mg         29      5 mg         30      5 mg         31      5 mg            Date Details   05/18 This INR check               How to take your warfarin dose     To take:  2.5 mg Take 0.5 of a 5 mg tablet.    To take:  5 mg Take 1 of the 5 mg tablets.           June 2018 Details    Sun Mon Tue Wed Thu Fri Sat          1            2                 3               4               5               6               7               8               9                 10               11               12               13               14               15               16                 17               18               19               20               21               22               23                 24               25               26                27               28               29               30                Date Details   No additional details    Date of next INR:  6/1/2018         How to take your warfarin dose     To take:  2.5 mg Take 0.5 of a 5 mg tablet.

## 2018-05-23 ENCOUNTER — OFFICE VISIT (OUTPATIENT)
Dept: CARDIOLOGY | Facility: CLINIC | Age: 78
End: 2018-05-23
Attending: INTERNAL MEDICINE
Payer: COMMERCIAL

## 2018-05-23 VITALS
HEIGHT: 68 IN | SYSTOLIC BLOOD PRESSURE: 110 MMHG | DIASTOLIC BLOOD PRESSURE: 70 MMHG | HEART RATE: 72 BPM | WEIGHT: 245.7 LBS | BODY MASS INDEX: 37.24 KG/M2

## 2018-05-23 DIAGNOSIS — I47.29 PAROXYSMAL VENTRICULAR TACHYCARDIA (H): ICD-10-CM

## 2018-05-23 DIAGNOSIS — I35.0 NONRHEUMATIC AORTIC VALVE STENOSIS: ICD-10-CM

## 2018-05-23 DIAGNOSIS — Z79.01 LONG-TERM (CURRENT) USE OF ANTICOAGULANTS: ICD-10-CM

## 2018-05-23 DIAGNOSIS — E78.5 HYPERLIPIDEMIA LDL GOAL <100: ICD-10-CM

## 2018-05-23 DIAGNOSIS — I25.5 ISCHEMIC CARDIOMYOPATHY: ICD-10-CM

## 2018-05-23 DIAGNOSIS — I48.0 PAROXYSMAL ATRIAL FIBRILLATION (H): ICD-10-CM

## 2018-05-23 DIAGNOSIS — I35.1 NONRHEUMATIC AORTIC VALVE INSUFFICIENCY: ICD-10-CM

## 2018-05-23 DIAGNOSIS — Z95.810 AUTOMATIC IMPLANTABLE CARDIOVERTER-DEFIBRILLATOR IN SITU: ICD-10-CM

## 2018-05-23 DIAGNOSIS — I50.22 CHRONIC SYSTOLIC CONGESTIVE HEART FAILURE (H): Primary | ICD-10-CM

## 2018-05-23 PROCEDURE — 99215 OFFICE O/P EST HI 40 MIN: CPT | Performed by: INTERNAL MEDICINE

## 2018-05-23 NOTE — MR AVS SNAPSHOT
After Visit Summary   5/23/2018    Shahid Villalta    MRN: [id number]           Patient Information     Date Of Birth          1940        Visit Information        Provider Department      5/23/2018 10:45 AM Adiel Aden MD [facility]        Today's Diagnoses     Chronic systolic congestive heart failure (H)    -  1    Nonrheumatic aortic valve stenosis        Nonrheumatic aortic valve insufficiency        Paroxysmal atrial fibrillation (H)        Hyperlipidemia LDL goal <100        Ischemic cardiomyopathy        Paroxysmal ventricular tachycardia (H)        Long-term (current) use of anticoagulants [Z79.01]        Automatic implantable cardioverter-defibrillator in situ           Follow-ups after your visit        Additional Services     Follow-Up with Cardiologist                 Your next 10 appointments already scheduled     Jun 14, 2018  3:45 PM CDT   TAVR New with Catia Nation MD   [facility] ([facility])    [address]  [facility] [place] [facility] [address]   [phone] OPT 2            Jun 29, 2018  9:30 AM CDT   Return Sleep Patient with Nadir Ashraf MD   [facility] ([facility])    [address]  [place] [address]   [phone]            Sep 12, 2018  4:30 PM CDT   Remote ICD Check with STEWART DCR2   [facility] ([facility])    [address]  [facility] [place] [facility] [address]   [phone] OPT 2           This appointment is for a remote check of your debrillator.  This is not an appointment at the office.            Nov 09, 2018  9:00 AM CST   Return Visit with RH ONCOLOGY NURSE   [facility] ([facility])    [facility]  [address]  [place] [address]   [phone]            Nov 14, 2018   9:30 AM CST   Return Visit with Nahun Hliario MD   [facility] ([facility])    [facility]  [address]  [place] [id number] [phone]              Future tests that were ordered for you today     Open Future Orders        Priority Expected Expires Ordered    TSH Routine 11/23/2018 5/23/2019 5/23/2018    Basic metabolic panel Routine 11/19/2018 5/23/2019 5/23/2018    Follow-Up with Cardiologist Routine 11/19/2018 5/23/2019 5/23/2018    General PFT Lab (Please always keep checked) Routine 11/23/2018 5/23/2019 5/23/2018    Pulmonary Function Test Routine 11/23/2018 5/23/2019 5/23/2018            Who to contact     If you have questions or need follow up information about today's clinic visit or your schedule please contact [facility]   [facility] directly at [phone].  Normal or non-critical lab and imaging results will be communicated to you by StackSearchhart, letter or phone within 4 business days after the clinic has received the results. If you do not hear from us within 7 days, please contact the clinic through Mantis Visiont or phone. If you have a critical or abnormal lab result, we will notify you by phone as soon as possible.  Submit refill requests through Arrayit or call your pharmacy and they will forward the refill request to us. Please allow 3 business days for your refill to be completed.          Additional Information About Your Visit        StackSearchhariPharro Media Information     Arrayit gives you secure access to your electronic health record. If you see a primary care provider, you can also send messages to your care team and make appointments. If you have questions, please call your primary care clinic.  If you do not have a primary care provider, please call [phone] and they will assist you.        Care EveryWhere ID     This is your Care EveryWhere ID. This could be used by other organizations to  "access your [place] medical records  [id number]        Your Vitals Were     Pulse Height BMI (Body Mass Index)             72 1.727 m (5' 8\") 37.36 kg/m2          Blood Pressure from Last 3 Encounters:   05/23/18 110/70   05/09/18 102/64   11/17/17 118/79    Weight from Last 3 Encounters:   05/23/18 111.4 kg (245 lb 11.2 oz)   05/09/18 112 kg (247 lb)   11/17/17 113.3 kg (249 lb 12.8 oz)              We Performed the Following     Follow-Up with Cardiologist          Today's Medication Changes          These changes are accurate as of 5/23/18 11:07 AM.  If you have any questions, ask your nurse or doctor.               These medicines have changed or have updated prescriptions.        Dose/Directions    warfarin 5 MG tablet   Commonly known as:  COUMADIN   This may have changed:  additional instructions   Used for:  Long-term (current) use of anticoagulants, Paroxysmal ventricular tachycardia (H)        5 mg daily   Quantity:  90 tablet   Refills:  1                Primary Care Provider Office Phone # Fax #    Gume Brown [phone] [address] [id number]        Equal Access to Services     [place]UST[place] SHEARER AH: Hadii tone mcgregoro Soangelina, waaxda luqadaha, qaybta kaalmada adeegyada, emily garcia. So [facility] [phone].    ATENCIÓN: Si habla español, tiene a irving disposición servicios gratuitos de asistencia lingüística. Llame al [phone].    We comply with applicable federal civil rights laws and [place] laws. We do not discriminate on the basis of race, color, national origin, age, disability, sex, sexual orientation, or gender identity.            Thank you!     Thank you for choosing [facility]  for your care. Our goal is always to provide you with excellent care. Hearing back from our patients is one way we can continue to improve our services. Please take a few minutes to complete the written survey " that you may receive in the mail after your visit with us. Thank you!             Your Updated Medication List - Protect others around you: Learn how to safely use, store and throw away your medicines at www.disposemymeds.org.          This list is accurate as of 5/23/18 11:07 AM.  Always use your most recent med list.                   Brand Name Dispense Instructions for use Diagnosis    acetaminophen 325 MG tablet    TYLENOL     Take 1,300 mg by mouth 2 times daily        amiodarone 200 MG tablet    PACERONE/CODARONE    50 tablet    Take PO 1/2 tablet daily-Take extra prn per MD recommendations.    Paroxysmal atrial fibrillation (H)       ASPIRIN NOT PRESCRIBED    INTENTIONAL    0 each    1 each daily Antiplatelet medication not prescribed intentionally due to Current anticoagulant therapy (warfarin/enoxaparin)    Atrial fibrillation, unspecified       cetirizine 10 MG tablet    zyrTEC    90 tablet    Take 1 tablet (10 mg) by mouth daily    Allergic rhinitis       cholecalciferol 1000 UNIT tablet    vitamin D3    180 tablet    Take 2 tablets (2,000 Units) by mouth daily    Vitamin D deficiency, Parathyroid hormone excess (H)       digoxin 125 MCG tablet    LANOXIN    45 tablet    Take 1 tablet (125 mcg) by mouth every 48 hours    Chronic systolic heart failure (H)       lisinopril 2.5 MG tablet    PRINIVIL/Zestril    90 tablet    Take 1 tablet (2.5 mg) by mouth daily    Nonrheumatic aortic valve stenosis       metoprolol succinate 100 MG 24 hr tablet    TOPROL-XL    90 tablet    Take 1 tablet (100 mg) by mouth daily    Paroxysmal atrial fibrillation (H)       omeprazole 20 MG CR capsule    priLOSEC    90 capsule    Take 1 capsule (20 mg) by mouth daily    Gastroesophageal reflux disease without esophagitis       polyethylene glycol powder    MIRALAX/GLYCOLAX     Take 17 g by mouth daily as needed for constipation        potassium chloride 10 MEQ tablet    K-TAB,KLOR-CON    90 tablet    Take 1 tablet (10 mEq) by  mouth daily    Chronic systolic heart failure (H)       senna-docusate 8.6-50 MG per tablet    SENOKOT-S;PERICOLACE     Take 2 tablets by mouth daily        simethicone 80 MG chewable tablet    MYLICON    180 tablet    Take 1 tablet (80 mg) by mouth every 6 hours as needed for cramping    Abdominal bloating       simvastatin 20 MG tablet    ZOCOR    90 tablet    Take 1 tablet (20 mg) by mouth At Bedtime    Hyperlipidemia LDL goal <100       torsemide 20 MG tablet    DEMADEX    90 tablet    Take 1 tablet (20 mg) by mouth daily    Cardiomyopathy (H), Chronic systolic heart failure (H)       VIAGRA 25 MG tablet   Generic drug:  sildenafil      Take 25 mg by mouth as needed        warfarin 5 MG tablet    COUMADIN    90 tablet    5 mg daily    Long-term (current) use of anticoagulants, Paroxysmal ventricular tachycardia (H)

## 2018-05-23 NOTE — LETTER
5/23/2018    Gume Brown MD  [id number]  [facility] [address]    RE: Shahid DEE Villalta       Dear Colleague,    I had the pleasure of seeing Shahid Villalta in the [facility].    HPI and Plan:    I had the pleasure of seeing Shahid Villalta in Cardiology Clinic in followup, a 77-year-old male with history of moderate to severe aortic valve stenosis, mild cardiomyopathy, coronary artery disease, paroxysmal atrial fibrillation and tachycardia status post AV dario ablation and post-biventricular AICD placement, previous SVT ablation, returns for followup.  He is on amiodarone for management of atrial arrhythmias.  Today we discussed his PFTs, which have remained stable.  There is mild obstruction but normal gas transfer or normal DLCO.       He has had a good summer. He's had no worsening of his chest pain or shortness of breath. No orthopnea or PND. I reviewed the repeat echocardiogram that was done on 11/13/17. It shows again moderate severe aortic valve stenosis with a mean rate of 36 mm. On my review of the echo, the aortic regurgitation appears at least moderate to moderately severe. Ejection fraction was 45%.  LV was mildly dilated although it appears to be an off axis measurement and therefore overestimated.     His BMP shows stable creatinine 1.49. K was 4.6. TSH, ALT and AST were normal. Repeat PFT showed 91% DLCO and is stable.       PHYSICAL EXAMINATION:   VITAL SIGNS:  Blood pressure is 118/79, pulse 60 per minute and regular.   CARDIAC:  A 3/6 ejection systolic murmur in the aortic area with a soft A2 component of the second heart sound. 2 x 6 early diastolic murmur was noted.  CHEST:  Clear to auscultation.       IMPRESSION:   1.  Aortic stenosis and aortic regurgitation consistent with mixed aortic valve disease                   .  Again, the patient has near severe aortic valve stenosis but no worsening symptoms.  His echocardiograms have been stable over the last 1-1/2  to 2 years except for some worsening of the aortic regurgitation. I think he is getting close to a point where we might have to consider transfemoral aortic valve replacement. I discussed that with him. Although they'll be measurement was off axis, increased LV size is somewhat concerning. I offered him a visit to TAVR in the next month or so but he wants to go to [place] and come back in March or April and then will visit with me with a repeat echocardiogram as well as be seen in TAVR clinic.  If there is worsening shortness of breath or worsening edema in the interim, he'll call us and come back from [place] earlier.     2.  Mild cardiomyopathy, stable EF 40%-50%, no overt congestive heart failure.      3.  Paroxysmal atrial tachycardia and fibrillation, stable and in sinus rhythm on amiodarone 100 mg daily.  His PFTs and chest x-rays are stable.   we will repeat TSH in 6 months.     4.  Chronic renal insufficiency, stable.      5.  Hypertension, well controlled on lisinopril and metoprolol.      6.  Status post BiV AICD.  Continue followup in the Device Clinic.         Thank you for allowing us to part spelled in the care of this nice patient. I spent a long time discussing the implications of worsening aortic valve disease, transfemoral aortic valve replacement, reviewed his echo from last week and in May. At this time, he prefers to go to [place] and come back in March or April to see me as well as the aortic stenosis intervention clinic.      cc:   Gume Brown MD    [facility]    [address] [place]  [address]           KHAI WAGNER MD      No orders of the defined types were placed in this encounter.      No orders of the defined types were placed in this encounter.      There are no discontinued medications.      Encounter Diagnoses   Name Primary?     Nonrheumatic aortic valve stenosis      Nonrheumatic aortic valve insufficiency      Paroxysmal atrial fibrillation (H)       Hyperlipidemia LDL goal <100      Ischemic cardiomyopathy      Paroxysmal ventricular tachycardia (H)      Chronic systolic congestive heart failure (H) Yes     Long-term (current) use of anticoagulants [Z79.01]      Automatic implantable cardioverter-defibrillator in situ        CURRENT MEDICATIONS:  Current Outpatient Prescriptions   Medication Sig Dispense Refill     acetaminophen (TYLENOL) 325 MG tablet Take 1,300 mg by mouth 2 times daily        amiodarone (PACERONE/CODARONE) 200 MG tablet Take PO 1/2 tablet daily-Take extra prn per MD recommendations. 50 tablet 3     cetirizine (ZYRTEC) 10 MG tablet Take 1 tablet (10 mg) by mouth daily 90 tablet 1     cholecalciferol (VITAMIN D) 1000 UNIT tablet Take 2 tablets (2,000 Units) by mouth daily 180 tablet 1     digoxin (LANOXIN) 125 MCG tablet Take 1 tablet (125 mcg) by mouth every 48 hours 45 tablet 0     lisinopril (PRINIVIL/ZESTRIL) 2.5 MG tablet Take 1 tablet (2.5 mg) by mouth daily 90 tablet 0     metoprolol (TOPROL-XL) 100 MG 24 hr tablet Take 1 tablet (100 mg) by mouth daily 90 tablet 3     omeprazole (PRILOSEC) 20 MG CR capsule Take 1 capsule (20 mg) by mouth daily 90 capsule 3     polyethylene glycol (MIRALAX/GLYCOLAX) powder Take 17 g by mouth daily as needed for constipation       potassium chloride (K-TAB,KLOR-CON) 10 MEQ tablet Take 1 tablet (10 mEq) by mouth daily 90 tablet 3     senna-docusate (SENOKOT-S;PERICOLACE) 8.6-50 MG per tablet Take 2 tablets by mouth daily        sildenafil (VIAGRA) 25 MG tablet Take 25 mg by mouth as needed       simethicone (MYLICON) 80 MG chewable tablet Take 1 tablet (80 mg) by mouth every 6 hours as needed for cramping 180 tablet      simvastatin (ZOCOR) 20 MG tablet Take 1 tablet (20 mg) by mouth At Bedtime 90 tablet 3     torsemide (DEMADEX) 20 MG tablet Take 1 tablet (20 mg) by mouth daily 90 tablet 3     warfarin (COUMADIN) 5 MG tablet 5 mg daily (Patient taking differently: 5 mg 5 days week, 2.5 mg 2 days week) 90  tablet 1     ASPIRIN NOT PRESCRIBED, INTENTIONAL, 1 each daily Antiplatelet medication not prescribed intentionally due to Current anticoagulant therapy (warfarin/enoxaparin) (Patient not taking: Reported on 2018) 0 each 0       ALLERGIES     Allergies   Allergen Reactions     Latex Rash     Seasonal Allergies      hayfever - wheezing -        PAST MEDICAL HISTORY:  Past Medical History:   Diagnosis Date     Aortic valve disorders      Aortic valve disorders      Arrhythmia      Atrial fibrillation (H)      Atrial fibrillation (H)      Automatic implantable cardiac defibrillator in situ      Cancer (H)      Congestive heart failure (H)      Coronary artery disease      Essential hypertension, benign      GERD (gastroesophageal reflux disease) 3/16/2010     Heart failure (H)      History of blood transfusion      Hyperlipidaemia      Hypertension      Obese      Other and unspecified hyperlipidemia      Other primary cardiomyopathies      Other primary cardiomyopathies      Pacemaker      Sleep apnea      Small bowel obstruction 2015     Ventricular tachycardia (H) 2013       PAST SURGICAL HISTORY:  Past Surgical History:   Procedure Laterality Date     BIOPSY  annually    prostate biopsy     CARDIAC SURGERY  2012    A fib Ablation     CARDIAC SURGERY      cardioversion     COLONOSCOPY       DAVINCI PROSTATECTOMY N/A 2015    Procedure: DAVINCI PROSTATECTOMY;  Surgeon: Nahun Hilario MD;  Location: [facility] OR     GENITOURINARY SURGERY      bladder surgery      H ABLATION AV NODE  13     H ABLATION FOCAL AFIB  13     HC TOOTH EXTRACTION W/FORCEP       TONSILLECTOMY & ADENOIDECTOMY         FAMILY HISTORY:  Family History   Problem Relation Age of Onset     C.A.D. Father      CHF  at 74     CEREBROVASCULAR DISEASE Father      C.A.D. Mother      CHF at 91 still living     CEREBROVASCULAR DISEASE Mother      TIAs     Breast Cancer Mother      HEART DISEASE Son       "arrythmia       SOCIAL HISTORY:  Social History     Social History     Marital status:      Spouse name: N/A     Number of children: 2     Years of education: N/A     Occupational History      Retired     Social History Main Topics     Smoking status: Never Smoker     Smokeless tobacco: Never Used     Alcohol use No      Comment: AA since 1975     Drug use: No     Sexual activity: Not Currently     Partners: Female     Birth control/ protection: Abstinence     Other Topics Concern     Parent/Sibling W/ Cabg, Mi Or Angioplasty Before 65f 55m? No     Caffeine Concern Yes     2-3 cups caffeine per day     Sleep Concern Yes     sleep apnea, wears cpap at night     Stress Concern No     Weight Concern No     weight fluctuates     Special Diet No     Exercise Yes     walking daily     Seat Belt Yes     Social History Narrative       Review of Systems:  Skin:  Negative       Eyes:  Positive for glasses    ENT:  Positive for hearing loss wears hearing aids  Respiratory:  Positive for sleep apnea;CPAP ESCOBAR in hot weather, and carrying items   Cardiovascular:  Negative;palpitations;chest pain;lightheadedness;syncope or near-syncope;cyanosis;fatigue Positive for;edema    Gastroenterology: Positive for heartburn    Genitourinary:         Musculoskeletal:  Positive for joint pain    Neurologic:  Negative      Psychiatric:  Negative      Heme/Lymph/Imm:  Positive for allergies    Endocrine:  Negative        Physical Exam:  Vitals: /70 (BP Location: Left arm, Cuff Size: Adult Large)  Pulse 72  Ht 1.727 m (5' 8\")  Wt 111.4 kg (245 lb 11.2 oz)  BMI 37.36 kg/m2    Constitutional:    overweight      Skin:  warm and dry to the touch          Head:  normocephalic        Eyes:  pupils equal and round        Lymph:      ENT:  no pallor or cyanosis        Neck:  carotid pulses are full and equal bilaterally;JVP normal        Respiratory:  clear to auscultation         Cardiac: normal S1 and S2   soft or inaudible A2   " systolic ejection murmur;crescendo;grade 3;radiation to the carotid   diastolic murmur;early diastolic murmur;grade 2;RUSB    not assessed this visit                                        GI:  abdomen soft;BS normoactive        Extremities and Muscular Skeletal:      trace;bilateral LE edema          Neurological:  no gross motor deficits        Psych:  Alert and Oriented x 3        CC  Adiel Aden MD  [address]  [place] [address]                HPI and Plan:    I had the pleasure of seeing Shahid Villalta in Cardiology Clinic in followup, a 78-year-old male with history of near severe aortic valve stenosis, mild cardiomyopathy, coronary artery disease, paroxysmal atrial fibrillation and tachycardia status post AV dario ablation and post-biventricular AICD placement, previous SVT ablation, returns for followup.  He is on amiodarone for management of atrial arrhythmias.  He is here for a six-month follow but although is complaining of some increasing shortness of breath compared to last year this time.  There is no chest pain.  No orthopnea or PND.    I reviewed and discussed her echocardiogram results done recently.  It shows decrease in ejection fraction to 35% to 40% compared to 45%.  There is again moderate to severe or near severe aortic stenosis.  The aortic valve area index is 0.57 cm .  The mean gradient is 36 although the aortic regurgitation appears worse compared to prior study.  It is now 3+.  However, he has to list the predominant lesion in him.  His BMP was reviewed which shows a baseline creatinine 1.49 unchanged from before.  His TSH was normal.      PHYSICAL EXAMINATION:   See below      IMPRESSION:   1.  Near severe aortic stenosis based on base of aortic valve area index of less than 0.6 cm  by meter square and moderately severe aortic valve regurgitation    Patient is now more symptomatic than last year.  Given these findings, I will ask him to proceed with TAVR workup and see  Dr. Nation in the TAVR clinic.    The decrease in EF is also somewhat concerning and therefore I think we should consider valve replacement this time if agreeable by the structural heart team.  He understands that he will need a CT [place] as well as coronary angiography prior to that.  Given his abnormal creatinine, there is some risk of dye-induced nephropathy and they might have to use a hydration as well as low-dose protocol.           2.  Mild to moderate ischemic cardiomyopathy, stable now 35-40%.  Thus he has chronic systolic dysfunction without overt congestive heart failure.  He will need coronary angiography to assess for any progression of CAD.      3.  Paroxysmal atrial tachycardia and fibrillation, stable and in sinus rhythm on amiodarone 100 mg daily.  He will need PFTs in 6 months.  we will repeat TSH in 6 months.     4.  Chronic renal insufficiency, stable.      5.  Hypertension, well controlled on lisinopril and metoprolol.      6.  Status post BiV AICD.  Continue followup in the Device Clinic.         Thank you for allowing us to part spelled in the care of this nice patient with multiple complex issues. I spent a long time discussing the implications of worsening aortic valve disease, transfemoral aortic valve replacement, reviewed his echo from last week and the pre-valve replacement workup that is going to happen.  I would like to see him back in 6 months and I anticipate his valve will be replaced by then.  We will do PFTs, TSH and BMP in 6 months when I see him.    Sincerely,      KHAI WAGNER MD    cc:   Gume Brown MD    [address]  [address]               Orders Placed This Encounter   Procedures     Basic metabolic panel     TSH     Follow-Up with Cardiologist     General PFT Lab (Please always keep checked)     Pulmonary Function Test       No orders of the defined types were placed in this encounter.      There are no discontinued  medications.      Encounter Diagnoses   Name Primary?     Nonrheumatic aortic valve stenosis      Nonrheumatic aortic valve insufficiency      Paroxysmal atrial fibrillation (H)      Hyperlipidemia LDL goal <100      Ischemic cardiomyopathy      Paroxysmal ventricular tachycardia (H)      Chronic systolic congestive heart failure (H) Yes     Long-term (current) use of anticoagulants [Z79.01]      Automatic implantable cardioverter-defibrillator in situ        CURRENT MEDICATIONS:  Current Outpatient Prescriptions   Medication Sig Dispense Refill     acetaminophen (TYLENOL) 325 MG tablet Take 1,300 mg by mouth 2 times daily        amiodarone (PACERONE/CODARONE) 200 MG tablet Take PO 1/2 tablet daily-Take extra prn per MD recommendations. 50 tablet 3     cetirizine (ZYRTEC) 10 MG tablet Take 1 tablet (10 mg) by mouth daily 90 tablet 1     cholecalciferol (VITAMIN D) 1000 UNIT tablet Take 2 tablets (2,000 Units) by mouth daily 180 tablet 1     digoxin (LANOXIN) 125 MCG tablet Take 1 tablet (125 mcg) by mouth every 48 hours 45 tablet 0     lisinopril (PRINIVIL/ZESTRIL) 2.5 MG tablet Take 1 tablet (2.5 mg) by mouth daily 90 tablet 0     metoprolol (TOPROL-XL) 100 MG 24 hr tablet Take 1 tablet (100 mg) by mouth daily 90 tablet 3     omeprazole (PRILOSEC) 20 MG CR capsule Take 1 capsule (20 mg) by mouth daily 90 capsule 3     polyethylene glycol (MIRALAX/GLYCOLAX) powder Take 17 g by mouth daily as needed for constipation       potassium chloride (K-TAB,KLOR-CON) 10 MEQ tablet Take 1 tablet (10 mEq) by mouth daily 90 tablet 3     senna-docusate (SENOKOT-S;PERICOLACE) 8.6-50 MG per tablet Take 2 tablets by mouth daily        sildenafil (VIAGRA) 25 MG tablet Take 25 mg by mouth as needed       simethicone (MYLICON) 80 MG chewable tablet Take 1 tablet (80 mg) by mouth every 6 hours as needed for cramping 180 tablet      simvastatin (ZOCOR) 20 MG tablet Take 1 tablet (20 mg) by mouth At Bedtime 90 tablet 3     torsemide  (DEMADEX) 20 MG tablet Take 1 tablet (20 mg) by mouth daily 90 tablet 3     warfarin (COUMADIN) 5 MG tablet 5 mg daily (Patient taking differently: 5 mg 5 days week, 2.5 mg 2 days week) 90 tablet 1     ASPIRIN NOT PRESCRIBED, INTENTIONAL, 1 each daily Antiplatelet medication not prescribed intentionally due to Current anticoagulant therapy (warfarin/enoxaparin) (Patient not taking: Reported on 2018) 0 each 0       ALLERGIES     Allergies   Allergen Reactions     Latex Rash     Seasonal Allergies      hayfever - wheezing -        PAST MEDICAL HISTORY:  Past Medical History:   Diagnosis Date     Aortic valve disorders      Aortic valve disorders      Arrhythmia      Atrial fibrillation (H)      Atrial fibrillation (H)      Automatic implantable cardiac defibrillator in situ      Cancer (H)      Congestive heart failure (H)      Coronary artery disease      Essential hypertension, benign      GERD (gastroesophageal reflux disease) 3/16/2010     Heart failure (H)      History of blood transfusion      Hyperlipidaemia      Hypertension      Obese      Other and unspecified hyperlipidemia      Other primary cardiomyopathies      Other primary cardiomyopathies      Pacemaker      Sleep apnea      Small bowel obstruction 2015     Ventricular tachycardia (H) 2013       PAST SURGICAL HISTORY:  Past Surgical History:   Procedure Laterality Date     BIOPSY  annually    prostate biopsy     CARDIAC SURGERY  2012    A fib Ablation     CARDIAC SURGERY      cardioversion     COLONOSCOPY       DAVINCI PROSTATECTOMY N/A 2015    Procedure: DAVINCI PROSTATECTOMY;  Surgeon: Nahun Hilario MD;  Location: [facility] OR     GENITOURINARY SURGERY      bladder surgery      H ABLATION AV NODE  13     H ABLATION FOCAL AFIB  13     HC TOOTH EXTRACTION W/FORCEP       TONSILLECTOMY & ADENOIDECTOMY         FAMILY HISTORY:  Family History   Problem Relation Age of Onset     C.A.D. Father      CHF  at 74  "    CEREBROVASCULAR DISEASE Father      C.A.D. Mother      CHF at 91 still living     CEREBROVASCULAR DISEASE Mother      TIAs     Breast Cancer Mother      HEART DISEASE Son      arrythmia       SOCIAL HISTORY:  Social History     Social History     Marital status:      Spouse name: N/A     Number of children: 2     Years of education: N/A     Occupational History      Retired     Social History Main Topics     Smoking status: Never Smoker     Smokeless tobacco: Never Used     Alcohol use No      Comment: AA since 1975     Drug use: No     Sexual activity: Not Currently     Partners: Female     Birth control/ protection: Abstinence     Other Topics Concern     Parent/Sibling W/ Cabg, Mi Or Angioplasty Before 65f 55m? No     Caffeine Concern Yes     2-3 cups caffeine per day     Sleep Concern Yes     sleep apnea, wears cpap at night     Stress Concern No     Weight Concern No     weight fluctuates     Special Diet No     Exercise Yes     walking daily     Seat Belt Yes     Social History Narrative       Review of Systems:  Skin:  Negative       Eyes:  Positive for glasses    ENT:  Positive for hearing loss wears hearing aids  Respiratory:  Positive for sleep apnea;CPAP ESCOBAR in hot weather, and carrying items   Cardiovascular:  Negative;palpitations;chest pain;lightheadedness;syncope or near-syncope;cyanosis;fatigue Positive for;edema    Gastroenterology: Positive for heartburn    Genitourinary:         Musculoskeletal:  Positive for joint pain    Neurologic:  Negative      Psychiatric:  Negative      Heme/Lymph/Imm:  Positive for allergies    Endocrine:  Negative        Physical Exam:  Vitals: /70 (BP Location: Left arm, Cuff Size: Adult Large)  Pulse 72  Ht 1.727 m (5' 8\")  Wt 111.4 kg (245 lb 11.2 oz)  BMI 37.36 kg/m2    Constitutional:    overweight      Skin:  warm and dry to the touch          Head:  normocephalic        Eyes:  pupils equal and round        Lymph:      ENT:  no pallor or " cyanosis        Neck:  carotid pulses are full and equal bilaterally;JVP normal        Respiratory:  clear to auscultation         Cardiac: normal S1 and S2   , soft A2   systolic ejection murmur;crescendo;grade 3;radiation to the carotid   diastolic murmur;early diastolic murmur;grade 2;RUSB    not assessed this visit                                        GI:  abdomen soft;BS normoactive        Extremities and Muscular Skeletal:      trace;bilateral LE edema          Neurological:  no gross motor deficits        Psych:  Alert and Oriented x 3        CC  Adiel Aden MD  [address]  [address]  [place] [address]                Thank you for allowing me to participate in the care of your patient.      Sincerely,     Adiel Aden MD     [facility]    cc:   Adiel Aden MD  [address]  [address]  [place] [address]

## 2018-05-23 NOTE — LETTER
5/23/2018    Gume Brown MD  [id number]  [facility] [address]    RE: Shahid DEE Villalta       Dear Colleague,    I had the pleasure of seeing Shahid Villalta in the [facility].    HPI and Plan:    I had the pleasure of seeing Shahid Villalta in Cardiology Clinic in followup, a 77-year-old male with history of moderate to severe aortic valve stenosis, mild cardiomyopathy, coronary artery disease, paroxysmal atrial fibrillation and tachycardia status post AV dario ablation and post-biventricular AICD placement, previous SVT ablation, returns for followup.  He is on amiodarone for management of atrial arrhythmias.  Today we discussed his PFTs, which have remained stable.  There is mild obstruction but normal gas transfer or normal DLCO.       He has had a good summer. He's had no worsening of his chest pain or shortness of breath. No orthopnea or PND. I reviewed the repeat echocardiogram that was done on 11/13/17. It shows again moderate severe aortic valve stenosis with a mean rate of 36 mm. On my review of the echo, the aortic regurgitation appears at least moderate to moderately severe. Ejection fraction was 45%.  LV was mildly dilated although it appears to be an off axis measurement and therefore overestimated.     His BMP shows stable creatinine 1.49. K was 4.6. TSH, ALT and AST were normal. Repeat PFT showed 91% DLCO and is stable.       PHYSICAL EXAMINATION:   VITAL SIGNS:  Blood pressure is 118/79, pulse 60 per minute and regular.   CARDIAC:  A 3/6 ejection systolic murmur in the aortic area with a soft A2 component of the second heart sound. 2 x 6 early diastolic murmur was noted.  CHEST:  Clear to auscultation.       IMPRESSION:   1.  Aortic stenosis and aortic regurgitation consistent with mixed aortic valve disease                   .  Again, the patient has near severe aortic valve stenosis but no worsening symptoms.  His echocardiograms have been stable over the last 1-1/2  to 2 years except for some worsening of the aortic regurgitation. I think he is getting close to a point where we might have to consider transfemoral aortic valve replacement. I discussed that with him. Although they'll be measurement was off axis, increased LV size is somewhat concerning. I offered him a visit to TAVR in the next month or so but he wants to go to [place] and come back in March or April and then will visit with me with a repeat echocardiogram as well as be seen in TAVR clinic.  If there is worsening shortness of breath or worsening edema in the interim, he'll call us and come back from [place] earlier.     2.  Mild cardiomyopathy, stable EF 40%-50%, no overt congestive heart failure.      3.  Paroxysmal atrial tachycardia and fibrillation, stable and in sinus rhythm on amiodarone 100 mg daily.  His PFTs and chest x-rays are stable.   we will repeat TSH in 6 months.     4.  Chronic renal insufficiency, stable.      5.  Hypertension, well controlled on lisinopril and metoprolol.      6.  Status post BiV AICD.  Continue followup in the Device Clinic.         Thank you for allowing us to part spelled in the care of this nice patient. I spent a long time discussing the implications of worsening aortic valve disease, transfemoral aortic valve replacement, reviewed his echo from last week and in May. At this time, he prefers to go to [place] and come back in March or April to see me as well as the aortic stenosis intervention clinic.      cc:   Gume Brown MD    [facility]    [address] [place]  [address]           KHAI WAGNER MD      No orders of the defined types were placed in this encounter.      No orders of the defined types were placed in this encounter.      There are no discontinued medications.      Encounter Diagnoses   Name Primary?     Nonrheumatic aortic valve stenosis      Nonrheumatic aortic valve insufficiency      Paroxysmal atrial fibrillation (H)       Hyperlipidemia LDL goal <100      Ischemic cardiomyopathy      Paroxysmal ventricular tachycardia (H)      Chronic systolic congestive heart failure (H) Yes     Long-term (current) use of anticoagulants [Z79.01]      Automatic implantable cardioverter-defibrillator in situ        CURRENT MEDICATIONS:  Current Outpatient Prescriptions   Medication Sig Dispense Refill     acetaminophen (TYLENOL) 325 MG tablet Take 1,300 mg by mouth 2 times daily        amiodarone (PACERONE/CODARONE) 200 MG tablet Take PO 1/2 tablet daily-Take extra prn per MD recommendations. 50 tablet 3     cetirizine (ZYRTEC) 10 MG tablet Take 1 tablet (10 mg) by mouth daily 90 tablet 1     cholecalciferol (VITAMIN D) 1000 UNIT tablet Take 2 tablets (2,000 Units) by mouth daily 180 tablet 1     digoxin (LANOXIN) 125 MCG tablet Take 1 tablet (125 mcg) by mouth every 48 hours 45 tablet 0     lisinopril (PRINIVIL/ZESTRIL) 2.5 MG tablet Take 1 tablet (2.5 mg) by mouth daily 90 tablet 0     metoprolol (TOPROL-XL) 100 MG 24 hr tablet Take 1 tablet (100 mg) by mouth daily 90 tablet 3     omeprazole (PRILOSEC) 20 MG CR capsule Take 1 capsule (20 mg) by mouth daily 90 capsule 3     polyethylene glycol (MIRALAX/GLYCOLAX) powder Take 17 g by mouth daily as needed for constipation       potassium chloride (K-TAB,KLOR-CON) 10 MEQ tablet Take 1 tablet (10 mEq) by mouth daily 90 tablet 3     senna-docusate (SENOKOT-S;PERICOLACE) 8.6-50 MG per tablet Take 2 tablets by mouth daily        sildenafil (VIAGRA) 25 MG tablet Take 25 mg by mouth as needed       simethicone (MYLICON) 80 MG chewable tablet Take 1 tablet (80 mg) by mouth every 6 hours as needed for cramping 180 tablet      simvastatin (ZOCOR) 20 MG tablet Take 1 tablet (20 mg) by mouth At Bedtime 90 tablet 3     torsemide (DEMADEX) 20 MG tablet Take 1 tablet (20 mg) by mouth daily 90 tablet 3     warfarin (COUMADIN) 5 MG tablet 5 mg daily (Patient taking differently: 5 mg 5 days week, 2.5 mg 2 days week) 90  tablet 1     ASPIRIN NOT PRESCRIBED, INTENTIONAL, 1 each daily Antiplatelet medication not prescribed intentionally due to Current anticoagulant therapy (warfarin/enoxaparin) (Patient not taking: Reported on 2018) 0 each 0       ALLERGIES     Allergies   Allergen Reactions     Latex Rash     Seasonal Allergies      hayfever - wheezing -        PAST MEDICAL HISTORY:  Past Medical History:   Diagnosis Date     Aortic valve disorders      Aortic valve disorders      Arrhythmia      Atrial fibrillation (H)      Atrial fibrillation (H)      Automatic implantable cardiac defibrillator in situ      Cancer (H)      Congestive heart failure (H)      Coronary artery disease      Essential hypertension, benign      GERD (gastroesophageal reflux disease) 3/16/2010     Heart failure (H)      History of blood transfusion      Hyperlipidaemia      Hypertension      Obese      Other and unspecified hyperlipidemia      Other primary cardiomyopathies      Other primary cardiomyopathies      Pacemaker      Sleep apnea      Small bowel obstruction 2015     Ventricular tachycardia (H) 2013       PAST SURGICAL HISTORY:  Past Surgical History:   Procedure Laterality Date     BIOPSY  annually    prostate biopsy     CARDIAC SURGERY  2012    A fib Ablation     CARDIAC SURGERY      cardioversion     COLONOSCOPY       DAVINCI PROSTATECTOMY N/A 2015    Procedure: DAVINCI PROSTATECTOMY;  Surgeon: Nahun Hilario MD;  Location: [facility] OR     GENITOURINARY SURGERY      bladder surgery      H ABLATION AV NODE  13     H ABLATION FOCAL AFIB  13     HC TOOTH EXTRACTION W/FORCEP       TONSILLECTOMY & ADENOIDECTOMY         FAMILY HISTORY:  Family History   Problem Relation Age of Onset     C.A.D. Father      CHF  at 74     CEREBROVASCULAR DISEASE Father      C.A.D. Mother      CHF at 91 still living     CEREBROVASCULAR DISEASE Mother      TIAs     Breast Cancer Mother      HEART DISEASE Son       "arrythmia       SOCIAL HISTORY:  Social History     Social History     Marital status:      Spouse name: N/A     Number of children: 2     Years of education: N/A     Occupational History      Retired     Social History Main Topics     Smoking status: Never Smoker     Smokeless tobacco: Never Used     Alcohol use No      Comment: AA since 1975     Drug use: No     Sexual activity: Not Currently     Partners: Female     Birth control/ protection: Abstinence     Other Topics Concern     Parent/Sibling W/ Cabg, Mi Or Angioplasty Before 65f 55m? No     Caffeine Concern Yes     2-3 cups caffeine per day     Sleep Concern Yes     sleep apnea, wears cpap at night     Stress Concern No     Weight Concern No     weight fluctuates     Special Diet No     Exercise Yes     walking daily     Seat Belt Yes     Social History Narrative       Review of Systems:  Skin:  Negative       Eyes:  Positive for glasses    ENT:  Positive for hearing loss wears hearing aids  Respiratory:  Positive for sleep apnea;CPAP ESCOBAR in hot weather, and carrying items   Cardiovascular:  Negative;palpitations;chest pain;lightheadedness;syncope or near-syncope;cyanosis;fatigue Positive for;edema    Gastroenterology: Positive for heartburn    Genitourinary:         Musculoskeletal:  Positive for joint pain    Neurologic:  Negative      Psychiatric:  Negative      Heme/Lymph/Imm:  Positive for allergies    Endocrine:  Negative        Physical Exam:  Vitals: /70 (BP Location: Left arm, Cuff Size: Adult Large)  Pulse 72  Ht 1.727 m (5' 8\")  Wt 111.4 kg (245 lb 11.2 oz)  BMI 37.36 kg/m2    Constitutional:    overweight      Skin:  warm and dry to the touch          Head:  normocephalic        Eyes:  pupils equal and round        Lymph:      ENT:  no pallor or cyanosis        Neck:  carotid pulses are full and equal bilaterally;JVP normal        Respiratory:  clear to auscultation         Cardiac: normal S1 and S2   soft or inaudible A2   " systolic ejection murmur;crescendo;grade 3;radiation to the carotid   diastolic murmur;early diastolic murmur;grade 2;RUSB    not assessed this visit                                        GI:  abdomen soft;BS normoactive        Extremities and Muscular Skeletal:      trace;bilateral LE edema          Neurological:  no gross motor deficits        Psych:  Alert and Oriented x 3        CC  Adiel Aden MD  [address]  [address]  [place] [address]                HPI and Plan:    I had the pleasure of seeing Shahid Villalta in Cardiology Clinic in followup, a 78-year-old male with history of near severe aortic valve stenosis, mild cardiomyopathy, coronary artery disease, paroxysmal atrial fibrillation and tachycardia status post AV dario ablation and post-biventricular AICD placement, previous SVT ablation, returns for followup.  He is on amiodarone for management of atrial arrhythmias.  He is here for a six-month follow but although is complaining of some increasing shortness of breath compared to last year this time.  There is no chest pain.  No orthopnea or PND.    I reviewed and discussed her echocardiogram results done recently.  It shows decrease in ejection fraction to 35% to 40% compared to 45%.  There is again moderate to severe or near severe aortic stenosis.  The aortic valve area index is 0.57 cm .  The mean gradient is 36 although the aortic regurgitation appears worse compared to prior study.  It is now 3+.  However, he has to list the predominant lesion in him.  His BMP was reviewed which shows a baseline creatinine 1.49 unchanged from before.  His TSH was normal.      PHYSICAL EXAMINATION:   See below      IMPRESSION:   1.  Near severe aortic stenosis based on base of aortic valve area index of less than 0.6 cm  by meter square and moderately severe aortic valve regurgitation    Patient is now more symptomatic than last year.  Given these findings, I will ask him to proceed with TAVR workup and see  Dr. Nation in the TAVR clinic.    The decrease in EF is also somewhat concerning and therefore I think we should consider valve replacement this time if agreeable by the structural heart team.  He understands that he will need a CT [place] as well as coronary angiography prior to that.  Given his abnormal creatinine, there is some risk of dye-induced nephropathy and they might have to use a hydration as well as low-dose protocol.           2.  Mild to moderate ischemic cardiomyopathy, stable now 35-40%.  Thus he has chronic systolic dysfunction without overt congestive heart failure.  He will need coronary angiography to assess for any progression of CAD.      3.  Paroxysmal atrial tachycardia and fibrillation, stable and in sinus rhythm on amiodarone 100 mg daily.  He will need PFTs in 6 months.  we will repeat TSH in 6 months.     4.  Chronic renal insufficiency, stable.      5.  Hypertension, well controlled on lisinopril and metoprolol.      6.  Status post BiV AICD.  Continue followup in the Device Clinic.         Thank you for allowing us to part spelled in the care of this nice patient with multiple complex issues. I spent a long time discussing the implications of worsening aortic valve disease, transfemoral aortic valve replacement, reviewed his echo from last week and the pre-valve replacement workup that is going to happen.  I would like to see him back in 6 months and I anticipate his valve will be replaced by then.  We will do PFTs, TSH and BMP in 6 months when I see him.    Sincerely,      KHAI WAGNER MD    cc:   Gume Brown MD    [address]  [address]               Orders Placed This Encounter   Procedures     Basic metabolic panel     TSH     Follow-Up with Cardiologist     General PFT Lab (Please always keep checked)     Pulmonary Function Test       No orders of the defined types were placed in this encounter.      There are no discontinued  medications.      Encounter Diagnoses   Name Primary?     Nonrheumatic aortic valve stenosis      Nonrheumatic aortic valve insufficiency      Paroxysmal atrial fibrillation (H)      Hyperlipidemia LDL goal <100      Ischemic cardiomyopathy      Paroxysmal ventricular tachycardia (H)      Chronic systolic congestive heart failure (H) Yes     Long-term (current) use of anticoagulants [Z79.01]      Automatic implantable cardioverter-defibrillator in situ        CURRENT MEDICATIONS:  Current Outpatient Prescriptions   Medication Sig Dispense Refill     acetaminophen (TYLENOL) 325 MG tablet Take 1,300 mg by mouth 2 times daily        amiodarone (PACERONE/CODARONE) 200 MG tablet Take PO 1/2 tablet daily-Take extra prn per MD recommendations. 50 tablet 3     cetirizine (ZYRTEC) 10 MG tablet Take 1 tablet (10 mg) by mouth daily 90 tablet 1     cholecalciferol (VITAMIN D) 1000 UNIT tablet Take 2 tablets (2,000 Units) by mouth daily 180 tablet 1     digoxin (LANOXIN) 125 MCG tablet Take 1 tablet (125 mcg) by mouth every 48 hours 45 tablet 0     lisinopril (PRINIVIL/ZESTRIL) 2.5 MG tablet Take 1 tablet (2.5 mg) by mouth daily 90 tablet 0     metoprolol (TOPROL-XL) 100 MG 24 hr tablet Take 1 tablet (100 mg) by mouth daily 90 tablet 3     omeprazole (PRILOSEC) 20 MG CR capsule Take 1 capsule (20 mg) by mouth daily 90 capsule 3     polyethylene glycol (MIRALAX/GLYCOLAX) powder Take 17 g by mouth daily as needed for constipation       potassium chloride (K-TAB,KLOR-CON) 10 MEQ tablet Take 1 tablet (10 mEq) by mouth daily 90 tablet 3     senna-docusate (SENOKOT-S;PERICOLACE) 8.6-50 MG per tablet Take 2 tablets by mouth daily        sildenafil (VIAGRA) 25 MG tablet Take 25 mg by mouth as needed       simethicone (MYLICON) 80 MG chewable tablet Take 1 tablet (80 mg) by mouth every 6 hours as needed for cramping 180 tablet      simvastatin (ZOCOR) 20 MG tablet Take 1 tablet (20 mg) by mouth At Bedtime 90 tablet 3     torsemide  (DEMADEX) 20 MG tablet Take 1 tablet (20 mg) by mouth daily 90 tablet 3     warfarin (COUMADIN) 5 MG tablet 5 mg daily (Patient taking differently: 5 mg 5 days week, 2.5 mg 2 days week) 90 tablet 1     ASPIRIN NOT PRESCRIBED, INTENTIONAL, 1 each daily Antiplatelet medication not prescribed intentionally due to Current anticoagulant therapy (warfarin/enoxaparin) (Patient not taking: Reported on 2018) 0 each 0       ALLERGIES     Allergies   Allergen Reactions     Latex Rash     Seasonal Allergies      hayfever - wheezing -        PAST MEDICAL HISTORY:  Past Medical History:   Diagnosis Date     Aortic valve disorders      Aortic valve disorders      Arrhythmia      Atrial fibrillation (H)      Atrial fibrillation (H)      Automatic implantable cardiac defibrillator in situ      Cancer (H)      Congestive heart failure (H)      Coronary artery disease      Essential hypertension, benign      GERD (gastroesophageal reflux disease) 3/16/2010     Heart failure (H)      History of blood transfusion      Hyperlipidaemia      Hypertension      Obese      Other and unspecified hyperlipidemia      Other primary cardiomyopathies      Other primary cardiomyopathies      Pacemaker      Sleep apnea      Small bowel obstruction 2015     Ventricular tachycardia (H) 2013       PAST SURGICAL HISTORY:  Past Surgical History:   Procedure Laterality Date     BIOPSY  annually    prostate biopsy     CARDIAC SURGERY  2012    A fib Ablation     CARDIAC SURGERY      cardioversion     COLONOSCOPY       DAVINCI PROSTATECTOMY N/A 2015    Procedure: DAVINCI PROSTATECTOMY;  Surgeon: Nahun Hilario MD;  Location: [facility] OR     GENITOURINARY SURGERY      bladder surgery      H ABLATION AV NODE  13     H ABLATION FOCAL AFIB  13     HC TOOTH EXTRACTION W/FORCEP       TONSILLECTOMY & ADENOIDECTOMY         FAMILY HISTORY:  Family History   Problem Relation Age of Onset     C.A.D. Father      CHF  at 74  "    CEREBROVASCULAR DISEASE Father      C.A.D. Mother      CHF at 91 still living     CEREBROVASCULAR DISEASE Mother      TIAs     Breast Cancer Mother      HEART DISEASE Son      arrythmia       SOCIAL HISTORY:  Social History     Social History     Marital status:      Spouse name: N/A     Number of children: 2     Years of education: N/A     Occupational History      Retired     Social History Main Topics     Smoking status: Never Smoker     Smokeless tobacco: Never Used     Alcohol use No      Comment: AA since 1975     Drug use: No     Sexual activity: Not Currently     Partners: Female     Birth control/ protection: Abstinence     Other Topics Concern     Parent/Sibling W/ Cabg, Mi Or Angioplasty Before 65f 55m? No     Caffeine Concern Yes     2-3 cups caffeine per day     Sleep Concern Yes     sleep apnea, wears cpap at night     Stress Concern No     Weight Concern No     weight fluctuates     Special Diet No     Exercise Yes     walking daily     Seat Belt Yes     Social History Narrative       Review of Systems:  Skin:  Negative       Eyes:  Positive for glasses    ENT:  Positive for hearing loss wears hearing aids  Respiratory:  Positive for sleep apnea;CPAP ESCOBAR in hot weather, and carrying items   Cardiovascular:  Negative;palpitations;chest pain;lightheadedness;syncope or near-syncope;cyanosis;fatigue Positive for;edema    Gastroenterology: Positive for heartburn    Genitourinary:         Musculoskeletal:  Positive for joint pain    Neurologic:  Negative      Psychiatric:  Negative      Heme/Lymph/Imm:  Positive for allergies    Endocrine:  Negative        Physical Exam:  Vitals: /70 (BP Location: Left arm, Cuff Size: Adult Large)  Pulse 72  Ht 1.727 m (5' 8\")  Wt 111.4 kg (245 lb 11.2 oz)  BMI 37.36 kg/m2    Constitutional:    overweight      Skin:  warm and dry to the touch          Head:  normocephalic        Eyes:  pupils equal and round        Lymph:      ENT:  no pallor or " cyanosis        Neck:  carotid pulses are full and equal bilaterally;JVP normal        Respiratory:  clear to auscultation         Cardiac: normal S1 and S2   , soft A2   systolic ejection murmur;crescendo;grade 3;radiation to the carotid   diastolic murmur;early diastolic murmur;grade 2;RUSB    not assessed this visit                                        GI:  abdomen soft;BS normoactive        Extremities and Muscular Skeletal:      trace;bilateral LE edema          Neurological:  no gross motor deficits        Psych:  Alert and Oriented x 3        Thank you for allowing me to participate in the care of your patient.    Sincerely,     Adiel Aden MD     [facility]

## 2018-05-23 NOTE — PROGRESS NOTES
HPI and Plan:    I had the pleasure of seeing Shahid Villalta in Cardiology Clinic in followup, a 77-year-old male with history of moderate to severe aortic valve stenosis, mild cardiomyopathy, coronary artery disease, paroxysmal atrial fibrillation and tachycardia status post AV dario ablation and post-biventricular AICD placement, previous SVT ablation, returns for followup.  He is on amiodarone for management of atrial arrhythmias.  Today we discussed his PFTs, which have remained stable.  There is mild obstruction but normal gas transfer or normal DLCO.       He has had a good summer. He's had no worsening of his chest pain or shortness of breath. No orthopnea or PND. I reviewed the repeat echocardiogram that was done on 11/13/17. It shows again moderate severe aortic valve stenosis with a mean rate of 36 mm. On my review of the echo, the aortic regurgitation appears at least moderate to moderately severe. Ejection fraction was 45%.  LV was mildly dilated although it appears to be an off axis measurement and therefore overestimated.     His BMP shows stable creatinine 1.49. K was 4.6. TSH, ALT and AST were normal. Repeat PFT showed 91% DLCO and is stable.       PHYSICAL EXAMINATION:   VITAL SIGNS:  Blood pressure is 118/79, pulse 60 per minute and regular.   CARDIAC:  A 3/6 ejection systolic murmur in the aortic area with a soft A2 component of the second heart sound. 2 x 6 early diastolic murmur was noted.  CHEST:  Clear to auscultation.       IMPRESSION:   1.  Aortic stenosis and aortic regurgitation consistent with mixed aortic valve disease                   .  Again, the patient has near severe aortic valve stenosis but no worsening symptoms.  His echocardiograms have been stable over the last 1-1/2 to 2 years except for some worsening of the aortic regurgitation. I think he is getting close to a point where we might have to consider transfemoral aortic valve replacement. I discussed that with him. Although  they'll be measurement was off axis, increased LV size is somewhat concerning. I offered him a visit to TAVR in the next month or so but he wants to go to [place] and come back in March or April and then will visit with me with a repeat echocardiogram as well as be seen in TAVR clinic.  If there is worsening shortness of breath or worsening edema in the interim, he'll call us and come back from [place] earlier.     2.  Mild cardiomyopathy, stable EF 40%-50%, no overt congestive heart failure.      3.  Paroxysmal atrial tachycardia and fibrillation, stable and in sinus rhythm on amiodarone 100 mg daily.  His PFTs and chest x-rays are stable.   we will repeat TSH in 6 months.     4.  Chronic renal insufficiency, stable.      5.  Hypertension, well controlled on lisinopril and metoprolol.      6.  Status post BiV AICD.  Continue followup in the Device Clinic.         Thank you for allowing us to part spelled in the care of this nice patient. I spent a long time discussing the implications of worsening aortic valve disease, transfemoral aortic valve replacement, reviewed his echo from last week and in May. At this time, he prefers to go to [place] and come back in March or April to see me as well as the aortic stenosis intervention clinic.      cc:   [place] [address]           KHAI WAGNER MD      No orders of the defined types were placed in this encounter.      No orders of the defined types were placed in this encounter.      There are no discontinued medications.      Encounter Diagnoses   Name Primary?     Nonrheumatic aortic valve stenosis      Nonrheumatic aortic valve insufficiency      Paroxysmal atrial fibrillation (H)      Hyperlipidemia LDL goal <100      Ischemic cardiomyopathy      Paroxysmal ventricular tachycardia (H)      Chronic systolic congestive heart failure (H) Yes     Long-term (current) use of anticoagulants [Z79.01]       Automatic implantable cardioverter-defibrillator in situ        CURRENT MEDICATIONS:  Current Outpatient Prescriptions   Medication Sig Dispense Refill     acetaminophen (TYLENOL) 325 MG tablet Take 1,300 mg by mouth 2 times daily        amiodarone (PACERONE/CODARONE) 200 MG tablet Take PO 1/2 tablet daily-Take extra prn per MD recommendations. 50 tablet 3     cetirizine (ZYRTEC) 10 MG tablet Take 1 tablet (10 mg) by mouth daily 90 tablet 1     cholecalciferol (VITAMIN D) 1000 UNIT tablet Take 2 tablets (2,000 Units) by mouth daily 180 tablet 1     digoxin (LANOXIN) 125 MCG tablet Take 1 tablet (125 mcg) by mouth every 48 hours 45 tablet 0     lisinopril (PRINIVIL/ZESTRIL) 2.5 MG tablet Take 1 tablet (2.5 mg) by mouth daily 90 tablet 0     metoprolol (TOPROL-XL) 100 MG 24 hr tablet Take 1 tablet (100 mg) by mouth daily 90 tablet 3     omeprazole (PRILOSEC) 20 MG CR capsule Take 1 capsule (20 mg) by mouth daily 90 capsule 3     polyethylene glycol (MIRALAX/GLYCOLAX) powder Take 17 g by mouth daily as needed for constipation       potassium chloride (K-TAB,KLOR-CON) 10 MEQ tablet Take 1 tablet (10 mEq) by mouth daily 90 tablet 3     senna-docusate (SENOKOT-S;PERICOLACE) 8.6-50 MG per tablet Take 2 tablets by mouth daily        sildenafil (VIAGRA) 25 MG tablet Take 25 mg by mouth as needed       simethicone (MYLICON) 80 MG chewable tablet Take 1 tablet (80 mg) by mouth every 6 hours as needed for cramping 180 tablet      simvastatin (ZOCOR) 20 MG tablet Take 1 tablet (20 mg) by mouth At Bedtime 90 tablet 3     torsemide (DEMADEX) 20 MG tablet Take 1 tablet (20 mg) by mouth daily 90 tablet 3     warfarin (COUMADIN) 5 MG tablet 5 mg daily (Patient taking differently: 5 mg 5 days week, 2.5 mg 2 days week) 90 tablet 1     ASPIRIN NOT PRESCRIBED, INTENTIONAL, 1 each daily Antiplatelet medication not prescribed intentionally due to Current anticoagulant therapy (warfarin/enoxaparin) (Patient not taking: Reported on  2018) 0 each 0       ALLERGIES     Allergies   Allergen Reactions     Latex Rash     Seasonal Allergies      hayfever - wheezing -        PAST MEDICAL HISTORY:  Past Medical History:   Diagnosis Date     Aortic valve disorders      Aortic valve disorders      Arrhythmia      Atrial fibrillation (H)      Atrial fibrillation (H)      Automatic implantable cardiac defibrillator in situ      Cancer (H)      Congestive heart failure (H)      Coronary artery disease      Essential hypertension, benign      GERD (gastroesophageal reflux disease) 3/16/2010     Heart failure (H)      History of blood transfusion      Hyperlipidaemia      Hypertension      Obese      Other and unspecified hyperlipidemia      Other primary cardiomyopathies      Other primary cardiomyopathies      Pacemaker      Sleep apnea      Small bowel obstruction 2015     Ventricular tachycardia (H) 2013       PAST SURGICAL HISTORY:  Past Surgical History:   Procedure Laterality Date     BIOPSY  annually    prostate biopsy     CARDIAC SURGERY      A fib Ablation     CARDIAC SURGERY      cardioversion     COLONOSCOPY       DAVINCI PROSTATECTOMY N/A 2015    Procedure: DAVINCI PROSTATECTOMY;  Surgeon: Nahun Hilario MD;  Location: [facility] OR     GENITOURINARY SURGERY      bladder surgery      H ABLATION AV NODE  13     H ABLATION FOCAL AFIB  13     HC TOOTH EXTRACTION W/FORCEP       TONSILLECTOMY & ADENOIDECTOMY         FAMILY HISTORY:  Family History   Problem Relation Age of Onset     C.A.D. Father      CHF  at 74     CEREBROVASCULAR DISEASE Father      C.A.D. Mother      CHF at 91 still living     CEREBROVASCULAR DISEASE Mother      TIAs     Breast Cancer Mother      HEART DISEASE Son      arrythmia       SOCIAL HISTORY:  Social History     Social History     Marital status:      Spouse name: N/A     Number of children: 2     Years of education: N/A     Occupational History      Retired  "    Social History Main Topics     Smoking status: Never Smoker     Smokeless tobacco: Never Used     Alcohol use No      Comment: AA since 1975     Drug use: No     Sexual activity: Not Currently     Partners: Female     Birth control/ protection: Abstinence     Other Topics Concern     Parent/Sibling W/ Cabg, Mi Or Angioplasty Before 65f 55m? No     Caffeine Concern Yes     2-3 cups caffeine per day     Sleep Concern Yes     sleep apnea, wears cpap at night     Stress Concern No     Weight Concern No     weight fluctuates     Special Diet No     Exercise Yes     walking daily     Seat Belt Yes     Social History Narrative       Review of Systems:  Skin:  Negative       Eyes:  Positive for glasses    ENT:  Positive for hearing loss wears hearing aids  Respiratory:  Positive for sleep apnea;CPAP ESCOBAR in hot weather, and carrying items   Cardiovascular:  Negative;palpitations;chest pain;lightheadedness;syncope or near-syncope;cyanosis;fatigue Positive for;edema    Gastroenterology: Positive for heartburn    Genitourinary:         Musculoskeletal:  Positive for joint pain    Neurologic:  Negative      Psychiatric:  Negative      Heme/Lymph/Imm:  Positive for allergies    Endocrine:  Negative        Physical Exam:  Vitals: /70 (BP Location: Left arm, Cuff Size: Adult Large)  Pulse 72  Ht 1.727 m (5' 8\")  Wt 111.4 kg (245 lb 11.2 oz)  BMI 37.36 kg/m2    Constitutional:    overweight      Skin:  warm and dry to the touch          Head:  normocephalic        Eyes:  pupils equal and round        Lymph:      ENT:  no pallor or cyanosis        Neck:  carotid pulses are full and equal bilaterally;JVP normal        Respiratory:  clear to auscultation         Cardiac: normal S1 and S2   soft or inaudible A2   systolic ejection murmur;crescendo;grade 3;radiation to the carotid   diastolic murmur;early diastolic murmur;grade 2;RUSB    not assessed this visit                                        GI:  abdomen soft;BS " normoactive        Extremities and Muscular Skeletal:      trace;bilateral LE edema          Neurological:  no gross motor deficits        Psych:  Alert and Oriented x 3        CC  Adiel Aden MD  [id number]  [id number]  [place] [id number]

## 2018-05-23 NOTE — PROGRESS NOTES
HPI and Plan:    I had the pleasure of seeing Shahid Villalta in Cardiology Clinic in followup, a 78-year-old male with history of near severe aortic valve stenosis, mild cardiomyopathy, coronary artery disease, paroxysmal atrial fibrillation and tachycardia status post AV dario ablation and post-biventricular AICD placement, previous SVT ablation, returns for followup.  He is on amiodarone for management of atrial arrhythmias.  He is here for a six-month follow but although is complaining of some increasing shortness of breath compared to last year this time.  There is no chest pain.  No orthopnea or PND.    I reviewed and discussed her echocardiogram results done recently.  It shows decrease in ejection fraction to 35% to 40% compared to 45%.  There is again moderate to severe or near severe aortic stenosis.  The aortic valve area index is 0.57 cm .  The mean gradient is 36 although the aortic regurgitation appears worse compared to prior study.  It is now 3+.  However, he has to list the predominant lesion in him.  His BMP was reviewed which shows a baseline creatinine 1.49 unchanged from before.  His TSH was normal.      PHYSICAL EXAMINATION:   See below      IMPRESSION:   1.  Near severe aortic stenosis based on base of aortic valve area index of less than 0.6 cm  by meter square and moderately severe aortic valve regurgitation    Patient is now more symptomatic than last year.  Given these findings, I will ask him to proceed with TAVR workup and see Dr. Nation in the TAVR clinic.    The decrease in EF is also somewhat concerning and therefore I think we should consider valve replacement this time if agreeable by the structural heart team.  He understands that he will need a CT [place] as well as coronary angiography prior to that.  Given his abnormal creatinine, there is some risk of dye-induced nephropathy and they might have to use a hydration as well as low-dose protocol.           2.  Mild to moderate ischemic  cardiomyopathy, stable now 35-40%.  Thus he has chronic systolic dysfunction without overt congestive heart failure.  He will need coronary angiography to assess for any progression of CAD.      3.  Paroxysmal atrial tachycardia and fibrillation, stable and in sinus rhythm on amiodarone 100 mg daily.  He will need PFTs in 6 months.  we will repeat TSH in 6 months.     4.  Chronic renal insufficiency, stable.      5.  Hypertension, well controlled on lisinopril and metoprolol.      6.  Status post BiV AICD.  Continue followup in the Device Clinic.         Thank you for allowing us to part spelled in the care of this nice patient with multiple complex issues. I spent a long time discussing the implications of worsening aortic valve disease, transfemoral aortic valve replacement, reviewed his echo from last week and the pre-valve replacement workup that is going to happen.  I would like to see him back in 6 months and I anticipate his valve will be replaced by then.  We will do PFTs, TSH and BMP in 6 months when I see him.    Sincerely,      KHAI WAGNER MD    cc:   Gume Brown MD    [place] [phone]               Orders Placed This Encounter   Procedures     Basic metabolic panel     TSH     Follow-Up with Cardiologist     General PFT Lab (Please always keep checked)     Pulmonary Function Test       No orders of the defined types were placed in this encounter.      There are no discontinued medications.      Encounter Diagnoses   Name Primary?     Nonrheumatic aortic valve stenosis      Nonrheumatic aortic valve insufficiency      Paroxysmal atrial fibrillation (H)      Hyperlipidemia LDL goal <100      Ischemic cardiomyopathy      Paroxysmal ventricular tachycardia (H)      Chronic systolic congestive heart failure (H) Yes     Long-term (current) use of anticoagulants [Z79.01]      Automatic implantable cardioverter-defibrillator in situ        CURRENT  MEDICATIONS:  Current Outpatient Prescriptions   Medication Sig Dispense Refill     acetaminophen (TYLENOL) 325 MG tablet Take 1,300 mg by mouth 2 times daily        amiodarone (PACERONE/CODARONE) 200 MG tablet Take PO 1/2 tablet daily-Take extra prn per MD recommendations. 50 tablet 3     cetirizine (ZYRTEC) 10 MG tablet Take 1 tablet (10 mg) by mouth daily 90 tablet 1     cholecalciferol (VITAMIN D) 1000 UNIT tablet Take 2 tablets (2,000 Units) by mouth daily 180 tablet 1     digoxin (LANOXIN) 125 MCG tablet Take 1 tablet (125 mcg) by mouth every 48 hours 45 tablet 0     lisinopril (PRINIVIL/ZESTRIL) 2.5 MG tablet Take 1 tablet (2.5 mg) by mouth daily 90 tablet 0     metoprolol (TOPROL-XL) 100 MG 24 hr tablet Take 1 tablet (100 mg) by mouth daily 90 tablet 3     omeprazole (PRILOSEC) 20 MG CR capsule Take 1 capsule (20 mg) by mouth daily 90 capsule 3     polyethylene glycol (MIRALAX/GLYCOLAX) powder Take 17 g by mouth daily as needed for constipation       potassium chloride (K-TAB,KLOR-CON) 10 MEQ tablet Take 1 tablet (10 mEq) by mouth daily 90 tablet 3     senna-docusate (SENOKOT-S;PERICOLACE) 8.6-50 MG per tablet Take 2 tablets by mouth daily        sildenafil (VIAGRA) 25 MG tablet Take 25 mg by mouth as needed       simethicone (MYLICON) 80 MG chewable tablet Take 1 tablet (80 mg) by mouth every 6 hours as needed for cramping 180 tablet      simvastatin (ZOCOR) 20 MG tablet Take 1 tablet (20 mg) by mouth At Bedtime 90 tablet 3     torsemide (DEMADEX) 20 MG tablet Take 1 tablet (20 mg) by mouth daily 90 tablet 3     warfarin (COUMADIN) 5 MG tablet 5 mg daily (Patient taking differently: 5 mg 5 days week, 2.5 mg 2 days week) 90 tablet 1     ASPIRIN NOT PRESCRIBED, INTENTIONAL, 1 each daily Antiplatelet medication not prescribed intentionally due to Current anticoagulant therapy (warfarin/enoxaparin) (Patient not taking: Reported on 5/23/2018) 0 each 0       ALLERGIES     Allergies   Allergen Reactions     Latex  Rash     Seasonal Allergies      hayfever - wheezing -        PAST MEDICAL HISTORY:  Past Medical History:   Diagnosis Date     Aortic valve disorders      Aortic valve disorders      Arrhythmia      Atrial fibrillation (H)      Atrial fibrillation (H)      Automatic implantable cardiac defibrillator in situ      Cancer (H)      Congestive heart failure (H)      Coronary artery disease      Essential hypertension, benign      GERD (gastroesophageal reflux disease) 3/16/2010     Heart failure (H)      History of blood transfusion      Hyperlipidaemia      Hypertension      Obese      Other and unspecified hyperlipidemia      Other primary cardiomyopathies      Other primary cardiomyopathies      Pacemaker      Sleep apnea      Small bowel obstruction 2015     Ventricular tachycardia (H) 2013       PAST SURGICAL HISTORY:  Past Surgical History:   Procedure Laterality Date     BIOPSY  annually    prostate biopsy     CARDIAC SURGERY  2012    A fib Ablation     CARDIAC SURGERY      cardioversion     COLONOSCOPY       DAVINCI PROSTATECTOMY N/A 2015    Procedure: DAVINCI PROSTATECTOMY;  Surgeon: Nahun Hilario MD;  Location: [facility] OR     GENITOURINARY SURGERY      bladder surgery      H ABLATION AV NODE  13     H ABLATION FOCAL AFIB  13     HC TOOTH EXTRACTION W/FORCEP       TONSILLECTOMY & ADENOIDECTOMY         FAMILY HISTORY:  Family History   Problem Relation Age of Onset     C.A.D. Father      CHF  at 74     CEREBROVASCULAR DISEASE Father      C.A.D. Mother      CHF at 91 still living     CEREBROVASCULAR DISEASE Mother      TIAs     Breast Cancer Mother      HEART DISEASE Son      arrythmia       SOCIAL HISTORY:  Social History     Social History     Marital status:      Spouse name: N/A     Number of children: 2     Years of education: N/A     Occupational History      Retired     Social History Main Topics     Smoking status: Never Smoker     Smokeless tobacco:  "Never Used     Alcohol use No      Comment: AA since 1975     Drug use: No     Sexual activity: Not Currently     Partners: Female     Birth control/ protection: Abstinence     Other Topics Concern     Parent/Sibling W/ Cabg, Mi Or Angioplasty Before 65f 55m? No     Caffeine Concern Yes     2-3 cups caffeine per day     Sleep Concern Yes     sleep apnea, wears cpap at night     Stress Concern No     Weight Concern No     weight fluctuates     Special Diet No     Exercise Yes     walking daily     Seat Belt Yes     Social History Narrative       Review of Systems:  Skin:  Negative       Eyes:  Positive for glasses    ENT:  Positive for hearing loss wears hearing aids  Respiratory:  Positive for sleep apnea;CPAP ESCOBAR in hot weather, and carrying items   Cardiovascular:  Negative;palpitations;chest pain;lightheadedness;syncope or near-syncope;cyanosis;fatigue Positive for;edema    Gastroenterology: Positive for heartburn    Genitourinary:         Musculoskeletal:  Positive for joint pain    Neurologic:  Negative      Psychiatric:  Negative      Heme/Lymph/Imm:  Positive for allergies    Endocrine:  Negative        Physical Exam:  Vitals: /70 (BP Location: Left arm, Cuff Size: Adult Large)  Pulse 72  Ht 1.727 m (5' 8\")  Wt 111.4 kg (245 lb 11.2 oz)  BMI 37.36 kg/m2    Constitutional:    overweight      Skin:  warm and dry to the touch          Head:  normocephalic        Eyes:  pupils equal and round        Lymph:      ENT:  no pallor or cyanosis        Neck:  carotid pulses are full and equal bilaterally;JVP normal        Respiratory:  clear to auscultation         Cardiac: normal S1 and S2   , soft A2   systolic ejection murmur;crescendo;grade 3;radiation to the carotid   diastolic murmur;early diastolic murmur;grade 2;RUSB    not assessed this visit                                        GI:  abdomen soft;BS normoactive        Extremities and Muscular Skeletal:      trace;bilateral LE edema      "     Neurological:  no gross motor deficits        Psych:  Alert and Oriented x 3        CC  Adiel Aden MD  [id number]  [id number]  [place] [id number]

## 2018-05-24 ENCOUNTER — TELEPHONE (OUTPATIENT)
Dept: UROLOGY | Facility: CLINIC | Age: 78
End: 2018-05-24

## 2018-05-29 DIAGNOSIS — I47.29 PAROXYSMAL VENTRICULAR TACHYCARDIA (H): ICD-10-CM

## 2018-05-29 DIAGNOSIS — K21.9 GASTROESOPHAGEAL REFLUX DISEASE WITHOUT ESOPHAGITIS: ICD-10-CM

## 2018-05-29 DIAGNOSIS — Z79.01 LONG-TERM (CURRENT) USE OF ANTICOAGULANTS: ICD-10-CM

## 2018-05-29 DIAGNOSIS — I50.22 CHRONIC SYSTOLIC HEART FAILURE (H): ICD-10-CM

## 2018-05-29 RX ORDER — WARFARIN SODIUM 5 MG/1
TABLET ORAL
Qty: 90 TABLET | Refills: 0 | Status: SHIPPED | OUTPATIENT
Start: 2018-05-29 | End: 2018-10-25

## 2018-05-29 RX ORDER — POTASSIUM CHLORIDE 750 MG/1
10 TABLET, EXTENDED RELEASE ORAL DAILY
Qty: 90 TABLET | Refills: 3 | Status: CANCELLED | OUTPATIENT
Start: 2018-05-29

## 2018-05-29 RX ORDER — POTASSIUM CHLORIDE 750 MG/1
TABLET, EXTENDED RELEASE ORAL
Qty: 90 TABLET | Refills: 0 | Status: CANCELLED | OUTPATIENT
Start: 2018-05-29

## 2018-05-29 NOTE — TELEPHONE ENCOUNTER
"Requested Prescriptions   Pending Prescriptions Disp Refills     potassium chloride (K-TAB,KLOR-CON) 10 MEQ tablet [Pharmacy Med Name: POTASSIUM CL 10MEQ ER TABLETS] 90 tablet 0    Last Written Prescription Date:  05/19/2017  Last Fill Quantity: 90 tablet,  # refills: 3   Last office visit: 5/19/2017 with prescribing provider:  05/19/2017   Future Office Visit:     Sig: TAKE 1 TABLET BY MOUTH DAILY    Potassium Supplements Protocol Failed    5/29/2018  1:40 PM       Failed - Recent (12 mo) or future (30 days) visit within the authorizing provider's specialty    Patient had office visit in the last 12 months or has a visit in the next 30 days with authorizing provider or within the authorizing provider's specialty.  See \"Patient Info\" tab in inbasket, or \"Choose Columns\" in Meds & Orders section of the refill encounter.           Passed - Patient is age 18 or older       Passed - Normal serum potassium in past 12 months    Recent Labs   Lab Test  05/18/18   1003   POTASSIUM  4.5                          omeprazole (PRILOSEC) 20 MG CR capsule [Pharmacy Med Name: OMEPRAZOLE 20MG CAPSULES] 90 capsule 0    Last Written Prescription Date:  05/19/2017  Last Fill Quantity: 90 capsule,  # refills: 3   Last office visit: 5/19/2017 with prescribing provider:  05/19/2017   Future Office Visit:     Sig: TAKE 1 CAPSULE BY MOUTH EVERY DAY    PPI Protocol Failed    5/29/2018  1:40 PM       Failed - Recent (12 mo) or future (30 days) visit within the authorizing provider's specialty    Patient had office visit in the last 12 months or has a visit in the next 30 days with authorizing provider or within the authorizing provider's specialty.  See \"Patient Info\" tab in inbasket, or \"Choose Columns\" in Meds & Orders section of the refill encounter.           Passed - Not on Clopidogrel (unless Pantoprazole ordered)       Passed - No diagnosis of osteoporosis on record       Passed - Patient is age 18 or older          Ethan GARCIAT  "

## 2018-05-29 NOTE — TELEPHONE ENCOUNTER
Prescription approved per [facility] Refill Protocol.  Isela Hua RN

## 2018-05-29 NOTE — TELEPHONE ENCOUNTER
Last Written Prescription Date: 18  Last Fill Quantity: 90 ,  # refills: 1  Last office visit: 2017 with prescribing provider:  2017  Future Office Visit:   Next 5 appointments (look out 90 days)     May 31, 2018  7:20 AM CDT   [facility]    [id number] [place] [id number] [phone]                   Requested Prescriptions   Pending Prescriptions Disp Refills     warfarin (COUMADIN) 5 MG tablet 90 tablet 1     Si mg daily    There is no refill protocol information for this order

## 2018-05-31 ENCOUNTER — OFFICE VISIT (OUTPATIENT)
Dept: FAMILY MEDICINE | Facility: CLINIC | Age: 78
End: 2018-05-31
Payer: COMMERCIAL

## 2018-05-31 ENCOUNTER — ANTICOAGULATION THERAPY VISIT (OUTPATIENT)
Dept: NURSING | Facility: CLINIC | Age: 78
End: 2018-05-31

## 2018-05-31 VITALS
HEIGHT: 68 IN | BODY MASS INDEX: 36.98 KG/M2 | HEART RATE: 85 BPM | WEIGHT: 244 LBS | OXYGEN SATURATION: 96 % | DIASTOLIC BLOOD PRESSURE: 66 MMHG | SYSTOLIC BLOOD PRESSURE: 118 MMHG | TEMPERATURE: 98.5 F

## 2018-05-31 DIAGNOSIS — I48.0 PAROXYSMAL ATRIAL FIBRILLATION (H): ICD-10-CM

## 2018-05-31 DIAGNOSIS — I47.29 PAROXYSMAL VENTRICULAR TACHYCARDIA (H): ICD-10-CM

## 2018-05-31 DIAGNOSIS — K21.9 GASTROESOPHAGEAL REFLUX DISEASE WITHOUT ESOPHAGITIS: ICD-10-CM

## 2018-05-31 DIAGNOSIS — I35.2 NONRHEUMATIC AORTIC INSUFFICIENCY WITH AORTIC STENOSIS: ICD-10-CM

## 2018-05-31 DIAGNOSIS — Z79.01 LONG-TERM (CURRENT) USE OF ANTICOAGULANTS: ICD-10-CM

## 2018-05-31 DIAGNOSIS — I10 HYPERTENSION GOAL BP (BLOOD PRESSURE) < 140/90: ICD-10-CM

## 2018-05-31 DIAGNOSIS — I35.0 NONRHEUMATIC AORTIC VALVE STENOSIS: ICD-10-CM

## 2018-05-31 DIAGNOSIS — C61 PROSTATE CANCER (H): ICD-10-CM

## 2018-05-31 DIAGNOSIS — I47.19 ATRIAL TACHYCARDIA, PAROXYSMAL (H): ICD-10-CM

## 2018-05-31 DIAGNOSIS — G47.33 OSA (OBSTRUCTIVE SLEEP APNEA): ICD-10-CM

## 2018-05-31 DIAGNOSIS — E78.5 HYPERLIPIDEMIA LDL GOAL <100: ICD-10-CM

## 2018-05-31 DIAGNOSIS — I50.22 CHRONIC SYSTOLIC HEART FAILURE (H): Primary | ICD-10-CM

## 2018-05-31 DIAGNOSIS — I50.22 CHRONIC SYSTOLIC CONGESTIVE HEART FAILURE (H): ICD-10-CM

## 2018-05-31 DIAGNOSIS — I25.5 ISCHEMIC CARDIOMYOPATHY: ICD-10-CM

## 2018-05-31 LAB — INR POINT OF CARE: 3.1 (ref 0.86–1.14)

## 2018-05-31 PROCEDURE — 85610 PROTHROMBIN TIME: CPT | Mod: QW | Performed by: FAMILY MEDICINE

## 2018-05-31 PROCEDURE — 99214 OFFICE O/P EST MOD 30 MIN: CPT | Performed by: FAMILY MEDICINE

## 2018-05-31 PROCEDURE — 36416 COLLJ CAPILLARY BLOOD SPEC: CPT | Performed by: FAMILY MEDICINE

## 2018-05-31 RX ORDER — POTASSIUM CHLORIDE 750 MG/1
10 TABLET, EXTENDED RELEASE ORAL DAILY
Qty: 90 TABLET | Refills: 3 | Status: SHIPPED | OUTPATIENT
Start: 2018-05-31 | End: 2019-01-01

## 2018-05-31 RX ORDER — TORSEMIDE 20 MG/1
20 TABLET ORAL DAILY
Qty: 90 TABLET | Refills: 3 | Status: SHIPPED | OUTPATIENT
Start: 2018-05-31 | End: 2019-01-01

## 2018-05-31 RX ORDER — SIMVASTATIN 20 MG
20 TABLET ORAL AT BEDTIME
Qty: 90 TABLET | Refills: 3 | Status: SHIPPED | OUTPATIENT
Start: 2018-05-31 | End: 2019-01-01

## 2018-05-31 RX ORDER — METOPROLOL SUCCINATE 100 MG/1
100 TABLET, EXTENDED RELEASE ORAL DAILY
Qty: 90 TABLET | Refills: 3 | Status: SHIPPED | OUTPATIENT
Start: 2018-05-31 | End: 2019-01-01

## 2018-05-31 RX ORDER — LISINOPRIL 2.5 MG/1
2.5 TABLET ORAL DAILY
Qty: 90 TABLET | Refills: 3 | Status: SHIPPED | OUTPATIENT
Start: 2018-05-31 | End: 2019-01-01

## 2018-05-31 NOTE — PROGRESS NOTES
SUBJECTIVE:   Shahid Villalta is a 78 year old male who presents to clinic today for the following health issues:    Patient with history of chronic systolic CHF with echocardiogram 5/2018 showing reduced EF at 35%. Biventricular ICD in place.     History of coronary artery disease with occluded right coronary artery and ischemic cardiomyopathy.     Patient with history of moderate to severe aortic stenosis. Followed by cardiology with plans for valve replacement. Stable peripheral edema    History of atrial fibrillation currently rate controlled on metoprolol and on Coumadin for thrombosis prevention. Denies heart palpitations.     History of obstructive sleep apnea. Wearing CPAP regularly.      History of prostate cancer status post prostatectomy with Dr. Hilario.     Problem list and histories reviewed & adjusted, as indicated.  Additional history: as documented    Patient Active Problem List   Diagnosis     Atrial fibrillation (H)     Anticoagulation monitoring, INR range 2-3     Hyperlipidemia LDL goal <100     GERD (gastroesophageal reflux disease)     Prostate cancer     Microalbuminuria     Impaired fasting glucose     Parathyroid hormone excess (H)     Vitamin D deficiency     Advanced directives, counseling/discussion     Aortic insufficiency with aortic stenosis     Hypertension goal BP (blood pressure) < 140/90     Health Care Home     Ventricular tachycardia     CKD (chronic kidney disease) stage 3, GFR 30-59 ml/min     Cardiomyopathy (H)     Automatic implantable cardioverter-defibrillator in situ     Aortic valve stenosis     Paroxysmal ventricular tachycardia (H)     Atrial tachycardia, paroxysmal (H)     Cardiomyopathy in other diseases classified elsewhere     Need for prophylactic measure     Anemia     Coronary artery disease     Pelvic hematoma, male     S/P prostatectomy     Long-term (current) use of anticoagulants [Z79.01]     Chronic systolic congestive heart failure (H)     Pulmonary  "hypertension     Nonrheumatic aortic valve insufficiency     Past Surgical History:   Procedure Laterality Date     BIOPSY  annually    prostate biopsy     CARDIAC SURGERY  2012    A fib Ablation     CARDIAC SURGERY      cardioversion     COLONOSCOPY       DAVINCI PROSTATECTOMY N/A 2015    Procedure: DAVINCI PROSTATECTOMY;  Surgeon: Nahun Hilario MD;  Location: [facility] OR     GENITOURINARY SURGERY      bladder surgery      H ABLATION AV NODE  13     H ABLATION FOCAL AFIB  13     HC TOOTH EXTRACTION W/FORCEP       TONSILLECTOMY & ADENOIDECTOMY         Social History   Substance Use Topics     Smoking status: Never Smoker     Smokeless tobacco: Never Used     Alcohol use No      Comment: AA since      Family History   Problem Relation Age of Onset     C.A.D. Father      CHF  at 74     CEREBROVASCULAR DISEASE Father      C.A.D. Mother      CHF at 91 still living     CEREBROVASCULAR DISEASE Mother      TIAs     Breast Cancer Mother      HEART DISEASE Son      arrythmia           Reviewed and updated as needed this visit by clinical staff  Tobacco  Allergies  Meds  Med Hx  Surg Hx  Fam Hx  Soc Hx      Reviewed and updated as needed this visit by Provider         ROS:  RESP: Stable dyspnea with exertion, no cough  CV: Stable peripheral edema, NEGATIVE for chest pain or palpitations    This document serves as a record of the services and decisions personally performed and made by Gume Brown MD. It was created on his behalf by Mariel Carcamo, a trained medical scribe. The creation of this document is based on the provider's statements to the medical scribe.  Mariel Carcamo 7:37 AM May 31, 2018    OBJECTIVE:     /66 (BP Location: Right arm, Patient Position: Chair, Cuff Size: Adult Regular)  Pulse 85  Temp 98.5  F (36.9  C) (Oral)  Ht 1.727 m (5' 8\")  Wt 110.7 kg (244 lb)  SpO2 96%  BMI 37.1 kg/m2  Body mass index is 37.1 kg/(m^2).  GENERAL: healthy, alert and no " distress  EYES: Eyes grossly normal to inspection, PERRL and conjunctivae and sclerae normal  HENT: mouth without ulcers or lesions, oropharynx clear and oral mucous membranes moist  NECK: no adenopathy, no asymmetry, masses, or scars and thyroid normal to palpation  RESP: lungs clear to auscultation - no rales, rhonchi or wheezes  CV: 1+ swelling lower legs (L>R), regular rates and rhythm, normal S1 S2, no S3 or S4, grade 3/6 systolic murmur heard best over the LUSB and LLSB and peripheral pulses strong    ASSESSMENT/PLAN:     1. Chronic systolic heart failure (H)  Continue current medications. Patient following closely with cardiology at this time with severe aortic stenosis.  - potassium chloride (K-TAB,KLOR-CON) 10 MEQ tablet; Take 1 tablet (10 mEq) by mouth daily  Dispense: 90 tablet; Refill: 3  - torsemide (DEMADEX) 20 MG tablet; Take 1 tablet (20 mg) by mouth daily  Dispense: 90 tablet; Refill: 3    2. Gastroesophageal reflux disease without esophagitis  - omeprazole (PRILOSEC) 20 MG CR capsule; Take 1 capsule (20 mg) by mouth daily  Dispense: 90 capsule; Refill: 3    3. SHIMON (obstructive sleep apnea)    4. Paroxysmal atrial fibrillation (H)  - metoprolol succinate (TOPROL-XL) 100 MG 24 hr tablet; Take 1 tablet (100 mg) by mouth daily  Dispense: 90 tablet; Refill: 3    5. Nonrheumatic aortic valve stenosis  Follow with cardiology as planned with consideration for TAVR.  - lisinopril (PRINIVIL/ZESTRIL) 2.5 MG tablet; Take 1 tablet (2.5 mg) by mouth daily  Dispense: 90 tablet; Refill: 3    6. Hyperlipidemia LDL goal <100  - simvastatin (ZOCOR) 20 MG tablet; Take 1 tablet (20 mg) by mouth At Bedtime  Dispense: 90 tablet; Refill: 3    7. Cardiomyopathy, unspecified type (H)  - torsemide (DEMADEX) 20 MG tablet; Take 1 tablet (20 mg) by mouth daily  Dispense: 90 tablet; Refill: 3    8. Prostate cancer  Following with urology, Dr. Hilario.    The information in this document, created by the medical scribe for me,  accurately reflects the services I personally performed and the decisions made by me. I have reviewed and approved this document for accuracy prior to leaving the patient care area.  May 31, 2018 8:05 AM    Gume Brown MD  [facility]

## 2018-05-31 NOTE — MR AVS SNAPSHOT
Shahid Villalta   5/31/2018   Anticoagulation Therapy Visit    Description:  78 year old male   Provider:  Gume Brown MD   Department:  [facility]           INR as of 5/31/2018     Today's INR 3.1!      Anticoagulation Summary as of 5/31/2018     INR goal 2.0-3.0   Today's INR 3.1!   Full warfarin instructions 2.5 mg on Mon, Fri; 5 mg all other days   Next INR check 6/26/2018    Indications   Long-term (current) use of anticoagulants [Z79.01] [Z79.01]  Paroxysmal ventricular tachycardia (H) [I47.2]         Your next Anticoagulation Clinic appointment(s)     Jun 26, 2018  9:00 AM CDT   Anticoagulation Visit with  ANTICOAGULATION CLINIC   [facility] ([facility]    [address] [address] [phone]              Contact Numbers     [facility]  Please call [phone] with any problems or questions regarding your therapy, or to cancel or reschedule your appointment.          May 2018 Details    Sun Mon Tue Wed Thu Fri Sat       1               2               3               4               5                 6               7               8               9               10               11               12                 13               14               15               16               17               18               19                 20               21               22               23               24               25               26                 27               28               29               30               31      5 mg   See details         Date Details   05/31 This INR check               How to take your warfarin dose     To take:  5 mg Take 1 of the 5 mg tablets.           June 2018 Details    Sun Mon Tue Wed Thu Fri Sat          1      2.5 mg         2      5 mg           3      5 mg         4      2.5 mg         5      5 mg         6      5 mg         7      5 mg         8      2.5 mg         9      5 mg           10       5 mg         11      2.5 mg         12      5 mg         13      5 mg         14      5 mg         15      2.5 mg         16      5 mg           17      5 mg         18      2.5 mg         19      5 mg         20      5 mg         21      5 mg         22      2.5 mg         23      5 mg           24      5 mg         25      2.5 mg         26            27               28               29               30                Date Details   No additional details    Date of next INR:  6/26/2018         How to take your warfarin dose     To take:  2.5 mg Take 0.5 of a 5 mg tablet.    To take:  5 mg Take 1 of the 5 mg tablets.

## 2018-05-31 NOTE — PROGRESS NOTES
ANTICOAGULATION FOLLOW-UP CLINIC VISIT    Patient Name:  Shahid Villalta  Date:  5/31/2018  Contact Type:  Face to Face    SUBJECTIVE:     Patient Findings     Positives No Problem Findings           OBJECTIVE    INR Protime   Date Value Ref Range Status   05/31/2018 3.1 (A) 0.86 - 1.14 Final     Chromogenic Factor 10   Date Value Ref Range Status   07/31/2017 17 (L) 70 - 130 % Final     Comment:     Therapeutic Range:  A Chromogenic Factor 10 level of approximately 20-40%   inversely correlates with an INR of 2-3 for patients receiving Warfarin.   Chromogenic Factor 10 levels below 20% indicate an INR greater than 3 and   levels above 40% indicate an INR less than 2.         ASSESSMENT / PLAN  No question data found.  Anticoagulation Summary as of 5/31/2018     INR goal 2.0-3.0   Today's INR 3.1!   Warfarin maintenance plan 2.5 mg (5 mg x 0.5) on Mon, Fri; 5 mg (5 mg x 1) all other days   Full warfarin instructions 2.5 mg on Mon, Fri; 5 mg all other days   Weekly warfarin total 30 mg   Plan last modified Isela Hua RN (5/18/2018)   Next INR check 6/26/2018   Target end date     Indications   Long-term (current) use of anticoagulants [Z79.01] [Z79.01]  Paroxysmal ventricular tachycardia (H) [I47.2]         Anticoagulation Episode Summary     INR check location     Preferred lab     Send INR reminders to [facility]    Comments             See the Encounter Report to view Anticoagulation Flowsheet and Dosing Calendar (Go to Encounters tab in chart review, and find the Anticoagulation Therapy Visit)        Manisha Pierson RN

## 2018-05-31 NOTE — MR AVS SNAPSHOT
After Visit Summary   5/31/2018    Shahid Villalta    MRN: [id number]           Patient Information     Date Of Birth          1940        Visit Information        Provider Department      5/31/2018 7:20 AM Gume Brown MD [facility]        Today's Diagnoses     Prostate cancer    -  1    Chronic systolic heart failure (H)        Gastroesophageal reflux disease without esophagitis        SHIMON (obstructive sleep apnea)        Paroxysmal atrial fibrillation (H)        Nonrheumatic aortic valve stenosis        Hyperlipidemia LDL goal <100        Cardiomyopathy, unspecified type (H)           Follow-ups after your visit        Your next 10 appointments already scheduled     Jun 14, 2018 12:45 PM CDT   TAVR New with Catia Nation MD   [facility] ([facility])    [address]  [place] [address]   [phone] OPT 2            Jun 29, 2018  9:30 AM CDT   Return Sleep Patient with Nadir Ashraf MD   [facility] ([facility])    [address]  [address]  [place] [address]   [phone]            Sep 12, 2018  4:30 PM CDT   Remote ICD Check with STEWART DCR2   [facility] ([facility] [place])    [address]  [place] [address] [phone] OPT 2           This appointment is for a remote check of your debrillator.  This is not an appointment at the office.            Nov 09, 2018  9:00 AM CST   Return Visit with  ONCOLOGY NURSE   [facility] ([facility])    [facility]  [address]  [place] [address]   [phone]            Nov 14, 2018  9:30 AM CST   Return Visit with Nahun Hilario MD   [facility] ([facility])    [facility]  [address]  [facility] [facility] [place] [id number] [phone]              Who to contact     If you have questions or need follow up information about today's clinic visit or your schedule please contact [facility] directly at [phone].  Normal or non-critical lab and imaging results will be communicated to you by MyChart, letter or phone within 4 business days after the clinic has received the results. If you do not hear from us within 7 days, please contact the clinic through [employer] or phone. If you have a critical or abnormal lab result, we will notify you by phone as soon as possible.  Submit refill requests through [employer] or call your pharmacy and they will forward the refill request to us. Please allow 3 business days for your refill to be completed.          Additional Information About Your Visit        [employer] Information     [employer] gives you secure access to your electronic health record. If you see a primary care provider, you can also send messages to your care team and make appointments. If you have questions, please call your primary care clinic.  If you do not have a primary care provider, please call [phone] and they will assist you.        Care EveryWhere ID     This is your Care EveryWhere ID. This could be used by other organizations to access your [facility] medical records  [id number]        Your Vitals Were     Pulse Temperature Height Pulse Oximetry BMI (Body Mass Index)       85 98.5  F (36.9  C) (Oral) 5' 8\" (1.727 m) 96% 37.1 kg/m2        Blood Pressure from Last 3 Encounters:   05/31/18 118/66   05/23/18 110/70   05/09/18 102/64    Weight from Last 3 Encounters:   05/31/18 244 lb (110.7 kg)   05/23/18 245 lb 11.2 oz (111.4 kg)   05/09/18 247 lb (112 kg)              Today, you had the following     No orders found for display         Where to get your medicines      These medications were sent to [email or web address] - [place], [place] - [address] [facility]  [address] [place] [address]     Phone:  [phone]     lisinopril 2.5 MG tablet    metoprolol succinate 100 MG 24 hr tablet    omeprazole 20 MG CR capsule    potassium chloride 10 MEQ tablet    simvastatin 20 MG tablet    torsemide 20 MG tablet          Primary Care Provider Office Phone # Fax #    Gume Brown [phone] [address]  [facility] [address]        Equal Access to Services     JEFFERSON SHEARER : Hadii aad ku hadasho Soomaali, waaxda luqadaha, qaybta kaalmada adeegyada, waxay idiin hayaan adeeg kharash lamax . So [facility] [phone].    ATENCIÓN: Si habla español, tiene a irving disposición servicios gratuitos de asistencia lingüística. [facility] [phone].    We comply with applicable federal civil rights laws and [place] laws. We do not discriminate on the basis of race, color, national origin, age, disability, sex, sexual orientation, or gender identity.            Thank you!     Thank you for choosing [facility]  for your care. Our goal is always to provide you with excellent care. Hearing back from our patients is one way we can continue to improve our services. Please take a few minutes to complete the written survey that you may receive in the mail after your visit with us. Thank you!             Your Updated Medication List - Protect others around you: Learn how to safely use, store and throw away your medicines at www.disposemymeds.org.          This list is accurate as of 5/31/18  7:57 AM.  Always use your most recent med list.                   Brand Name Dispense Instructions for use Diagnosis    acetaminophen 325 MG tablet    TYLENOL     Take 1,300 mg by mouth 2 times daily        amiodarone 200 MG tablet    PACERONE/CODARONE    50 tablet    Take PO 1/2 tablet daily-Take extra prn per MD recommendations.    Paroxysmal atrial fibrillation (H)       ASPIRIN NOT PRESCRIBED    INTENTIONAL    0 each    1 each  daily Antiplatelet medication not prescribed intentionally due to Current anticoagulant therapy (warfarin/enoxaparin)    Atrial fibrillation, unspecified       cetirizine 10 MG tablet    zyrTEC    90 tablet    Take 1 tablet (10 mg) by mouth daily    Allergic rhinitis       cholecalciferol 1000 UNIT tablet    vitamin D3    180 tablet    Take 2 tablets (2,000 Units) by mouth daily    Vitamin D deficiency, Parathyroid hormone excess (H)       digoxin 125 MCG tablet    LANOXIN    45 tablet    Take 1 tablet (125 mcg) by mouth every 48 hours    Chronic systolic heart failure (H)       lisinopril 2.5 MG tablet    PRINIVIL/Zestril    90 tablet    Take 1 tablet (2.5 mg) by mouth daily    Nonrheumatic aortic valve stenosis       metoprolol succinate 100 MG 24 hr tablet    TOPROL-XL    90 tablet    Take 1 tablet (100 mg) by mouth daily    Paroxysmal atrial fibrillation (H)       omeprazole 20 MG CR capsule    priLOSEC    90 capsule    Take 1 capsule (20 mg) by mouth daily    Gastroesophageal reflux disease without esophagitis       polyethylene glycol powder    MIRALAX/GLYCOLAX     Take 17 g by mouth daily as needed for constipation        potassium chloride 10 MEQ tablet    K-TAB,KLOR-CON    90 tablet    Take 1 tablet (10 mEq) by mouth daily    Chronic systolic heart failure (H)       senna-docusate 8.6-50 MG per tablet    SENOKOT-S;PERICOLACE     Take 2 tablets by mouth daily        simethicone 80 MG chewable tablet    MYLICON    180 tablet    Take 1 tablet (80 mg) by mouth every 6 hours as needed for cramping    Abdominal bloating       simvastatin 20 MG tablet    ZOCOR    90 tablet    Take 1 tablet (20 mg) by mouth At Bedtime    Hyperlipidemia LDL goal <100       torsemide 20 MG tablet    DEMADEX    90 tablet    Take 1 tablet (20 mg) by mouth daily    Cardiomyopathy, unspecified type (H), Chronic systolic heart failure (H)       VIAGRA 25 MG tablet   Generic drug:  sildenafil      Take 25 mg by mouth as needed         warfarin 5 MG tablet    COUMADIN    90 tablet    5 mg daily    Long-term (current) use of anticoagulants, Paroxysmal ventricular tachycardia (H)

## 2018-06-01 DIAGNOSIS — G47.33 OSA (OBSTRUCTIVE SLEEP APNEA): Primary | ICD-10-CM

## 2018-06-06 ENCOUNTER — PRE VISIT (OUTPATIENT)
Dept: CARDIOLOGY | Facility: CLINIC | Age: 78
End: 2018-06-06

## 2018-06-06 DIAGNOSIS — I48.0 PAROXYSMAL ATRIAL FIBRILLATION (H): ICD-10-CM

## 2018-06-06 RX ORDER — AMIODARONE HYDROCHLORIDE 200 MG/1
TABLET ORAL
Qty: 50 TABLET | Refills: 0 | Status: SHIPPED | OUTPATIENT
Start: 2018-06-06 | End: 2018-06-06

## 2018-06-06 RX ORDER — AMIODARONE HYDROCHLORIDE 200 MG/1
TABLET ORAL
Qty: 50 TABLET | Refills: 0 | Status: SHIPPED | OUTPATIENT
Start: 2018-06-06 | End: 2018-12-17

## 2018-06-06 RX ORDER — AMIODARONE HYDROCHLORIDE 200 MG/1
TABLET ORAL
Qty: 50 TABLET | Refills: 3 | Status: SHIPPED | OUTPATIENT
Start: 2018-06-06 | End: 2018-06-06

## 2018-06-06 NOTE — TELEPHONE ENCOUNTER
TAVR PRE-VISIT : per Dr. Aden   STS score: 2.4%  Morbidity or Mortality: 19.286%     HPI: 5/23/18 with Dr. Aden   I had the pleasure of seeing Shahid Villalta in Cardiology Clinic in followup, a 78-year-old male with history of near severe aortic valve stenosis, mild cardiomyopathy, coronary artery disease, paroxysmal atrial fibrillation and tachycardia status post AV dario ablation and post-biventricular AICD placement, previous SVT ablation, returns for followup.  He is on amiodarone for management of atrial arrhythmias.  He is here for a six-month follow but although is complaining of some increasing shortness of breath compared to last year this time.  There is no chest pain.  No orthopnea or PND.     I reviewed and discussed her echocardiogram results done recently.  It shows decrease in ejection fraction to 35% to 40% compared to 45%.  There is again moderate to severe or near severe aortic stenosis.  The aortic valve area index is 0.57 cm .  The mean gradient is 36 although the aortic regurgitation appears worse compared to prior study.  It is now 3+.  However, he has to list the predominant lesion in him.  His BMP was reviewed which shows a baseline creatinine 1.49 unchanged from before.  His TSH was normal.      PHYSICAL EXAMINATION:   See below      IMPRESSION:   1.  Near severe aortic stenosis based on base of aortic valve area index of less than 0.6 cm  by meter square and moderately severe aortic valve regurgitation     Patient is now more symptomatic than last year.  Given these findings, I will ask him to proceed with TAVR workup and see Dr. Nation in the TAVR clinic.    The decrease in EF is also somewhat concerning and therefore I think we should consider valve replacement this time if agreeable by the structural heart team.  He understands that he will need a CT [place] as well as coronary angiography prior to that.  Given his abnormal creatinine, there is some risk of dye-induced nephropathy and they  might have to use a hydration as well as low-dose protocol.            2.  Mild to moderate ischemic cardiomyopathy, stable now 35-40%.  Thus he has chronic systolic dysfunction without overt congestive heart failure.  He will need coronary angiography to assess for any progression of CAD.       3.  Paroxysmal atrial tachycardia and fibrillation, stable and in sinus rhythm on amiodarone 100 mg daily.  He will need PFTs in 6 months.  we will repeat TSH in 6 months.      4.  Chronic renal insufficiency, stable.       5.  Hypertension, well controlled on lisinopril and metoprolol.       6.  Status post BiV AICD.  Continue followup in the Device Clinic.         Thank you for allowing us to part spelled in the care of this nice patient with multiple complex issues. I spent a long time discussing the implications of worsening aortic valve disease, transfemoral aortic valve replacement, reviewed his echo from last week and the pre-valve replacement workup that is going to happen.  I would like to see him back in 6 months and I anticipate his valve will be replaced by then.  We will do PFTs, TSH and BMP in 6 months when I see him.     Sincerely,       KHAI WAGNER MD      ECHO: 5/18/18 read by Dr. Bustillos  Interpretation Summary     The visual ejection fraction is estimated at 35%.  There is mild to moderate concentric left ventricular hypertrophy.  Severe mixed aortic stensosis and aortic insufficiency.     There is moderate global hypokinesia of the left ventricle.  The right ventricle is mildly dilated.  There is a catheter/pacemaker lead seen in the right ventricle.  The right ventricular systolic function is mild to moderately reduced.  The left atrium is moderately dilated.  There is moderate to severe mitral annular calcification.  The mean mitral valve gradient is 3mmHg.  Moderate (46-55mmHg) pulmonary hypertension is present.  The mean AoV pressure gradient is 36mmHg.  Moderate to severe valvular aortic  stenosis.  There is moderately severe (3+) aortic regurgitation. In the parasternal view  the vena contracta is between 35-50% of the outflow tract daimeter.  There is an eccentric jet of aortic insufficiency directed against the  anterior mitral leaflet.  Mild aortic root dilatation.  The ascending aorta is Mildly dilated.     Compared side by side with the prior study LVEF not definitetely worse, but  appears previous EF is overestimated. Other findings similar.      CT angiogram:not of file.       Heart cath: not of file.       Carotid U/S: not of file.       PFT's: 5/9/17  Component Value Flag Ref Range Units Status Collected Lab   FVC-Pred 3.18   L  05/09/2017 12:29    FVC-Pre 2.53   L  05/09/2017 12:29    FVC-%Pred-Pre 79   %  05/09/2017 12:29    FEV1-Pre 1.75   L  05/09/2017 12:29    FEV1-%Pred-Pre 71   %  05/09/2017 12:29    FEV1FVC-Pred 77   %  05/09/2017 12:29    FEV1FVC-Pre 69   %  05/09/2017 12:29    FEFMax-Pred 6.83   L/sec  05/09/2017 12:29    FEFMax-Pre 6.63   L/sec  05/09/2017 12:29    FEFMax-%Pred-Pre 97   %  05/09/2017 12:29    FEF2575-Pred 1.88   L/sec  05/09/2017 12:29    FEF2575-Pre 1.01   L/sec  05/09/2017 12:29    VQG3940-%Pred-Pre 53   %  05/09/2017 12:29    ExpTime-Pre 9.52   sec  05/09/2017 12:29    FIFMax-Pre 3.47   L/sec  05/09/2017 12:29    VC-Pred 3.85   L  05/09/2017 12:29    VC-Pre 2.60   L  05/09/2017 12:29    VC-%Pred-Pre 67   %  05/09/2017 12:29    IC-Pred 3.74   L  05/09/2017 12:29    IC-Pre 2.29   L  05/09/2017 12:29    IC-%Pred-Pre 61   %  05/09/2017 12:29    ERV-Pred 0.11   L  05/09/2017 12:29    ERV-Pre 0.31   L  05/09/2017 12:29    ERV-%Pred-Pre 282   %  05/09/2017 12:29    FEV1FEV6-Pred 77   %  05/09/2017 12:29    FEV1FEV6-Pre 73   %  05/09/2017 12:29    FRCPleth-Pred 3.48   L  05/09/2017 12:29    FRCPleth-Pre 3.29    L  05/09/2017 12:29    FRCPleth-%Pred-Pre 94   %  05/09/2017 12:29    RVPleth-Pred 2.60   L  05/09/2017 12:29    RVPleth-Pre 2.98   L  05/09/2017 12:29    RVPleth-%Pred-Pre 114   %  05/09/2017 12:29    TLCPleth-Pred 6.21   L  05/09/2017 12:29    TLCPleth-Pre 5.58   L  05/09/2017 12:29    TLCPleth-%Pred-Pre 89   %  05/09/2017 12:29    DLCOunc-Pred 23.08   ml/min/mmHg  05/09/2017 12:29    DLCOunc-Pre 21.01   ml/min/mmHg  05/09/2017 12:29    DLCOunc-%Pred-Pre 91   %  05/09/2017 12:29    VA-Pre 4.03   L  05/09/2017 12:29    VA-%Pred-Pre 67   %  05/09/2017 12:29    FEV1SVC-Pred 63   %  05/09/2017 12:29    FEV1SVC-Pre 67   %  05/09/2017 12:29 PM

## 2018-06-14 ENCOUNTER — OFFICE VISIT (OUTPATIENT)
Dept: CARDIOLOGY | Facility: CLINIC | Age: 78
End: 2018-06-14
Attending: INTERNAL MEDICINE
Payer: COMMERCIAL

## 2018-06-14 ENCOUNTER — APPOINTMENT (OUTPATIENT)
Dept: CARDIOLOGY | Facility: CLINIC | Age: 78
End: 2018-06-14
Payer: COMMERCIAL

## 2018-06-14 VITALS
BODY MASS INDEX: 36.04 KG/M2 | WEIGHT: 237.8 LBS | DIASTOLIC BLOOD PRESSURE: 72 MMHG | HEIGHT: 68 IN | HEART RATE: 72 BPM | SYSTOLIC BLOOD PRESSURE: 122 MMHG

## 2018-06-14 DIAGNOSIS — R09.89 ABNORMAL CHEST SOUNDS: ICD-10-CM

## 2018-06-14 DIAGNOSIS — I35.0 AORTIC STENOSIS: Primary | ICD-10-CM

## 2018-06-14 DIAGNOSIS — I35.0 NONRHEUMATIC AORTIC VALVE STENOSIS: ICD-10-CM

## 2018-06-14 PROCEDURE — 93005 ELECTROCARDIOGRAM TRACING: CPT | Performed by: INTERNAL MEDICINE

## 2018-06-14 PROCEDURE — 99204 OFFICE O/P NEW MOD 45 MIN: CPT | Performed by: INTERNAL MEDICINE

## 2018-06-14 NOTE — LETTER
6/14/2018    Gume Brown MD  [id number]  [facility] [id number]    RE: Shahid Villalta       Dear Colleague,    I had the pleasure of seeing Shahid Villalta in the [facility].      Structural Heart Cardiology Consultation     Assessment & Plan     1.  Moderate to Severe AS (mean gradient of 36 mmHg, variable on different studies)  2.  Moderate to Severe AR  3.  Moderate MR  4.  Cardiomyopathy with reduction in EF recently  5.  PAT and Atrial fibrillation,   6.  S/P BiV ICD S/P AV node ablation.  7.  CRI  8.  CAD with  of RCA 2013  9.  Prostate Ca with good short term prognosis    Recommendations    1.  Multivalvular Heart disease.  We discussed TAVR as a potential option to address his Aortic stenosis.  Given his co-morbidities and elevated STS score, this would be preferable option.    2.  Additional studies will be needed.  3.  Will order DEMETRIUS for MR and AS/AR assessment.  He has full neck mobility and no difficulty swallowing liquids or solids.  4.  Following that we will perform right and left coronary angiography.  Risks,benefits, and potential complications discussed with patient and he is willing to proceed.  5.  Eventually will need TAVR CT.  Will need to spread out contrast exposure and can use low dose CT protocol  6.  I will then review his serial echocardiogram studies.    7. Will then present to combined CTS/Cardiology conference to get a consensus opinion.         Thank you kindly for consult.      Catia Nation MD      HPI:    Patient is a 77 yo male who follows with my colleague Dr. Aden.  His cardiac history is notable for known CAD and variable cardiomyopathy on serial echo assessments, PAF, AV dario ablation and Bi-V ICD placement.  Patient has been complaining of SOB for the last 6 months. No CP, or syncope.  He has had PFT`s which have been stable.  During this time frame he has had a decrease in his EF with worsening of his AR.  Due to this presents  for evaluation in the TAVR clinic.  Jointly seen with my CTS colleague.     Carotid US:  Clinical history: ; Abnormal chest sounds     Comparison Study: 10/29/2014         Impression:  No hemodynamically significant obstructive carotid artery disease  (less than 50% stenosis bilaterally).     Calcified plaque was again visualized in bilateral carotid artery  bulbs.     Compared to previous study 10/29/2014, no significant change    Echo:   The visual ejection fraction is estimated at 35%.  There is mild to moderate concentric left ventricular hypertrophy.  Severe mixed aortic stensosis and aortic insufficiency.     There is moderate global hypokinesia of the left ventricle.  The right ventricle is mildly dilated.  There is a catheter/pacemaker lead seen in the right ventricle.  The right ventricular systolic function is mild to moderately reduced.  The left atrium is moderately dilated.  There is moderate to severe mitral annular calcification.  The mean mitral valve gradient is 3mmHg.  Moderate (46-55mmHg) pulmonary hypertension is present.  The mean AoV pressure gradient is 36mmHg.  Moderate to severe valvular aortic stenosis.  There is moderately severe (3+) aortic regurgitation. In the parasternal view  the vena contracta is between 35-50% of the outflow tract daimeter.  There is an eccentric jet of aortic insufficiency directed against the  anterior mitral leaflet.  Mild aortic root dilatation.  The ascending aorta is Mildly dilated.     Compared side by side with the prior study LVEF not definitetely worse, but  appears previous EF is overestimated. Other findings similar.    Primary Care Physician   Gume Brown      Patient Active Problem List   Diagnosis     Atrial fibrillation (H)     Anticoagulation monitoring, INR range 2-3     Hyperlipidemia LDL goal <100     GERD (gastroesophageal reflux disease)     Prostate cancer     Microalbuminuria     Impaired fasting glucose     Parathyroid hormone excess  (H)     Vitamin D deficiency     Advanced directives, counseling/discussion     Aortic insufficiency with aortic stenosis     Hypertension goal BP (blood pressure) < 140/90     Health Care Home     Ventricular tachycardia     CKD (chronic kidney disease) stage 3, GFR 30-59 ml/min     Cardiomyopathy (H)     Automatic implantable cardioverter-defibrillator in situ     Aortic valve stenosis     Paroxysmal ventricular tachycardia (H)     Atrial tachycardia, paroxysmal (H)     Cardiomyopathy in other diseases classified elsewhere     Need for prophylactic measure     Anemia     Coronary artery disease     Pelvic hematoma, male     S/P prostatectomy     Long-term (current) use of anticoagulants [Z79.01]     Chronic systolic congestive heart failure (H)     Pulmonary hypertension     Nonrheumatic aortic valve insufficiency     SHIMON (obstructive sleep apnea)       Past Medical History   I have reviewed this patient's medical history and updated it with pertinent information if needed.   Past Medical History:   Diagnosis Date     Aortic valve disorders      Aortic valve disorders      Arrhythmia      Atrial fibrillation (H)      Atrial fibrillation (H)      Automatic implantable cardiac defibrillator in situ      Cancer (H)      Congestive heart failure (H)      Coronary artery disease      Essential hypertension, benign      GERD (gastroesophageal reflux disease) 3/16/2010     Heart failure (H)      History of blood transfusion      Hyperlipidaemia      Hypertension      Obese      Other and unspecified hyperlipidemia      Other primary cardiomyopathies      Other primary cardiomyopathies      Pacemaker      Sleep apnea      Small bowel obstruction 12/14/2015     Ventricular tachycardia (H) 6/17/2013       Past Surgical History   I have reviewed this patient's surgical history and updated it with pertinent information if needed.  Past Surgical History:   Procedure Laterality Date     BIOPSY  annually    prostate biopsy      "CARDIAC SURGERY  2012    A fib Ablation     CARDIAC SURGERY      cardioversion     COLONOSCOPY       DAVINCI PROSTATECTOMY N/A 2015    Procedure: DAVINCI PROSTATECTOMY;  Surgeon: Nahun Hilario MD;  Location: [facility] OR     GENITOURINARY SURGERY      bladder surgery      H ABLATION AV NODE  13     H ABLATION FOCAL AFIB  13     HC TOOTH EXTRACTION W/FORCEP       TONSILLECTOMY & ADENOIDECTOMY         Prior to Admission Medications   Cannot display prior to admission medications because the patient has not been admitted in this contact.     [unfilled]  @[facility]@  Allergies   Allergies   Allergen Reactions     Latex Rash     Seasonal Allergies      hayfever - wheezing -        Social History    reports that he has never smoked. He has never used smokeless tobacco. He reports that he does not drink alcohol or use illicit drugs.    Family History   Family History   Problem Relation Age of Onset     C.A.D. Father      CHF  at 74     Cerebrovascular Disease Father      C.A.D. Mother      CHF at 91 still living     Cerebrovascular Disease Mother      TIAs     Breast Cancer Mother      HEART DISEASE Son      arrythmia     Family History Negative Sister      Family History Negative Brother      Family History Negative Son        Review of Systems   The comprehensive 10 point Review of Systems is negative other than noted in the HPI or here.     Physical Exam     Vitals: /72  Pulse 72  Ht 1.727 m (5' 8\")  Wt 107.9 kg (237 lb 12.8 oz)  BMI 36.16 kg/m2    Constitutional:    overweight       Skin:  warm and dry to the touch           Head:  normocephalic         Eyes:  pupils equal and round         Lymph:       ENT:  no pallor or cyanosis         Neck:  carotid pulses are full and equal bilaterally;JVP normal         Respiratory:  clear to auscultation          Cardiac: normal S1 and S2   , soft A2   systolic ejection murmur;crescendo;grade 3;radiation to the carotid   diastolic " murmur;early diastolic murmur;grade 2;RUSB    not assessed this visit                                         GI:  abdomen soft;BS normoactive         Extremities and Muscular Skeletal:      trace;bilateral LE edema           Neurological:  no gross motor deficits         Psych:  Alert and Oriented x 3       @LABRCNTIPR(tropi:5,troponinies:5)@    @LABRCNTIPR(wbc:3,hgb:3,mcv:3,plt:3,inr:3,na:3,potassium:3,chloride:3,co2:3,bun:3,cr:3,gfrestimated:3,gfrestblack:3,aniongap:3,chiquis:3,glc:3,albumin:2,prottotal:2,bilitotal:2,alkphos:2,alt:2,ast:2,lipase:2,tropi:3)@  Recent Labs   Lab Test  10/15/14   0953  10/29/13   0953   CHOL  125  133   HDL  41  47   LDL  66  70   TRIG  92  83   CHOLHDLRATIO  3.0  2.8     @LABRCNTIP(wbc:3,hgb:3,hct:3,mcv:3,plt:3,iron:3,ironsat:3,reticabsct:3,retp:3,feb:3,lola:3,b12:3,folic:3,epoe:3,morph:3)@  @LABRCNTIP(PH:3,PHV:3,PO2:3,PO2V:3,sat:3,PCO2:3,PCO2V:3,HCO3:3,HCO3V:3)@  @LABRCNTIP(NTBNPI:3,NTBNP:3)@  @LABRCNTIP(DD:1)@  @LABRCNTIP(sed:3,crp:3)@  @LABRCNTIP(PLT:3)@  @LABRCNTIP(TSH:3)@  @LABRCNTIP(color:1,appearance:1,urineg,urinebili:1,urineketone:1,s,ubld:1,urineph:1,protein:1,urobilinogen:1,nitrite:1,leukest:1,rbcu:1,wbcu:1)@    Imaging:  No results found for this or any previous visit (from the past 48 hour(s)).      Thank you for allowing me to participate in the care of your patient.      Sincerely,     Catia Nation MD     [facility]    cc:   Adiel Aden MD  [id number]  [id number]  [place] [address]

## 2018-06-14 NOTE — MR AVS SNAPSHOT
After Visit Summary   6/14/2018    Shahid Villalta    MRN: [id number]           Patient Information     Date Of Birth          1940        Visit Information        Provider Department      6/14/2018 12:45 PM Catia Nation MD [facility]   [place]        Today's Diagnoses     Nonrheumatic aortic valve stenosis           Follow-ups after your visit        Your next 10 appointments already scheduled     Jun 29, 2018  8:30 AM CDT   Ech Jt with SHECHR2   [facility] ([facility])    [address] [place] [address]   [phone]           1.  Please bring or wear a comfortable two-piece outfit. 2.  Arrival time: -   [facility]:  arrive 75 minutes prior to examination time. -   [facility]:  arrive 90 minutes prior to examination time. -   [facility]:   arrive 15 minutes prior to examination time. 3.  Plan to have someone here to drive you home after the test. -   Someone should stay with you for 6 hours after your test. 4.  No food or drink: -   6 hours before the test 5.  If you take antacids or water pills (diuretics): Do not take them until after your test. You may take blood pressure medicine with a few sips of water. 6.  If you have diabetes: -   Morning slots preferred -   If you take insulin, call your diabetes care team. Ask if you should take a   dose the morning of your test. -   If you take diabetes medicine by mouth, don't take it on the morning of your test. Bring it with you to take after the test. (If you have questions, call your diabetes care team.) 7.  Bring a list of any medicines you are taking. 8.  Do not drive for 24 hours after the test. 9.   A responsible adult must stay with you for 24 hours after the test.  10.  For any questions that cannot be answered, please contact the ordering physician            Jun 29, 2018 10:00 AM CDT   Cath 90 Minute with SHCVR1   [facility] Cardiac  Catheterization Lab ([facility])    [address]  [place] [address]   [phone]            Jul 09, 2018  9:00 AM CDT   Return Visit with Mira Norwood PA-C   [facility] ([facility])    [address]  [place] [address]   [phone]            Sep 12, 2018  4:30 PM CDT   Remote ICD Check with STEWART DCR2   [facility] ([facility])    [address]  [place] [address]   [phone] OPT 2           This appointment is for a remote check of your debrillator.  This is not an appointment at the office.            Nov 09, 2018  9:00 AM CST   Return Visit with RH ONCOLOGY NURSE   [facility] ([facility])    [facility]  [address]  [facility] [address]   [phone]            Nov 14, 2018  9:30 AM CST   Return Visit with Nahun Hilario MD   [facility] ([facility])    [facility]  [address]  [address]  [facility] [address]   [phone]              Who to contact     If you have questions or need follow up information about today's clinic visit or your schedule please contact [facility] directly at [phone].  Normal or non-critical lab and imaging results will be communicated to you by MyChart, letter or phone within 4 business days after the clinic has received the results. If you do not hear from us within 7 days, please contact the clinic through MyChart or phone. If you have a critical or abnormal lab result, we will notify you by phone as soon as possible.  Submit refill requests through [employer] or call your pharmacy and they will forward the refill request to us. Please allow 3 business days for your refill to be completed.          Additional  "Information About Your Visit        MyChart Information     [employer] gives you secure access to your electronic health record. If you see a primary care provider, you can also send messages to your care team and make appointments. If you have questions, please call your primary care clinic.  If you do not have a primary care provider, please call [phone] and they will assist you.        Care EveryWhere ID     This is your Care EveryWhere ID. This could be used by other organizations to access your [facility] medical records  [id number]        Your Vitals Were     Pulse Height BMI (Body Mass Index)             72 1.727 m (5' 8\") 36.16 kg/m2          Blood Pressure from Last 3 Encounters:   06/14/18 122/72   05/31/18 118/66   05/23/18 110/70    Weight from Last 3 Encounters:   06/14/18 107.9 kg (237 lb 12.8 oz)   05/31/18 110.7 kg (244 lb)   05/23/18 111.4 kg (245 lb 11.2 oz)              We Performed the Following     EKG 12-lead complete w/read - Clinics (performed today)     Follow-Up with Cardiac Structural Heart Clinic        Primary Care Provider Office Phone # Fax #    Gume Brown [phone] [address]  [place] [id number]        Equal Access to Services     [place]USTA SHEARER AH: Hadii aad ku hadasho Soomaali, waaxda luqadaha, qaybta kaalmada adeegyada, emily girard haysee garcia. So [facility] [phone].    ATENCIÓN: Si habla español, tiene a irving disposición servicios gratuitos de asistencia lingüística. Llame al [phone].    We comply with applicable federal civil rights laws and [place] laws. We do not discriminate on the basis of race, color, national origin, age, disability, sex, sexual orientation, or gender identity.            Thank you!     Thank you for choosing [facility]  for your care. Our goal is always to provide you with excellent care. Hearing back from our patients is one way we can continue to improve our " services. Please take a few minutes to complete the written survey that you may receive in the mail after your visit with us. Thank you!             Your Updated Medication List - Protect others around you: Learn how to safely use, store and throw away your medicines at www.disposemymeds.org.          This list is accurate as of 6/14/18 11:59 PM.  Always use your most recent med list.                   Brand Name Dispense Instructions for use Diagnosis    acetaminophen 325 MG tablet    TYLENOL     Take 1,300 mg by mouth 2 times daily        amiodarone 200 MG tablet    PACERONE/CODARONE    50 tablet    Take PO 1/2 tablet daily-Take extra prn per MD recommendations.    Paroxysmal atrial fibrillation (H)       ASPIRIN NOT PRESCRIBED    INTENTIONAL    0 each    1 each daily Antiplatelet medication not prescribed intentionally due to Current anticoagulant therapy (warfarin/enoxaparin)    Atrial fibrillation, unspecified       cetirizine 10 MG tablet    zyrTEC    90 tablet    Take 1 tablet (10 mg) by mouth daily    Allergic rhinitis       cholecalciferol 1000 UNIT tablet    vitamin D3    180 tablet    Take 2 tablets (2,000 Units) by mouth daily    Vitamin D deficiency, Parathyroid hormone excess (H)       digoxin 125 MCG tablet    LANOXIN    45 tablet    Take 1 tablet (125 mcg) by mouth every 48 hours    Chronic systolic heart failure (H)       lisinopril 2.5 MG tablet    PRINIVIL/Zestril    90 tablet    Take 1 tablet (2.5 mg) by mouth daily    Nonrheumatic aortic valve stenosis       metoprolol succinate 100 MG 24 hr tablet    TOPROL-XL    90 tablet    Take 1 tablet (100 mg) by mouth daily    Paroxysmal atrial fibrillation (H)       omeprazole 20 MG CR capsule    priLOSEC    90 capsule    Take 1 capsule (20 mg) by mouth daily    Gastroesophageal reflux disease without esophagitis       polyethylene glycol powder    MIRALAX/GLYCOLAX     Take 17 g by mouth daily as needed for constipation        potassium chloride 10 MEQ  tablet    K-TAB,KLOR-CON    90 tablet    Take 1 tablet (10 mEq) by mouth daily    Chronic systolic heart failure (H)       senna-docusate 8.6-50 MG per tablet    SENOKOT-S;PERICOLACE     Take 2 tablets by mouth daily        simethicone 80 MG chewable tablet    MYLICON    180 tablet    Take 1 tablet (80 mg) by mouth every 6 hours as needed for cramping    Abdominal bloating       simvastatin 20 MG tablet    ZOCOR    90 tablet    Take 1 tablet (20 mg) by mouth At Bedtime    Hyperlipidemia LDL goal <100       torsemide 20 MG tablet    DEMADEX    90 tablet    Take 1 tablet (20 mg) by mouth daily    Chronic systolic heart failure (H), Ischemic cardiomyopathy       VIAGRA 25 MG tablet   Generic drug:  sildenafil      Take 25 mg by mouth as needed        warfarin 5 MG tablet    COUMADIN    90 tablet    5 mg daily    Long-term (current) use of anticoagulants, Paroxysmal ventricular tachycardia (H)

## 2018-06-14 NOTE — PROGRESS NOTES
Service Date: 06/14/2018      NEW OUTPATIENT EVALUATION.      DATE OF VISIT: 06/14/2018.      I was requested to see Mr. Villalta for evaluation of options for severe aortic stenosis.  Mr. Villalta is a pleasant 78-year-old gentleman with a past medical history of aortic stenosis, heart failure, coronary artery disease, who has had worsening shortness of breath, fatigue and tiredness over the last several months.  His effort runs approximately a block.  Denies any fever, chills, syncope, palpitation, or loss of consciousness.      PAST MEDICAL HISTORY:  Aortic stenosis, cardiomyopathy, coronary artery disease, atrial fibrillation status post AV node ablation, status post biventricular pacing.      MEDICATIONS:  Coumadin, amiodarone, digoxin, aspirin, Toprol , simvastatin, and torsemide.      SOCIAL HISTORY:  Denies current alcohol or tobacco abuse.      PAST SURGICAL HISTORY:  BiV AICD.      ALLERGIES:  None.      REVIEW OF SYSTEMS:   CONSTITUTIONAL:  Fatigue, tiredness.   RESPIRATORY:  Short of breath.   CARDIOVASCULAR:  Aortic stenosis, atrial fibrillation.   GENITOURINARY:  None.   GASTROINTESTINAL:  None.   ENDOCRINE:  None.   MUSCULOSKELETAL:  None.   NEUROLOGICAL: None.   PSYCHIATRIC:  None.   INTEGUMENT:  None.   EYES:  None.   ENT:  None.      PHYSICAL EXAMINATION:   VITAL SIGNS:  Afebrile, blood pressure 110/74, heart rate 68.   GENERAL:  The patient is awake, alert, oriented x3, appears comfortable at rest.   CARDIOVASCULAR SYSTEM:  S1, S2 normal, no S3, 3/6 systolic murmur left sternal border.   RESPIRATORY SYSTEM:  Bilateral rhonchi or crepitations.   ABDOMEN:  Bowel sounds present, nontender.   LOWER EXTREMITIES:  Warm, well perfused,  pedal edema.   NEUROLOGICAL:  No focal motor deficits.      LABORATORY DATA:  Electrolytes within normal limits.  White cell count within normal limits.  INR 3.1.  Echocardiogram shows ejection fraction 35%, mild to moderate concentric LV hypertrophy, severe mixed aortic  stenosis and aortic insufficiency.  Right ventricle is mildly dilated.  Moderate pulmonary hypertension, mean mitral valve gradient is 3 mm, moderate to severe mitral annular calcification, moderate mitral regurgitation.      ASSESSMENT AND PLAN:  A 78-year-old gentleman with severe mixed aortic stenosis with LV dysfunction.  The patient is low risk for aortic valve replacement with STS score of 2.4%.  He will need a coronary angiogram and transthoracic echo before deciding on the optimal plan of management.  We discussed this with the patient.  He has agreed to proceed with this. If he has  any questions with his care contact me.  The patient was seen with Dr. Hermann Nation.         MARGOT MCGEE MD             D: 2018   T: 2018   MT: SHABNAM      Name:     LUCIO VINCENT   MRN:      [id number]        Account:      [id number]   :      1940           Service Date: 2018      Document: [id number]       cc: Gume Aden MD       [facility] Billing

## 2018-06-14 NOTE — LETTER
6/14/2018    Gume Brown MD  [address]  [facility] [address]    RE: Shahid Villalta       Dear Colleague,    I had the pleasure of seeing Shahid Villalta in the [facility].      Structural Heart Cardiology Consultation     Assessment & Plan     1.  Moderate to Severe AS (mean gradient of 36 mmHg, variable on different studies)  2.  Moderate to Severe AR  3.  Moderate MR  4.  Cardiomyopathy with reduction in EF recently  5.  PAT and Atrial fibrillation,   6.  S/P BiV ICD S/P AV node ablation.  7.  CRI  8.  CAD with  of RCA 2013  9.  Prostate Ca with good short term prognosis    Recommendations    1.  Multivalvular Heart disease.  We discussed TAVR as a potential option to address his Aortic stenosis.  Given his co-morbidities and elevated STS score, this would be preferable option.    2.  Additional studies will be needed.  3.  Will order DEMETRIUS for MR and AS/AR assessment.  He has full neck mobility and no difficulty swallowing liquids or solids.  4.  Following that we will perform right and left coronary angiography.  Risks,benefits, and potential complications discussed with patient and he is willing to proceed.  5.  Eventually will need TAVR CT.  Will need to spread out contrast exposure and can use low dose CT protocol  6.  I will then review his serial echocardiogram studies.    7. Will then present to combined CTS/Cardiology conference to get a consensus opinion.         Thank you kindly for consult.      Catia Nation MD      HPI:    Patient is a 79 yo male who follows with my colleague Dr. Aden.  His cardiac history is notable for known CAD and variable cardiomyopathy on serial echo assessments, PAF, AV dario ablation and Bi-V ICD placement.  Patient has been complaining of SOB for the last 6 months. No CP, or syncope.  He has had PFT`s which have been stable.  During this time frame he has had a decrease in his EF with worsening of his AR.  Due to this presents  for evaluation in the TAVR clinic.  Jointly seen with my CTS colleague.     Carotid US:  Clinical history: ; Abnormal chest sounds     Comparison Study: 10/29/2014         Impression:  No hemodynamically significant obstructive carotid artery disease  (less than 50% stenosis bilaterally).     Calcified plaque was again visualized in bilateral carotid artery  bulbs.     Compared to previous study 10/29/2014, no significant change    Echo:   The visual ejection fraction is estimated at 35%.  There is mild to moderate concentric left ventricular hypertrophy.  Severe mixed aortic stensosis and aortic insufficiency.     There is moderate global hypokinesia of the left ventricle.  The right ventricle is mildly dilated.  There is a catheter/pacemaker lead seen in the right ventricle.  The right ventricular systolic function is mild to moderately reduced.  The left atrium is moderately dilated.  There is moderate to severe mitral annular calcification.  The mean mitral valve gradient is 3mmHg.  Moderate (46-55mmHg) pulmonary hypertension is present.  The mean AoV pressure gradient is 36mmHg.  Moderate to severe valvular aortic stenosis.  There is moderately severe (3+) aortic regurgitation. In the parasternal view  the vena contracta is between 35-50% of the outflow tract daimeter.  There is an eccentric jet of aortic insufficiency directed against the  anterior mitral leaflet.  Mild aortic root dilatation.  The ascending aorta is Mildly dilated.     Compared side by side with the prior study LVEF not definitetely worse, but  appears previous EF is overestimated. Other findings similar.    Primary Care Physician   Gume Brown      Patient Active Problem List   Diagnosis     Atrial fibrillation (H)     Anticoagulation monitoring, INR range 2-3     Hyperlipidemia LDL goal <100     GERD (gastroesophageal reflux disease)     Prostate cancer     Microalbuminuria     Impaired fasting glucose     Parathyroid hormone excess  (H)     Vitamin D deficiency     Advanced directives, counseling/discussion     Aortic insufficiency with aortic stenosis     Hypertension goal BP (blood pressure) < 140/90     Health Care Home     Ventricular tachycardia     CKD (chronic kidney disease) stage 3, GFR 30-59 ml/min     Cardiomyopathy (H)     Automatic implantable cardioverter-defibrillator in situ     Aortic valve stenosis     Paroxysmal ventricular tachycardia (H)     Atrial tachycardia, paroxysmal (H)     Cardiomyopathy in other diseases classified elsewhere     Need for prophylactic measure     Anemia     Coronary artery disease     Pelvic hematoma, male     S/P prostatectomy     Long-term (current) use of anticoagulants [Z79.01]     Chronic systolic congestive heart failure (H)     Pulmonary hypertension     Nonrheumatic aortic valve insufficiency     SHIMON (obstructive sleep apnea)       Past Medical History   I have reviewed this patient's medical history and updated it with pertinent information if needed.   Past Medical History:   Diagnosis Date     Aortic valve disorders      Aortic valve disorders      Arrhythmia      Atrial fibrillation (H)      Atrial fibrillation (H)      Automatic implantable cardiac defibrillator in situ      Cancer (H)      Congestive heart failure (H)      Coronary artery disease      Essential hypertension, benign      GERD (gastroesophageal reflux disease) 3/16/2010     Heart failure (H)      History of blood transfusion      Hyperlipidaemia      Hypertension      Obese      Other and unspecified hyperlipidemia      Other primary cardiomyopathies      Other primary cardiomyopathies      Pacemaker      Sleep apnea      Small bowel obstruction 12/14/2015     Ventricular tachycardia (H) 6/17/2013       Past Surgical History   I have reviewed this patient's surgical history and updated it with pertinent information if needed.  Past Surgical History:   Procedure Laterality Date     BIOPSY  annually    prostate biopsy      "CARDIAC SURGERY  2012    A fib Ablation     CARDIAC SURGERY      cardioversion     COLONOSCOPY       DAVINCI PROSTATECTOMY N/A 2015    Procedure: DAVINCI PROSTATECTOMY;  Surgeon: Nahun Hilario MD;  Location: [facility] OR     GENITOURINARY SURGERY      bladder surgery      H ABLATION AV NODE  13     H ABLATION FOCAL AFIB  13     HC TOOTH EXTRACTION W/FORCEP       TONSILLECTOMY & ADENOIDECTOMY         Prior to Admission Medications   Cannot display prior to admission medications because the patient has not been admitted in this contact.     [unfilled]  @[facility]@  Allergies   Allergies   Allergen Reactions     Latex Rash     Seasonal Allergies      hayfever - wheezing -        Social History    reports that he has never smoked. He has never used smokeless tobacco. He reports that he does not drink alcohol or use illicit drugs.    Family History   Family History   Problem Relation Age of Onset     C.A.D. Father      CHF  at 74     Cerebrovascular Disease Father      C.A.D. Mother      CHF at 91 still living     Cerebrovascular Disease Mother      TIAs     Breast Cancer Mother      HEART DISEASE Son      arrythmia     Family History Negative Sister      Family History Negative Brother      Family History Negative Son        Review of Systems   The comprehensive 10 point Review of Systems is negative other than noted in the HPI or here.     Physical Exam     Vitals: /72  Pulse 72  Ht 1.727 m (5' 8\")  Wt 107.9 kg (237 lb 12.8 oz)  BMI 36.16 kg/m2    Constitutional:    overweight       Skin:  warm and dry to the touch           Head:  normocephalic         Eyes:  pupils equal and round         Lymph:       ENT:  no pallor or cyanosis         Neck:  carotid pulses are full and equal bilaterally;JVP normal         Respiratory:  clear to auscultation          Cardiac: normal S1 and S2   , soft A2   systolic ejection murmur;crescendo;grade 3;radiation to the carotid   diastolic " murmur;early diastolic murmur;grade 2;RUSB    not assessed this visit                                         GI:  abdomen soft;BS normoactive         Extremities and Muscular Skeletal:      trace;bilateral LE edema           Neurological:  no gross motor deficits         Psych:  Alert and Oriented x 3       @LABRCNTIPR(tropi:5,troponinies:5)@    @LABRCNTIPR(wbc:3,hgb:3,mcv:3,plt:3,inr:3,na:3,potassium:3,chloride:3,co2:3,bun:3,cr:3,gfrestimated:3,gfrestblack:3,aniongap:3,chiquis:3,glc:3,albumin:2,prottotal:2,bilitotal:2,alkphos:2,alt:2,ast:2,lipase:2,tropi:3)@  Recent Labs   Lab Test  10/15/14   0953  10/29/13   0953   CHOL  125  133   HDL  41  47   LDL  66  70   TRIG  92  83   CHOLHDLRATIO  3.0  2.8     @LABRCNTIP(wbc:3,hgb:3,hct:3,mcv:3,plt:3,iron:3,ironsat:3,reticabsct:3,retp:3,feb:3,lola:3,b12:3,folic:3,epoe:3,morph:3)@  @LABRCNTIP(PH:3,PHV:3,PO2:3,PO2V:3,sat:3,PCO2:3,PCO2V:3,HCO3:3,HCO3V:3)@  @LABRCNTIP(NTBNPI:3,NTBNP:3)@  @LABRCNTIP(DD:1)@  @LABRCNTIP(sed:3,crp:3)@  @LABRCNTIP(PLT:3)@  @LABRCNTIP(TSH:3)@  @LABRCNTIP(color:1,appearance:1,urineg,urinebili:1,urineketone:1,s,ubld:1,urineph:1,protein:1,urobilinogen:1,nitrite:1,leukest:1,rbcu:1,wbcu:1)@    Imaging:  No results found for this or any previous visit (from the past 48 hour(s)).      Thank you for allowing me to participate in the care of your patient.    Sincerely,     Catia Nation MD     [facility]

## 2018-06-15 ENCOUNTER — HOSPITAL ENCOUNTER (OUTPATIENT)
Dept: ULTRASOUND IMAGING | Facility: CLINIC | Age: 78
Discharge: HOME | End: 2018-06-15
Attending: INTERNAL MEDICINE | Admitting: INTERNAL MEDICINE
Payer: MEDICARE

## 2018-06-15 DIAGNOSIS — R09.89 ABNORMAL CHEST SOUNDS: ICD-10-CM

## 2018-06-15 PROCEDURE — 93880 EXTRACRANIAL BILAT STUDY: CPT

## 2018-06-15 PROCEDURE — 93880 EXTRACRANIAL BILAT STUDY: CPT | Mod: 26 | Performed by: INTERNAL MEDICINE

## 2018-06-20 NOTE — PROGRESS NOTES
Structural Heart Cardiology Consultation     Assessment & Plan     1.  Moderate to Severe AS (mean gradient of 36 mmHg, variable on different studies)  2.  Moderate to Severe AR  3.  Moderate MR  4.  Cardiomyopathy with reduction in EF recently  5.  PAT and Atrial fibrillation,   6.  S/P BiV ICD S/P AV node ablation.  7.  CRI  8.  CAD with  of RCA 2013  9.  Prostate Ca with good short term prognosis    Recommendations    1.  Multivalvular Heart disease.  We discussed TAVR as a potential option to address his Aortic stenosis.  Given his co-morbidities and elevated STS score, this would be preferable option.    2.  Additional studies will be needed.  3.  Will order DEMETRIUS for MR and AS/AR assessment.  He has full neck mobility and no difficulty swallowing liquids or solids.  4.  Following that we will perform right and left coronary angiography.  Risks,benefits, and potential complications discussed with patient and he is willing to proceed.  5.  Eventually will need TAVR CT.  Will need to spread out contrast exposure and can use low dose CT protocol  6.  I will then review his serial echocardiogram studies.    7. Will then present to combined CTS/Cardiology conference to get a consensus opinion.         Thank you kindly for consult.      Catia Nation MD      HPI:    Patient is a 79 yo male who follows with my colleague Dr. Aden.  His cardiac history is notable for known CAD and variable cardiomyopathy on serial echo assessments, PAF, AV dario ablation and Bi-V ICD placement.  Patient has been complaining of SOB for the last 6 months. No CP, or syncope.  He has had PFT`s which have been stable.  During this time frame he has had a decrease in his EF with worsening of his AR.  Due to this presents for evaluation in the TAVR clinic.  Jointly seen with my CTS colleague.     Carotid US:  Clinical history: ; Abnormal chest sounds     Comparison Study: 10/29/2014         Impression:  No hemodynamically  significant obstructive carotid artery disease  (less than 50% stenosis bilaterally).     Calcified plaque was again visualized in bilateral carotid artery  bulbs.     Compared to previous study 10/29/2014, no significant change    Echo:   The visual ejection fraction is estimated at 35%.  There is mild to moderate concentric left ventricular hypertrophy.  Severe mixed aortic stensosis and aortic insufficiency.     There is moderate global hypokinesia of the left ventricle.  The right ventricle is mildly dilated.  There is a catheter/pacemaker lead seen in the right ventricle.  The right ventricular systolic function is mild to moderately reduced.  The left atrium is moderately dilated.  There is moderate to severe mitral annular calcification.  The mean mitral valve gradient is 3mmHg.  Moderate (46-55mmHg) pulmonary hypertension is present.  The mean AoV pressure gradient is 36mmHg.  Moderate to severe valvular aortic stenosis.  There is moderately severe (3+) aortic regurgitation. In the parasternal view  the vena contracta is between 35-50% of the outflow tract daimeter.  There is an eccentric jet of aortic insufficiency directed against the  anterior mitral leaflet.  Mild aortic root dilatation.  The ascending aorta is Mildly dilated.     Compared side by side with the prior study LVEF not definitetely worse, but  appears previous EF is overestimated. Other findings similar.    Primary Care Physician   Gume Brown      Patient Active Problem List   Diagnosis     Atrial fibrillation (H)     Anticoagulation monitoring, INR range 2-3     Hyperlipidemia LDL goal <100     GERD (gastroesophageal reflux disease)     Prostate cancer     Microalbuminuria     Impaired fasting glucose     Parathyroid hormone excess (H)     Vitamin D deficiency     Advanced directives, counseling/discussion     Aortic insufficiency with aortic stenosis     Hypertension goal BP (blood pressure) < 140/90     Health Care Home      Ventricular tachycardia     CKD (chronic kidney disease) stage 3, GFR 30-59 ml/min     Cardiomyopathy (H)     Automatic implantable cardioverter-defibrillator in situ     Aortic valve stenosis     Paroxysmal ventricular tachycardia (H)     Atrial tachycardia, paroxysmal (H)     Cardiomyopathy in other diseases classified elsewhere     Need for prophylactic measure     Anemia     Coronary artery disease     Pelvic hematoma, male     S/P prostatectomy     Long-term (current) use of anticoagulants [Z79.01]     Chronic systolic congestive heart failure (H)     Pulmonary hypertension     Nonrheumatic aortic valve insufficiency     SHIMON (obstructive sleep apnea)       Past Medical History   I have reviewed this patient's medical history and updated it with pertinent information if needed.   Past Medical History:   Diagnosis Date     Aortic valve disorders      Aortic valve disorders      Arrhythmia      Atrial fibrillation (H)      Atrial fibrillation (H)      Automatic implantable cardiac defibrillator in situ      Cancer (H)      Congestive heart failure (H)      Coronary artery disease      Essential hypertension, benign      GERD (gastroesophageal reflux disease) 3/16/2010     Heart failure (H)      History of blood transfusion      Hyperlipidaemia      Hypertension      Obese      Other and unspecified hyperlipidemia      Other primary cardiomyopathies      Other primary cardiomyopathies      Pacemaker      Sleep apnea      Small bowel obstruction 12/14/2015     Ventricular tachycardia (H) 6/17/2013       Past Surgical History   I have reviewed this patient's surgical history and updated it with pertinent information if needed.  Past Surgical History:   Procedure Laterality Date     BIOPSY  annually    prostate biopsy     CARDIAC SURGERY  2012    A fib Ablation     CARDIAC SURGERY      cardioversion     COLONOSCOPY  2007     DAVINCI PROSTATECTOMY N/A 11/20/2015    Procedure: DAVINCI PROSTATECTOMY;  Surgeon: Weight,  "Nahun Ashraf MD;  Location: [facility] OR     GENITOURINARY SURGERY      bladder surgery      H ABLATION AV NODE  13     H ABLATION FOCAL AFIB  13     HC TOOTH EXTRACTION W/FORCEP       TONSILLECTOMY & ADENOIDECTOMY         Prior to Admission Medications   Cannot display prior to admission medications because the patient has not been admitted in this contact.     [unfilled]  @[facility]@  Allergies   Allergies   Allergen Reactions     Latex Rash     Seasonal Allergies      hayfever - wheezing -        Social History    reports that he has never smoked. He has never used smokeless tobacco. He reports that he does not drink alcohol or use illicit drugs.    Family History   Family History   Problem Relation Age of Onset     C.A.D. Father      CHF  at 74     Cerebrovascular Disease Father      C.A.D. Mother      CHF at 91 still living     Cerebrovascular Disease Mother      TIAs     Breast Cancer Mother      HEART DISEASE Son      arrythmia     Family History Negative Sister      Family History Negative Brother      Family History Negative Son        Review of Systems   The comprehensive 10 point Review of Systems is negative other than noted in the HPI or here.     Physical Exam     Vitals: /72  Pulse 72  Ht 1.727 m (5' 8\")  Wt 107.9 kg (237 lb 12.8 oz)  BMI 36.16 kg/m2    Constitutional:    overweight       Skin:  warm and dry to the touch           Head:  normocephalic         Eyes:  pupils equal and round         Lymph:       ENT:  no pallor or cyanosis         Neck:  carotid pulses are full and equal bilaterally;JVP normal         Respiratory:  clear to auscultation          Cardiac: normal S1 and S2   , soft A2   systolic ejection murmur;crescendo;grade 3;radiation to the carotid   diastolic murmur;early diastolic murmur;grade 2;RUSB    not assessed this visit                                         GI:  abdomen soft;BS normoactive         Extremities and Muscular Skeletal:      " trace;bilateral LE edema           Neurological:  no gross motor deficits         Psych:  Alert and Oriented x 3       @LABRCNTIPR(tropi:5,troponinies:5)@    @LABRCNTIPR(wbc:3,hgb:3,mcv:3,plt:3,inr:3,na:3,potassium:3,chloride:3,co2:3,bun:3,cr:3,gfrestimated:3,gfrestblack:3,aniongap:3,chiquis:3,glc:3,albumin:2,prottotal:2,bilitotal:2,alkphos:2,alt:2,ast:2,lipase:2,tropi:3)@  Recent Labs   Lab Test  10/15/14   0953  10/29/13   0953   CHOL  125  133   HDL  41  47   LDL  66  70   TRIG  92  83   CHOLHDLRATIO  3.0  2.8     @LABRCNTIP(wbc:3,hgb:3,hct:3,mcv:3,plt:3,iron:3,ironsat:3,reticabsct:3,retp:3,feb:3,lola:3,b12:3,folic:3,epoe:3,morph:3)@  @LABRCNTIP(PH:3,PHV:3,PO2:3,PO2V:3,sat:3,PCO2:3,PCO2V:3,HCO3:3,HCO3V:3)@  @LABRCNTIP(NTBNPI:3,NTBNP:3)@  @LABRCNTIP(DD:1)@  @LABRCNTIP(sed:3,crp:3)@  @LABRCNTIP(PLT:3)@  @LABRCNTIP(TSH:3)@  @LABRCNTIP(color:1,appearance:1,urineg,urinebili:1,urineketone:1,s,ubld:1,urineph:1,protein:1,urobilinogen:1,nitrite:1,leukest:1,rbcu:1,wbcu:1)@    Imaging:  No results found for this or any previous visit (from the past 48 hour(s)).

## 2018-06-25 ENCOUNTER — TELEPHONE (OUTPATIENT)
Dept: CARDIOLOGY | Facility: CLINIC | Age: 78
End: 2018-06-25

## 2018-06-25 RX ORDER — LIDOCAINE 40 MG/G
CREAM TOPICAL
Status: CANCELLED | OUTPATIENT
Start: 2018-06-25

## 2018-06-25 RX ORDER — SODIUM CHLORIDE 9 MG/ML
INJECTION, SOLUTION INTRAVENOUS CONTINUOUS
Status: CANCELLED | OUTPATIENT
Start: 2018-06-25

## 2018-06-25 RX ORDER — POTASSIUM CHLORIDE 1500 MG/1
20 TABLET, EXTENDED RELEASE ORAL
Status: CANCELLED | OUTPATIENT
Start: 2018-06-25

## 2018-06-25 NOTE — TELEPHONE ENCOUNTER
Called patient with Pre cath instructions:     Contrast allergy: no  Anticoagulation: yes, holding   Metformin: no  Oral DM meds: no  Insulin: no  Diuretic: yes, holding morning of procedure  Use of phosphodiesterase type 5 inhibitor: aware to not take any within 36 hrs of procedure  Aspirin: yes, taking before and morning of   Pt informed to be NPO at midnight  Renal issues pt at baseline  Pt has transportation and 24 hours post procedure monitoring set up.   Pt aware of no driving for 24 hours post procedure.     Pt aware of arrival time and location. Pt verbalized understanding of instructions.       Called patient to review DEMETRIUS Check List:    Insulin/metformin/glipazide: no  Digoxin/Lanoxin: holding morning of procedure  Patient aware to be NPO x 6hr except for medications.  Transportation home: yes  H&P + consent + risk&benefit within 30 days: yes  Labwork ordered DAY OF procedure only    Patient aware of time, date, and location.

## 2018-06-27 ENCOUNTER — TELEPHONE (OUTPATIENT)
Dept: UROLOGY | Facility: CLINIC | Age: 78
End: 2018-06-27

## 2018-06-29 ENCOUNTER — HOSPITAL ENCOUNTER (OUTPATIENT)
Facility: CLINIC | Age: 78
Discharge: HOME | End: 2018-06-29
Attending: INTERNAL MEDICINE | Admitting: INTERNAL MEDICINE
Payer: MEDICARE

## 2018-06-29 ENCOUNTER — APPOINTMENT (OUTPATIENT)
Dept: CARDIOLOGY | Facility: CLINIC | Age: 78
End: 2018-06-29
Attending: INTERNAL MEDICINE
Payer: MEDICARE

## 2018-06-29 ENCOUNTER — HOSPITAL ENCOUNTER (OUTPATIENT)
Dept: CARDIOLOGY | Facility: CLINIC | Age: 78
End: 2018-06-29
Attending: INTERNAL MEDICINE | Admitting: INTERNAL MEDICINE
Payer: MEDICARE

## 2018-06-29 VITALS
TEMPERATURE: 98.2 F | RESPIRATION RATE: 20 BRPM | HEIGHT: 66 IN | BODY MASS INDEX: 38.22 KG/M2 | SYSTOLIC BLOOD PRESSURE: 119 MMHG | WEIGHT: 237.8 LBS | DIASTOLIC BLOOD PRESSURE: 66 MMHG | HEART RATE: 63 BPM | OXYGEN SATURATION: 96 %

## 2018-06-29 DIAGNOSIS — I25.10 ATHEROSCLEROSIS OF NATIVE CORONARY ARTERY OF NATIVE HEART WITHOUT ANGINA PECTORIS: ICD-10-CM

## 2018-06-29 DIAGNOSIS — Z98.890 STATUS POST CORONARY ANGIOGRAM: ICD-10-CM

## 2018-06-29 DIAGNOSIS — I35.0 AORTIC STENOSIS: ICD-10-CM

## 2018-06-29 LAB
ANION GAP SERPL CALCULATED.3IONS-SCNC: 8 MMOL/L (ref 3–14)
APTT PPP: 31 SEC (ref 22–37)
BUN SERPL-MCNC: 32 MG/DL (ref 7–30)
CALCIUM SERPL-MCNC: 9.2 MG/DL (ref 8.5–10.1)
CHLORIDE SERPL-SCNC: 109 MMOL/L (ref 94–109)
CO2 BLD-SCNC: 24 MMOL/L (ref 21–28)
CO2 BLDCOV-SCNC: 25 MMOL/L (ref 21–28)
CO2 SERPL-SCNC: 24 MMOL/L (ref 20–32)
CREAT SERPL-MCNC: 1.64 MG/DL (ref 0.66–1.25)
ERYTHROCYTE [DISTWIDTH] IN BLOOD BY AUTOMATED COUNT: 15.4 % (ref 10–15)
GFR SERPL CREATININE-BSD FRML MDRD: 41 ML/MIN/1.7M2
GLUCOSE SERPL-MCNC: 98 MG/DL (ref 70–99)
HCT VFR BLD AUTO: 38.1 % (ref 40–53)
HGB BLD-MCNC: 12 G/DL (ref 13.3–17.7)
INR PPP: 1.38 (ref 0.86–1.14)
MCH RBC QN AUTO: 29.7 PG (ref 26.5–33)
MCHC RBC AUTO-ENTMCNC: 31.5 G/DL (ref 31.5–36.5)
MCV RBC AUTO: 94 FL (ref 78–100)
PCO2 BLD: 43 MM HG (ref 35–45)
PCO2 BLDV: 52 MM HG (ref 40–50)
PH BLD: 7.35 PH (ref 7.35–7.45)
PH BLDV: 7.3 PH (ref 7.32–7.43)
PLATELET # BLD AUTO: 275 10E9/L (ref 150–450)
PO2 BLD: 106 MM HG (ref 80–105)
PO2 BLDV: 31 MM HG (ref 25–47)
POTASSIUM SERPL-SCNC: 4.2 MMOL/L (ref 3.4–5.3)
RBC # BLD AUTO: 4.04 10E12/L (ref 4.4–5.9)
SAO2 % BLDA FROM PO2: 98 % (ref 92–100)
SAO2 % BLDV FROM PO2: 52 %
SODIUM SERPL-SCNC: 141 MMOL/L (ref 133–144)
WBC # BLD AUTO: 7.4 10E9/L (ref 4–11)

## 2018-06-29 PROCEDURE — 93005 ELECTROCARDIOGRAM TRACING: CPT

## 2018-06-29 PROCEDURE — 40000065 ZZH STATISTIC EKG NON-CHARGEABLE

## 2018-06-29 PROCEDURE — 85027 COMPLETE CBC AUTOMATED: CPT | Performed by: INTERNAL MEDICINE

## 2018-06-29 PROCEDURE — 93312 ECHO TRANSESOPHAGEAL: CPT | Mod: 26 | Performed by: INTERNAL MEDICINE

## 2018-06-29 PROCEDURE — 25000132 ZZH RX MED GY IP 250 OP 250 PS 637: Mod: GY | Performed by: INTERNAL MEDICINE

## 2018-06-29 PROCEDURE — 93456 R HRT CORONARY ARTERY ANGIO: CPT

## 2018-06-29 PROCEDURE — A9270 NON-COVERED ITEM OR SERVICE: HCPCS | Mod: GY | Performed by: INTERNAL MEDICINE

## 2018-06-29 PROCEDURE — 36415 COLL VENOUS BLD VENIPUNCTURE: CPT | Performed by: INTERNAL MEDICINE

## 2018-06-29 PROCEDURE — 25000125 ZZHC RX 250: Performed by: INTERNAL MEDICINE

## 2018-06-29 PROCEDURE — 27210946 ZZH KIT HC TOTES DISP CR8

## 2018-06-29 PROCEDURE — 85610 PROTHROMBIN TIME: CPT | Performed by: INTERNAL MEDICINE

## 2018-06-29 PROCEDURE — 40000857 ZZH STATISTIC TEE INCLUDES SEDATION

## 2018-06-29 PROCEDURE — 93456 R HRT CORONARY ARTERY ANGIO: CPT | Mod: 26 | Performed by: INTERNAL MEDICINE

## 2018-06-29 PROCEDURE — 40000235 ZZH STATISTIC TELEMETRY

## 2018-06-29 PROCEDURE — 27211181 ZZH BALLOON TIP PRESSURE CR5

## 2018-06-29 PROCEDURE — 27211089 ZZH KIT ACIST INJECTOR CR3

## 2018-06-29 PROCEDURE — 76377 3D RENDER W/INTRP POSTPROCES: CPT

## 2018-06-29 PROCEDURE — 80048 BASIC METABOLIC PNL TOTAL CA: CPT | Performed by: INTERNAL MEDICINE

## 2018-06-29 PROCEDURE — 40000852 ZZH STATISTIC HEART CATH LAB OR EP LAB

## 2018-06-29 PROCEDURE — C1769 GUIDE WIRE: HCPCS

## 2018-06-29 PROCEDURE — 93325 DOPPLER ECHO COLOR FLOW MAPG: CPT | Mod: 26 | Performed by: INTERNAL MEDICINE

## 2018-06-29 PROCEDURE — 93320 DOPPLER ECHO COMPLETE: CPT | Mod: 26 | Performed by: INTERNAL MEDICINE

## 2018-06-29 PROCEDURE — 27210787 ZZH MANIFOLD CR2

## 2018-06-29 PROCEDURE — 82803 BLOOD GASES ANY COMBINATION: CPT

## 2018-06-29 PROCEDURE — 93010 ELECTROCARDIOGRAM REPORT: CPT | Performed by: INTERNAL MEDICINE

## 2018-06-29 PROCEDURE — 36415 COLL VENOUS BLD VENIPUNCTURE: CPT

## 2018-06-29 PROCEDURE — 25000128 H RX IP 250 OP 636: Performed by: INTERNAL MEDICINE

## 2018-06-29 PROCEDURE — 85730 THROMBOPLASTIN TIME PARTIAL: CPT | Performed by: INTERNAL MEDICINE

## 2018-06-29 PROCEDURE — 27210795 ZZH PAD DEFIB QUICK CR4

## 2018-06-29 RX ORDER — POTASSIUM CHLORIDE 1500 MG/1
20 TABLET, EXTENDED RELEASE ORAL
Status: DISCONTINUED | OUTPATIENT
Start: 2018-06-29 | End: 2018-06-29 | Stop reason: HOSPADM

## 2018-06-29 RX ORDER — LISINOPRIL 2.5 MG/1
2.5 TABLET ORAL DAILY
Status: DISCONTINUED | OUTPATIENT
Start: 2018-06-29 | End: 2018-06-29 | Stop reason: HOSPADM

## 2018-06-29 RX ORDER — CLOPIDOGREL BISULFATE 75 MG/1
75 TABLET ORAL
Status: DISCONTINUED | OUTPATIENT
Start: 2018-06-29 | End: 2018-06-29 | Stop reason: HOSPADM

## 2018-06-29 RX ORDER — DIPHENHYDRAMINE HYDROCHLORIDE 50 MG/ML
25-50 INJECTION INTRAMUSCULAR; INTRAVENOUS
Status: DISCONTINUED | OUTPATIENT
Start: 2018-06-29 | End: 2018-06-29 | Stop reason: HOSPADM

## 2018-06-29 RX ORDER — FLUMAZENIL 0.1 MG/ML
0.2 INJECTION, SOLUTION INTRAVENOUS
Status: DISCONTINUED | OUTPATIENT
Start: 2018-06-29 | End: 2018-06-29 | Stop reason: HOSPADM

## 2018-06-29 RX ORDER — SIMVASTATIN 20 MG
20 TABLET ORAL AT BEDTIME
Status: DISCONTINUED | OUTPATIENT
Start: 2018-06-29 | End: 2018-06-29 | Stop reason: HOSPADM

## 2018-06-29 RX ORDER — ASPIRIN 81 MG/1
81 TABLET, CHEWABLE ORAL DAILY
Status: DISCONTINUED | OUTPATIENT
Start: 2018-06-29 | End: 2018-06-29 | Stop reason: HOSPADM

## 2018-06-29 RX ORDER — POTASSIUM CHLORIDE 750 MG/1
10 CAPSULE, EXTENDED RELEASE ORAL DAILY
Status: DISCONTINUED | OUTPATIENT
Start: 2018-06-29 | End: 2018-06-29 | Stop reason: HOSPADM

## 2018-06-29 RX ORDER — NITROGLYCERIN 0.4 MG/1
0.4 TABLET SUBLINGUAL EVERY 5 MIN PRN
Status: DISCONTINUED | OUTPATIENT
Start: 2018-06-29 | End: 2018-06-29 | Stop reason: HOSPADM

## 2018-06-29 RX ORDER — METHYLPREDNISOLONE SODIUM SUCCINATE 125 MG/2ML
125 INJECTION, POWDER, LYOPHILIZED, FOR SOLUTION INTRAMUSCULAR; INTRAVENOUS
Status: DISCONTINUED | OUTPATIENT
Start: 2018-06-29 | End: 2018-06-29 | Stop reason: HOSPADM

## 2018-06-29 RX ORDER — WARFARIN SODIUM 5 MG/1
5 TABLET ORAL
Status: DISCONTINUED | OUTPATIENT
Start: 2018-06-29 | End: 2018-06-29 | Stop reason: HOSPADM

## 2018-06-29 RX ORDER — ACETAMINOPHEN 325 MG/1
325-650 TABLET ORAL EVERY 4 HOURS PRN
Status: DISCONTINUED | OUTPATIENT
Start: 2018-06-29 | End: 2018-06-29 | Stop reason: HOSPADM

## 2018-06-29 RX ORDER — PRASUGREL 10 MG/1
10-60 TABLET, FILM COATED ORAL
Status: DISCONTINUED | OUTPATIENT
Start: 2018-06-29 | End: 2018-06-29 | Stop reason: HOSPADM

## 2018-06-29 RX ORDER — GLYCOPYRROLATE 0.2 MG/ML
0.1 INJECTION, SOLUTION INTRAMUSCULAR; INTRAVENOUS ONCE
Status: COMPLETED | OUTPATIENT
Start: 2018-06-29 | End: 2018-06-29

## 2018-06-29 RX ORDER — METOPROLOL SUCCINATE 100 MG/1
100 TABLET, EXTENDED RELEASE ORAL DAILY
Status: DISCONTINUED | OUTPATIENT
Start: 2018-06-29 | End: 2018-06-29 | Stop reason: HOSPADM

## 2018-06-29 RX ORDER — POTASSIUM CHLORIDE 29.8 MG/ML
20 INJECTION INTRAVENOUS
Status: DISCONTINUED | OUTPATIENT
Start: 2018-06-29 | End: 2018-06-29 | Stop reason: HOSPADM

## 2018-06-29 RX ORDER — HYDROCODONE BITARTRATE AND ACETAMINOPHEN 5; 325 MG/1; MG/1
1-2 TABLET ORAL EVERY 4 HOURS PRN
Status: DISCONTINUED | OUTPATIENT
Start: 2018-06-29 | End: 2018-06-29 | Stop reason: HOSPADM

## 2018-06-29 RX ORDER — FENTANYL CITRATE 50 UG/ML
25-50 INJECTION, SOLUTION INTRAMUSCULAR; INTRAVENOUS
Status: COMPLETED | OUTPATIENT
Start: 2018-06-29 | End: 2018-06-29

## 2018-06-29 RX ORDER — LIDOCAINE 40 MG/G
CREAM TOPICAL
Status: DISCONTINUED | OUTPATIENT
Start: 2018-06-29 | End: 2018-06-29 | Stop reason: HOSPADM

## 2018-06-29 RX ORDER — METOPROLOL TARTRATE 1 MG/ML
5 INJECTION, SOLUTION INTRAVENOUS EVERY 5 MIN PRN
Status: DISCONTINUED | OUTPATIENT
Start: 2018-06-29 | End: 2018-06-29 | Stop reason: HOSPADM

## 2018-06-29 RX ORDER — HEPARIN SODIUM 1000 [USP'U]/ML
1000-10000 INJECTION, SOLUTION INTRAVENOUS; SUBCUTANEOUS EVERY 5 MIN PRN
Status: DISCONTINUED | OUTPATIENT
Start: 2018-06-29 | End: 2018-06-29 | Stop reason: HOSPADM

## 2018-06-29 RX ORDER — NALOXONE HYDROCHLORIDE 0.4 MG/ML
.1-.4 INJECTION, SOLUTION INTRAMUSCULAR; INTRAVENOUS; SUBCUTANEOUS
Status: DISCONTINUED | OUTPATIENT
Start: 2018-06-29 | End: 2018-06-29 | Stop reason: HOSPADM

## 2018-06-29 RX ORDER — DOPAMINE HYDROCHLORIDE 160 MG/100ML
2-20 INJECTION, SOLUTION INTRAVENOUS CONTINUOUS PRN
Status: DISCONTINUED | OUTPATIENT
Start: 2018-06-29 | End: 2018-06-29 | Stop reason: HOSPADM

## 2018-06-29 RX ORDER — ONDANSETRON 2 MG/ML
4 INJECTION INTRAMUSCULAR; INTRAVENOUS EVERY 4 HOURS PRN
Status: DISCONTINUED | OUTPATIENT
Start: 2018-06-29 | End: 2018-06-29 | Stop reason: HOSPADM

## 2018-06-29 RX ORDER — ENALAPRILAT 1.25 MG/ML
1.25-2.5 INJECTION INTRAVENOUS
Status: DISCONTINUED | OUTPATIENT
Start: 2018-06-29 | End: 2018-06-29 | Stop reason: HOSPADM

## 2018-06-29 RX ORDER — ATROPINE SULFATE 0.1 MG/ML
0.5 INJECTION INTRAVENOUS EVERY 5 MIN PRN
Status: DISCONTINUED | OUTPATIENT
Start: 2018-06-29 | End: 2018-06-29 | Stop reason: HOSPADM

## 2018-06-29 RX ORDER — FENTANYL CITRATE 50 UG/ML
25-50 INJECTION, SOLUTION INTRAMUSCULAR; INTRAVENOUS
Status: DISCONTINUED | OUTPATIENT
Start: 2018-06-29 | End: 2018-06-29 | Stop reason: HOSPADM

## 2018-06-29 RX ORDER — ADENOSINE 3 MG/ML
12-12000 INJECTION, SOLUTION INTRAVENOUS
Status: DISCONTINUED | OUTPATIENT
Start: 2018-06-29 | End: 2018-06-29 | Stop reason: HOSPADM

## 2018-06-29 RX ORDER — LORAZEPAM 2 MG/ML
.5-2 INJECTION INTRAMUSCULAR EVERY 4 HOURS PRN
Status: DISCONTINUED | OUTPATIENT
Start: 2018-06-29 | End: 2018-06-29 | Stop reason: HOSPADM

## 2018-06-29 RX ORDER — NITROGLYCERIN 20 MG/100ML
.07-1.85 INJECTION INTRAVENOUS CONTINUOUS PRN
Status: DISCONTINUED | OUTPATIENT
Start: 2018-06-29 | End: 2018-06-29 | Stop reason: HOSPADM

## 2018-06-29 RX ORDER — PROTAMINE SULFATE 10 MG/ML
1-5 INJECTION, SOLUTION INTRAVENOUS
Status: DISCONTINUED | OUTPATIENT
Start: 2018-06-29 | End: 2018-06-29 | Stop reason: HOSPADM

## 2018-06-29 RX ORDER — IOPAMIDOL 755 MG/ML
65 INJECTION, SOLUTION INTRAVASCULAR ONCE
Status: COMPLETED | OUTPATIENT
Start: 2018-06-29 | End: 2018-06-29

## 2018-06-29 RX ORDER — CLOPIDOGREL 300 MG/1
300-600 TABLET, FILM COATED ORAL
Status: DISCONTINUED | OUTPATIENT
Start: 2018-06-29 | End: 2018-06-29 | Stop reason: HOSPADM

## 2018-06-29 RX ORDER — NITROGLYCERIN 5 MG/ML
100-200 VIAL (ML) INTRAVENOUS
Status: DISCONTINUED | OUTPATIENT
Start: 2018-06-29 | End: 2018-06-29 | Stop reason: HOSPADM

## 2018-06-29 RX ORDER — EPINEPHRINE 1 MG/ML
0.3 INJECTION, SOLUTION, CONCENTRATE INTRAVENOUS
Status: DISCONTINUED | OUTPATIENT
Start: 2018-06-29 | End: 2018-06-29 | Stop reason: HOSPADM

## 2018-06-29 RX ORDER — DEXTROSE MONOHYDRATE 25 G/50ML
12.5-5 INJECTION, SOLUTION INTRAVENOUS EVERY 30 MIN PRN
Status: DISCONTINUED | OUTPATIENT
Start: 2018-06-29 | End: 2018-06-29 | Stop reason: HOSPADM

## 2018-06-29 RX ORDER — LIDOCAINE HYDROCHLORIDE 10 MG/ML
1-10 INJECTION, SOLUTION EPIDURAL; INFILTRATION; INTRACAUDAL; PERINEURAL
Status: COMPLETED | OUTPATIENT
Start: 2018-06-29 | End: 2018-06-29

## 2018-06-29 RX ORDER — FUROSEMIDE 10 MG/ML
20-100 INJECTION INTRAMUSCULAR; INTRAVENOUS
Status: DISCONTINUED | OUTPATIENT
Start: 2018-06-29 | End: 2018-06-29 | Stop reason: HOSPADM

## 2018-06-29 RX ORDER — VERAPAMIL HYDROCHLORIDE 2.5 MG/ML
1-2.5 INJECTION, SOLUTION INTRAVENOUS
Status: DISCONTINUED | OUTPATIENT
Start: 2018-06-29 | End: 2018-06-29 | Stop reason: HOSPADM

## 2018-06-29 RX ORDER — ATROPINE SULFATE 0.1 MG/ML
.5-1 INJECTION INTRAVENOUS
Status: DISCONTINUED | OUTPATIENT
Start: 2018-06-29 | End: 2018-06-29 | Stop reason: HOSPADM

## 2018-06-29 RX ORDER — SODIUM CHLORIDE 9 MG/ML
INJECTION, SOLUTION INTRAVENOUS CONTINUOUS
Status: DISCONTINUED | OUTPATIENT
Start: 2018-06-29 | End: 2018-06-29 | Stop reason: HOSPADM

## 2018-06-29 RX ORDER — BUPIVACAINE HYDROCHLORIDE 2.5 MG/ML
1-10 INJECTION, SOLUTION EPIDURAL; INFILTRATION; INTRACAUDAL
Status: DISCONTINUED | OUTPATIENT
Start: 2018-06-29 | End: 2018-06-29 | Stop reason: HOSPADM

## 2018-06-29 RX ORDER — NITROGLYCERIN 5 MG/ML
100-500 VIAL (ML) INTRAVENOUS
Status: DISCONTINUED | OUTPATIENT
Start: 2018-06-29 | End: 2018-06-29 | Stop reason: HOSPADM

## 2018-06-29 RX ORDER — MORPHINE SULFATE 2 MG/ML
1-2 INJECTION, SOLUTION INTRAMUSCULAR; INTRAVENOUS EVERY 5 MIN PRN
Status: DISCONTINUED | OUTPATIENT
Start: 2018-06-29 | End: 2018-06-29 | Stop reason: HOSPADM

## 2018-06-29 RX ORDER — DOBUTAMINE HYDROCHLORIDE 200 MG/100ML
2-20 INJECTION INTRAVENOUS CONTINUOUS PRN
Status: DISCONTINUED | OUTPATIENT
Start: 2018-06-29 | End: 2018-06-29 | Stop reason: HOSPADM

## 2018-06-29 RX ORDER — SODIUM NITROPRUSSIDE 25 MG/ML
100-200 INJECTION INTRAVENOUS
Status: DISCONTINUED | OUTPATIENT
Start: 2018-06-29 | End: 2018-06-29 | Stop reason: HOSPADM

## 2018-06-29 RX ORDER — LIDOCAINE HYDROCHLORIDE 10 MG/ML
30 INJECTION, SOLUTION EPIDURAL; INFILTRATION; INTRACAUDAL; PERINEURAL
Status: DISCONTINUED | OUTPATIENT
Start: 2018-06-29 | End: 2018-06-29 | Stop reason: HOSPADM

## 2018-06-29 RX ORDER — PHENYLEPHRINE HCL IN 0.9% NACL 1 MG/10 ML
20-100 SYRINGE (ML) INTRAVENOUS
Status: DISCONTINUED | OUTPATIENT
Start: 2018-06-29 | End: 2018-06-29 | Stop reason: HOSPADM

## 2018-06-29 RX ORDER — FENTANYL CITRATE 50 UG/ML
25 INJECTION, SOLUTION INTRAMUSCULAR; INTRAVENOUS
Status: DISCONTINUED | OUTPATIENT
Start: 2018-06-29 | End: 2018-06-29 | Stop reason: HOSPADM

## 2018-06-29 RX ORDER — NALOXONE HYDROCHLORIDE 0.4 MG/ML
0.4 INJECTION, SOLUTION INTRAMUSCULAR; INTRAVENOUS; SUBCUTANEOUS EVERY 5 MIN PRN
Status: DISCONTINUED | OUTPATIENT
Start: 2018-06-29 | End: 2018-06-29 | Stop reason: HOSPADM

## 2018-06-29 RX ORDER — NIFEDIPINE 10 MG/1
10 CAPSULE ORAL
Status: DISCONTINUED | OUTPATIENT
Start: 2018-06-29 | End: 2018-06-29 | Stop reason: HOSPADM

## 2018-06-29 RX ORDER — NALOXONE HYDROCHLORIDE 0.4 MG/ML
.2-.4 INJECTION, SOLUTION INTRAMUSCULAR; INTRAVENOUS; SUBCUTANEOUS
Status: DISCONTINUED | OUTPATIENT
Start: 2018-06-29 | End: 2018-06-29 | Stop reason: HOSPADM

## 2018-06-29 RX ORDER — LIDOCAINE HYDROCHLORIDE 40 MG/ML
1.5 SOLUTION TOPICAL ONCE
Status: COMPLETED | OUTPATIENT
Start: 2018-06-29 | End: 2018-06-29

## 2018-06-29 RX ORDER — PROTAMINE SULFATE 10 MG/ML
25-100 INJECTION, SOLUTION INTRAVENOUS EVERY 5 MIN PRN
Status: DISCONTINUED | OUTPATIENT
Start: 2018-06-29 | End: 2018-06-29 | Stop reason: HOSPADM

## 2018-06-29 RX ORDER — DIGOXIN 125 MCG
125 TABLET ORAL
Status: DISCONTINUED | OUTPATIENT
Start: 2018-06-30 | End: 2018-06-29 | Stop reason: HOSPADM

## 2018-06-29 RX ORDER — SODIUM CHLORIDE 9 MG/ML
INJECTION, SOLUTION INTRAVENOUS CONTINUOUS PRN
Status: DISCONTINUED | OUTPATIENT
Start: 2018-06-29 | End: 2018-06-29 | Stop reason: HOSPADM

## 2018-06-29 RX ORDER — ASPIRIN 81 MG/1
81-324 TABLET, CHEWABLE ORAL
Status: DISCONTINUED | OUTPATIENT
Start: 2018-06-29 | End: 2018-06-29 | Stop reason: HOSPADM

## 2018-06-29 RX ORDER — HYDRALAZINE HYDROCHLORIDE 20 MG/ML
10-20 INJECTION INTRAMUSCULAR; INTRAVENOUS
Status: DISCONTINUED | OUTPATIENT
Start: 2018-06-29 | End: 2018-06-29 | Stop reason: HOSPADM

## 2018-06-29 RX ORDER — NICARDIPINE HYDROCHLORIDE 2.5 MG/ML
100 INJECTION INTRAVENOUS
Status: DISCONTINUED | OUTPATIENT
Start: 2018-06-29 | End: 2018-06-29 | Stop reason: HOSPADM

## 2018-06-29 RX ORDER — POTASSIUM CHLORIDE 7.45 MG/ML
10 INJECTION INTRAVENOUS
Status: DISCONTINUED | OUTPATIENT
Start: 2018-06-29 | End: 2018-06-29 | Stop reason: HOSPADM

## 2018-06-29 RX ORDER — ASPIRIN 325 MG
325 TABLET ORAL
Status: DISCONTINUED | OUTPATIENT
Start: 2018-06-29 | End: 2018-06-29 | Stop reason: HOSPADM

## 2018-06-29 RX ADMIN — MIDAZOLAM HYDROCHLORIDE 0.5 MG: 1 INJECTION, SOLUTION INTRAMUSCULAR; INTRAVENOUS at 09:30

## 2018-06-29 RX ADMIN — IOPAMIDOL 65 ML: 755 INJECTION, SOLUTION INTRAVASCULAR at 11:00

## 2018-06-29 RX ADMIN — ASPIRIN 325 MG ORAL TABLET 325 MG: 325 PILL ORAL at 08:30

## 2018-06-29 RX ADMIN — MIDAZOLAM HYDROCHLORIDE 2 MG: 1 INJECTION, SOLUTION INTRAMUSCULAR; INTRAVENOUS at 08:58

## 2018-06-29 RX ADMIN — LIDOCAINE HYDROCHLORIDE 1.5 ML: 40 SOLUTION TOPICAL at 08:26

## 2018-06-29 RX ADMIN — GLYCOPYRROLATE 0.1 MG: 0.2 INJECTION, SOLUTION INTRAMUSCULAR; INTRAVENOUS at 08:32

## 2018-06-29 RX ADMIN — LIDOCAINE HYDROCHLORIDE 10 ML: 10 INJECTION, SOLUTION EPIDURAL; INFILTRATION; INTRACAUDAL; PERINEURAL at 10:29

## 2018-06-29 RX ADMIN — MIDAZOLAM HYDROCHLORIDE 0.5 MG: 1 INJECTION, SOLUTION INTRAMUSCULAR; INTRAVENOUS at 09:47

## 2018-06-29 RX ADMIN — FENTANYL CITRATE 50 MCG: 50 INJECTION INTRAMUSCULAR; INTRAVENOUS at 08:59

## 2018-06-29 RX ADMIN — TOPICAL ANESTHETIC 0.5 ML: 200 SPRAY DENTAL; PERIODONTAL at 09:00

## 2018-06-29 RX ADMIN — FENTANYL CITRATE 25 MCG: 50 INJECTION INTRAMUSCULAR; INTRAVENOUS at 09:25

## 2018-06-29 NOTE — PROGRESS NOTES
Pt returned from Cath Lab. Bandaid CDI to right groin puncture site. No oozing or hematoma noted. Area soft & flat. Pt denies pain. Pt instructed on activity restrictions while on bedrest. Verbal understanding received from pt. Pt's family at bedside. Detailed update given. Pt taking diet & flds well. No complaints. Discharge teaching & instructions given to both pt & sister w/ verbal understanding received. All questions & concerns addressed.

## 2018-06-29 NOTE — PROGRESS NOTES
1120  Report from Franklin WELLS RN:  Patient returned to [facility] at about 1115, s/p heart cath by Dr. Christian, non-intervention.  R arterial and venous groin sites are WDL/no hematoma or bleeding, bandaid in place.  Patient has some back discomfort.  Denies chest discomfort.  Taking po clear liquids.  100% AV paced.  Sister at bedside.  Plan is 6 hours bedrest.      1215  HOB at 30 degrees, patient eating box lunch.

## 2018-06-29 NOTE — IP AVS SNAPSHOT
[place] [address]    [place] [address]    Phone:  [phone]                                       After Visit Summary   6/29/2018    Shahid Villalta    MRN: [id number]           After Visit Summary Signature Page     I have received my discharge instructions, and my questions have been answered. I have discussed any challenges I see with this plan with the nurse or doctor.    ..........................................................................................................................................  Patient/Patient Representative Signature      ..........................................................................................................................................  Patient Representative Print Name and Relationship to Patient    ..................................................               ................................................  Date                                            Time    ..........................................................................................................................................  Reviewed by Signature/Title    ...................................................              ..............................................  Date                                                            Time

## 2018-06-29 NOTE — PROGRESS NOTES
1715 UP walking in hallway. Went to the bathroom and voided. Right groin site did not change. DCI reviewed briefly.

## 2018-06-29 NOTE — DISCHARGE INSTRUCTIONS
Cardiac Angiogram Discharge Instructions - Femoral    After you go home:      Have an adult stay with you until tomorrow.    Drink extra fluids for 2 days.    You may resume your normal diet.    No smoking       For 24 hours - due to the sedation you received:    Relax and take it easy.    Do NOT make any important or legal decisions.    Do NOT drive or operate machines at home or at work.    Do NOT drink alcohol.    Care of Groin Puncture Site:      For the first 24 hrs - check the puncture site every 1-2 hours while awake.    For 2 days, when you cough, sneeze, laugh or move your bowels, hold your hand over the puncture site and press firmly.    Remove the bandaid after 24 hours. If there is minor oozing, apply another bandaid and remove it after 12 hours.    It is normal to have a small bruise or pea size lump at the site.    You may shower tomorrow. Do NOT take a bath, or use a hot tub or pool for at least 3 days. Do NOT scrub the site. Do not use lotion or powder near the puncture site.    Activity:            For 2 days:    No stooping or squatting    Do NOT do any heavy activity such as exercise, lifting, or straining.     No housework, yard work or any activity that make you sweat    Do NOT lift more than 10 pounds    Bleeding:      If you start bleeding from the site in your groin, lie down flat and press firmly on/above the site for 10 minutes.     Once bleeding stops, lay flat for 2 hours.     Call [facility] as soon as you can.       Call 911 right away if you have heavy bleeding or bleeding that does not stop.      Medicines:      If you are taking an antiplatelet medication such as Plavix, Brilinta or Effient, do not stop taking it until you talk to your cardiologist.      If you are on Metformin (Glucophage), do not restart it until you have blood tests (within 2 to 3 days after discharge).  After you have your blood drawn, you may restart the Metformin.     Take your medications, including blood  thinners, unless your provider tells you not to.  If you take Coumadin (Warfarin), have your INR checked by your provider in  3-5 days. Call your clinic to schedule this.    If you have stopped any medicines, check with your provider about when to restart them.    Follow Up Appointments:      Follow up with [facility] Nurse Practitioner at [facility] of patient preference in 7-10 days.    Call the clinic if:      You have increased pain or a large or growing hard lump around the site.    The site is red, swollen, hot or tender.    Blood or fluid is draining from the site.    You have chills or a fever greater than 101 F (38 C).    Your leg feels numb, cool or changes color.    You have hives, a rash or unusual itching.    New pain in the back or belly that you cannot control with Tylenol.    Any questions or concerns.                  [facility] [place]:    [phone] [facility] (7 days a week)    DEMETRIUS  (Transesophageal Echocardiogram)  Discharge Instructions    After you go home:      Have an adult stay with you for 6 hours.       For 24 hours - due to the sedation you received:    Relax and take it easy.    Do NOT make any important or legal decisions.    Do NOT drive or operate machines at home or at work.    Do NOT drink alcohol.    Diet:    You may resume your normal diet, but no scratchy foods for two days.    If your throat is sore, eat cold, bland or soft foods.    You may have heartburn if the tube used in the exam entered your stomach.  If so:   - Do not eat acidic and spicy foods.   - Do not eat three hours before bedtime. Clear liquids are okay.   - When lying down, use two pillows to raise your head.    Medicines:      Take your medications, including blood thinners, unless your provider tells you not to.    If you have stopped any medicines, check with your provider about when to restart them.    You may take Tylenol (Acetaminophen) if your throat is sore.    You may  take antacids if you have heartburn.      Follow Up Appointments:      Follow up with your cardiologist at [facility] of patient preference as instructed.    Follow up with your primary care provider as needed.    Call the clinic if:      You have heartburn that is severe or lasts more than 72 hours.    You have a sore throat that feels worse after 72 hours.    You have shortness of breath, neck pain, chest pain, fever, chills, coughing up blood, or other unusual signs.    Questions or concerns      [facility] [place]:    [phone] [facility] (7 days a week)

## 2018-06-29 NOTE — PROGRESS NOTES
DEMETRIUS performed by Dr Lane.  Pt tolerated well with little movement throughout.  Alert now, reports no pain.  Going to cath lab now.    Versed 3 mg and Fentanyl 75 mcg given for DEMETRIUS.

## 2018-06-29 NOTE — IP AVS SNAPSHOT
MRN:[id number]                      After Visit Summary   6/29/2018    Shahid Villalta    MRN: [id number]           Visit Information        Department      6/29/2018  6:23 AM [facility]          Review of your medicines      UNREVIEWED medicines. Ask your doctor about these medicines        Dose / Directions    acetaminophen 325 MG tablet   Commonly known as:  TYLENOL        Dose:  1300 mg   Take 1,300 mg by mouth 2 times daily   Refills:  0       amiodarone 200 MG tablet   Commonly known as:  PACERONE/CODARONE   Used for:  Paroxysmal atrial fibrillation (H)        Take PO 1/2 tablet daily-Take extra prn per MD recommendations.   Quantity:  50 tablet   Refills:  0       ASPIRIN NOT PRESCRIBED   Commonly known as:  INTENTIONAL   Used for:  Atrial fibrillation, unspecified        Dose:  1 each   1 each daily Antiplatelet medication not prescribed intentionally due to Current anticoagulant therapy (warfarin/enoxaparin)   Quantity:  0 each   Refills:  0       ASPIRIN PO        Dose:  81 mg   Take 81 mg by mouth daily Taking only since off warfarin since Sunday.  Otherwise doesn't normally take aspirin   Refills:  0       cetirizine 10 MG tablet   Commonly known as:  zyrTEC   Used for:  Allergic rhinitis        Dose:  10 mg   Take 1 tablet (10 mg) by mouth daily   Quantity:  90 tablet   Refills:  1       cholecalciferol 1000 UNIT tablet   Commonly known as:  vitamin D3   Used for:  Vitamin D deficiency, Parathyroid hormone excess (H)        Dose:  2000 Units   Take 2 tablets (2,000 Units) by mouth daily   Quantity:  180 tablet   Refills:  1       digoxin 125 MCG tablet   Commonly known as:  LANOXIN   Used for:  Chronic systolic heart failure (H)        Dose:  125 mcg   Take 1 tablet (125 mcg) by mouth every 48 hours   Quantity:  45 tablet   Refills:  0       lisinopril 2.5 MG tablet   Commonly known as:  PRINIVIL/Zestril   Used for:  Nonrheumatic aortic valve stenosis        Dose:   2.5 mg   Take 1 tablet (2.5 mg) by mouth daily   Quantity:  90 tablet   Refills:  3       metoprolol succinate 100 MG 24 hr tablet   Commonly known as:  TOPROL-XL   Used for:  Paroxysmal atrial fibrillation (H)        Dose:  100 mg   Take 1 tablet (100 mg) by mouth daily   Quantity:  90 tablet   Refills:  3       omeprazole 20 MG CR capsule   Commonly known as:  priLOSEC   Used for:  Gastroesophageal reflux disease without esophagitis        Dose:  20 mg   Take 1 capsule (20 mg) by mouth daily   Quantity:  90 capsule   Refills:  3       polyethylene glycol powder   Commonly known as:  MIRALAX/GLYCOLAX        Dose:  17 g   Take 17 g by mouth daily as needed for constipation   Refills:  0       potassium chloride 10 MEQ tablet   Commonly known as:  K-TAB,KLOR-CON   Used for:  Chronic systolic heart failure (H)        Dose:  10 mEq   Take 1 tablet (10 mEq) by mouth daily   Quantity:  90 tablet   Refills:  3       senna-docusate 8.6-50 MG per tablet   Commonly known as:  SENOKOT-S;PERICOLACE        Dose:  2 tablet   Take 2 tablets by mouth daily   Refills:  0       simethicone 80 MG chewable tablet   Commonly known as:  MYLICON   Used for:  Abdominal bloating        Dose:  80 mg   Take 1 tablet (80 mg) by mouth every 6 hours as needed for cramping   Quantity:  180 tablet   Refills:  0       simvastatin 20 MG tablet   Commonly known as:  ZOCOR   Used for:  Hyperlipidemia LDL goal <100        Dose:  20 mg   Take 1 tablet (20 mg) by mouth At Bedtime   Quantity:  90 tablet   Refills:  3       torsemide 20 MG tablet   Commonly known as:  DEMADEX   Used for:  Chronic systolic heart failure (H), Ischemic cardiomyopathy        Dose:  20 mg   Take 1 tablet (20 mg) by mouth daily   Quantity:  90 tablet   Refills:  3       VIAGRA 25 MG tablet   Generic drug:  sildenafil        Dose:  25 mg   Take 25 mg by mouth as needed   Refills:  0       warfarin 5 MG tablet   Commonly known as:  COUMADIN   Used for:  Long-term (current) use of  anticoagulants, Paroxysmal ventricular tachycardia (H)        5 mg daily   Quantity:  90 tablet   Refills:  0                Protect others around you: Learn how to safely use, store and throw away your medicines at www.disposemymeds.org.         Follow-ups after your visit        Your next 10 appointments already scheduled     Jul 09, 2018  9:00 AM CDT   Return Visit with Mira Norwood PA-C   [facility] ([facility])    [address]  [place] [address]   [phone]            Sep 12, 2018  4:30 PM CDT   Remote ICD Check with STEWART DCR2   [facility] ([facility])    [address]  [place] [address] [phone] OPT 2           This appointment is for a remote check of your debrillator.  This is not an appointment at the office.            Nov 09, 2018  9:00 AM CST   Return Visit with  ONCOLOGY NURSE   [facility] ([facility])    [facility]  [address]  [place] [address]   [phone]            Nov 14, 2018  9:30 AM CST   Return Visit with Nahun Hilario MD   [facility] ([facility])    [facility]  [address]  [place] [address]   [phone]               Care Instructions        After Care Instructions     Discharge Instructions - IF on Metformin (Glucophage or Glucovance) or Metformin containing medications       IF on Metformin (Glucophage or Glucovance) or Metformin containing medications , schedule a Basic Metabolic Panel at [facility] or Primary Clinic in 48 - 72 hours post procedure and PRIOR TO resuming the Metformin or Metformin containing medications.  Hold Metformin (Glucophage or Glucovance) or Metformin containing medications until after the Basic Metabolic Panel on the 2nd or 3rd day  following the procedure.  May resume after blood draw is complete.                  Further instructions from your care team       Cardiac Angiogram Discharge Instructions - Femoral    After you go home:      Have an adult stay with you until tomorrow.    Drink extra fluids for 2 days.    You may resume your normal diet.    No smoking       For 24 hours - due to the sedation you received:    Relax and take it easy.    Do NOT make any important or legal decisions.    Do NOT drive or operate machines at home or at work.    Do NOT drink alcohol.    Care of Groin Puncture Site:      For the first 24 hrs - check the puncture site every 1-2 hours while awake.    For 2 days, when you cough, sneeze, laugh or move your bowels, hold your hand over the puncture site and press firmly.    Remove the bandaid after 24 hours. If there is minor oozing, apply another bandaid and remove it after 12 hours.    It is normal to have a small bruise or pea size lump at the site.    You may shower tomorrow. Do NOT take a bath, or use a hot tub or pool for at least 3 days. Do NOT scrub the site. Do not use lotion or powder near the puncture site.    Activity:            For 2 days:    No stooping or squatting    Do NOT do any heavy activity such as exercise, lifting, or straining.     No housework, yard work or any activity that make you sweat    Do NOT lift more than 10 pounds    Bleeding:      If you start bleeding from the site in your groin, lie down flat and press firmly on/above the site for 10 minutes.     Once bleeding stops, lay flat for 2 hours.     Call [facility] as soon as you can.       Call 911 right away if you have heavy bleeding or bleeding that does not stop.      Medicines:      If you are taking an antiplatelet medication such as Plavix, Brilinta or Effient, do not stop taking it until you talk to your cardiologist.      If you are on Metformin (Glucophage), do not restart it until you have blood tests (within 2 to 3 days  after discharge).  After you have your blood drawn, you may restart the Metformin.     Take your medications, including blood thinners, unless your provider tells you not to.  If you take Coumadin (Warfarin), have your INR checked by your provider in  3-5 days. Call your clinic to schedule this.    If you have stopped any medicines, check with your provider about when to restart them.    Follow Up Appointments:      Follow up with [facility] Nurse Practitioner at [facility] of patient preference in 7-10 days.    Call the clinic if:      You have increased pain or a large or growing hard lump around the site.    The site is red, swollen, hot or tender.    Blood or fluid is draining from the site.    You have chills or a fever greater than 101 F (38 C).    Your leg feels numb, cool or changes color.    You have hives, a rash or unusual itching.    New pain in the back or belly that you cannot control with Tylenol.    Any questions or concerns.                  [facility] [place]:    [phone] [facility] (7 days a week)    DEMETRIUS  (Transesophageal Echocardiogram)  Discharge Instructions    After you go home:      Have an adult stay with you for 6 hours.       For 24 hours - due to the sedation you received:    Relax and take it easy.    Do NOT make any important or legal decisions.    Do NOT drive or operate machines at home or at work.    Do NOT drink alcohol.    Diet:    You may resume your normal diet, but no scratchy foods for two days.    If your throat is sore, eat cold, bland or soft foods.    You may have heartburn if the tube used in the exam entered your stomach.  If so:   - Do not eat acidic and spicy foods.   - Do not eat three hours before bedtime. Clear liquids are okay.   - When lying down, use two pillows to raise your head.    Medicines:      Take your medications, including blood thinners, unless your provider tells you not to.    If you have stopped any medicines,  "check with your provider about when to restart them.    You may take Tylenol (Acetaminophen) if your throat is sore.    You may take antacids if you have heartburn.      Follow Up Appointments:      Follow up with your cardiologist at [facility] of patient preference as instructed.    Follow up with your primary care provider as needed.    Call the clinic if:      You have heartburn that is severe or lasts more than 72 hours.    You have a sore throat that feels worse after 72 hours.    You have shortness of breath, neck pain, chest pain, fever, chills, coughing up blood, or other unusual signs.    Questions or concerns      [facility] [place]:    [phone] [facility] (7 days a week)         Additional Information About Your Visit        [employer] Information     [employer] gives you secure access to your electronic health record. If you see a primary care provider, you can also send messages to your care team and make appointments. If you have questions, please call your primary care clinic.  If you do not have a primary care provider, please call [phone] and they will assist you.        Care EveryWhere ID     This is your Care EveryWhere ID. This could be used by other organizations to access your [place] medical records  [id number]        Your Vitals Were     Blood Pressure Pulse Temperature Respirations Height Weight    106/63 63 98.2  F (36.8  C) (Oral) 20 1.676 m (5' 6\") 107.9 kg (237 lb 12.8 oz)    Pulse Oximetry BMI (Body Mass Index)                96% 38.38 kg/m2           Primary Care Provider Office Phone # Fax #    Gume Brown [phone]      Equal Access to Services     [facility]: Hadii aad ku hadasho Soomaali, waaxda luqadaha, qaybta kaalmada emily srinivasan. So [facility] [phone].    ATENCIÓN: Si habla español, tiene a irving disposición servicios gratuitos de asistencia lingüística. Llame al " [phone].    We comply with applicable federal civil rights laws and [place] laws. We do not discriminate on the basis of race, color, national origin, age, disability, sex, sexual orientation, or gender identity.            Thank you!     Thank you for choosing [facility] for your care. Our goal is always to provide you with excellent care. Hearing back from our patients is one way we can continue to improve our services. Please take a few minutes to complete the written survey that you may receive in the mail after you visit with us. Thank you!             Medication List: This is a list of all your medications and when to take them. Check marks below indicate your daily home schedule. Keep this list as a reference.      Medications           Morning Afternoon Evening Bedtime As Needed    acetaminophen 325 MG tablet   Commonly known as:  TYLENOL   Take 1,300 mg by mouth 2 times daily                                amiodarone 200 MG tablet   Commonly known as:  PACERONE/CODARONE   Take PO 1/2 tablet daily-Take extra prn per MD recommendations.                                ASPIRIN NOT PRESCRIBED   Commonly known as:  INTENTIONAL   1 each daily Antiplatelet medication not prescribed intentionally due to Current anticoagulant therapy (warfarin/enoxaparin)                                ASPIRIN PO   Take 81 mg by mouth daily Taking only since off warfarin since Sunday.  Otherwise doesn't normally take aspirin   Last time this was given:  325 mg on 6/29/2018  8:30 AM                                cetirizine 10 MG tablet   Commonly known as:  zyrTEC   Take 1 tablet (10 mg) by mouth daily                                cholecalciferol 1000 UNIT tablet   Commonly known as:  vitamin D3   Take 2 tablets (2,000 Units) by mouth daily                                digoxin 125 MCG tablet   Commonly known as:  LANOXIN   Take 1 tablet (125 mcg) by mouth every 48 hours                                lisinopril 2.5 MG tablet    Commonly known as:  PRINIVIL/Zestril   Take 1 tablet (2.5 mg) by mouth daily                                metoprolol succinate 100 MG 24 hr tablet   Commonly known as:  TOPROL-XL   Take 1 tablet (100 mg) by mouth daily                                omeprazole 20 MG CR capsule   Commonly known as:  priLOSEC   Take 1 capsule (20 mg) by mouth daily                                polyethylene glycol powder   Commonly known as:  MIRALAX/GLYCOLAX   Take 17 g by mouth daily as needed for constipation                                potassium chloride 10 MEQ tablet   Commonly known as:  K-TAB,KLOR-CON   Take 1 tablet (10 mEq) by mouth daily                                senna-docusate 8.6-50 MG per tablet   Commonly known as:  SENOKOT-S;PERICOLACE   Take 2 tablets by mouth daily                                simethicone 80 MG chewable tablet   Commonly known as:  MYLICON   Take 1 tablet (80 mg) by mouth every 6 hours as needed for cramping                                simvastatin 20 MG tablet   Commonly known as:  ZOCOR   Take 1 tablet (20 mg) by mouth At Bedtime                                torsemide 20 MG tablet   Commonly known as:  DEMADEX   Take 1 tablet (20 mg) by mouth daily                                VIAGRA 25 MG tablet   Take 25 mg by mouth as needed   Generic drug:  sildenafil                                warfarin 5 MG tablet   Commonly known as:  COUMADIN   5 mg daily

## 2018-06-29 NOTE — PROGRESS NOTES
Pt here for DEMETRIUS with coronary angiogram to follow.  Reports no pain today, procedure explained.  Dc instructions reviewed with pt for DEMETRIUS.  Pt states sister and son are here today and he will be spending the night at [place] after d/c.  Labs pending at this time.  For DEMETRIUS denies any diff swallowing or chocking on food.  Denies any false or removable teeth.    Call light in reach.  Will cont to monitor.

## 2018-07-04 LAB — INTERPRETATION ECG - MUSE: NORMAL

## 2018-07-06 ENCOUNTER — ANTICOAGULATION THERAPY VISIT (OUTPATIENT)
Dept: NURSING | Facility: CLINIC | Age: 78
End: 2018-07-06
Payer: COMMERCIAL

## 2018-07-06 ENCOUNTER — CARE COORDINATION (OUTPATIENT)
Dept: CARDIOLOGY | Facility: CLINIC | Age: 78
End: 2018-07-06

## 2018-07-06 DIAGNOSIS — Z79.01 LONG-TERM (CURRENT) USE OF ANTICOAGULANTS: ICD-10-CM

## 2018-07-06 DIAGNOSIS — I35.0 AORTIC STENOSIS: Primary | ICD-10-CM

## 2018-07-06 DIAGNOSIS — I47.29 PAROXYSMAL VENTRICULAR TACHYCARDIA (H): ICD-10-CM

## 2018-07-06 LAB — INR POINT OF CARE: 1.7 (ref 0.86–1.14)

## 2018-07-06 PROCEDURE — 99207 ZZC NO CHARGE NURSE ONLY: CPT

## 2018-07-06 PROCEDURE — 85610 PROTHROMBIN TIME: CPT | Mod: QW

## 2018-07-06 PROCEDURE — 36416 COLLJ CAPILLARY BLOOD SPEC: CPT

## 2018-07-06 NOTE — MR AVS SNAPSHOT
Shahid Villalta   7/6/2018 9:00 AM   Anticoagulation Therapy Visit    Description:  78 year old male   Provider:   ANTICOAGULATION CLINIC   Department:   Nurse           INR as of 7/6/2018     Today's INR 1.7!      Anticoagulation Summary as of 7/6/2018     INR goal 2.0-3.0   Today's INR 1.7!   Full warfarin instructions 7/6: 5 mg; Otherwise 2.5 mg on Mon, Fri; 5 mg all other days   Next INR check 7/12/2018    Indications   Long-term (current) use of anticoagulants [Z79.01] [Z79.01]  Paroxysmal ventricular tachycardia (H) [I47.2]         Your next Anticoagulation Clinic appointment(s)     Jul 12, 2018  8:30 AM CDT   Anticoagulation Visit with  ANTICOAGULATION CLINIC   [facility] ([facility])    [address] [address] [phone]              Contact Numbers     [facility]  Please call [phone] with any problems or questions regarding your therapy, or to cancel or reschedule your appointment.          July 2018 Details    Sun Mon Tue Wed Thu Fri Sat     1               2               3               4               5               6      5 mg   See details      7      5 mg           8      5 mg         9      2.5 mg         10      5 mg         11      5 mg         12            13               14                 15               16               17               18               19               20               21                 22               23               24               25               26               27               28                 29               30               31                    Date Details   07/06 This INR check       Date of next INR:  7/12/2018         How to take your warfarin dose     To take:  2.5 mg Take 0.5 of a 5 mg tablet.    To take:  5 mg Take 1 of the 5 mg tablets.

## 2018-07-06 NOTE — PROGRESS NOTES
ANTICOAGULATION FOLLOW-UP CLINIC VISIT    Patient Name:  Shahid Villalta  Date:  7/6/2018  Contact Type:  Face to Face    SUBJECTIVE:     Patient Findings     Positives Change in medications, Intentional hold of therapy    Comments Pt had angiogram and had a hold.  Has been back on coumadin for a week but may have valve replacement within the next week           OBJECTIVE    INR Protime   Date Value Ref Range Status   07/06/2018 1.7 (A) 0.86 - 1.14 Final     Chromogenic Factor 10   Date Value Ref Range Status   07/31/2017 17 (L) 70 - 130 % Final     Comment:     Therapeutic Range:  A Chromogenic Factor 10 level of approximately 20-40%   inversely correlates with an INR of 2-3 for patients receiving Warfarin.   Chromogenic Factor 10 levels below 20% indicate an INR greater than 3 and   levels above 40% indicate an INR less than 2.         ASSESSMENT / PLAN  INR assessment SUB    Recheck INR In: 1 WEEK    INR Location Clinic      Anticoagulation Summary as of 7/6/2018     INR goal 2.0-3.0   Today's INR 1.7!   Warfarin maintenance plan 2.5 mg (5 mg x 0.5) on Mon, Fri; 5 mg (5 mg x 1) all other days   Full warfarin instructions 7/6: 5 mg; Otherwise 2.5 mg on Mon, Fri; 5 mg all other days   Weekly warfarin total 30 mg   Plan last modified Isela Hua RN (5/18/2018)   Next INR check 7/12/2018   Target end date     Indications   Long-term (current) use of anticoagulants [Z79.01] [Z79.01]  Paroxysmal ventricular tachycardia (H) [I47.2]         Anticoagulation Episode Summary     INR check location     Preferred lab     Send INR reminders to [facility]    Comments             See the Encounter Report to view Anticoagulation Flowsheet and Dosing Calendar (Go to Encounters tab in chart review, and find the Anticoagulation Therapy Visit)    Dosage adjustment made based on physician directed care plan.    Manisha Pierson, JUDIT

## 2018-07-06 NOTE — PROGRESS NOTES
Dr. Nation has reviewed the following angiogram and DEMETRIUS.  Will proceed with TAVR work.  No need to do anything with his RCA which is chronically occluded since he has good collaterals.  Will place order for TAVR CT as long as his kidney function is OK.

## 2018-07-09 ENCOUNTER — OFFICE VISIT (OUTPATIENT)
Dept: CARDIOLOGY | Facility: CLINIC | Age: 78
End: 2018-07-09
Payer: COMMERCIAL

## 2018-07-09 VITALS
HEART RATE: 72 BPM | BODY MASS INDEX: 36.04 KG/M2 | SYSTOLIC BLOOD PRESSURE: 116 MMHG | HEIGHT: 68 IN | WEIGHT: 237.8 LBS | DIASTOLIC BLOOD PRESSURE: 70 MMHG

## 2018-07-09 DIAGNOSIS — N18.30 CKD (CHRONIC KIDNEY DISEASE) STAGE 3, GFR 30-59 ML/MIN (H): ICD-10-CM

## 2018-07-09 DIAGNOSIS — I35.0 AORTIC VALVE STENOSIS, ETIOLOGY OF CARDIAC VALVE DISEASE UNSPECIFIED: Primary | ICD-10-CM

## 2018-07-09 DIAGNOSIS — I25.10 CORONARY ARTERY DISEASE INVOLVING NATIVE CORONARY ARTERY OF NATIVE HEART WITHOUT ANGINA PECTORIS: ICD-10-CM

## 2018-07-09 LAB
ANION GAP SERPL CALCULATED.3IONS-SCNC: 3 MMOL/L (ref 3–14)
BUN SERPL-MCNC: 25 MG/DL (ref 7–30)
CALCIUM SERPL-MCNC: 9.3 MG/DL (ref 8.5–10.1)
CHLORIDE SERPL-SCNC: 108 MMOL/L (ref 94–109)
CO2 SERPL-SCNC: 31 MMOL/L (ref 20–32)
CREAT SERPL-MCNC: 1.55 MG/DL (ref 0.66–1.25)
GFR SERPL CREATININE-BSD FRML MDRD: 44 ML/MIN/1.7M2
GLUCOSE SERPL-MCNC: 97 MG/DL (ref 70–99)
POTASSIUM SERPL-SCNC: 4.4 MMOL/L (ref 3.4–5.3)
SODIUM SERPL-SCNC: 142 MMOL/L (ref 133–144)

## 2018-07-09 PROCEDURE — 80048 BASIC METABOLIC PNL TOTAL CA: CPT | Performed by: PHYSICIAN ASSISTANT

## 2018-07-09 PROCEDURE — 99214 OFFICE O/P EST MOD 30 MIN: CPT | Performed by: PHYSICIAN ASSISTANT

## 2018-07-09 PROCEDURE — 36415 COLL VENOUS BLD VENIPUNCTURE: CPT | Performed by: PHYSICIAN ASSISTANT

## 2018-07-09 NOTE — LETTER
7/9/2018      Gume Brown MD  [address]  [facility] [address]      RE: Shahid PRICE Pawan       Dear Colleague,    I had the pleasure of seeing Shahid Villalta in the [facility].    Service Date: 07/09/2018      HISTORY OF PRESENT ILLNESS:  Mr. Villalta is a pleasant 78-year-old gentleman who presents to Cardiology office today to follow up after her recent DEMETRIUS and coronary angiogram performed as part of a TAVR workup.      The patient's cardiac history includes aortic stenosis, mitral regurgitation, coronary artery disease with a known totally occluded RCA with left-to-right collaterals, mild cardiomyopathy, history of paroxysmal atrial fibrillation and atrial tachycardia for which he is on amiodarone and has previously undergone an AV dario ablation and BiV ICD implantation and stage III CKD whom has followed with Dr. Aden for a number of years.  In May, he presented for routine followup.  It was noted at that time that the patient had noted some increased shortness of breath over the past 6-12 months.  His echocardiogram just prior to that office visit showed a decrease in his ejection fraction to about 35%-40% compared to 45% previously.  The aortic valve area index was about 0.6 cm2.  The mean gradient was 36.  It appeared that the aortic regurgitation was worse compared to the prior study.  He was referred to the TAVR Clinic.  He was evaluated by Dr. Nation, who recommended a DEMETRIUS as well as left and right heart catheterization.  A TAVR CT was also recommended.      His recent DEMETRIUS showed severe aortic stenosis with calculated valve area of 0.7 to 0.8 cm2.  The mean gradient was 35 mmHg.  It was noted that there was mild to moderate (1 to 2+), central aortic regurgitation.  The LV systolic function was moderately reduced with an EF of 35%-40%.  There is moderate concentric left ventricular hypertrophy.  No LVOT obstruction was noted.  The posterior leaflet of the mitral valve was  mobile but no stenosis was noted.  Mild mitral regurgitation was noted.        His right and left heart catheterization was performed on 06/29.  The coronary angiogram showed minimal disease in the left coronary system.  The RCA was noted to be occluded at the ostium.  Extensive left-to-right collaterals were again noted.  His right heart catheterization showed a mean PA pressure of 40 mmHg.  The pulmonary capillary wedge pressure was 27 with a V wave to 40.      At this point, the patient states that his symptoms have not changed over the past month.  He denies any complaints related to his arterial access site.  He really does not have any new questions or concerns at this time.      CURRENT CARDIAC MEDICATIONS:   1.  Amiodarone 200 mg, 1/2 tablet daily.   2.  Digoxin 125 mcg every other day.   3.  Lisinopril 2.5 mg daily.   4.  Toprol- mg daily.   5.  Potassium chloride 10 mEq daily.   6.  Simvastatin 20 mg nightly.   7.  Torsemide 20 mg daily.   8.  Coumadin as directed.      The remainder of his medications, allergies and review of systems were reviewed and as are listed separately.      PHYSICAL EXAMINATION:   GENERAL:  The patient is a 78-year-old gentleman who appears his stated age.  He is in no apparent distress.   VITAL SIGNS:  His blood pressure is 116/70, pulse 72, weight is 237 pounds, which is stable.  Breathing is nonlabored.   LUNGS:  Clear to auscultation.   CARDIAC:  Reveals a regular rate and rhythm.  He has a 2/6 systolic ejection murmur heard best at the upper left sternal border.   EXTREMITIES:  Lower extremities show no evidence of edema.   NEUROLOGIC:  Alert and oriented.      ASSESSMENT AND PLAN:  The patient is a pleasant 78-year-old gentleman with a cardiac history as detailed above who is being worked up for severe aortic stenosis by our TAVR team.  His recent DEMETRIUS was consistent with severe aortic stenosis.  His right and left heart catheterization showed stable coronary artery  disease with a known chronically occluded RCA with extensive left-to-right collaterals.  To complete his workup, he will need a TAVR CTA.  I recommended that we get a repeat basic metabolic panel today to ensure that his renal function is stable.  Hopefully we can get him set up for the CTA in the near future.      Further followup will be determined by the TAVR team.      Thank you for allowing me to participate in the care of this pleasant patient.         ZULEMA ROLLE PA-C             D: 2018   T: 2018   MT: TAYLOR      Name:     LUCIO VINCENT   MRN:      [id number]        Account:      [id number]   :      1940           Service Date: 2018      Document: [id number]           Outpatient Encounter Prescriptions as of 2018   Medication Sig Dispense Refill     acetaminophen (TYLENOL) 325 MG tablet Take 1,300 mg by mouth 2 times daily        amiodarone (PACERONE/CODARONE) 200 MG tablet Take PO 1/2 tablet daily-Take extra prn per MD recommendations. (Patient taking differently: Take PO 1/2 tablet daily-Take extra prn per MD recommendations) 50 tablet 0     cetirizine (ZYRTEC) 10 MG tablet Take 1 tablet (10 mg) by mouth daily 90 tablet 1     cholecalciferol (VITAMIN D) 1000 UNIT tablet Take 2 tablets (2,000 Units) by mouth daily 180 tablet 1     digoxin (LANOXIN) 125 MCG tablet Take 1 tablet (125 mcg) by mouth every 48 hours 45 tablet 0     lisinopril (PRINIVIL/ZESTRIL) 2.5 MG tablet Take 1 tablet (2.5 mg) by mouth daily 90 tablet 3     metoprolol succinate (TOPROL-XL) 100 MG 24 hr tablet Take 1 tablet (100 mg) by mouth daily 90 tablet 3     omeprazole (PRILOSEC) 20 MG CR capsule Take 1 capsule (20 mg) by mouth daily 90 capsule 3     polyethylene glycol (MIRALAX/GLYCOLAX) powder Take 17 g by mouth daily as needed for constipation       potassium chloride (K-TAB,KLOR-CON) 10 MEQ tablet Take 1 tablet (10 mEq) by mouth daily 90 tablet 3     senna-docusate (SENOKOT-S;PERICOLACE) 8.6-50  MG per tablet Take 2 tablets by mouth daily        sildenafil (VIAGRA) 25 MG tablet Take 25 mg by mouth as needed       simethicone (MYLICON) 80 MG chewable tablet Take 1 tablet (80 mg) by mouth every 6 hours as needed for cramping 180 tablet      simvastatin (ZOCOR) 20 MG tablet Take 1 tablet (20 mg) by mouth At Bedtime 90 tablet 3     torsemide (DEMADEX) 20 MG tablet Take 1 tablet (20 mg) by mouth daily 90 tablet 3     warfarin (COUMADIN) 5 MG tablet 5 mg daily (Patient taking differently: 5 mg daily or Take as Directed) 90 tablet 0     ASPIRIN NOT PRESCRIBED, INTENTIONAL, 1 each daily Antiplatelet medication not prescribed intentionally due to Current anticoagulant therapy (warfarin/enoxaparin) (Patient not taking: Reported on 7/9/2018) 0 each 0     [DISCONTINUED] ASPIRIN PO Take 81 mg by mouth daily Taking only since off warfarin since Sunday.  Otherwise doesn't normally take aspirin       No facility-administered encounter medications on file as of 7/9/2018.        Again, thank you for allowing me to participate in the care of your patient.      Sincerely,    Mira Norwood PA-C     [facility]

## 2018-07-09 NOTE — PROGRESS NOTES
Please see separate dictation for HPI, PHYSICAL EXAM AND IMPRESSION/PLAN.    CURRENT MEDICATIONS:  Current Outpatient Prescriptions   Medication Sig Dispense Refill     acetaminophen (TYLENOL) 325 MG tablet Take 1,300 mg by mouth 2 times daily        amiodarone (PACERONE/CODARONE) 200 MG tablet Take PO 1/2 tablet daily-Take extra prn per MD recommendations. (Patient taking differently: Take PO 1/2 tablet daily-Take extra prn per MD recommendations) 50 tablet 0     cetirizine (ZYRTEC) 10 MG tablet Take 1 tablet (10 mg) by mouth daily 90 tablet 1     cholecalciferol (VITAMIN D) 1000 UNIT tablet Take 2 tablets (2,000 Units) by mouth daily 180 tablet 1     digoxin (LANOXIN) 125 MCG tablet Take 1 tablet (125 mcg) by mouth every 48 hours 45 tablet 0     lisinopril (PRINIVIL/ZESTRIL) 2.5 MG tablet Take 1 tablet (2.5 mg) by mouth daily 90 tablet 3     metoprolol succinate (TOPROL-XL) 100 MG 24 hr tablet Take 1 tablet (100 mg) by mouth daily 90 tablet 3     omeprazole (PRILOSEC) 20 MG CR capsule Take 1 capsule (20 mg) by mouth daily 90 capsule 3     polyethylene glycol (MIRALAX/GLYCOLAX) powder Take 17 g by mouth daily as needed for constipation       potassium chloride (K-TAB,KLOR-CON) 10 MEQ tablet Take 1 tablet (10 mEq) by mouth daily 90 tablet 3     senna-docusate (SENOKOT-S;PERICOLACE) 8.6-50 MG per tablet Take 2 tablets by mouth daily        sildenafil (VIAGRA) 25 MG tablet Take 25 mg by mouth as needed       simethicone (MYLICON) 80 MG chewable tablet Take 1 tablet (80 mg) by mouth every 6 hours as needed for cramping 180 tablet      simvastatin (ZOCOR) 20 MG tablet Take 1 tablet (20 mg) by mouth At Bedtime 90 tablet 3     torsemide (DEMADEX) 20 MG tablet Take 1 tablet (20 mg) by mouth daily 90 tablet 3     warfarin (COUMADIN) 5 MG tablet 5 mg daily (Patient taking differently: 5 mg daily or Take as Directed) 90 tablet 0     ASPIRIN NOT PRESCRIBED, INTENTIONAL, 1 each daily Antiplatelet medication not prescribed  intentionally due to Current anticoagulant therapy (warfarin/enoxaparin) (Patient not taking: Reported on 7/9/2018) 0 each 0       ALLERGIES:     Allergies   Allergen Reactions     Latex Rash     Seasonal Allergies      hayfever - wheezing -        PAST MEDICAL HISTORY:  Past Medical History:   Diagnosis Date     Aortic valve disorders      Aortic valve disorders      Arrhythmia      Atrial fibrillation (H)      Atrial fibrillation (H)      Automatic implantable cardiac defibrillator in situ      Cancer (H)      Congestive heart failure (H)      Coronary artery disease      Essential hypertension, benign      GERD (gastroesophageal reflux disease) 3/16/2010     Heart failure (H)      History of blood transfusion      Hyperlipidaemia      Hypertension      Obese      Other and unspecified hyperlipidemia      Other primary cardiomyopathies      Other primary cardiomyopathies      Pacemaker      Sleep apnea      Small bowel obstruction 12/14/2015     Ventricular tachycardia (H) 6/17/2013       PAST SURGICAL HISTORY:  Past Surgical History:   Procedure Laterality Date     BIOPSY  annually    prostate biopsy     CARDIAC SURGERY  2012    A fib Ablation     CARDIAC SURGERY      cardioversion     COLONOSCOPY  2007     DAVINCI PROSTATECTOMY N/A 11/20/2015    Procedure: DAVINCI PROSTATECTOMY;  Surgeon: Nahun Hilario MD;  Location: [facility] OR     GENITOURINARY SURGERY      bladder surgery 2011     H ABLATION AV NODE  6/25/13     H ABLATION FOCAL AFIB  6/25/13     HC TOOTH EXTRACTION W/FORCEP       TONSILLECTOMY & ADENOIDECTOMY         SOCIAL HISTORY:  Social History     Social History     Marital status:      Spouse name: N/A     Number of children: 2     Years of education: N/A     Occupational History      Retired     Social History Main Topics     Smoking status: Never Smoker     Smokeless tobacco: Never Used     Alcohol use No      Comment: AA since 1975     Drug use: No     Sexual activity: Not Currently      Partners: Female     Birth control/ protection: Abstinence     Other Topics Concern     Parent/Sibling W/ Cabg, Mi Or Angioplasty Before 65f 55m? No     Caffeine Concern Yes     2-3 cups caffeine per day     Sleep Concern Yes     sleep apnea, wears cpap at night     Stress Concern No     Weight Concern No     weight fluctuates     Special Diet No     Exercise Yes     walking daily     Seat Belt Yes     Social History Narrative       FAMILY HISTORY:  Family History   Problem Relation Age of Onset     C.A.D. Father      CHF  at 74     Cerebrovascular Disease Father      C.A.D. Mother      CHF at 91 still living     Cerebrovascular Disease Mother      TIAs     Breast Cancer Mother      HEART DISEASE Son      arrythmia     Family History Negative Sister      Family History Negative Brother      Family History Negative Son        Review of Systems:  Skin:  Negative       Eyes:  Positive for glasses    ENT:  Positive for hearing loss wears hearing aids  Respiratory:  Positive for sleep apnea;CPAP;dyspnea on exertion ESCOBAR in hot weather and carrying items   Cardiovascular:  Negative      Gastroenterology: Positive for heartburn controlled with medication  Genitourinary:  Positive for urinary frequency prostate removed  Musculoskeletal:  Positive for joint pain;nocturnal cramping    Neurologic:  Negative      Psychiatric:  Negative      Heme/Lymph/Imm:  Positive for allergies    Endocrine:  Negative         Reviewed. Remainder of the note dictated.    Mira Norwood PA-C

## 2018-07-09 NOTE — PATIENT INSTRUCTIONS
Thank you for your [facility] visit today. Your provider has recommended the following:  Medication Changes:  No change today  Recommendations:  Lab work today  CTA of the aorta   Follow-up:  To be determined based on the CTA of the aorta    Reminder:  Please bring in your current medication list or your medication, over the counter supplements and vitamin bottles as we will review these at each office visit.

## 2018-07-09 NOTE — MR AVS SNAPSHOT
After Visit Summary   7/9/2018    Shahid Villalta    MRN: [id number]           Patient Information     Date Of Birth          1940        Visit Information        Provider Department      7/9/2018 9:00 AM Mira Norwood PA-C [facility]        Today's Diagnoses     CKD (chronic kidney disease) stage 3, GFR 30-59 ml/min    -  1       Follow-ups after your visit        Your next 10 appointments already scheduled     Jul 12, 2018  8:30 AM CDT   Anticoagulation Visit with [facility]   [facility]    [address] [facility] [address]   [phone]            Sep 12, 2018  4:30 PM CDT   Remote ICD Check with STEWART DCR2   [facility] ([facility])    [address] [place] [address] [address]  [facility] [id number] [phone] OPT 2           This appointment is for a remote check of your debrillator.  This is not an appointment at the office.            Nov 09, 2018  9:00 AM CST   Return Visit with [facility] ONCOLOGY NURSE   [facility] ([facility])    [facility]  [address]  [facility] [id number] [phone]            Nov 14, 2018  9:30 AM CST   Return Visit with Nahun Hilario MD   [facility] ([facility])    [facility]  [address]  [facility] [id number] [phone]              Future tests that were ordered for you today     Open Future Orders        Priority Expected Expires Ordered    Basic metabolic panel Routine 7/9/2018 7/9/2019 7/9/2018            Who to contact     If you have questions or need follow up information about today's clinic visit or your schedule please contact [facility] directly at [phone].  Normal or non-critical lab  "and imaging results will be communicated to you by [employer], letter or phone within 4 business days after the clinic has received the results. If you do not hear from us within 7 days, please contact the clinic through [employer] or phone. If you have a critical or abnormal lab result, we will notify you by phone as soon as possible.  Submit refill requests through [employer] or call your pharmacy and they will forward the refill request to us. Please allow 3 business days for your refill to be completed.          Additional Information About Your Visit        [employer] Information     [employer] gives you secure access to your electronic health record. If you see a primary care provider, you can also send messages to your care team and make appointments. If you have questions, please call your primary care clinic.  If you do not have a primary care provider, please call [phone] and they will assist you.        Care EveryWhere ID     This is your Care EveryWhere ID. This could be used by other organizations to access your [facility] medical records  [id number]        Your Vitals Were     Pulse Height BMI (Body Mass Index)             72 1.727 m (5' 8\") 36.16 kg/m2          Blood Pressure from Last 3 Encounters:   07/09/18 116/70   06/29/18 119/66   06/14/18 122/72    Weight from Last 3 Encounters:   07/09/18 107.9 kg (237 lb 12.8 oz)   06/29/18 107.9 kg (237 lb 12.8 oz)   06/14/18 107.9 kg (237 lb 12.8 oz)                 Today's Medication Changes          These changes are accurate as of 7/9/18  9:26 AM.  If you have any questions, ask your nurse or doctor.               These medicines have changed or have updated prescriptions.        Dose/Directions    amiodarone 200 MG tablet   Commonly known as:  PACERONE/CODARONE   This may have changed:  additional instructions   Used for:  Paroxysmal atrial fibrillation (H)        Take PO 1/2 tablet daily-Take extra prn per MD recommendations.   Quantity:  50 tablet   Refills:  0 "       warfarin 5 MG tablet   Commonly known as:  COUMADIN   This may have changed:  additional instructions   Used for:  Long-term (current) use of anticoagulants, Paroxysmal ventricular tachycardia (H)        5 mg daily   Quantity:  90 tablet   Refills:  0                Primary Care Provider Office Phone # Fax #    Gume Brown [phone] [address]  [facility] [address]        Equal Access to Services     JEFFERSON SHEARER : Hadii aad ku hadasho Soomaali, waaxda luqadaha, qaybta kaalmada adeegyada, waxay idiin hayaan adeeg kharash la'aan ah. So [facility] [phone].    ATENCIÓN: Si habla espavis, tiene a irving disposición servicios gratuitos de asistencia lingüística. Llame al [phone].    We comply with applicable federal civil rights laws and [place] laws. We do not discriminate on the basis of race, color, national origin, age, disability, sex, sexual orientation, or gender identity.            Thank you!     Thank you for choosing [facility]  for your care. Our goal is always to provide you with excellent care. Hearing back from our patients is one way we can continue to improve our services. Please take a few minutes to complete the written survey that you may receive in the mail after your visit with us. Thank you!             Your Updated Medication List - Protect others around you: Learn how to safely use, store and throw away your medicines at www.disposemymeds.org.          This list is accurate as of 7/9/18  9:26 AM.  Always use your most recent med list.                   Brand Name Dispense Instructions for use Diagnosis    acetaminophen 325 MG tablet    TYLENOL     Take 1,300 mg by mouth 2 times daily        amiodarone 200 MG tablet    PACERONE/CODARONE    50 tablet    Take PO 1/2 tablet daily-Take extra prn per MD recommendations.    Paroxysmal atrial fibrillation (H)       ASPIRIN NOT PRESCRIBED    INTENTIONAL    0 each    1  each daily Antiplatelet medication not prescribed intentionally due to Current anticoagulant therapy (warfarin/enoxaparin)    Atrial fibrillation, unspecified       cetirizine 10 MG tablet    zyrTEC    90 tablet    Take 1 tablet (10 mg) by mouth daily    Allergic rhinitis       cholecalciferol 1000 UNIT tablet    vitamin D3    180 tablet    Take 2 tablets (2,000 Units) by mouth daily    Vitamin D deficiency, Parathyroid hormone excess (H)       digoxin 125 MCG tablet    LANOXIN    45 tablet    Take 1 tablet (125 mcg) by mouth every 48 hours    Chronic systolic heart failure (H)       lisinopril 2.5 MG tablet    PRINIVIL/Zestril    90 tablet    Take 1 tablet (2.5 mg) by mouth daily    Nonrheumatic aortic valve stenosis       metoprolol succinate 100 MG 24 hr tablet    TOPROL-XL    90 tablet    Take 1 tablet (100 mg) by mouth daily    Paroxysmal atrial fibrillation (H)       omeprazole 20 MG CR capsule    priLOSEC    90 capsule    Take 1 capsule (20 mg) by mouth daily    Gastroesophageal reflux disease without esophagitis       polyethylene glycol powder    MIRALAX/GLYCOLAX     Take 17 g by mouth daily as needed for constipation        potassium chloride 10 MEQ tablet    K-TAB,KLOR-CON    90 tablet    Take 1 tablet (10 mEq) by mouth daily    Chronic systolic heart failure (H)       senna-docusate 8.6-50 MG per tablet    SENOKOT-S;PERICOLACE     Take 2 tablets by mouth daily        simethicone 80 MG chewable tablet    MYLICON    180 tablet    Take 1 tablet (80 mg) by mouth every 6 hours as needed for cramping    Abdominal bloating       simvastatin 20 MG tablet    ZOCOR    90 tablet    Take 1 tablet (20 mg) by mouth At Bedtime    Hyperlipidemia LDL goal <100       torsemide 20 MG tablet    DEMADEX    90 tablet    Take 1 tablet (20 mg) by mouth daily    Chronic systolic heart failure (H), Ischemic cardiomyopathy       VIAGRA 25 MG tablet   Generic drug:  sildenafil      Take 25 mg by mouth as needed        warfarin 5 MG  tablet    COUMADIN    90 tablet    5 mg daily    Long-term (current) use of anticoagulants, Paroxysmal ventricular tachycardia (H)

## 2018-07-09 NOTE — PROGRESS NOTES
Service Date: 07/09/2018      HISTORY OF PRESENT ILLNESS:  Mr. Villalta is a pleasant 78-year-old gentleman who presents to Cardiology office today to follow up after her recent DEMETRIUS and coronary angiogram performed as part of a TAVR workup.      The patient's cardiac history includes aortic stenosis, mitral regurgitation, coronary artery disease with a known totally occluded RCA with left-to-right collaterals, mild cardiomyopathy, history of paroxysmal atrial fibrillation and atrial tachycardia for which he is on amiodarone and has previously undergone an AV dario ablation and BiV ICD implantation and stage III CKD whom has followed with Dr. Aden for a number of years.  In May, he presented for routine followup.  It was noted at that time that the patient had noted some increased shortness of breath over the past 6-12 months.  His echocardiogram just prior to that office visit showed a decrease in his ejection fraction to about 35%-40% compared to 45% previously.  The aortic valve area index was about 0.6 cm2.  The mean gradient was 36.  It appeared that the aortic regurgitation was worse compared to the prior study.  He was referred to the TAVR Clinic.  He was evaluated by Dr. Nation, who recommended a DEMETRIUS as well as left and right heart catheterization.  A TAVR CT was also recommended.      His recent DEMETRIUS showed severe aortic stenosis with calculated valve area of 0.7 to 0.8 cm2.  The mean gradient was 35 mmHg.  It was noted that there was mild to moderate (1 to 2+), central aortic regurgitation.  The LV systolic function was moderately reduced with an EF of 35%-40%.  There is moderate concentric left ventricular hypertrophy.  No LVOT obstruction was noted.  The posterior leaflet of the mitral valve was mobile but no stenosis was noted.  Mild mitral regurgitation was noted.        His right and left heart catheterization was performed on 06/29.  The coronary angiogram showed minimal disease in the left coronary  system.  The RCA was noted to be occluded at the ostium.  Extensive left-to-right collaterals were again noted.  His right heart catheterization showed a mean PA pressure of 40 mmHg.  The pulmonary capillary wedge pressure was 27 with a V wave to 40.      At this point, the patient states that his symptoms have not changed over the past month.  He denies any complaints related to his arterial access site.  He really does not have any new questions or concerns at this time.      CURRENT CARDIAC MEDICATIONS:   1.  Amiodarone 200 mg, 1/2 tablet daily.   2.  Digoxin 125 mcg every other day.   3.  Lisinopril 2.5 mg daily.   4.  Toprol- mg daily.   5.  Potassium chloride 10 mEq daily.   6.  Simvastatin 20 mg nightly.   7.  Torsemide 20 mg daily.   8.  Coumadin as directed.      The remainder of his medications, allergies and review of systems were reviewed and as are listed separately.      PHYSICAL EXAMINATION:   GENERAL:  The patient is a 78-year-old gentleman who appears his stated age.  He is in no apparent distress.   VITAL SIGNS:  His blood pressure is 116/70, pulse 72, weight is 237 pounds, which is stable.  Breathing is nonlabored.   LUNGS:  Clear to auscultation.   CARDIAC:  Reveals a regular rate and rhythm.  He has a 2/6 systolic ejection murmur heard best at the upper left sternal border.   EXTREMITIES:  Lower extremities show no evidence of edema.   NEUROLOGIC:  Alert and oriented.      ASSESSMENT AND PLAN:  The patient is a pleasant 78-year-old gentleman with a cardiac history as detailed above who is being worked up for severe aortic stenosis by our TAVR team.  His recent DEMETRIUS was consistent with severe aortic stenosis.  His right and left heart catheterization showed stable coronary artery disease with a known chronically occluded RCA with extensive left-to-right collaterals.  To complete his workup, he will need a TAVR CTA.  I recommended that we get a repeat basic metabolic panel today to ensure  that his renal function is stable.  Hopefully we can get him set up for the CTA in the near future.      Further followup will be determined by the TAVR team.      Thank you for allowing me to participate in the care of this pleasant patient.         ZULEMA ROLLE PA-C             D: 2018   T: 2018   MT: TAYLOR      Name:     LUCIO VINCENT   MRN:      [id number]        Account:      [id number]   :      1940           Service Date: 2018      Document: [id number]

## 2018-07-09 NOTE — LETTER
7/9/2018    Gume Brown [place]  [id number]  [facility] [address]    RE: Shahid Villatla       Dear Colleague,    I had the pleasure of seeing Shahid Villalta in the [facility].    Please see separate dictation for HPI, PHYSICAL EXAM AND IMPRESSION/PLAN.    CURRENT MEDICATIONS:  Current Outpatient Prescriptions   Medication Sig Dispense Refill     acetaminophen (TYLENOL) 325 MG tablet Take 1,300 mg by mouth 2 times daily        amiodarone (PACERONE/CODARONE) 200 MG tablet Take PO 1/2 tablet daily-Take extra prn per MD recommendations. (Patient taking differently: Take PO 1/2 tablet daily-Take extra prn per MD recommendations) 50 tablet 0     cetirizine (ZYRTEC) 10 MG tablet Take 1 tablet (10 mg) by mouth daily 90 tablet 1     cholecalciferol (VITAMIN D) 1000 UNIT tablet Take 2 tablets (2,000 Units) by mouth daily 180 tablet 1     digoxin (LANOXIN) 125 MCG tablet Take 1 tablet (125 mcg) by mouth every 48 hours 45 tablet 0     lisinopril (PRINIVIL/ZESTRIL) 2.5 MG tablet Take 1 tablet (2.5 mg) by mouth daily 90 tablet 3     metoprolol succinate (TOPROL-XL) 100 MG 24 hr tablet Take 1 tablet (100 mg) by mouth daily 90 tablet 3     omeprazole (PRILOSEC) 20 MG CR capsule Take 1 capsule (20 mg) by mouth daily 90 capsule 3     polyethylene glycol (MIRALAX/GLYCOLAX) powder Take 17 g by mouth daily as needed for constipation       potassium chloride (K-TAB,KLOR-CON) 10 MEQ tablet Take 1 tablet (10 mEq) by mouth daily 90 tablet 3     senna-docusate (SENOKOT-S;PERICOLACE) 8.6-50 MG per tablet Take 2 tablets by mouth daily        sildenafil (VIAGRA) 25 MG tablet Take 25 mg by mouth as needed       simethicone (MYLICON) 80 MG chewable tablet Take 1 tablet (80 mg) by mouth every 6 hours as needed for cramping 180 tablet      simvastatin (ZOCOR) 20 MG tablet Take 1 tablet (20 mg) by mouth At Bedtime 90 tablet 3     torsemide (DEMADEX) 20 MG tablet Take 1 tablet (20 mg) by mouth daily 90 tablet 3      warfarin (COUMADIN) 5 MG tablet 5 mg daily (Patient taking differently: 5 mg daily or Take as Directed) 90 tablet 0     ASPIRIN NOT PRESCRIBED, INTENTIONAL, 1 each daily Antiplatelet medication not prescribed intentionally due to Current anticoagulant therapy (warfarin/enoxaparin) (Patient not taking: Reported on 7/9/2018) 0 each 0       ALLERGIES:     Allergies   Allergen Reactions     Latex Rash     Seasonal Allergies      hayfever - wheezing -        PAST MEDICAL HISTORY:  Past Medical History:   Diagnosis Date     Aortic valve disorders      Aortic valve disorders      Arrhythmia      Atrial fibrillation (H)      Atrial fibrillation (H)      Automatic implantable cardiac defibrillator in situ      Cancer (H)      Congestive heart failure (H)      Coronary artery disease      Essential hypertension, benign      GERD (gastroesophageal reflux disease) 3/16/2010     Heart failure (H)      History of blood transfusion      Hyperlipidaemia      Hypertension      Obese      Other and unspecified hyperlipidemia      Other primary cardiomyopathies      Other primary cardiomyopathies      Pacemaker      Sleep apnea      Small bowel obstruction 12/14/2015     Ventricular tachycardia (H) 6/17/2013       PAST SURGICAL HISTORY:  Past Surgical History:   Procedure Laterality Date     BIOPSY  annually    prostate biopsy     CARDIAC SURGERY  2012    A fib Ablation     CARDIAC SURGERY      cardioversion     COLONOSCOPY  2007     DAVINCI PROSTATECTOMY N/A 11/20/2015    Procedure: DAVINCI PROSTATECTOMY;  Surgeon: Nahun Hilario MD;  Location: [facility] OR     GENITOURINARY SURGERY      bladder surgery 2011     H ABLATION AV NODE  6/25/13     H ABLATION FOCAL AFIB  6/25/13     HC TOOTH EXTRACTION W/FORCEP       TONSILLECTOMY & ADENOIDECTOMY         SOCIAL HISTORY:  Social History     Social History     Marital status:      Spouse name: N/A     Number of children: 2     Years of education: N/A     Occupational History       Retired     Social History Main Topics     Smoking status: Never Smoker     Smokeless tobacco: Never Used     Alcohol use No      Comment: AA since      Drug use: No     Sexual activity: Not Currently     Partners: Female     Birth control/ protection: Abstinence     Other Topics Concern     Parent/Sibling W/ Cabg, Mi Or Angioplasty Before 65f 55m? No     Caffeine Concern Yes     2-3 cups caffeine per day     Sleep Concern Yes     sleep apnea, wears cpap at night     Stress Concern No     Weight Concern No     weight fluctuates     Special Diet No     Exercise Yes     walking daily     Seat Belt Yes     Social History Narrative       FAMILY HISTORY:  Family History   Problem Relation Age of Onset     C.A.D. Father      CHF  at 74     Cerebrovascular Disease Father      C.A.D. Mother      CHF at 91 still living     Cerebrovascular Disease Mother      TIAs     Breast Cancer Mother      HEART DISEASE Son      arrythmia     Family History Negative Sister      Family History Negative Brother      Family History Negative Son        Review of Systems:  Skin:  Negative       Eyes:  Positive for glasses    ENT:  Positive for hearing loss wears hearing aids  Respiratory:  Positive for sleep apnea;CPAP;dyspnea on exertion ESCOBAR in hot weather and carrying items   Cardiovascular:  Negative      Gastroenterology: Positive for heartburn controlled with medication  Genitourinary:  Positive for urinary frequency prostate removed  Musculoskeletal:  Positive for joint pain;nocturnal cramping    Neurologic:  Negative      Psychiatric:  Negative      Heme/Lymph/Imm:  Positive for allergies    Endocrine:  Negative         Reviewed. Remainder of the note dictated.    Mira Norwood PA-C        Note created in error. Mira Norwood PA-C      Service Date: 2018      HISTORY OF PRESENT ILLNESS:  Mr. Villalta is a pleasant 78-year-old gentleman who presents to Cardiology office today to follow up after her recent  DEMETRIUS and coronary angiogram performed as part of a TAVR workup.      The patient's cardiac history includes aortic stenosis, mitral regurgitation, coronary artery disease with a known totally occluded RCA with left-to-right collaterals, mild cardiomyopathy, history of paroxysmal atrial fibrillation and atrial tachycardia for which he is on amiodarone and has previously undergone an AV dario ablation and BiV ICD implantation and stage III CKD whom has followed with Dr. Aden for a number of years.  In May, he presented for routine followup.  It was noted at that time that the patient had noted some increased shortness of breath over the past 6-12 months.  His echocardiogram just prior to that office visit showed a decrease in his ejection fraction to about 35%-40% compared to 45% previously.  The aortic valve area index was about 0.6 cm2.  The mean gradient was 36.  It appeared that the aortic regurgitation was worse compared to the prior study.  He was referred to the TAVR Clinic.  He was evaluated by Dr. Nation, who recommended a DEMETRIUS as well as left and right heart catheterization.  A TAVR CT was also recommended.      His recent DEMETRIUS showed severe aortic stenosis with calculated valve area of 0.7 to 0.8 cm2.  The mean gradient was 35 mmHg.  It was noted that there was mild to moderate (1 to 2+), central aortic regurgitation.  The LV systolic function was moderately reduced with an EF of 35%-40%.  There is moderate concentric left ventricular hypertrophy.  No LVOT obstruction was noted.  The posterior leaflet of the mitral valve was mobile but no stenosis was noted.  Mild mitral regurgitation was noted.        His right and left heart catheterization was performed on 06/29.  The coronary angiogram showed minimal disease in the left coronary system.  The RCA was noted to be occluded at the ostium.  Extensive left-to-right collaterals were again noted.  His right heart catheterization showed a mean PA pressure of 40  mmHg.  The pulmonary capillary wedge pressure was 27 with a V wave to 40.      At this point, the patient states that his symptoms have not changed over the past month.  He denies any complaints related to his arterial access site.  He really does not have any new questions or concerns at this time.      CURRENT CARDIAC MEDICATIONS:   1.  Amiodarone 200 mg, 1/2 tablet daily.   2.  Digoxin 125 mcg every other day.   3.  Lisinopril 2.5 mg daily.   4.  Toprol- mg daily.   5.  Potassium chloride 10 mEq daily.   6.  Simvastatin 20 mg nightly.   7.  Torsemide 20 mg daily.   8.  Coumadin as directed.      The remainder of his medications, allergies and review of systems were reviewed and as are listed separately.      PHYSICAL EXAMINATION:   GENERAL:  The patient is a 78-year-old gentleman who appears his stated age.  He is in no apparent distress.   VITAL SIGNS:  His blood pressure is 116/70, pulse 72, weight is 237 pounds, which is stable.  Breathing is nonlabored.   LUNGS:  Clear to auscultation.   CARDIAC:  Reveals a regular rate and rhythm.  He has a 2/6 systolic ejection murmur heard best at the upper left sternal border.   EXTREMITIES:  Lower extremities show no evidence of edema.   NEUROLOGIC:  Alert and oriented.      ASSESSMENT AND PLAN:  The patient is a pleasant 78-year-old gentleman with a cardiac history as detailed above who is being worked up for severe aortic stenosis by our TAVR team.  His recent DEMETRIUS was consistent with severe aortic stenosis.  His right and left heart catheterization showed stable coronary artery disease with a known chronically occluded RCA with extensive left-to-right collaterals.  To complete his workup, he will need a TAVR CTA.  I recommended that we get a repeat basic metabolic panel today to ensure that his renal function is stable.  Hopefully we can get him set up for the CTA in the near future.      Further followup will be determined by the TAVR team.      Thank you for  allowing me to participate in the care of this pleasant patient.         ZULEMA ROLLE PA-C             D: 2018   T: 2018   MT: TAYLOR      Name:     LUCIO VINCENT   MRN:      [id number]        Account:      [id number]   :      1940           Service Date: 2018      Document: [id number]        Thank you for allowing me to participate in the care of your patient.      Sincerely,     Zulema Rolle PA-C     [facility]    cc:   No referring provider defined for this encounter.

## 2018-07-11 NOTE — TELEPHONE ENCOUNTER
Called to report results.  Left message.  All questions were answered  High risk decipher and I would recommend radiation treatment.    Risk of PC death is 15% at 10 years  Risk of Mets is 25% at 5 years    These can be reduced into the ~6% range by radiation      Nahun Hilario MD  Department of Urology Staff  [facility]

## 2018-07-12 ENCOUNTER — ANTICOAGULATION THERAPY VISIT (OUTPATIENT)
Dept: NURSING | Facility: CLINIC | Age: 78
End: 2018-07-12
Payer: COMMERCIAL

## 2018-07-12 ENCOUNTER — CARE COORDINATION (OUTPATIENT)
Dept: CARDIOLOGY | Facility: CLINIC | Age: 78
End: 2018-07-12

## 2018-07-12 DIAGNOSIS — Z79.01 LONG-TERM (CURRENT) USE OF ANTICOAGULANTS: ICD-10-CM

## 2018-07-12 DIAGNOSIS — I47.29 PAROXYSMAL VENTRICULAR TACHYCARDIA (H): ICD-10-CM

## 2018-07-12 DIAGNOSIS — I35.0 AORTIC STENOSIS: Primary | ICD-10-CM

## 2018-07-12 LAB — INR POINT OF CARE: 2 (ref 0.86–1.14)

## 2018-07-12 PROCEDURE — 85610 PROTHROMBIN TIME: CPT | Mod: QW

## 2018-07-12 PROCEDURE — 36416 COLLJ CAPILLARY BLOOD SPEC: CPT

## 2018-07-12 PROCEDURE — 99207 ZZC NO CHARGE NURSE ONLY: CPT

## 2018-07-12 NOTE — PROGRESS NOTES
ANTICOAGULATION FOLLOW-UP CLINIC VISIT    Patient Name:  Shahid Villalta  Date:  7/12/2018  Contact Type:  Face to Face    SUBJECTIVE:     Patient Findings     Positives No Problem Findings           OBJECTIVE    INR Protime   Date Value Ref Range Status   07/12/2018 2.0 (A) 0.86 - 1.14 Final     Chromogenic Factor 10   Date Value Ref Range Status   07/31/2017 17 (L) 70 - 130 % Final     Comment:     Therapeutic Range:  A Chromogenic Factor 10 level of approximately 20-40%   inversely correlates with an INR of 2-3 for patients receiving Warfarin.   Chromogenic Factor 10 levels below 20% indicate an INR greater than 3 and   levels above 40% indicate an INR less than 2.         ASSESSMENT / PLAN  No question data found.  Anticoagulation Summary as of 7/12/2018     INR goal 2.0-3.0   Today's INR 2.0   Warfarin maintenance plan 2.5 mg (5 mg x 0.5) on Mon, Fri; 5 mg (5 mg x 1) all other days   Full warfarin instructions 7/13: 5 mg; 7/20: 5 mg; Otherwise 2.5 mg on Mon, Fri; 5 mg all other days   Weekly warfarin total 30 mg   Plan last modified Isela Hua RN (5/18/2018)   Next INR check 7/23/2018   Target end date     Indications   Long-term (current) use of anticoagulants [Z79.01] [Z79.01]  Paroxysmal ventricular tachycardia (H) [I47.2]         Anticoagulation Episode Summary     INR check location     Preferred lab     Send INR reminders to [facility]    Comments             See the Encounter Report to view Anticoagulation Flowsheet and Dosing Calendar (Go to Encounters tab in chart review, and find the Anticoagulation Therapy Visit)        Manisha Pierson RN

## 2018-07-12 NOTE — MR AVS SNAPSHOT
Shahid Villalta   7/12/2018 8:30 AM   Anticoagulation Therapy Visit    Description:  78 year old male   Provider:   ANTICOAGULATION CLINIC   Department:   Nurse           INR as of 7/12/2018     Today's INR 2.0      Anticoagulation Summary as of 7/12/2018     INR goal 2.0-3.0   Today's INR 2.0   Full warfarin instructions 7/13: 5 mg; 7/20: 5 mg; Otherwise 2.5 mg on Mon, Fri; 5 mg all other days   Next INR check 7/23/2018    Indications   Long-term (current) use of anticoagulants [Z79.01] [Z79.01]  Paroxysmal ventricular tachycardia (H) [I47.2]         Your next Anticoagulation Clinic appointment(s)     Jul 23, 2018  8:45 AM CDT   Anticoagulation Visit with  ANTICOAGULATION CLINIC   [facility]    [address] [facility] [address] [phone]              Contact Numbers     [facility]  Please call [phone] with any problems or questions regarding your therapy, or to cancel or reschedule your appointment.          July 2018 Details    Sun Mon Tue Wed Thu Fri Sat     1               2               3               4               5               6               7                 8               9               10               11               12      5 mg   See details      13      5 mg         14      5 mg           15      5 mg         16      2.5 mg         17      5 mg         18      5 mg         19      5 mg         20      5 mg         21      5 mg           22      5 mg         23            24               25               26               27               28                 29               30               31                    Date Details   07/12 This INR check       Date of next INR:  7/23/2018         How to take your warfarin dose     To take:  2.5 mg Take 0.5 of a 5 mg tablet.    To take:  5 mg Take 1 of the 5 mg tablets.

## 2018-07-12 NOTE — PROGRESS NOTES
Will give fluids for 1 hour at 75 cc/hr prior to his TAVR CT next week.  GFR was 44.  Patient is aware of risks of dye with his kidneys.  Will order GFR to be checked 3 days later.

## 2018-07-19 DIAGNOSIS — I35.0 AORTIC STENOSIS: Primary | ICD-10-CM

## 2018-07-23 ENCOUNTER — ANTICOAGULATION THERAPY VISIT (OUTPATIENT)
Dept: NURSING | Facility: CLINIC | Age: 78
End: 2018-07-23
Payer: COMMERCIAL

## 2018-07-23 DIAGNOSIS — Z79.01 LONG-TERM (CURRENT) USE OF ANTICOAGULANTS: ICD-10-CM

## 2018-07-23 DIAGNOSIS — I47.29 PAROXYSMAL VENTRICULAR TACHYCARDIA (H): ICD-10-CM

## 2018-07-23 LAB — INR POINT OF CARE: 2.5 (ref 0.86–1.14)

## 2018-07-23 PROCEDURE — 36416 COLLJ CAPILLARY BLOOD SPEC: CPT

## 2018-07-23 PROCEDURE — 99207 ZZC NO CHARGE NURSE ONLY: CPT

## 2018-07-23 PROCEDURE — 85610 PROTHROMBIN TIME: CPT | Mod: QW

## 2018-07-23 NOTE — PROGRESS NOTES
ANTICOAGULATION FOLLOW-UP CLINIC VISIT    Patient Name:  Shahid Villalta  Date:  7/23/2018  Contact Type:  Face to Face    SUBJECTIVE:        OBJECTIVE    INR Protime   Date Value Ref Range Status   07/23/2018 2.5 (A) 0.86 - 1.14 Final     Chromogenic Factor 10   Date Value Ref Range Status   07/31/2017 17 (L) 70 - 130 % Final     Comment:     Therapeutic Range:  A Chromogenic Factor 10 level of approximately 20-40%   inversely correlates with an INR of 2-3 for patients receiving Warfarin.   Chromogenic Factor 10 levels below 20% indicate an INR greater than 3 and   levels above 40% indicate an INR less than 2.         ASSESSMENT / PLAN  No question data found.  Anticoagulation Summary as of 7/23/2018     INR goal 2.0-3.0   Today's INR 2.5   Warfarin maintenance plan 2.5 mg (5 mg x 0.5) on Mon; 5 mg (5 mg x 1) all other days   Full warfarin instructions 2.5 mg on Mon; 5 mg all other days   Weekly warfarin total 32.5 mg   Plan last modified Manisha Pierson RN (7/23/2018)   Next INR check 8/6/2018   Target end date     Indications   Long-term (current) use of anticoagulants [Z79.01] [Z79.01]  Paroxysmal ventricular tachycardia (H) [I47.2]         Anticoagulation Episode Summary     INR check location     Preferred lab     Send INR reminders to [facility]    Comments             See the Encounter Report to view Anticoagulation Flowsheet and Dosing Calendar (Go to Encounters tab in chart review, and find the Anticoagulation Therapy Visit)        Manisha Pierson RN

## 2018-07-23 NOTE — MR AVS SNAPSHOT
Shahid Villalta   7/23/2018 8:45 AM   Anticoagulation Therapy Visit    Description:  78 year old male   Provider:   ANTICOAGULATION CLINIC   Department:  [facility] Nurse           INR as of 7/23/2018     Today's INR 2.5      Anticoagulation Summary as of 7/23/2018     INR goal 2.0-3.0   Today's INR 2.5   Full warfarin instructions 2.5 mg on Mon; 5 mg all other days   Next INR check 8/6/2018    Indications   Long-term (current) use of anticoagulants [Z79.01] [Z79.01]  Paroxysmal ventricular tachycardia (H) [I47.2]         Your next Anticoagulation Clinic appointment(s)     Aug 06, 2018  9:00 AM CDT   Anticoagulation Visit with  ANTICOAGULATION CLINIC   [facility]    [address] [address] [phone]              Contact Numbers     [facility]  Please call [phone] with any problems or questions regarding your therapy, or to cancel or reschedule your appointment.          July 2018 Details    Sun Mon Tue Wed Thu Fri Sat     1               2               3               4               5               6               7                 8               9               10               11               12               13               14                 15               16               17               18               19               20               21                 22               23      2.5 mg   See details      24      5 mg         25      5 mg         26      5 mg         27      5 mg         28      5 mg           29      5 mg         30      2.5 mg         31      5 mg              Date Details   07/23 This INR check               How to take your warfarin dose     To take:  2.5 mg Take 0.5 of a 5 mg tablet.    To take:  5 mg Take 1 of the 5 mg tablets.           August 2018 Details    Sun Mon Tue Wed Thu Fri Sat        1      5 mg         2      5 mg         3      5 mg         4      5 mg           5      5 mg         6             7               8               9               10               11                 12               13               14               15               16               17               18                 19               20               21               22               23               24               25                 26               27               28               29               30               31                 Date Details   No additional details    Date of next INR:  8/6/2018         How to take your warfarin dose     To take:  2.5 mg Take 0.5 of a 5 mg tablet.    To take:  5 mg Take 1 of the 5 mg tablets.

## 2018-08-01 DIAGNOSIS — I35.0 AORTIC STENOSIS: ICD-10-CM

## 2018-08-01 LAB
ANION GAP SERPL CALCULATED.3IONS-SCNC: 6 MMOL/L (ref 3–14)
BUN SERPL-MCNC: 29 MG/DL (ref 7–30)
CALCIUM SERPL-MCNC: 9.4 MG/DL (ref 8.5–10.1)
CHLORIDE SERPL-SCNC: 108 MMOL/L (ref 94–109)
CO2 SERPL-SCNC: 27 MMOL/L (ref 20–32)
CREAT SERPL-MCNC: 1.52 MG/DL (ref 0.66–1.25)
GFR SERPL CREATININE-BSD FRML MDRD: 45 ML/MIN/1.7M2
GLUCOSE SERPL-MCNC: 99 MG/DL (ref 70–99)
POTASSIUM SERPL-SCNC: 4.5 MMOL/L (ref 3.4–5.3)
SODIUM SERPL-SCNC: 141 MMOL/L (ref 133–144)

## 2018-08-01 PROCEDURE — 36415 COLL VENOUS BLD VENIPUNCTURE: CPT | Performed by: INTERNAL MEDICINE

## 2018-08-01 PROCEDURE — 80048 BASIC METABOLIC PNL TOTAL CA: CPT | Performed by: INTERNAL MEDICINE

## 2018-08-02 ENCOUNTER — HOSPITAL ENCOUNTER (OUTPATIENT)
Dept: CARDIOLOGY | Facility: CLINIC | Age: 78
Discharge: HOME | End: 2018-08-02
Attending: INTERNAL MEDICINE | Admitting: INTERNAL MEDICINE
Payer: MEDICARE

## 2018-08-02 ENCOUNTER — CARE COORDINATION (OUTPATIENT)
Dept: CARDIOLOGY | Facility: CLINIC | Age: 78
End: 2018-08-02

## 2018-08-02 VITALS — SYSTOLIC BLOOD PRESSURE: 129 MMHG | HEART RATE: 77 BPM | DIASTOLIC BLOOD PRESSURE: 79 MMHG | OXYGEN SATURATION: 97 %

## 2018-08-02 PROCEDURE — 25000128 H RX IP 250 OP 636: Performed by: INTERNAL MEDICINE

## 2018-08-02 PROCEDURE — 74174 CTA ABD&PLVS W/CONTRAST: CPT

## 2018-08-02 PROCEDURE — 74174 CTA ABD&PLVS W/CONTRAST: CPT | Mod: 26 | Performed by: INTERNAL MEDICINE

## 2018-08-02 PROCEDURE — 71275 CT ANGIOGRAPHY CHEST: CPT | Mod: 26 | Performed by: INTERNAL MEDICINE

## 2018-08-02 RX ORDER — IOPAMIDOL 755 MG/ML
150 INJECTION, SOLUTION INTRAVASCULAR ONCE
Status: COMPLETED | OUTPATIENT
Start: 2018-08-02 | End: 2018-08-02

## 2018-08-02 RX ORDER — ACYCLOVIR 200 MG/1
0-1 CAPSULE ORAL
Status: DISCONTINUED | OUTPATIENT
Start: 2018-08-02 | End: 2018-08-03 | Stop reason: HOSPADM

## 2018-08-02 RX ADMIN — IOPAMIDOL 70 ML: 755 INJECTION, SOLUTION INTRAVENOUS at 15:19

## 2018-08-02 RX ADMIN — SODIUM CHLORIDE 128 ML: 9 INJECTION, SOLUTION INTRAVENOUS at 15:19

## 2018-08-02 RX ADMIN — SODIUM CHLORIDE 300 ML: 9 INJECTION, SOLUTION INTRAVENOUS at 12:03

## 2018-08-02 NOTE — PROGRESS NOTES
Patient had his TAVR CT scheduled for 7/19/18 cancelled due to GFR of 39 and Creat of 1.7.  Recheck yesterday showing GFR of 45 and Creat of 1.5.  The plan is extra hydration prior to TAVR CT tomorrow.   Patient is aware of poor kidney function and need for extra hydration.

## 2018-08-06 ENCOUNTER — ANTICOAGULATION THERAPY VISIT (OUTPATIENT)
Dept: NURSING | Facility: CLINIC | Age: 78
End: 2018-08-06
Payer: COMMERCIAL

## 2018-08-06 DIAGNOSIS — Z79.01 LONG-TERM (CURRENT) USE OF ANTICOAGULANTS: ICD-10-CM

## 2018-08-06 DIAGNOSIS — I47.29 PAROXYSMAL VENTRICULAR TACHYCARDIA (H): ICD-10-CM

## 2018-08-06 LAB — INR POINT OF CARE: 2.9 (ref 0.86–1.14)

## 2018-08-06 PROCEDURE — 85610 PROTHROMBIN TIME: CPT | Mod: QW

## 2018-08-06 PROCEDURE — 36416 COLLJ CAPILLARY BLOOD SPEC: CPT

## 2018-08-06 NOTE — MR AVS SNAPSHOT
Shahid Villalta   8/6/2018 9:00 AM   Anticoagulation Therapy Visit    Description:  78 year old male   Provider:   ANTICOAGULATION CLINIC   Department:  [facility]           INR as of 8/6/2018     Today's INR 2.9      Anticoagulation Summary as of 8/6/2018     INR goal 2.0-3.0   Today's INR 2.9   Full warfarin instructions 2.5 mg on Mon; 5 mg all other days   Next INR check 9/4/2018    Indications   Long-term (current) use of anticoagulants [Z79.01] [Z79.01]  Paroxysmal ventricular tachycardia (H) [I47.2]         Your next Anticoagulation Clinic appointment(s)     Sep 04, 2018  9:00 AM CDT   Anticoagulation Visit with  ANTICOAGULATION CLINIC   [facility] ([facility])    [address] [address] [address] [phone]              Contact Numbers     [facility]  Please call [phone] with any problems or questions regarding your therapy, or to cancel or reschedule your appointment.          August 2018 Details    Sun Mon Tue Wed Thu Fri Sat        1               2               3               4                 5               6      2.5 mg   See details      7      5 mg         8      5 mg         9      5 mg         10      5 mg         11      5 mg           12      5 mg         13      2.5 mg         14      5 mg         15      5 mg         16      5 mg         17      5 mg         18      5 mg           19      5 mg         20      2.5 mg         21      5 mg         22      5 mg         23      5 mg         24      5 mg         25      5 mg           26      5 mg         27      2.5 mg         28      5 mg         29      5 mg         30      5 mg         31      5 mg           Date Details   08/06 This INR check               How to take your warfarin dose     To take:  2.5 mg Take 0.5 of a 5 mg tablet.    To take:  5 mg Take 1 of the 5 mg tablets.           September 2018 Details    Sun Mon Tue Wed Thu Fri Sat           1      5 mg           2      5  mg         3      2.5 mg         4            5               6               7               8                 9               10               11               12               13               14               15                 16               17               18               19               20               21               22                 23               24               25               26               27               28               29                 30                      Date Details   No additional details    Date of next INR:  9/4/2018         How to take your warfarin dose     To take:  2.5 mg Take 0.5 of a 5 mg tablet.    To take:  5 mg Take 1 of the 5 mg tablets.

## 2018-08-06 NOTE — PROGRESS NOTES
ANTICOAGULATION FOLLOW-UP CLINIC VISIT    Patient Name:  Shahid Villalta  Date:  8/6/2018  Contact Type:  Face to Face    SUBJECTIVE:     Patient Findings     Positives No Problem Findings           OBJECTIVE    INR Protime   Date Value Ref Range Status   08/06/2018 2.9 (A) 0.86 - 1.14 Final     Chromogenic Factor 10   Date Value Ref Range Status   07/31/2017 17 (L) 70 - 130 % Final     Comment:     Therapeutic Range:  A Chromogenic Factor 10 level of approximately 20-40%   inversely correlates with an INR of 2-3 for patients receiving Warfarin.   Chromogenic Factor 10 levels below 20% indicate an INR greater than 3 and   levels above 40% indicate an INR less than 2.         ASSESSMENT / PLAN  No question data found.  Anticoagulation Summary as of 8/6/2018     INR goal 2.0-3.0   Today's INR 2.9   Warfarin maintenance plan 2.5 mg (5 mg x 0.5) on Mon; 5 mg (5 mg x 1) all other days   Full warfarin instructions 2.5 mg on Mon; 5 mg all other days   Weekly warfarin total 32.5 mg   No change documented Manisha Pierson RN   Plan last modified Manisha Pierson RN (7/23/2018)   Next INR check 9/4/2018   Target end date     Indications   Long-term (current) use of anticoagulants [Z79.01] [Z79.01]  Paroxysmal ventricular tachycardia (H) [I47.2]         Anticoagulation Episode Summary     INR check location     Preferred lab     Send INR reminders to [facility]    Comments             See the Encounter Report to view Anticoagulation Flowsheet and Dosing Calendar (Go to Encounters tab in chart review, and find the Anticoagulation Therapy Visit)        Manisha Pierson RN

## 2018-08-20 ENCOUNTER — TELEPHONE (OUTPATIENT)
Dept: CARDIOLOGY | Facility: CLINIC | Age: 78
End: 2018-08-20

## 2018-08-20 DIAGNOSIS — I35.0 AORTIC STENOSIS: Primary | ICD-10-CM

## 2018-08-20 NOTE — TELEPHONE ENCOUNTER
TAVR scheduled for 8/29/18 at the [facility]. Patient is aware and will go first case. Medtronic Valve .

## 2018-08-21 NOTE — TELEPHONE ENCOUNTER
Scheduled the patient for 2nd turn down surgeon visit 8/22/18 with Dr. Sullivan. Patient will also be seen in the PAC clinic at [facility] 11:30 8/27/18. Patient is aware of these appointments.      Patient is to also to take his last dose of Warfarin 8/24/18 then begin holding in preparation for the TAVR. He will begin a baby ASA 8/25/18.  He states his understanding.     He was discussed at TAVR clinic and he will receive a Medtronic 34 Valve. Transfemoral approach with MAC sedation.

## 2018-08-22 ENCOUNTER — OFFICE VISIT (OUTPATIENT)
Dept: CARDIOLOGY | Facility: CLINIC | Age: 78
End: 2018-08-22
Payer: COMMERCIAL

## 2018-08-22 DIAGNOSIS — I35.0 AORTIC VALVE STENOSIS, ETIOLOGY OF CARDIAC VALVE DISEASE UNSPECIFIED: Primary | ICD-10-CM

## 2018-08-22 NOTE — PROGRESS NOTES
CARDIAC SURGERY CLINIC NOTE    Reason for visit: aortic valve stenosis    HPI: 78 year old male with symptomatic aortic stenosis. He was previously seen by my colleague, Dr Magdiel Ashraf.    The patient reports worsening dyspnea, primarily with exertion, over the past 6 months. He was found to have aortic stenosis on ECHO. Further results of workup noted below.       A/P: Patient is a 78 year old male with severe aortic stenosis and multiple other medical issues. His STS mortality risk calculates at ~4%. I think this in combination with his age and other medical issues makes him a good candidate for TAVR. He is scheduled for his procedure next week at [facility].    Thank you for the opportunity to participate in the care of this patient.      Peewee Sullivan MD        PMH:  Past Medical History:   Diagnosis Date     Aortic valve disorders      Aortic valve disorders      Arrhythmia      Atrial fibrillation (H)      Atrial fibrillation (H)      Automatic implantable cardiac defibrillator in situ      Cancer (H)      Congestive heart failure (H)      Coronary artery disease      Essential hypertension, benign      GERD (gastroesophageal reflux disease) 3/16/2010     Heart failure (H)      History of blood transfusion      Hyperlipidaemia      Hypertension      Obese      Other and unspecified hyperlipidemia      Other primary cardiomyopathies      Other primary cardiomyopathies      Pacemaker      Sleep apnea      Small bowel obstruction 12/14/2015     Ventricular tachycardia (H) 6/17/2013         PSH:  Past Surgical History:   Procedure Laterality Date     BIOPSY  annually    prostate biopsy     CARDIAC SURGERY  2012    A fib Ablation     CARDIAC SURGERY      cardioversion     COLONOSCOPY  2007     DAVINCI PROSTATECTOMY N/A 11/20/2015    Procedure: DAVINCI PROSTATECTOMY;  Surgeon: Nahun Hilario MD;  Location: [facility] OR     GENITOURINARY SURGERY      bladder surgery 2011     H ABLATION AV NODE  6/25/13     H  ABLATION FOCAL AFIB  6/25/13     HC TOOTH EXTRACTION W/FORCEP       TONSILLECTOMY & ADENOIDECTOMY           FH:  family history includes Breast Cancer in his mother; C.A.D. in his father and mother; Cerebrovascular Disease in his father and mother; Family History Negative in his brother, sister, and son; HEART DISEASE in his son.        Allergies:  Allergies   Allergen Reactions     Latex Rash     Seasonal Allergies      hayfever - wheezing -        Home Meds:  Reviewed in EPIC    ROS: + as noted above, otherwise negative for 10 systems    Physical Exam:   Gen: NAD  Lungs: non-labored breathing  Abd: non distended  Ext: cool  Neuro: AOx3    Labs:  None today    Imaging:    ECHO 5/18    Interpretation Summary     The visual ejection fraction is estimated at 35%.  There is mild to moderate concentric left ventricular hypertrophy.  Severe mixed aortic stensosis and aortic insufficiency.     There is moderate global hypokinesia of the left ventricle.  The right ventricle is mildly dilated.  There is a catheter/pacemaker lead seen in the right ventricle.  The right ventricular systolic function is mild to moderately reduced.  The left atrium is moderately dilated.  There is moderate to severe mitral annular calcification.  The mean mitral valve gradient is 3mmHg.  Moderate (46-55mmHg) pulmonary hypertension is present.  The mean AoV pressure gradient is 36mmHg.  Moderate to severe valvular aortic stenosis.  There is moderately severe (3+) aortic regurgitation. In the parasternal view  the vena contracta is between 35-50% of the outflow tract daimeter.  There is an eccentric jet of aortic insufficiency directed against the  anterior mitral leaflet.  Mild aortic root dilatation.  The ascending aorta is Mildly dilated.     Compared side by side with the prior study LVEF not definitetely worse, but  appears previous EF is overestimated. Other findings  similar.  _____________________________________________________________________________  __        Left Ventricle  The left ventricle is normal in size. There is mild to moderate concentric  left ventricular hypertrophy. The visual ejection fraction is estimated at  35%. Grade III or advanced diastolic dysfunction. There is moderate global  hypokinesia of the left ventricle.     Right Ventricle  There is a catheter/pacemaker lead seen in the right ventricle. The right  ventricle is mildly dilated. The right ventricular systolic function is mild  to moderately reduced.     Atria  The left atrium is moderately dilated. The right atrium is mildly dilated.  There is no color Doppler evidence of an atrial shunt.     Mitral Valve  There is moderate to severe mitral annular calcification. There is moderate  (2+) mitral regurgitation. The mean mitral valve gradient is 3mmHg.        Tricuspid Valve  The tricuspid valve is not well visualized, but is grossly normal. There is  mild (1+) tricuspid regurgitation. The right ventricular systolic pressure is  approximated at 42.6 mmHg plus the right atrial pressure. Normal IVC (1.5-  2.5cm) with <50% respiratory collapse; right atrial pressure is estimated at  10-15mmHg. Moderate (46-55mmHg) pulmonary hypertension is present.     Aortic Valve  The aortic valve is not well visualized. There is moderately severe (3+)  aortic regurgitation. There is an eccentric jet of aortic insufficiency  directed against the anterior mitral leaflet. The mean AoV pressure gradient  is 36mmHg. Moderate to severe valvular aortic stenosis.     Pulmonic Valve  The pulmonic valve is not well seen, but is grossly normal. There is moderate  (2+) pulmonic valvular regurgitation.     Vessels  Mild aortic root dilatation. The ascending aorta is Mildly dilated. The  inferior vena cava is not dilated.     Pericardium  There is no pericardial effusion.        ECHO 6/29    Interpretation Summary     Severe  valvular aortic stenosis noted and the calculated aortic valve area is  0.7 - 0.8 cm2. The mean AoV pressure gradient is 35mmHg. There is mild to  moderate (1-2+) central aortic regurgitation.  The aortic Sinus(es) of Valsalva are mildly dilated. They are 3.9 cm. The  aortic annulus is about 2.5 to 2.6 cm.  Left ventricular systolic function is moderately reduced. The visual ejection  fraction is estimated at 35-40%, possibly slightly better.. There is moderate  concentric left ventricular hypertrophy. Echo findings are not consistent with  left ventricular outflow obstruction. The left ventricle is borderline  dilated.  Immobile mitral valve posterior leaflet is noted but without mitral stenosis  (by CW Doppler) There is mild (1+) mitral regurgitation.      BILATERAL HEART CATH 6/29    Hemodynamics:    Right atrial pressure: Mean of 17  Right ventricular pressure: 62 with an end-diastolic of 17  Pulmonary artery pressure: 65/24 with a mean of 40.  Pulmonary capillary wedge pressure: Mean of 27 with a V-wave to 40  Aortic blood pressure: 100/56 with a mean of 74  Left ventricular pressure: Aortic valve was not crossed      Pulmonary artery saturation: 52%   Femoral artery saturation: 98%.    Yvette cardiac output: 3.3 L/m with an index of: 1.6.    Rhythm: AV sequential pacemaker 60 bpm    Angiographic Results   Right coronary artery is a dominant vessel. It is occluded at the  ostium. There are extensive left to right collaterals.    Left main coronary artery is normal.    Circumflex coronary artery gives rise to 2 very large obtuse marginal  there are minimal luminal irregularities no stenosis greater than  10-20%. Next    Left anterior descending artery again has only mild luminal  irregularities no stenosis greater than 20%.  The left anterior  descending artery does wrap around the apex of the heart.    No ventriculogram performed.    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 Right dominant circulation. Minimal other  coronary disease.  2.Pulmonary hypertension with a pulmonary artery pressure of 65/24  with a mean of 40. Pulmonary capillary wedge pressure was 27 with a  V-wave to 40. Cardiac output 3.3 L/m with a pulmonary artery  saturation of 52%. Index was 1.6.        TAVR CTA 8/2    FINDINGS:     1.   Trileaflet aortic valve. Aortic valve calcium score is 5802. The  LVOT is mild to moderately calcified. The ascending aorta is mildly  calcified, aortic arch is  mild to moderately calcified, the  descending thoracic aorta is mildly calcified. There is mild to  moderate mitral annular calcification. The abdominal aorta has a  moderately tortuous course.  2.  There are no adverse aortic root features, ie coronary heights are  adequate and there is no significant aortic root calcification.  3.  There are significant native coronary calcifications. Right-sided  pacemaker leads are noted.  4.  The arch vessel branching pattern is normal.   5.  The suprarenal abdominal aorta is mildly calcified; infrarenal  abdominal aorta is mildly calcified  6.  Widely patent origins of celiac trunk, superior mesenteric artery  and inferior mesenteric artery.  Single right renal artery and 2 left  renal arteries are seen with widely patent origins.   7.  There is no acute aortic pathology, such as dissection, intramural  hematoma, or contained rupture.      MEASUREMENTS:   Representative dimensions of the thoracoabdominal aorta are as  follows:     1. AORTIC ANNULUS MEASUREMENTS:     1.  The average aortic annulus diameter is 29.7 mm, (long diameter is  34.3 mm and short diameter is 24.9 mm)  2.  Aortic annulus area is 6.95 cm2  3.  Aortic annulus perimeter is 96 mm  4.  Aortic root angle is 38 degrees  5.  Left main - Annulus distance: 16 mm, RCA - Annulus distance: 20 mm     2. LVOT MEASUREMENTS:     1.  The average LVOT diameter (measured 4 mm below annular plane) is  30.1 mm  2.   LVOT area is 7.15 cm2  3.  LVOT perimeter is 101 mm     3. AORTA MEASUREMENTS     1.  The aortic root at the sinuses of Valsalva (left cusp, right cusp,  non cusp): 39 mm x  39 mm x 36 mm  2.  The sinotubular junction:  33 mm x 34 mm  3.  Sinotubular junction height: 25 mm x 26 mm  4.  Proximal ascending aorta: 36 mm x 38 mm  5.  Distal abdominal aorta proximal to the bifurcation: 15.7 mm x 19.5  mm  6.  Innominate artery in left common carotid artery measurements were  not done because of poor contrast opacification     4. ILIOFEMORAL MEASUREMENTS:     RIGHT:  1.  Right common iliac artery: 14 mm x 9 mm   2.  Right external iliac artery: 10 mm x 11.7 mm   3.  Right common femoral artery: 6.8 mm x 10 mm   4.  Tortuosity: mild   5.  Calcification: mild      LEFT:  1.  Left common iliac artery: 9.9 mm x  11.4 mm  2.  Left external iliac artery: 9.6 mm x 11.7 mm  3.  Left common femoral artery: 10 mm x 8.8 mm  4.  Tortuosity: mild   5.  Calcification: mild

## 2018-08-22 NOTE — LETTER
8/22/2018      RE: Shahid Villalta  [address]  [facility] [address]       Dear Colleague,    Thank you for the opportunity to participate in the care of your patient, Shahid Villalta, at the [facility] CARDIOTHORACIC at [facility]. Please see a copy of my visit note below.    CARDIAC SURGERY CLINIC NOTE    Reason for visit: aortic valve stenosis    HPI: 78 year old male with symptomatic aortic stenosis. He was previously seen by my colleague, Dr Magdiel Ashraf.    The patient reports worsening dyspnea, primarily with exertion, over the past 6 months. He was found to have aortic stenosis on ECHO. Further results of workup noted below.       A/P: Patient is a 78 year old male with severe aortic stenosis and multiple other medical issues. His STS mortality risk calculates at ~4%. I think this in combination with his age and other medical issues makes him a good candidate for TAVR. He is scheduled for his procedure next week at [facility].    Thank you for the opportunity to participate in the care of this patient.      Peewee Sullivan MD        PMH:  Past Medical History:   Diagnosis Date     Aortic valve disorders      Aortic valve disorders      Arrhythmia      Atrial fibrillation (H)      Atrial fibrillation (H)      Automatic implantable cardiac defibrillator in situ      Cancer (H)      Congestive heart failure (H)      Coronary artery disease      Essential hypertension, benign      GERD (gastroesophageal reflux disease) 3/16/2010     Heart failure (H)      History of blood transfusion      Hyperlipidaemia      Hypertension      Obese      Other and unspecified hyperlipidemia      Other primary cardiomyopathies      Other primary cardiomyopathies      Pacemaker      Sleep apnea      Small bowel obstruction 12/14/2015     Ventricular tachycardia (H) 6/17/2013         PSH:  Past Surgical History:   Procedure Laterality Date     BIOPSY  annually    prostate biopsy     CARDIAC SURGERY   2012    A fib Ablation     CARDIAC SURGERY      cardioversion     COLONOSCOPY  2007     DAVINCI PROSTATECTOMY N/A 11/20/2015    Procedure: DAVINCI PROSTATECTOMY;  Surgeon: Nahun Hilario MD;  Location: [facility] OR     GENITOURINARY SURGERY      bladder surgery 2011     H ABLATION AV NODE  6/25/13     H ABLATION FOCAL AFIB  6/25/13     HC TOOTH EXTRACTION W/FORCEP       TONSILLECTOMY & ADENOIDECTOMY           FH:  family history includes Breast Cancer in his mother; C.A.D. in his father and mother; Cerebrovascular Disease in his father and mother; Family History Negative in his brother, sister, and son; HEART DISEASE in his son.        Allergies:  Allergies   Allergen Reactions     Latex Rash     Seasonal Allergies      hayfever - wheezing -        Home Meds:  Reviewed in EPIC    ROS: + as noted above, otherwise negative for 10 systems    Physical Exam:   Gen: NAD  Lungs: non-labored breathing  Abd: non distended  Ext: cool  Neuro: AOx3    Labs:  None today    Imaging:    ECHO 5/18    Interpretation Summary     The visual ejection fraction is estimated at 35%.  There is mild to moderate concentric left ventricular hypertrophy.  Severe mixed aortic stensosis and aortic insufficiency.     There is moderate global hypokinesia of the left ventricle.  The right ventricle is mildly dilated.  There is a catheter/pacemaker lead seen in the right ventricle.  The right ventricular systolic function is mild to moderately reduced.  The left atrium is moderately dilated.  There is moderate to severe mitral annular calcification.  The mean mitral valve gradient is 3mmHg.  Moderate (46-55mmHg) pulmonary hypertension is present.  The mean AoV pressure gradient is 36mmHg.  Moderate to severe valvular aortic stenosis.  There is moderately severe (3+) aortic regurgitation. In the parasternal view  the vena contracta is between 35-50% of the outflow tract daimeter.  There is an eccentric jet of aortic insufficiency directed against  the  anterior mitral leaflet.  Mild aortic root dilatation.  The ascending aorta is Mildly dilated.     Compared side by side with the prior study LVEF not definitetely worse, but  appears previous EF is overestimated. Other findings similar.  _____________________________________________________________________________  __        Left Ventricle  The left ventricle is normal in size. There is mild to moderate concentric  left ventricular hypertrophy. The visual ejection fraction is estimated at  35%. Grade III or advanced diastolic dysfunction. There is moderate global  hypokinesia of the left ventricle.     Right Ventricle  There is a catheter/pacemaker lead seen in the right ventricle. The right  ventricle is mildly dilated. The right ventricular systolic function is mild  to moderately reduced.     Atria  The left atrium is moderately dilated. The right atrium is mildly dilated.  There is no color Doppler evidence of an atrial shunt.     Mitral Valve  There is moderate to severe mitral annular calcification. There is moderate  (2+) mitral regurgitation. The mean mitral valve gradient is 3mmHg.        Tricuspid Valve  The tricuspid valve is not well visualized, but is grossly normal. There is  mild (1+) tricuspid regurgitation. The right ventricular systolic pressure is  approximated at 42.6 mmHg plus the right atrial pressure. Normal IVC (1.5-  2.5cm) with <50% respiratory collapse; right atrial pressure is estimated at  10-15mmHg. Moderate (46-55mmHg) pulmonary hypertension is present.     Aortic Valve  The aortic valve is not well visualized. There is moderately severe (3+)  aortic regurgitation. There is an eccentric jet of aortic insufficiency  directed against the anterior mitral leaflet. The mean AoV pressure gradient  is 36mmHg. Moderate to severe valvular aortic stenosis.     Pulmonic Valve  The pulmonic valve is not well seen, but is grossly normal. There is moderate  (2+) pulmonic valvular  regurgitation.     Vessels  Mild aortic root dilatation. The ascending aorta is Mildly dilated. The  inferior vena cava is not dilated.     Pericardium  There is no pericardial effusion.        ECHO 6/29    Interpretation Summary     Severe valvular aortic stenosis noted and the calculated aortic valve area is  0.7 - 0.8 cm2. The mean AoV pressure gradient is 35mmHg. There is mild to  moderate (1-2+) central aortic regurgitation.  The aortic Sinus(es) of Valsalva are mildly dilated. They are 3.9 cm. The  aortic annulus is about 2.5 to 2.6 cm.  Left ventricular systolic function is moderately reduced. The visual ejection  fraction is estimated at 35-40%, possibly slightly better.. There is moderate  concentric left ventricular hypertrophy. Echo findings are not consistent with  left ventricular outflow obstruction. The left ventricle is borderline  dilated.  Immobile mitral valve posterior leaflet is noted but without mitral stenosis  (by CW Doppler) There is mild (1+) mitral regurgitation.      BILATERAL HEART CATH 6/29    Hemodynamics:    Right atrial pressure: Mean of 17  Right ventricular pressure: 62 with an end-diastolic of 17  Pulmonary artery pressure: 65/24 with a mean of 40.  Pulmonary capillary wedge pressure: Mean of 27 with a V-wave to 40  Aortic blood pressure: 100/56 with a mean of 74  Left ventricular pressure: Aortic valve was not crossed      Pulmonary artery saturation: 52%   Femoral artery saturation: 98%.    Yvette cardiac output: 3.3 L/m with an index of: 1.6.    Rhythm: AV sequential pacemaker 60 bpm    Angiographic Results   Right coronary artery is a dominant vessel. It is occluded at the  ostium. There are extensive left to right collaterals.    Left main coronary artery is normal.    Circumflex coronary artery gives rise to 2 very large obtuse marginal  there are minimal luminal irregularities no stenosis greater than  10-20%. Next    Left anterior descending artery again has only mild  luminal  irregularities no stenosis greater than 20%.  The left anterior  descending artery does wrap around the apex of the heart.    No ventriculogram performed.    Conclusion: Chronically occluded right coronary artery with extensive  left to right collaterals. Right dominant circulation. Minimal other  coronary disease.  2.Pulmonary hypertension with a pulmonary artery pressure of 65/24  with a mean of 40. Pulmonary capillary wedge pressure was 27 with a  V-wave to 40. Cardiac output 3.3 L/m with a pulmonary artery  saturation of 52%. Index was 1.6.        TAVR CTA 8/2    FINDINGS:     1.   Trileaflet aortic valve. Aortic valve calcium score is 5802. The  LVOT is mild to moderately calcified. The ascending aorta is mildly  calcified, aortic arch is  mild to moderately calcified, the  descending thoracic aorta is mildly calcified. There is mild to  moderate mitral annular calcification. The abdominal aorta has a  moderately tortuous course.  2.  There are no adverse aortic root features, ie coronary heights are  adequate and there is no significant aortic root calcification.  3.  There are significant native coronary calcifications. Right-sided  pacemaker leads are noted.  4.  The arch vessel branching pattern is normal.   5.  The suprarenal abdominal aorta is mildly calcified; infrarenal  abdominal aorta is mildly calcified  6.  Widely patent origins of celiac trunk, superior mesenteric artery  and inferior mesenteric artery.  Single right renal artery and 2 left  renal arteries are seen with widely patent origins.   7.  There is no acute aortic pathology, such as dissection, intramural  hematoma, or contained rupture.      MEASUREMENTS:   Representative dimensions of the thoracoabdominal aorta are as  follows:     1. AORTIC ANNULUS MEASUREMENTS:     1.  The average aortic annulus diameter is 29.7 mm, (long diameter is  34.3 mm and short diameter is 24.9 mm)  2.  Aortic annulus area is 6.95 cm2  3.  Aortic  annulus perimeter is 96 mm  4.  Aortic root angle is 38 degrees  5.  Left main - Annulus distance: 16 mm, RCA - Annulus distance: 20 mm     2. LVOT MEASUREMENTS:     1.  The average LVOT diameter (measured 4 mm below annular plane) is  30.1 mm  2.  LVOT area is 7.15 cm2  3.  LVOT perimeter is 101 mm     3. AORTA MEASUREMENTS     1.  The aortic root at the sinuses of Valsalva (left cusp, right cusp,  non cusp): 39 mm x  39 mm x 36 mm  2.  The sinotubular junction:  33 mm x 34 mm  3.  Sinotubular junction height: 25 mm x 26 mm  4.  Proximal ascending aorta: 36 mm x 38 mm  5.  Distal abdominal aorta proximal to the bifurcation: 15.7 mm x 19.5  mm  6.  Innominate artery in left common carotid artery measurements were  not done because of poor contrast opacification     4. ILIOFEMORAL MEASUREMENTS:     RIGHT:  1.  Right common iliac artery: 14 mm x 9 mm   2.  Right external iliac artery: 10 mm x 11.7 mm   3.  Right common femoral artery: 6.8 mm x 10 mm   4.  Tortuosity: mild   5.  Calcification: mild      LEFT:  1.  Left common iliac artery: 9.9 mm x  11.4 mm  2.  Left external iliac artery: 9.6 mm x 11.7 mm  3.  Left common femoral artery: 10 mm x 8.8 mm  4.  Tortuosity: mild   5.  Calcification: mild

## 2018-08-22 NOTE — MR AVS SNAPSHOT
After Visit Summary   8/22/2018    Shahid Villalta    MRN: [id number]           Patient Information     Date Of Birth          1940        Visit Information        Provider Department      8/22/2018 2:15 PM Peewee Sullivan MD [facility] Cardiothoracic        Today's Diagnoses     Aortic valve stenosis, etiology of cardiac valve disease unspecified    -  1       Follow-ups after your visit        Your next 10 appointments already scheduled     Aug 27, 2018 11:30 AM CDT   (Arrive by 11:15 AM)   PAC EVALUATION with ALEKSEY Pimentel   [facility] Preoperative Assessment Center ([facility] [place])    [address] [facility] [address]   [phone]            Aug 27, 2018 12:30 PM CDT   (Arrive by 12:15 PM)   PAC RN ASSESSMENT with Uc Pac Rn   [facility] Preoperative Assessment [place] ([facility])    [address]  [address] [facility] [id number] [phone]            Aug 27, 2018  1:10 PM CDT   (Arrive by 12:55 PM)   PAC Anesthesia Consult with Denisse Pac Anesthesiologist   [facility] Preoperative Assessment [place] ([facility])    [address]  [address] [facility] [address] [phone]            Aug 29, 2018   Procedure with GENERIC ANESTHESIA PROVIDER   [facility]    [address] [address]   [phone]            Aug 29, 2018  7:30 AM CDT   Transcath Aortic-Valve Replace with [id number]   [facility] ([facility] [place])    [address] [address]   [phone]            Sep 04, 2018  9:00 AM CDT   Anticoagulation Visit with [facility] ANTICOAGULATION CLINIC   [facility]    [address] [address] [phone]            Sep 12, 2018  4:30 PM CDT   Remote ICD Check with STEWART DCR2   [facility]  [facility])    [address]  [place] [address]   [phone] OPT 2           This appointment is for a remote check of your debrillator.  This is not an appointment at the office.            Nov 09, 2018  9:00 AM CST   Return Visit with RH ONCOLOGY NURSE   [facility] ([facility])    [facility]  [address]  [place] [address] [phone]            Nov 14, 2018  9:30 AM CST   Return Visit with Nahun Hilario MD   [facility] ([facility])    [facility]  [address]  [facility] [address] [phone]              Who to contact     Please call your clinic at [phone] to:    Ask questions about your health    Make or cancel appointments    Discuss your medicines    Learn about your test results    Speak to your doctor            Additional Information About Your Visit        [employer] Information     [employer] gives you secure access to your electronic health record. If you see a primary care provider, you can also send messages to your care team and make appointments. If you have questions, please call your primary care clinic.  If you do not have a primary care provider, please call [phone] and they will assist you.      [employer] is an electronic gateway that provides easy, online access to your medical records. With [employer], you can request a clinic appointment, read your test results, renew a prescription or communicate with your care team.     To access your existing account, please contact your [facility] or call [phone] for assistance.        Care EveryWhere ID     This is your Care EveryWhere ID. This could be used by other organizations to access your [place] medical records  [id number]         Blood Pressure from Last 3 Encounters:    08/02/18 129/79   07/09/18 116/70   06/29/18 119/66    Weight from Last 3 Encounters:   07/09/18 107.9 kg (237 lb 12.8 oz)   06/29/18 107.9 kg (237 lb 12.8 oz)   06/14/18 107.9 kg (237 lb 12.8 oz)              Today, you had the following     No orders found for display         Today's Medication Changes          These changes are accurate as of 8/22/18  2:38 PM.  If you have any questions, ask your nurse or doctor.               These medicines have changed or have updated prescriptions.        Dose/Directions    amiodarone 200 MG tablet   Commonly known as:  PACERONE/CODARONE   This may have changed:  additional instructions   Used for:  Paroxysmal atrial fibrillation (H)        Take PO 1/2 tablet daily-Take extra prn per MD recommendations.   Quantity:  50 tablet   Refills:  0       warfarin 5 MG tablet   Commonly known as:  COUMADIN   This may have changed:  additional instructions   Used for:  Long-term (current) use of anticoagulants, Paroxysmal ventricular tachycardia (H)        5 mg daily   Quantity:  90 tablet   Refills:  0                Primary Care Provider Office Phone # Fax #    Gume Brown [phone] [address] [place] [id number]        Equal Access to Services     [place] SHEARER AH: Berenice mcgregoro Sojovannaali, waaxda luqadaha, qaybta kaalmada adeegyada, emily garcia. So [facility] [phone].    ATENCIÓN: Si habla español, tiene a irving disposición servicios gratuitos de asistencia lingüística. Llame al [phone].    We comply with applicable federal civil rights laws and [place] laws. We do not discriminate on the basis of race, color, national origin, age, disability, sex, sexual orientation, or gender identity.            Thank you!     Thank you for choosing [facility] CARDIOTHORACIC  for your care. Our goal is always to provide you with excellent care. Hearing back from our patients is one way we can continue to improve our services.  Please take a few minutes to complete the written survey that you may receive in the mail after your visit with us. Thank you!             Your Updated Medication List - Protect others around you: Learn how to safely use, store and throw away your medicines at www.disposemymeds.org.          This list is accurate as of 8/22/18  2:38 PM.  Always use your most recent med list.                   Brand Name Dispense Instructions for use Diagnosis    acetaminophen 325 MG tablet    TYLENOL     Take 1,300 mg by mouth 2 times daily        amiodarone 200 MG tablet    PACERONE/CODARONE    50 tablet    Take PO 1/2 tablet daily-Take extra prn per MD recommendations.    Paroxysmal atrial fibrillation (H)       ASPIRIN NOT PRESCRIBED    INTENTIONAL    0 each    1 each daily Antiplatelet medication not prescribed intentionally due to Current anticoagulant therapy (warfarin/enoxaparin)    Atrial fibrillation, unspecified       cetirizine 10 MG tablet    zyrTEC    90 tablet    Take 1 tablet (10 mg) by mouth daily    Allergic rhinitis       cholecalciferol 1000 UNIT tablet    vitamin D3    180 tablet    Take 2 tablets (2,000 Units) by mouth daily    Vitamin D deficiency, Parathyroid hormone excess (H)       digoxin 125 MCG tablet    LANOXIN    45 tablet    Take 1 tablet (125 mcg) by mouth every 48 hours    Chronic systolic heart failure (H)       lisinopril 2.5 MG tablet    PRINIVIL/Zestril    90 tablet    Take 1 tablet (2.5 mg) by mouth daily    Nonrheumatic aortic valve stenosis       metoprolol succinate 100 MG 24 hr tablet    TOPROL-XL    90 tablet    Take 1 tablet (100 mg) by mouth daily    Paroxysmal atrial fibrillation (H)       omeprazole 20 MG CR capsule    priLOSEC    90 capsule    Take 1 capsule (20 mg) by mouth daily    Gastroesophageal reflux disease without esophagitis       polyethylene glycol powder    MIRALAX/GLYCOLAX     Take 17 g by mouth daily as needed for constipation        potassium chloride 10 MEQ tablet     K-TAB,KLOR-CON    90 tablet    Take 1 tablet (10 mEq) by mouth daily    Chronic systolic heart failure (H)       senna-docusate 8.6-50 MG per tablet    SENOKOT-S;PERICOLACE     Take 2 tablets by mouth daily        simethicone 80 MG chewable tablet    MYLICON    180 tablet    Take 1 tablet (80 mg) by mouth every 6 hours as needed for cramping    Abdominal bloating       simvastatin 20 MG tablet    ZOCOR    90 tablet    Take 1 tablet (20 mg) by mouth At Bedtime    Hyperlipidemia LDL goal <100       torsemide 20 MG tablet    DEMADEX    90 tablet    Take 1 tablet (20 mg) by mouth daily    Chronic systolic heart failure (H), Ischemic cardiomyopathy       VIAGRA 25 MG tablet   Generic drug:  sildenafil      Take 25 mg by mouth as needed        warfarin 5 MG tablet    COUMADIN    90 tablet    5 mg daily    Long-term (current) use of anticoagulants, Paroxysmal ventricular tachycardia (H)

## 2018-08-24 NOTE — TELEPHONE ENCOUNTER
Patient's TAVR moved to 9/5/18. He will take his last dose of Warfarin 8/31/18 and being a baby ASA on 9/1/18. He is aware to arrive at 5:30 am at the [facility].

## 2018-08-27 ENCOUNTER — OFFICE VISIT (OUTPATIENT)
Dept: SURGERY | Facility: CLINIC | Age: 78
End: 2018-08-27
Payer: COMMERCIAL

## 2018-08-27 ENCOUNTER — ALLIED HEALTH/NURSE VISIT (OUTPATIENT)
Dept: SURGERY | Facility: CLINIC | Age: 78
End: 2018-08-27
Payer: COMMERCIAL

## 2018-08-27 ENCOUNTER — APPOINTMENT (OUTPATIENT)
Dept: SURGERY | Facility: CLINIC | Age: 78
End: 2018-08-27
Payer: COMMERCIAL

## 2018-08-27 ENCOUNTER — ANESTHESIA EVENT (OUTPATIENT)
Dept: SURGERY | Facility: CLINIC | Age: 78
End: 2018-08-27

## 2018-08-27 VITALS
DIASTOLIC BLOOD PRESSURE: 76 MMHG | HEIGHT: 68 IN | SYSTOLIC BLOOD PRESSURE: 126 MMHG | WEIGHT: 240.5 LBS | HEART RATE: 66 BPM | BODY MASS INDEX: 36.45 KG/M2 | OXYGEN SATURATION: 93 % | TEMPERATURE: 97.5 F

## 2018-08-27 DIAGNOSIS — Z01.818 PREOPERATIVE GENERAL PHYSICAL EXAMINATION: ICD-10-CM

## 2018-08-27 DIAGNOSIS — Z01.818 PREOP EXAMINATION: Primary | ICD-10-CM

## 2018-08-27 DIAGNOSIS — I35.0 NONRHEUMATIC AORTIC VALVE STENOSIS: ICD-10-CM

## 2018-08-27 DIAGNOSIS — I35.0 NONRHEUMATIC AORTIC VALVE STENOSIS: Primary | ICD-10-CM

## 2018-08-27 LAB
ANION GAP SERPL CALCULATED.3IONS-SCNC: 6 MMOL/L (ref 3–14)
BUN SERPL-MCNC: 25 MG/DL (ref 7–30)
CALCIUM SERPL-MCNC: 9.3 MG/DL (ref 8.5–10.1)
CHLORIDE SERPL-SCNC: 107 MMOL/L (ref 94–109)
CO2 SERPL-SCNC: 29 MMOL/L (ref 20–32)
CREAT SERPL-MCNC: 1.69 MG/DL (ref 0.66–1.25)
ERYTHROCYTE [DISTWIDTH] IN BLOOD BY AUTOMATED COUNT: 16.5 % (ref 10–15)
GFR SERPL CREATININE-BSD FRML MDRD: 40 ML/MIN/1.7M2
GLUCOSE SERPL-MCNC: 98 MG/DL (ref 70–99)
HCT VFR BLD AUTO: 40.1 % (ref 40–53)
HGB BLD-MCNC: 12.3 G/DL (ref 13.3–17.7)
MCH RBC QN AUTO: 29.7 PG (ref 26.5–33)
MCHC RBC AUTO-ENTMCNC: 30.7 G/DL (ref 31.5–36.5)
MCV RBC AUTO: 97 FL (ref 78–100)
MRSA DNA SPEC QL NAA+PROBE: NEGATIVE
PLATELET # BLD AUTO: 213 10E9/L (ref 150–450)
POTASSIUM SERPL-SCNC: 4.7 MMOL/L (ref 3.4–5.3)
RBC # BLD AUTO: 4.14 10E12/L (ref 4.4–5.9)
SODIUM SERPL-SCNC: 142 MMOL/L (ref 133–144)
SPECIMEN SOURCE: NORMAL
WBC # BLD AUTO: 8.7 10E9/L (ref 4–11)

## 2018-08-27 RX ORDER — ASPIRIN 81 MG/1
81 TABLET, CHEWABLE ORAL DAILY
COMMUNITY
End: 2018-10-25

## 2018-08-27 ASSESSMENT — ENCOUNTER SYMPTOMS: DYSRHYTHMIAS: 1

## 2018-08-27 NOTE — PHARMACY - PREOPERATIVE ASSESSMENT CENTER
Anticoagulation Note - Preoperative Assessment Center (PAC) Pharmacist     Patient seen and interviewed during time of PAC Clinic appointment August 27, 2018.  The purpose of this note is to document the perioperative anticoagulation plan outlined by the providers caring for Shahid Villalta.     Current Regimen  Anticoagulation Regimen as of August 27, 2018: Warfarin 2.5mg po Mondays, 5mg the rest of the week  Indication:  Atrial fibrilliation  Prescriber:   Dr. Gume Brown  Expected Duration of therapy:  Indefinite  Current medications that may interact with this include:  Amiodarone, on stable dosing  INR Goal: 2-3  Recent Change in oral intake/nutrition: No    Perioperative plan  Shahid Villalta is scheduled for a TAVR on 9/5/18 with Dr. Nation and the perioperative anticoagulation plan outlined by Jessica Gandhi RN,  is to hold warfarin for 5 days prior to surgery with no bridging.  Last dose of warfarin to be taken 8/31/18, hold to begin 9/1/18.  Resumption of anticoagulation after procedure will be based on surgery team assessment of bleeding risks and complications.  This plan may require re-assessment and modification by his primary team in the perioperative setting depending on patients clinical situation.        Anyi Suresh, PharmD, MS  August 27, 2018  11:29 AM

## 2018-08-27 NOTE — PROGRESS NOTES
[facility] medication history for August 27, 2018 is complete.    See Epic admission navigator for prior to admission medications.   Operating room staff will still need to confirm medications and last dose information on day of surgery.     Medication history interview sources:   Patient    Changes made to PTA medication list (reason)  Added:  Aspirin 81mg po daily to start 8/31/18  Deleted: none  Changed: none    Additional medication history information (including reliability of information, actions taken by pharmacist):None    -- No recent (within 30 days) course of antibiotics  -- No recent (within 30 days) course of steroids  -- No recent (within 30 days) chronic daily medications stopped   -- Patient declines being on any other prescription or over-the-counter medications    Prior to Admission medications    Medication Sig Last Dose Taking? Auth Provider   acetaminophen (TYLENOL) 325 MG tablet Take 1,300 mg by mouth 2 times daily  Taking Yes Reported, Patient   amiodarone (PACERONE/CODARONE) 200 MG tablet Take PO 1/2 tablet daily-Take extra prn per MD recommendations.  Patient taking differently: Take PO 1/2 tablet daily-Take extra prn per MD recommendations Taking Yes Adiel Aden MD   cetirizine (ZYRTEC) 10 MG tablet Take 1 tablet (10 mg) by mouth daily Taking Yes Edin Cordova MD   cholecalciferol (VITAMIN D) 1000 UNIT tablet Take 2 tablets (2,000 Units) by mouth daily Taking Yes Edin Cordova MD   digoxin (LANOXIN) 125 MCG tablet Take 1 tablet (125 mcg) by mouth every 48 hours Taking Yes Gume Brown MD   lisinopril (PRINIVIL/ZESTRIL) 2.5 MG tablet Take 1 tablet (2.5 mg) by mouth daily Taking Yes Gume Brown MD   metoprolol succinate (TOPROL-XL) 100 MG 24 hr tablet Take 1 tablet (100 mg) by mouth daily Taking Yes Gume Brown MD   omeprazole (PRILOSEC) 20 MG CR capsule Take 1 capsule (20 mg) by mouth daily Taking Yes Gume Brown  MD   potassium chloride (K-TAB,KLOR-CON) 10 MEQ tablet Take 1 tablet (10 mEq) by mouth daily Taking Yes Gume Brown MD   senna-docusate (SENOKOT-S;PERICOLACE) 8.6-50 MG per tablet Take 2 tablets by mouth daily  Taking Yes Reported, Patient   simethicone (MYLICON) 80 MG chewable tablet Take 1 tablet (80 mg) by mouth every 6 hours as needed for cramping Taking Yes Valentino Donis MD   simvastatin (ZOCOR) 20 MG tablet Take 1 tablet (20 mg) by mouth At Bedtime Taking Yes Gume Brown MD   torsemide (DEMADEX) 20 MG tablet Take 1 tablet (20 mg) by mouth daily Taking Yes Gume Brown MD   warfarin (COUMADIN) 5 MG tablet 5 mg daily  Patient taking differently: 2.5mg on Mondays and 5mg ROW Taking Yes Gume Brown MD   aspirin 81 MG chewable tablet Take 81 mg by mouth daily Start 8/31/18 Not Taking  Unknown, Entered By History   ASPIRIN NOT PRESCRIBED, INTENTIONAL, 1 each daily Antiplatelet medication not prescribed intentionally due to Current anticoagulant therapy (warfarin/enoxaparin)  Patient not taking: Reported on 7/9/2018   Gume Brown MD   polyethylene glycol (MIRALAX/GLYCOLAX) powder Take 17 g by mouth daily as needed for constipation Not Taking  Reported, Patient   sildenafil (VIAGRA) 25 MG tablet Take 25 mg by mouth as needed Not Taking  Reported, Patient         Medication history completed by: Anyi Suresh [facility]

## 2018-08-27 NOTE — MR AVS SNAPSHOT
After Visit Summary   2018    Shahid Villalta    MRN: [id number]           Patient Information     Date Of Birth          1940        Visit Information        Provider Department      2018 12:30 PM Rn, [facility]        Care Instructions    Preparing for Your Surgery      Name:  Shahid Villalta   MRN:  [id number]   :  1940   Today's Date:  2018     Arriving for surgery:    Surgery date:  18    Arrival time:  05:30 am    Please come to:       [facility] [address]    [address] [place]  [address]    -   parking is available in front of the hospital from 5:15 am to 8:00 pm    -  Stop at the Information Desk in the lobby    -   Inform the information person that you are here for surgery. An escort to 3c will be provided. If you would not like an escort, please proceed to 3C on the 3rd floor. [phone]     What can I eat or drink?    -  You may have solid food or milk products until 8 hours prior to your surgery.  -  You may have water, apple juice or 7up/Sprite until 2 hours prior to your surgery.    Which medicines can I take?    Stop vitamins and supplements one week prior to surgery.  Hold Ibuprofen and Naproxen for 24 hours prior to surgery.     -  Do NOT take these medications in the morning, the day of surgery:    Lisinopril the morning of surgery, last dose of coumadin (warfarin) 18, do not take viagra 24 hours before surgery.    -  Please take these medications the day of surgery:    Tylenol if needed, take all other medications normally scheduled in the morning.    How do I prepare myself?  -  Take two showers: one the night before surgery; and one the morning of surgery.         Use Scrubcare or Hibiclens to wash from neck down.  You may use your own     shampoo and conditioner. No other hair products.   -  Do NOT use lotion, powder, deodorant, or antiperspirant the day of  your surgery.  -  Do NOT wear any jewelry.    - Do not bring your own medications to the hospital, except for inhalers and eye   drops.  -  Bring your ID and insurance card.    Questions or Concerns:  -If you have questions or concerns regarding the day of surgery, please call [phone].     -If you are scheduled at the Ambulatory Surgery Center please call [phone].    -For questions after surgery please call your surgeons office.                     Follow-ups after your visit        Your next 10 appointments already scheduled     Aug 27, 2018 12:30 PM CDT   (Arrive by 12:15 PM)   PAC RN ASSESSMENT with Denisse Pac Rn   [facility] ([facility] [place])    [address]  [address] [facility] [place] [facility] [id number] [phone]            Aug 27, 2018  1:10 PM CDT   (Arrive by 12:55 PM)   PAC Anesthesia Consult with Denisse Pac Anesthesiologist   [facility] [place] ([facility] [place])    [address] [place] [id number] [phone]            Sep 04, 2018  9:00 AM CDT   Anticoagulation Visit with [facility] CL[facility]IC   [facility]    [address] [address] [phone]            Sep 05, 2018   Procedure with GENERIC ANESTHESIA PROVIDER   [facility]    [address] [facility] [address] [phone]            Sep 05, 2018  7:30 AM CDT   Transcath Aortic-Valve Replace with [id number]   [facility] ([facility] [place])    [address] [facility] [id number] [phone]            Sep 12, 2018  4:30 PM CDT   Remote ICD Check with STEWART DCR2   [facility]   [place] ([facility])    [address]  [place] [id number] [phone] OPT 2           This appointment is for a remote check of your  tom.  This is not an appointment at the office.            Nov 09, 2018  9:00 AM CST   Return Visit with RH ONCOLOGY NURSE   [facility] ([facility])    [facility]  [address]  [facility] [address] [phone]            Nov 14, 2018  9:30 AM CST   Return Visit with Nahun Hilario MD   [facility] ([facility])    [facility]  [address]  [facility] [address] [phone]              Who to contact     Please call your clinic at [phone] to:    Ask questions about your health    Make or cancel appointments    Discuss your medicines    Learn about your test results    Speak to your doctor            Additional Information About Your Visit        [employer] Information     [facility] gives you secure access to your electronic health record. If you see a primary care provider, you can also send messages to your care team and make appointments. If you have questions, please call your primary care clinic.  If you do not have a primary care provider, please call [phone] and they will assist you.      [facility] is an electronic gateway that provides easy, online access to your medical records. With [facility], you can request a clinic appointment, read your test results, renew a prescription or communicate with your care team.     To access your existing account, please contact your [facility] or call [phone] for assistance.        Care EveryWhere ID     This is your Care EveryWhere ID. This could be used by other organizations to access your [facility] medical records  [id number]         Blood Pressure from Last 3 Encounters:   08/27/18 126/76   08/02/18 129/79   07/09/18 116/70    Weight from Last 3 Encounters:   08/27/18 109.1 kg (240 lb 8 oz)   07/09/18 107.9 kg (237 lb 12.8 oz)   06/29/18 107.9 kg (237 lb 12.8  oz)              Today, you had the following     No orders found for display         Today's Medication Changes          These changes are accurate as of 8/27/18 11:58 AM.  If you have any questions, ask your nurse or doctor.               These medicines have changed or have updated prescriptions.        Dose/Directions    amiodarone 200 MG tablet   Commonly known as:  PACERONE/CODARONE   This may have changed:  additional instructions   Used for:  Paroxysmal atrial fibrillation (H)        Take PO 1/2 tablet daily-Take extra prn per MD recommendations.   Quantity:  50 tablet   Refills:  0       warfarin 5 MG tablet   Commonly known as:  COUMADIN   This may have changed:  additional instructions   Used for:  Long-term (current) use of anticoagulants, Paroxysmal ventricular tachycardia (H)        5 mg daily   Quantity:  90 tablet   Refills:  0                Primary Care Provider Office Phone # Fax #    Gume Brown [phone] [address] [address]        Equal Access to Services     [facility]: Hadii aad alex hadasho Soangelina, waaxda luqadaha, qaybta kaalmada adeegyada, emily girard haysee mesa . So [facility] [phone].    ATENCIÓN: Si habla español, tiene a irving disposición servicios gratuitos de asistencia lingüística. Llame al [phone].    We comply with applicable federal civil rights laws and [place] laws. We do not discriminate on the basis of race, color, national origin, age, disability, sex, sexual orientation, or gender identity.            Thank you!     Thank you for choosing [facility]  for your care. Our goal is always to provide you with excellent care. Hearing back from our patients is one way we can continue to improve our services. Please take a few minutes to complete the written survey that you may receive in the mail after your visit with us. Thank you!             Your Updated Medication List - Protect  others around you: Learn how to safely use, store and throw away your medicines at www.disposemymeds.org.          This list is accurate as of 8/27/18 11:58 AM.  Always use your most recent med list.                   Brand Name Dispense Instructions for use Diagnosis    acetaminophen 325 MG tablet    TYLENOL     Take 1,300 mg by mouth 2 times daily        amiodarone 200 MG tablet    PACERONE/CODARONE    50 tablet    Take PO 1/2 tablet daily-Take extra prn per MD recommendations.    Paroxysmal atrial fibrillation (H)       aspirin 81 MG chewable tablet      Take 81 mg by mouth daily Start 8/31/18        ASPIRIN NOT PRESCRIBED    INTENTIONAL    0 each    1 each daily Antiplatelet medication not prescribed intentionally due to Current anticoagulant therapy (warfarin/enoxaparin)    Atrial fibrillation, unspecified       cetirizine 10 MG tablet    zyrTEC    90 tablet    Take 1 tablet (10 mg) by mouth daily    Allergic rhinitis       cholecalciferol 1000 UNIT tablet    vitamin D3    180 tablet    Take 2 tablets (2,000 Units) by mouth daily    Vitamin D deficiency, Parathyroid hormone excess (H)       digoxin 125 MCG tablet    LANOXIN    45 tablet    Take 1 tablet (125 mcg) by mouth every 48 hours    Chronic systolic heart failure (H)       lisinopril 2.5 MG tablet    PRINIVIL/Zestril    90 tablet    Take 1 tablet (2.5 mg) by mouth daily    Nonrheumatic aortic valve stenosis       metoprolol succinate 100 MG 24 hr tablet    TOPROL-XL    90 tablet    Take 1 tablet (100 mg) by mouth daily    Paroxysmal atrial fibrillation (H)       omeprazole 20 MG CR capsule    priLOSEC    90 capsule    Take 1 capsule (20 mg) by mouth daily    Gastroesophageal reflux disease without esophagitis       polyethylene glycol powder    MIRALAX/GLYCOLAX     Take 17 g by mouth daily as needed for constipation        potassium chloride 10 MEQ tablet    K-TAB,KLOR-CON    90 tablet    Take 1 tablet (10 mEq) by mouth daily    Chronic systolic heart  failure (H)       senna-docusate 8.6-50 MG per tablet    SENOKOT-S;PERICOLACE     Take 2 tablets by mouth daily        simethicone 80 MG chewable tablet    MYLICON    180 tablet    Take 1 tablet (80 mg) by mouth every 6 hours as needed for cramping    Abdominal bloating       simvastatin 20 MG tablet    ZOCOR    90 tablet    Take 1 tablet (20 mg) by mouth At Bedtime    Hyperlipidemia LDL goal <100       torsemide 20 MG tablet    DEMADEX    90 tablet    Take 1 tablet (20 mg) by mouth daily    Chronic systolic heart failure (H), Ischemic cardiomyopathy       VIAGRA 25 MG tablet   Generic drug:  sildenafil      Take 25 mg by mouth as needed        warfarin 5 MG tablet    COUMADIN    90 tablet    5 mg daily    Long-term (current) use of anticoagulants, Paroxysmal ventricular tachycardia (H)

## 2018-08-27 NOTE — H&P
Pre-Operative H & P     CC:  Preoperative exam to assess for increased cardiopulmonary risk while undergoing surgery and anesthesia.    Date of Encounter: 8/27/2018  Primary Care Physician:  Gume Brown    HPI  Shahid PRICE Pawan is a 78 year old male who presents for pre-operative H & P in preparation for tranfemoral transcatheter aortic valve replacement, with Drs Bonnie Nation and Magdiel, at [facility]. He has been followed for the aortic stenosis and was told it is time to repair. He downplays progressive ESCOBAR but will admit to being less active due to SOB. No chest pain.  He is here with his sister. No fever, chills. No new leg edema or sx of infection.    History is obtained from the patient.     Past Medical History  Past Medical History:   Diagnosis Date     Aortic valve stenosis      Arrhythmia      Atrial fibrillation (H)      Automatic implantable cardiac pacemaker/defibrillator in situ      Prostate cancer      Congestive heart failure (H)      Coronary artery disease      Essential hypertension, benign      GERD (gastroesophageal reflux disease) 3/16/2010     History of blood transfusion      Obese      Other and unspecified hyperlipidemia      Other primary cardiomyopathies      Sleep apnea      Small bowel obstruction 12/14/2015     Ventricular tachycardia (H) 6/17/2013       Past Surgical History  Past Surgical History:   Procedure Laterality Date     BIOPSY  annually    prostate biopsy     CARDIAC SURGERY  2012    A fib Ablation     CARDIAC SURGERY      cardioversion     COLONOSCOPY  2007     DAVINCI PROSTATECTOMY N/A 11/20/2015    Procedure: DAVINCI PROSTATECTOMY;  Surgeon: Nahun Hilario MD;  Location: [facility] OR     GENITOURINARY SURGERY      bladder surgery 2011     H ABLATION AV NODE  6/25/13     H ABLATION FOCAL AFIB  6/25/13     HC TOOTH EXTRACTION W/FORCEP       TONSILLECTOMY & ADENOIDECTOMY         Hx of Blood transfusions/reactions:  chart history/pt has no memory     Hx of abnormal bleeding or anti-platelet use: yes on coumadin and has plan    Menstrual history: No LMP for male patient.    Steroid use in the last year: no    Personal or FH with difficulty with Anesthesia:  Urinary retention in past.     Prior to Admission Medications  Current Outpatient Prescriptions   Medication Sig Dispense Refill     acetaminophen (TYLENOL) 325 MG tablet Take 1,300 mg by mouth 2 times daily        amiodarone (PACERONE/CODARONE) 200 MG tablet Take PO 1/2 tablet daily-Take extra prn per MD recommendations. (Patient taking differently: Take PO 1/2 tablet daily-Take extra prn per MD recommendations) 50 tablet 0     aspirin 81 MG chewable tablet Take 81 mg by mouth daily Start 8/31/18       ASPIRIN NOT PRESCRIBED, INTENTIONAL, 1 each daily Antiplatelet medication not prescribed intentionally due to Current anticoagulant therapy (warfarin/enoxaparin) (Patient not taking: Reported on 7/9/2018) 0 each 0     cetirizine (ZYRTEC) 10 MG tablet Take 1 tablet (10 mg) by mouth daily 90 tablet 1     cholecalciferol (VITAMIN D) 1000 UNIT tablet Take 2 tablets (2,000 Units) by mouth daily 180 tablet 1     digoxin (LANOXIN) 125 MCG tablet Take 1 tablet (125 mcg) by mouth every 48 hours 45 tablet 0     lisinopril (PRINIVIL/ZESTRIL) 2.5 MG tablet Take 1 tablet (2.5 mg) by mouth daily 90 tablet 3     metoprolol succinate (TOPROL-XL) 100 MG 24 hr tablet Take 1 tablet (100 mg) by mouth daily 90 tablet 3     omeprazole (PRILOSEC) 20 MG CR capsule Take 1 capsule (20 mg) by mouth daily 90 capsule 3     polyethylene glycol (MIRALAX/GLYCOLAX) powder Take 17 g by mouth daily as needed for constipation       potassium chloride (K-TAB,KLOR-CON) 10 MEQ tablet Take 1 tablet (10 mEq) by mouth daily 90 tablet 3     senna-docusate (SENOKOT-S;PERICOLACE) 8.6-50 MG per tablet Take 2 tablets by mouth daily        sildenafil (VIAGRA) 25 MG tablet Take 25 mg by mouth as needed       simethicone  (MYLICON) 80 MG chewable tablet Take 1 tablet (80 mg) by mouth every 6 hours as needed for cramping 180 tablet      simvastatin (ZOCOR) 20 MG tablet Take 1 tablet (20 mg) by mouth At Bedtime 90 tablet 3     torsemide (DEMADEX) 20 MG tablet Take 1 tablet (20 mg) by mouth daily 90 tablet 3     warfarin (COUMADIN) 5 MG tablet 5 mg daily (Patient taking differently: 2.5mg on  and 5mg ROW) 90 tablet 0       Allergies  Allergies   Allergen Reactions     Latex Rash     Seasonal Allergies      hayfever - wheezing -        Social History  Social History     Social History     Marital status:      Spouse name: N/A     Number of children: 2     Years of education: N/A     Occupational History      Retired     Social History Main Topics     Smoking status: Never Smoker     Smokeless tobacco: Never Used     Alcohol use No      Comment: AA since      Drug use: No     Sexual activity: Not Currently     Partners: Female     Birth control/ protection: Abstinence     Other Topics Concern     Parent/Sibling W/ Cabg, Mi Or Angioplasty Before 65f 55m? No     Social History Narrative       Family History  Family History   Problem Relation Age of Onset     C.A.D. Father      CHF  at 74     Cerebrovascular Disease Father      C.A.D. Mother      CHF at 91 still living     Cerebrovascular Disease Mother      TIAs     Breast Cancer Mother      HEART DISEASE Son      arrythmia     Family History Negative Sister      Family History Negative Brother      Family History Negative Son            ROS/MED HX    ENT/Pulmonary:     (+)sleep apnea, allergic rhinitis, uses CPAP , . .    Neurologic:  - neg neurologic ROS     Cardiovascular:     (+) Dyslipidemia, hypertension--CAD, --. Taking blood thinners Pt has received instructions: . CHF . . pacemaker :. dysrhythmias a-fib, Irregular Heartbeat/Palpitations, valvular problems/murmurs type: AS, AI and MR . pulmonary hypertension,       METS/Exercise Tolerance:  3 - Able to walk  "1-2 blocks without stopping   Hematologic:  - neg hematologic  ROS       Musculoskeletal:   (+) arthritis, , , -       GI/Hepatic:     (+) GERD Asymptomatic on medication,       Renal/Genitourinary:     (+) chronic renal disease, type: CRI, Other Renal/ Genitourinary, prostate cancer S/p prostatectomy      Endo:     (+) Obesity, .      Psychiatric:  - neg psychiatric ROS       Infectious Disease:  - neg infectious disease ROS       Malignancy:   (+) Malignancy History of Prostate  Prostate CA status post Surgery,         Other:    (+) No chance of pregnancy no H/O Chronic Pain,no other significant disability          The complete review of systems is negative other than noted in the HPI or here.   Temp: 97.5  F (36.4  C) Temp src: Oral BP: 126/76 Pulse: 66     SpO2: 93 %         240 lbs 8 oz  5' 8\"   Body mass index is 36.57 kg/(m^2).       Physical Exam  Constitutional: Awake, alert, cooperative, no apparent distress, and appears stated age.  Eyes: Pupils equal, round and reactive to light, extra ocular muscles intact, sclera clear, conjunctiva normal.  HENT: Normocephalic, oral pharynx with moist mucus membranes, good dentition. No goiter appreciated.   Respiratory: Clear to auscultation bilaterally, no crackles or wheezing.  Cardiovascular: Regular rate and rhythm, normal S1 and S2, and 3/6 harsh squeaky systolic murmur heard best at the base.  Carotids-hard to feel, weak 1+, hard to hear murmur radiation that high but can hear up to sternum.  1 + pitting leg edema left > right. Palpable pulses to radial and PT arteries.   GI: Normal bowel sounds, soft, non-distended, non-tender, no masses palpated, no hepatosplenomegaly.    Lymph/Hematologic: No cervical lymphadenopathy and no supraclavicular lymphadenopathy.  Genitourinary:  No rashes in the intertriginous inquinal area  Skin: Warm and dry.  No rashes at anticipated surgical site.   Musculoskeletal: Full ROM of neck. There is no redness, warmth, or swelling of " the joints. Gross motor strength is normal.    Neurologic: Awake, alert, oriented to name, place and time. Cranial nerves II-XII are grossly intact. Gait is normal.   Neuropsychiatric: Calm, cooperative. Normal affect.     Labs: (personally reviewed)  Lab Results   Component Value Date    WBC 8.7 08/27/2018     Lab Results   Component Value Date    RBC 4.14 08/27/2018     Lab Results   Component Value Date    HGB 12.3 08/27/2018     Lab Results   Component Value Date    HCT 40.1 08/27/2018     Lab Results   Component Value Date    MCV 97 08/27/2018     Lab Results   Component Value Date    MCH 29.7 08/27/2018     Lab Results   Component Value Date    MCHC 30.7 08/27/2018     Lab Results   Component Value Date    RDW 16.5 08/27/2018     Lab Results   Component Value Date     08/27/2018     Last Comprehensive Metabolic Panel:  Sodium   Date Value Ref Range Status   08/27/2018 142 133 - 144 mmol/L Final     Potassium   Date Value Ref Range Status   08/27/2018 4.7 3.4 - 5.3 mmol/L Final     Chloride   Date Value Ref Range Status   08/27/2018 107 94 - 109 mmol/L Final     Carbon Dioxide   Date Value Ref Range Status   08/27/2018 29 20 - 32 mmol/L Final     Anion Gap   Date Value Ref Range Status   08/27/2018 6 3 - 14 mmol/L Final     Glucose   Date Value Ref Range Status   08/27/2018 98 70 - 99 mg/dL Final     Urea Nitrogen   Date Value Ref Range Status   08/27/2018 25 7 - 30 mg/dL Final     Creatinine   Date Value Ref Range Status   08/27/2018 1.69 (H) 0.66 - 1.25 mg/dL Final     GFR Estimate   Date Value Ref Range Status   08/27/2018 40 (L) >60 mL/min/1.7m2 Final     Comment:     Non  GFR Calc     Calcium   Date Value Ref Range Status   08/27/2018 9.3 8.5 - 10.1 mg/dL Final       6/2018 DEMETRIUS showed severe AS with calculated valve area of 0.7 to 0.8 cm2; mean gradient 35 mmHg.  Mild to moderate (1 to 2+), central aortic regurgitation. LV systolic function - EF of 35%-40%. Moderate concentric LVH..     Right and left heart catheterization 06/29 showed minimal disease in the left coronary system.  RCA was noted to be occluded at the ostium with extensive left-to-right collaterals His right heart catheterization showed a mean PA pressure of 40 mmHg.          ASSESSMENT and PLAN  Shahid Villalta is a 78 year old male scheduled to undergo tranfemoral transcatheter aortic valve replacement, with Drs Rios, Bonnie and Magdiel.     Pre-operative considerations include:  1.) CV: Functional status independent. Exercise tolerance < 4 METS. HTN. HLD. Aortic stenosis, aortic regurgitation, mitral regurgitation, pulmonary hypertension, CAD without known MI event, cardiomyopathy, history of paroxysmal atrial fibrillation (PAF) and atrial tachycardia. He is S/P AV dario ablation and BiV ICD implantation and is on amiodarone, digoxin, ACEI, b-blocker, torsemide and Coumadin.   Recent cardiac testing:  as above  -Per cardiology: Take last dose of warfarin 8/31/18 and start 81 mg ASA same day and continue to DOS  -He should continue amiodarone, torsemide, digoxin, Toprol, simvastatin.  -Hold lisinopril DOS  -No further cardiac evaluation indicated    2.) Pulmonary: Never smoker. SHIMON on CPAP. No ETOH-AA since 1975.  Bring CPAP mask to DOS    3.) Renal: Chronic renal insufficiency. Stage III CKD (Dr. Aden). Creatinine 1.52. Prostate cancer S/P prostatectomy 2015, no radiation.  BMP today.     4.) HEME: Mild chronic anemia. HGB 12 in June 2018. No clots or bleeding history. Chart hx for blood transfusion.  CBC, type and screen    5.) GERD on PPI with good control  Take PPI DOS  PONV score = 1 (2 or > antiemetic prophylaxis indicated).       Patient was discussed with Dr Whelan. Patient is optimized and is acceptable candidate for the proposed procedure.  No further diagnostic evaluation is needed.     ALEKSEY Pimentel  Preoperative Assessment Center  [facility]  [facility]  Phone:  [phone]  Fax: [phone]

## 2018-08-27 NOTE — ANESTHESIA PREPROCEDURE EVALUATION
Anesthesia Evaluation     . Pt has had prior anesthetic. Type: General and MAC           ROS/MED HX    ENT/Pulmonary:     (+)sleep apnea, allergic rhinitis, uses CPAP , . .    Neurologic:  - neg neurologic ROS     Cardiovascular:     (+) Dyslipidemia, hypertension--CAD, --. Taking blood thinners Pt has received instructions: . CHF . . pacemaker :. dysrhythmias a-fib, Irregular Heartbeat/Palpitations, valvular problems/murmurs type: AS, AI and MR . pulmonary hypertension,       METS/Exercise Tolerance:  3 - Able to walk 1-2 blocks without stopping   Hematologic:  - neg hematologic  ROS       Musculoskeletal:   (+) arthritis, , , -       GI/Hepatic:     (+) GERD Asymptomatic on medication,       Renal/Genitourinary:     (+) chronic renal disease, type: CRI, Other Renal/ Genitourinary, prostate cancer S/p prostatectomy      Endo:     (+) Obesity, .      Psychiatric:  - neg psychiatric ROS       Infectious Disease:  - neg infectious disease ROS       Malignancy:   (+) Malignancy History of Prostate  Prostate CA status post Surgery,         Other:    (+) No chance of pregnancy no H/O Chronic Pain,no other significant disability                    Physical Exam  Normal systems: dental    Airway   Mallampati: I  TM distance: >3 FB    Dental     Cardiovascular   Rhythm and rate: regular and normal  (+) murmur       Pulmonary    breath sounds clear to auscultation(+) decreased breath sounds                  PAC Discussion and Assessment    ASA Classification: 3  Case is suitable for:   Anesthetic techniques and relevant risks discussed: GA  Invasive monitoring and risk discussed:   Types:   Possibility and Risk of blood transfusion discussed: Yes  NPO instructions given:   Additional anesthetic preparation and risks discussed:   Needs early admission to pre-op area:   Other:     PAC Resident/NP Anesthesia Assessment:  78 year old male for tranfemoral transcatheter aortic valve replacement, with Bonnie Mullen and  Magdiel. PAC referral for risk assessment and optimization for anesthesia.  Pre-operative considerations include:  1.) CV: Functional status independent. Exercise tolerance < 4 METS. HTN. HLD. Aortic stenosis, aortic regurgitation, mitral regurgitation, pulmonary hypertension, CAD without known MI event, cardiomyopathy, history of paroxysmal atrial fibrillation (PAF) and atrial tachycardia. He is S/P AV dario ablation and BiV ICD implantation and is on amiodarone, digoxin, ACEI, b-blocker, torsemide and Coumadin.   Recent cardiac testin2018 DEMETRIUS showed severe AS with calculated valve area of 0.7 to 0.8 cm2; mean gradient 35 mmHg.  Mild to moderate (1 to 2+), central aortic regurgitation. LV systolic function - EF of 35%-40%. Moderate concentric LVH..    Right and left heart catheterization  showed minimal disease in the left coronary system.  RCA was noted to be occluded at the ostium with extensive left-to-right collaterals His right heart catheterization showed a mean PA pressure of 40 mmHg.     -Per cardiology: Take last dose of warfarin 18 and start 81 mg ASA same day to DOS  -He should continue amiodarone, torsemide, digoxin, Toprol, simvastatin.  -Hold lisinopril DOS  -No further cardiac evaluation indicated  2.) Pulmonary: Never smoker. SHIMON on CPAP. No ETOH-AA since .  Bring CPAP mask to DOS  3.) Renal: Prostate cancer S/P prostatectomy , no radiation. Stage III CKD (Dr. Aden). Creatinine 1.52.   4.) HEME: Mild chronic anemia. HGB 12 in 2018. No clots or bleeding history. Chart hx for blood transfusion.  5.) GERD on PPI with good control  PONV score = 1 (2 or > antiemetic prophylaxis indicated)    ANESTHESIA: no prior problems but post-op narcotics caused a serious ileus.Pt discussed with Dr. Whelan.                                                                                Reviewed and Signed by PAC Mid-Level Provider/Resident  Mid-Level Provider/Resident: Clara Darden  YAEL  Date: 8/27/18  Time: 12:32 PM    Attending Anesthesiologist Anesthesia Assessment:  78 year old for transfemoral TAVR in management of aortic stenosis and regurgitation. FELIZ 0.7 with gradient of 35 mmHg and moderate decrease in EF at 35-40%. MR (2+).  of RCA with collaterals; pulmonary hypertension with PA of 40 mmHg.     Mild anemia, CKD with creatinine 1.52.     Patient/case discussed with GALILEO. No need to see patient. Patient is appropriate for the planned procedure without further work-up or medical management.      Reviewed and Signed by PAC Anesthesiologist  Anesthesiologist: alfreda  Date: 8/27/2018  Time:   Pass/Fail: Pass  Disposition:     PAC Pharmacist Assessment:        Pharmacist:   Date:   Time:                           .

## 2018-08-27 NOTE — PATIENT INSTRUCTIONS
Preparing for Your Surgery      Name:  Shahid Villalta   MRN:  [id number]   :  1940   Today's Date:  2018     Arriving for surgery:    Surgery date:  18    Arrival time:  05:30 am    Please come to:       [facility] [address]    [address] [place]  [address]    -   parking is available in front of the hospital from 5:15 am to 8:00 pm    -  Stop at the Information Desk in the lobby    -   Inform the information person that you are here for surgery. An escort to 3c will be provided. If you would not like an escort, please proceed to 3C on the 3rd floor. [phone]     What can I eat or drink?    -  You may have solid food or milk products until 8 hours prior to your surgery.  -  You may have water, apple juice or 7up/Sprite until 2 hours prior to your surgery.    Which medicines can I take?    Stop vitamins and supplements one week prior to surgery.  Hold Ibuprofen and Naproxen for 24 hours prior to surgery.     -  Do NOT take these medications in the morning, the day of surgery:    Lisinopril the morning of surgery, last dose of coumadin (warfarin) 18, do not take viagra 24 hours before surgery.    -  Please take these medications the day of surgery:    Tylenol if needed, take all other medications normally scheduled in the morning.    How do I prepare myself?  -  Take two showers: one the night before surgery; and one the morning of surgery.         Use Scrubcare or Hibiclens to wash from neck down.  You may use your own     shampoo and conditioner. No other hair products.   -  Do NOT use lotion, powder, deodorant, or antiperspirant the day of your surgery.  -  Do NOT wear any jewelry.    - Do not bring your own medications to the hospital, except for inhalers and eye   drops.  -  Bring your ID and insurance card.    Questions or Concerns:  -If you have questions or concerns regarding the day of surgery, please call [phone].     -If you are  scheduled at the Ambulatory Surgery Center please call [phone].    -For questions after surgery please call your surgeons office.

## 2018-08-27 NOTE — MR AVS SNAPSHOT
After Visit Summary   8/27/2018    Shahid Villalta    MRN: [id number]           Patient Information     Date Of Birth          1940        Visit Information        Provider Department      8/27/2018 11:15 AM Pharmacist, Denisse Rangel formerly [facility] Assessment [place]        Today's Diagnoses     Preop examination    -  1       Follow-ups after your visit        Your next 10 appointments already scheduled     Aug 27, 2018 12:30 PM CDT   (Arrive by 12:15 PM)   PAC RN ASSESSMENT with Denisse Pac Rn   [facility] Preoperative Assessment [place] ([facility])    [address] [facility] [address]   [phone]            Aug 27, 2018  1:10 PM CDT   (Arrive by 12:55 PM)   PAC Anesthesia Consult with Denisse Pac Anesthesiologist   [facility] Preoperative Assessment [place] ([facility])    [address] [facility] [address]   [phone]            Sep 04, 2018  9:00 AM CDT   Anticoagulation Visit with LV ANTICOAGULATION CLINIC   [facility] ([facility])    [address] [address]   [phone]            Sep 05, 2018   Procedure with GENERIC ANESTHESIA PROVIDER   [facility]    [address] [address]   [phone]            Sep 05, 2018  7:30 AM CDT   Transcath Aortic-Valve Replace with [id number]   [facility] ([facility] [place] [facility] [place])    [address] [address] [phone]            Sep 12, 2018  4:30 PM CDT   Remote ICD Check with STEWART DCR2   [facility]   [place] ([facility])    [address]  [place] [address] [phone] OPT 2           This appointment is for a remote check of your debrillator.  This is not an appointment at the office.            Nov 09, 2018  9:00 AM CST   Return Visit with RH ONCOLOGY  NURSE   [facility] ([facility])    [facility]  [address]  [facility] [id number] [phone]            Nov 14, 2018  9:30 AM CST   Return Visit with Nahun Hilario MD   [facility] ([facility])    [facility]  [address]  [place] [address] [phone]              Who to contact     Please call your clinic at [phone] to:    Ask questions about your health    Make or cancel appointments    Discuss your medicines    Learn about your test results    Speak to your doctor            Additional Information About Your Visit        [employer] Information     [employer] gives you secure access to your electronic health record. If you see a primary care provider, you can also send messages to your care team and make appointments. If you have questions, please call your primary care clinic.  If you do not have a primary care provider, please call [phone] and they will assist you.      [employer] is an electronic gateway that provides easy, online access to your medical records. With [employer], you can request a clinic appointment, read your test results, renew a prescription or communicate with your care team.     To access your existing account, please contact your [facility] or call [phone] for assistance.        Care EveryWhere ID     This is your Care EveryWhere ID. This could be used by other organizations to access your [facility] medical records  [id number]         Blood Pressure from Last 3 Encounters:   08/27/18 126/76   08/02/18 129/79   07/09/18 116/70    Weight from Last 3 Encounters:   08/27/18 109.1 kg (240 lb 8 oz)   07/09/18 107.9 kg (237 lb 12.8 oz)   06/29/18 107.9 kg (237 lb 12.8 oz)              Today, you had the following     No orders found for display         Today's Medication Changes           These changes are accurate as of 8/27/18 11:33 AM.  If you have any questions, ask your nurse or doctor.               These medicines have changed or have updated prescriptions.        Dose/Directions    amiodarone 200 MG tablet   Commonly known as:  PACERONE/CODARONE   This may have changed:  additional instructions   Used for:  Paroxysmal atrial fibrillation (H)        Take PO 1/2 tablet daily-Take extra prn per MD recommendations.   Quantity:  50 tablet   Refills:  0       warfarin 5 MG tablet   Commonly known as:  COUMADIN   This may have changed:  additional instructions   Used for:  Long-term (current) use of anticoagulants, Paroxysmal ventricular tachycardia (H)        5 mg daily   Quantity:  90 tablet   Refills:  0                Primary Care Provider Office Phone # Fax #    Gume Brown [phone] [id number]  [facility] [address]        Equal Access to Services     [facility] Hospital[facility] : Hadii aad ku hadasho Soomaali, waaxda luqadaha, qaybta kaalmada adetheresayaacosta, emily mesa . So [facility] [phone].    ATENCIÓN: Si habla español, tiene a irving disposición servicios gratuitos de asistencia lingüística. [facility] [phone].    We comply with applicable federal civil rights laws and [place] laws. We do not discriminate on the basis of race, color, national origin, age, disability, sex, sexual orientation, or gender identity.            Thank you!     Thank you for choosing [facility]  for your care. Our goal is always to provide you with excellent care. Hearing back from our patients is one way we can continue to improve our services. Please take a few minutes to complete the written survey that you may receive in the mail after your visit with us. Thank you!             Your Updated Medication List - Protect others around you: Learn how to safely use, store and throw away your medicines at www.disposemymeds.org.          This  list is accurate as of 8/27/18 11:33 AM.  Always use your most recent med list.                   Brand Name Dispense Instructions for use Diagnosis    acetaminophen 325 MG tablet    TYLENOL     Take 1,300 mg by mouth 2 times daily        amiodarone 200 MG tablet    PACERONE/CODARONE    50 tablet    Take PO 1/2 tablet daily-Take extra prn per MD recommendations.    Paroxysmal atrial fibrillation (H)       aspirin 81 MG chewable tablet      Take 81 mg by mouth daily Start 8/31/18        ASPIRIN NOT PRESCRIBED    INTENTIONAL    0 each    1 each daily Antiplatelet medication not prescribed intentionally due to Current anticoagulant therapy (warfarin/enoxaparin)    Atrial fibrillation, unspecified       cetirizine 10 MG tablet    zyrTEC    90 tablet    Take 1 tablet (10 mg) by mouth daily    Allergic rhinitis       cholecalciferol 1000 UNIT tablet    vitamin D3    180 tablet    Take 2 tablets (2,000 Units) by mouth daily    Vitamin D deficiency, Parathyroid hormone excess (H)       digoxin 125 MCG tablet    LANOXIN    45 tablet    Take 1 tablet (125 mcg) by mouth every 48 hours    Chronic systolic heart failure (H)       lisinopril 2.5 MG tablet    PRINIVIL/Zestril    90 tablet    Take 1 tablet (2.5 mg) by mouth daily    Nonrheumatic aortic valve stenosis       metoprolol succinate 100 MG 24 hr tablet    TOPROL-XL    90 tablet    Take 1 tablet (100 mg) by mouth daily    Paroxysmal atrial fibrillation (H)       omeprazole 20 MG CR capsule    priLOSEC    90 capsule    Take 1 capsule (20 mg) by mouth daily    Gastroesophageal reflux disease without esophagitis       polyethylene glycol powder    MIRALAX/GLYCOLAX     Take 17 g by mouth daily as needed for constipation        potassium chloride 10 MEQ tablet    K-TAB,KLOR-CON    90 tablet    Take 1 tablet (10 mEq) by mouth daily    Chronic systolic heart failure (H)       senna-docusate 8.6-50 MG per tablet    SENOKOT-S;PERICOLACE     Take 2 tablets by mouth daily         simethicone 80 MG chewable tablet    MYLICON    180 tablet    Take 1 tablet (80 mg) by mouth every 6 hours as needed for cramping    Abdominal bloating       simvastatin 20 MG tablet    ZOCOR    90 tablet    Take 1 tablet (20 mg) by mouth At Bedtime    Hyperlipidemia LDL goal <100       torsemide 20 MG tablet    DEMADEX    90 tablet    Take 1 tablet (20 mg) by mouth daily    Chronic systolic heart failure (H), Ischemic cardiomyopathy       VIAGRA 25 MG tablet   Generic drug:  sildenafil      Take 25 mg by mouth as needed        warfarin 5 MG tablet    COUMADIN    90 tablet    5 mg daily    Long-term (current) use of anticoagulants, Paroxysmal ventricular tachycardia (H)

## 2018-08-30 RX ORDER — CEFAZOLIN SODIUM 2 G/100ML
2 INJECTION, SOLUTION INTRAVENOUS
Status: CANCELLED | OUTPATIENT
Start: 2018-08-30 | End: 2038-08-31

## 2018-08-30 RX ORDER — ASPIRIN 81 MG/1
81 TABLET ORAL DAILY
Status: CANCELLED | OUTPATIENT
Start: 2018-08-30 | End: 2019-01-01

## 2018-08-30 RX ORDER — LIDOCAINE 40 MG/G
CREAM TOPICAL
Status: CANCELLED | OUTPATIENT
Start: 2018-08-30 | End: 2019-01-01

## 2018-08-30 RX ORDER — SODIUM CHLORIDE 9 MG/ML
INJECTION, SOLUTION INTRAVENOUS CONTINUOUS
Status: CANCELLED | OUTPATIENT
Start: 2018-08-30 | End: 2019-01-01

## 2018-09-05 ENCOUNTER — APPOINTMENT (OUTPATIENT)
Dept: CT IMAGING | Facility: CLINIC | Age: 78
End: 2018-09-05
Attending: EMERGENCY MEDICINE
Payer: MEDICARE

## 2018-09-05 ENCOUNTER — HOSPITAL ENCOUNTER (OUTPATIENT)
Facility: CLINIC | Age: 78
End: 2018-09-05
Payer: MEDICARE

## 2018-09-05 ENCOUNTER — ANESTHESIA (OUTPATIENT)
Dept: SURGERY | Facility: CLINIC | Age: 78
End: 2018-09-05

## 2018-09-05 ENCOUNTER — HOSPITAL ENCOUNTER (OUTPATIENT)
Facility: CLINIC | Age: 78
Discharge: HOME | End: 2018-09-05
Attending: INTERNAL MEDICINE | Admitting: INTERNAL MEDICINE
Payer: MEDICARE

## 2018-09-05 VITALS
HEART RATE: 63 BPM | HEIGHT: 68 IN | TEMPERATURE: 98.1 F | DIASTOLIC BLOOD PRESSURE: 74 MMHG | WEIGHT: 242 LBS | OXYGEN SATURATION: 95 % | RESPIRATION RATE: 16 BRPM | BODY MASS INDEX: 36.68 KG/M2 | SYSTOLIC BLOOD PRESSURE: 138 MMHG

## 2018-09-05 DIAGNOSIS — T07.XXXA MULTIPLE ABRASIONS: ICD-10-CM

## 2018-09-05 DIAGNOSIS — I35.0 AORTIC STENOSIS: ICD-10-CM
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
## 2018-09-05 DIAGNOSIS — S00.83XA TRAUMATIC HEMATOMA OF FOREHEAD, INITIAL ENCOUNTER: ICD-10-CM

## 2018-09-05 PROBLEM — I27.20 PULMONARY HYPERTENSION (H): Status: ACTIVE | Noted: 2017-11-15

## 2018-09-05 PROBLEM — I35.1 NONRHEUMATIC AORTIC VALVE INSUFFICIENCY: Chronic | Status: ACTIVE | Noted: 2017-11-17

## 2018-09-05 PROBLEM — I50.22 CHRONIC SYSTOLIC CONGESTIVE HEART FAILURE (H): Chronic | Status: ACTIVE | Noted: 2017-05-19

## 2018-09-05 PROBLEM — I27.20 PULMONARY HYPERTENSION (H): Chronic | Status: ACTIVE | Noted: 2017-11-15

## 2018-09-05 PROBLEM — G47.33 OSA (OBSTRUCTIVE SLEEP APNEA): Chronic | Status: ACTIVE | Noted: 2018-05-31

## 2018-09-05 LAB
ANION GAP SERPL CALCULATED.3IONS-SCNC: 6 MMOL/L (ref 3–14)
BASOPHILS # BLD AUTO: 0 10E9/L (ref 0–0.2)
BASOPHILS NFR BLD AUTO: 0.5 %
BUN SERPL-MCNC: 30 MG/DL (ref 7–30)
CALCIUM SERPL-MCNC: 9.3 MG/DL (ref 8.5–10.1)
CHLORIDE SERPL-SCNC: 109 MMOL/L (ref 94–109)
CO2 SERPL-SCNC: 28 MMOL/L (ref 20–32)
CREAT SERPL-MCNC: 1.58 MG/DL (ref 0.66–1.25)
DIFFERENTIAL METHOD BLD: ABNORMAL
EOSINOPHIL # BLD AUTO: 0.5 10E9/L (ref 0–0.7)
EOSINOPHIL NFR BLD AUTO: 5.4 %
ERYTHROCYTE [DISTWIDTH] IN BLOOD BY AUTOMATED COUNT: 16.9 % (ref 10–15)
GFR SERPL CREATININE-BSD FRML MDRD: 43 ML/MIN/1.7M2
GLUCOSE SERPL-MCNC: 98 MG/DL (ref 70–99)
HCT VFR BLD AUTO: 39.2 % (ref 40–53)
HGB BLD-MCNC: 11.9 G/DL (ref 13.3–17.7)
IMM GRANULOCYTES # BLD: 0 10E9/L (ref 0–0.4)
IMM GRANULOCYTES NFR BLD: 0.2 %
INR PPP: 1.77 (ref 0.86–1.14)
LYMPHOCYTES # BLD AUTO: 0.9 10E9/L (ref 0.8–5.3)
LYMPHOCYTES NFR BLD AUTO: 11.1 %
MCH RBC QN AUTO: 29.8 PG (ref 26.5–33)
MCHC RBC AUTO-ENTMCNC: 30.4 G/DL (ref 31.5–36.5)
MCV RBC AUTO: 98 FL (ref 78–100)
MONOCYTES # BLD AUTO: 1.1 10E9/L (ref 0–1.3)
MONOCYTES NFR BLD AUTO: 13.5 %
NEUTROPHILS # BLD AUTO: 5.8 10E9/L (ref 1.6–8.3)
NEUTROPHILS NFR BLD AUTO: 69.3 %
NRBC # BLD AUTO: 0 10*3/UL
NRBC BLD AUTO-RTO: 0 /100
PLATELET # BLD AUTO: 241 10E9/L (ref 150–450)
POTASSIUM SERPL-SCNC: 4 MMOL/L (ref 3.4–5.3)
RBC # BLD AUTO: 4 10E12/L (ref 4.4–5.9)
SODIUM SERPL-SCNC: 142 MMOL/L (ref 133–144)
WBC # BLD AUTO: 8.3 10E9/L (ref 4–11)

## 2018-09-05 PROCEDURE — 99285 EMERGENCY DEPT VISIT HI MDM: CPT | Mod: Z6 | Performed by: EMERGENCY MEDICINE

## 2018-09-05 PROCEDURE — 99222 1ST HOSP IP/OBS MODERATE 55: CPT | Performed by: NURSE PRACTITIONER

## 2018-09-05 PROCEDURE — 70450 CT HEAD/BRAIN W/O DYE: CPT

## 2018-09-05 PROCEDURE — 85025 COMPLETE CBC W/AUTO DIFF WBC: CPT | Performed by: EMERGENCY MEDICINE

## 2018-09-05 PROCEDURE — 80048 BASIC METABOLIC PNL TOTAL CA: CPT | Performed by: EMERGENCY MEDICINE

## 2018-09-05 PROCEDURE — 99285 EMERGENCY DEPT VISIT HI MDM: CPT | Mod: 25 | Performed by: EMERGENCY MEDICINE

## 2018-09-05 PROCEDURE — 72125 CT NECK SPINE W/O DYE: CPT

## 2018-09-05 PROCEDURE — 85610 PROTHROMBIN TIME: CPT | Performed by: EMERGENCY MEDICINE

## 2018-09-05 RX ORDER — DEXMEDETOMIDINE HYDROCHLORIDE 4 UG/ML
0.2-1.2 INJECTION, SOLUTION INTRAVENOUS CONTINUOUS
Status: DISCONTINUED | OUTPATIENT
Start: 2018-09-05 | End: 2018-09-05 | Stop reason: HOSPADM

## 2018-09-05 RX ORDER — DEXMEDETOMIDINE HYDROCHLORIDE 4 UG/ML
0.2-1.2 INJECTION, SOLUTION INTRAVENOUS CONTINUOUS
Status: DISCONTINUED | OUTPATIENT
Start: 2018-09-05 | End: 2018-09-05 | Stop reason: CLARIF

## 2018-09-05 ASSESSMENT — ENCOUNTER SYMPTOMS
ABDOMINAL PAIN: 0
FEVER: 0
SHORTNESS OF BREATH: 0

## 2018-09-05 NOTE — CONSULTS
[facility], [place]     Consult note: Trauma Service    Date of Admission:  9/5/2018    Time of Admission/Consult Request (page/call): 0705    Time of my evaluation: 0745      Assessment & Plan   Trauma mechanism: Mechanical Fall   Time/date of injury:September 5, 2018 0700  Known Injuries:  1. Superior left periorbital contusion   2. Nasal bridge abrasion   3. Right need abrasion   Other diagnoses:   Patient Active Problem List   Diagnosis     Atrial fibrillation (H)     Anticoagulation monitoring, INR range 2-3     Hyperlipidemia LDL goal <100     GERD (gastroesophageal reflux disease)     Prostate cancer     Impaired fasting glucose     Vitamin D deficiency     Advanced directives, counseling/discussion     Aortic insufficiency with aortic stenosis     Hypertension goal BP (blood pressure) < 140/90     Health Care Home     CKD (chronic kidney disease) stage 3, GFR 30-59 ml/min     Automatic implantable cardioverter-defibrillator in situ     Aortic valve stenosis     Anemia     Coronary artery disease     Long-term (current) use of anticoagulants [Z79.01]     Chronic systolic congestive heart failure (H)     Pulmonary hypertension     Nonrheumatic aortic valve insufficiency     SHIMON (obstructive sleep apnea)       Plan:  1. Trauma evaluation completed. No further injuries noted   2. No need to repeat Head CT unless noted neurologic change.   3. No further trauma work up needed   4. Remainders of care through ED and CVTS   5. Trauma will sign off     Code status: Full confirmed with pt .     ETOH: This patient was asked if in the last 3-6 months there has been a time when he had  5 or more drinks in a single day/outing.. Patient answer to the screening question was in the negative. No intervention needed.  Primary Care Physician   Gume Brown    Chief Complaint   Fall    History is obtained from the patient    History of Present Illness   Shahid DEE Villalta is a 78 year old male complex  past medical history who presents to the ED after a fall.  He is actually coming into the hospital today to have a planned aortic valve replacement.  He unfortunately tripped on the curb and coming to the hospital.  He fell, and struck his forehead on the cement.  He had some bleeding from his nose, and has a lump on his head.  The triage nurse strongly recommended he be seen, though initially he declined, wanted to go up to have a surgery.  However, when he got up there, they told him that they would not continue to process him for surgery until he was evaluated in the ED.  He states he is really not having much pain.  He denies neck pain or back pain.  No chest pain, shortness of breath, abdominal pain.  He was able to walk afterwards, denies any lower extremity symptoms, other than abrasions.  He denies facial pain.  He denies any loose teeth or pain in the mouth.  He had previously been on Coumadin, but is held it since 8/31.       Past Medical History    I have reviewed this patient's medical history and updated it with pertinent information if needed.   Past Medical History:   Diagnosis Date     Aortic valve disorders      Atrial fibrillation (H)      Automatic implantable cardiac defibrillator in situ      Cancer (H)      Congestive heart failure (H)      Coronary artery disease      Essential hypertension, benign      GERD (gastroesophageal reflux disease) 3/16/2010     History of blood transfusion      Hyperlipidaemia      Hypertension      Obese      Other and unspecified hyperlipidemia      Other primary cardiomyopathies      Pacemaker      Sleep apnea      Small bowel obstruction 12/14/2015     Ventricular tachycardia (H) 6/17/2013       Past Surgical History   I have reviewed this patient's surgical history and updated it with pertinent information if needed.  Past Surgical History:   Procedure Laterality Date     BIOPSY  annually    prostate biopsy     CARDIAC SURGERY  2012    A fib Ablation      CARDIAC SURGERY      cardioversion     COLONOSCOPY       DAVINCI PROSTATECTOMY N/A 2015    Procedure: DAVINCI PROSTATECTOMY;  Surgeon: Nahun Hilario MD;  Location: [facility] OR     GENITOURINARY SURGERY      bladder surgery      H ABLATION AV NODE  13     H ABLATION FOCAL AFIB  13     HC TOOTH EXTRACTION W/FORCEP       TONSILLECTOMY & ADENOIDECTOMY       Prior to Admission Medications   Prior to Admission Medications   Prescriptions Last Dose Informant Patient Reported? Taking?   ASPIRIN NOT PRESCRIBED, INTENTIONAL, 2018 at Unknown time  No Yes   Si each daily Antiplatelet medication not prescribed intentionally due to Current anticoagulant therapy (warfarin/enoxaparin)   acetaminophen (TYLENOL) 325 MG tablet 2018 at Unknown time  Yes Yes   Sig: Take 1,300 mg by mouth 2 times daily    amiodarone (PACERONE/CODARONE) 200 MG tablet 2018 at Unknown time  No Yes   Sig: Take PO 1/2 tablet daily-Take extra prn per MD recommendations.   Patient taking differently: Take PO 1/2 tablet daily-Take extra prn per MD recommendations   aspirin 81 MG chewable tablet 2018 at Unknown time  Yes Yes   Sig: Take 81 mg by mouth daily Start 18   cetirizine (ZYRTEC) 10 MG tablet 2018 at Unknown time  No Yes   Sig: Take 1 tablet (10 mg) by mouth daily   cholecalciferol (VITAMIN D) 1000 UNIT tablet 2018 at Unknown time  No Yes   Sig: Take 2 tablets (2,000 Units) by mouth daily   digoxin (LANOXIN) 125 MCG tablet 2018 at Unknown time  No Yes   Sig: Take 1 tablet (125 mcg) by mouth every 48 hours   lisinopril (PRINIVIL/ZESTRIL) 2.5 MG tablet Past Week at Unknown time  No Yes   Sig: Take 1 tablet (2.5 mg) by mouth daily   metoprolol succinate (TOPROL-XL) 100 MG 24 hr tablet 2018 at Unknown time  No Yes   Sig: Take 1 tablet (100 mg) by mouth daily   omeprazole (PRILOSEC) 20 MG CR capsule 2018 at Unknown time  No Yes   Sig: Take 1 capsule (20 mg) by mouth daily   polyethylene  glycol (MIRALAX/GLYCOLAX) powder   Yes No   Sig: Take 17 g by mouth daily as needed for constipation   potassium chloride (K-TAB,KLOR-CON) 10 MEQ tablet 2018 at Unknown time  No Yes   Sig: Take 1 tablet (10 mEq) by mouth daily   senna-docusate (SENOKOT-S;PERICOLACE) 8.6-50 MG per tablet   Yes No   Sig: Take 2 tablets by mouth daily    sildenafil (VIAGRA) 25 MG tablet Unknown at Unknown time  Yes No   Sig: Take 25 mg by mouth as needed   simethicone (MYLICON) 80 MG chewable tablet 2018 at Unknown time  No Yes   Sig: Take 1 tablet (80 mg) by mouth every 6 hours as needed for cramping   simvastatin (ZOCOR) 20 MG tablet 2018 at Unknown time  No Yes   Sig: Take 1 tablet (20 mg) by mouth At Bedtime   torsemide (DEMADEX) 20 MG tablet 2018 at Unknown time  No Yes   Sig: Take 1 tablet (20 mg) by mouth daily   warfarin (COUMADIN) 5 MG tablet Past Week at Unknown time  No Yes   Si mg daily   Patient taking differently: 2.5mg on  and 5mg ROW      Facility-Administered Medications: None     Allergies   Allergies   Allergen Reactions     Latex Rash     Seasonal Allergies      hayfever - wheezing -        Social History   Social History     Social History     Marital status:      Spouse name: N/A     Number of children: 2     Years of education: N/A     Occupational History      Retired     Social History Main Topics     Smoking status: Never Smoker     Smokeless tobacco: Never Used     Alcohol use No      Comment: AA since      Drug use: No     Sexual activity: Not Currently     Partners: Female     Birth control/ protection: Abstinence     Other Topics Concern     Parent/Sibling W/ Cabg, Mi Or Angioplasty Before 65f 55m? No     Caffeine Concern Yes     2-3 cups caffeine per day     Sleep Concern Yes     sleep apnea, wears cpap at night     Stress Concern No     Weight Concern No     weight fluctuates     Special Diet No     Exercise Yes     walking daily     Seat Belt Yes     Social History  Narrative       Family History   I have reviewed this patient's family history and updated it with pertinent information if needed.   Family History   Problem Relation Age of Onset     C.A.D. Father      CHF  at 74     Cerebrovascular Disease Father      C.A.D. Mother      CHF at 91 still living     Cerebrovascular Disease Mother      TIAs     Breast Cancer Mother      HEART DISEASE Son      arrythmia     Family History Negative Sister      Family History Negative Brother      Family History Negative Son        Review of Systems   CONSTITUTIONAL: No fever, chills, sweats, fatigue   EYES: no visual blurring, no double vision or visual loss  ENT: no decrease in hearing, no tinnitus, no vertigo, no hoarseness  RESPIRATORY: no shortness of breath, no cough, no sputum   CARDIOVASCULAR: no palpitations, no chest  pain, no exertional chest pain or pressure  GASTROINTESTINAL: no nausea or vomiting, or abd pain  GENITOURINARY: no dysuria, no frequency or hesitancy, no hematuria  MUSCULOSKELETAL: no weakness, no redness, no swelling, no joint pain,   SKIN: no rashes, ecchymoses, abrasions or lacerations  NEUROLOGIC: no numbness or tingling of hands, no numbness or tingling  of feet, no syncope, no tremors or weakness  PSYCHIATRIC: no sleep disturbances, no anxiety or depression    Physical Exam   Temp: 98.1  F (36.7  C) Temp src: Oral BP: 138/74   Heart Rate: 62 Resp: 16 SpO2: 93 % O2 Device: None (Room air)    Vital Signs with Ranges  Temp:  [98.1  F (36.7  C)] 98.1  F (36.7  C)  Heart Rate:  [62] 62  Resp:  [16] 16  BP: (138)/(74) 138/74  SpO2:  [92 %-94 %] 93 % 242 lbs 0 oz    Primary Survey:  Airway: patient talking  Breathing: symmetric respiratory effort bilaterally  Circulation: central pulses present and peripheral pulses present  Disability: Pupils - left 4 mm and brisk, right 4 mm and brisk     [place] Coma Scale - Total 15/15  Eye Response (E): 4  4= spontaneous,  3= to verbal/voice, 2=  to pain, 1= No response    Verbal Response (V): 5   5= Orientated, converses,  4= Confused, converses, 3= Inappropriate words,  2= Incomprehensible sounds,  1=No response   Motor Response (M): 6   6= Obeys commands, 5= Localizes to pain, 4= Withdrawal to pain, 3=Fexion to pain, 2= Extension to pain, 1= No response    Secondary Survey:  General: alert, oriented to person, place, time  Head:, normocephalic, trachea midline, small contusion above left eye   Eyes: PERRLA, pupils 4 mm, EOMI, corneas and conjunctivae clear  Ears: pearly grey bilateral TMs and non-inflamed external ear canals  Nose: nares patent,  nasal septum non-tender, dried blood on nose, small abrasion   Mouth/Throat: no exudates or erythema,  no dental tenderness or malocclusions, no tongue lacerations  Neck:   No cervical collar present. No midline posterior tenderness, full AROM without pain or tenderness   Chest/Pulmonary: normal respiratory rate and rhythm,  bilateral clear breath sounds, no wheezes, rales or rhonchi, no chest wall tenderness or deformities,   Cardiovascular: S1, S2,  normal and regular rate and rhythm,  Abdomen: soft, non-tender, no guarding, no rebound tenderness and no tenderness to palpation  : normal external genitalia, pelvis stable to lateral compression,  no christy, no urine assessBack/Spine: no deformity, no midline tenderness, no sacral tenderness,  no step-offs and no abrasions or contusions  Musculoskel/Extremities: normal extremities, full AROM of major joints without tenderness, edema, erythema, ecchymosis, or abrasions. +2 PP. 0+ edema.   Hand: no gross deformities of hands or fingers. Full AROM of hand and fingers in flexion and extension.  strength equal and symmetric.   Skin: no rashes, laceration, ecchymosis, skin warm and dry unless listed above   Neuro: PERRLA, alert, oriented x 4. CN II-XII grossly intact. No focal deficits. Strength 5/5 x 4 extremities.  Sensation intact.  Psychiatric: affect/mood normal, cooperative, normal  judgement/insight and memory intact  # Pain Assessment:  Current Pain Score 6/3/2016   Pain score (0-10) 2   Pain location -   Pain descriptors -   Shahid turner pain level was assessed and he currently denies pain.      Data   UA RESULTS:  Recent Labs   Lab Test  11/24/15   0306   11/20/12   1240   COLOR  Light Yellow   < >  Yellow   APPEARANCE  Clear   < >  Clear   URINEGLC  Negative   < >  Negative   URINEBILI  Negative   < >  Negative   URINEKETONE  Negative   < >  Negative   SG  1.010   < >  1.025   UBLD  Moderate*   < >  Small*   URINEPH  5.0   < >  5.0   PROTEIN  10*   < >  Negative   UROBILINOGEN   --    --   0.2   NITRITE  Negative   < >  Negative   LEUKEST  Small*   < >  Negative   RBCU  38*   < >   --    WBCU  6*   < >   --     < > = values in this interval not displayed.      Results for orders placed or performed during the hospital encounter of 09/05/18 (from the past 24 hour(s))   CBC with platelets differential   Result Value Ref Range    WBC 8.3 4.0 - 11.0 10e9/L    RBC Count 4.00 (L) 4.4 - 5.9 10e12/L    Hemoglobin 11.9 (L) 13.3 - 17.7 g/dL    Hematocrit 39.2 (L) 40.0 - 53.0 %    MCV 98 78 - 100 fl    MCH 29.8 26.5 - 33.0 pg    MCHC 30.4 (L) 31.5 - 36.5 g/dL    RDW 16.9 (H) 10.0 - 15.0 %    Platelet Count 241 150 - 450 10e9/L    Diff Method Automated Method     % Neutrophils 69.3 %    % Lymphocytes 11.1 %    % Monocytes 13.5 %    % Eosinophils 5.4 %    % Basophils 0.5 %    % Immature Granulocytes 0.2 %    Nucleated RBCs 0 0 /100    Absolute Neutrophil 5.8 1.6 - 8.3 10e9/L    Absolute Lymphocytes 0.9 0.8 - 5.3 10e9/L    Absolute Monocytes 1.1 0.0 - 1.3 10e9/L    Absolute Eosinophils 0.5 0.0 - 0.7 10e9/L    Absolute Basophils 0.0 0.0 - 0.2 10e9/L    Abs Immature Granulocytes 0.0 0 - 0.4 10e9/L    Absolute Nucleated RBC 0.0    Basic metabolic panel   Result Value Ref Range    Sodium 142 133 - 144 mmol/L    Potassium 4.0 3.4 - 5.3 mmol/L    Chloride 109 94 - 109 mmol/L    Carbon Dioxide 28 20 - 32 mmol/L     Anion Gap 6 3 - 14 mmol/L    Glucose 98 70 - 99 mg/dL    Urea Nitrogen 30 7 - 30 mg/dL    Creatinine 1.58 (H) 0.66 - 1.25 mg/dL    GFR Estimate 43 (L) >60 mL/min/1.7m2    GFR Estimate If Black 52 (L) >60 mL/min/1.7m2    Calcium 9.3 8.5 - 10.1 mg/dL   INR   Result Value Ref Range    INR 1.77 (H) 0.86 - 1.14   Head CT w/o contrast    Narrative    PRELIMINARY REPORT - The following report is a preliminary  interpretation.       Impression    Impression:   1. No acute intracranial pathology.  2. Moderate generalized parenchymal volume loss, with sequelae of  prior ischemic disease.  3. Small soft tissue hematoma overlying the left frontal scalp.   Cervical spine CT w/o contrast    Narrative    PRELIMINARY REPORT - The following report is a preliminary  interpretation.      Impression    Impression:   1. No acute fracture or subluxation.  2. Multilevel degenerative changes of the cervical spine as described  above.       Studies:  Cervical spine CT w/o contrast   Preliminary Result   Impression:    1. No acute fracture or subluxation.   2. Multilevel degenerative changes of the cervical spine as described   above.      Head CT w/o contrast   Preliminary Result   Impression:    1. No acute intracranial pathology.   2. Moderate generalized parenchymal volume loss, with sequelae of   prior ischemic disease.   3. Small soft tissue hematoma overlying the left frontal scalp.      Humerus XR,  G/E 2 views, left    (Results Pending)   Chest XR,  PA & LAT    (Results Pending)       Raulito Chavez

## 2018-09-05 NOTE — CONSULTS
[facility]       NEUROSURGERY CONSULTATION NOTE    This consultation was requested by Dr. Lyons from the EM service.    Reason for Consultation: fall    HPI:  Mr Villalta is a 78 year old male with an extensive CV history was on warfarin but, has been held for last week, currently on aspirin. He arrived at the hospital today for his scheduled aortic valve replacement, but fell and hit his forehead on the curb. He had onset of bloody nose. No LOC. No headache. No neck pain.     CT head and neck were obtained by the ED. No acute findings identified. His head CT demonstrates an old stroke in the left frontal region. The patient denies a known history of stroke. INR 1.77 today.      PAST MEDICAL HISTORY:   Past Medical History:   Diagnosis Date     Aortic valve disorders      Atrial fibrillation (H)      Automatic implantable cardiac defibrillator in situ      Cancer (H)      Congestive heart failure (H)      Coronary artery disease      Essential hypertension, benign      GERD (gastroesophageal reflux disease) 3/16/2010     History of blood transfusion      Hyperlipidaemia      Hypertension      Obese      Other and unspecified hyperlipidemia      Other primary cardiomyopathies      Pacemaker      Sleep apnea      Small bowel obstruction 12/14/2015     Ventricular tachycardia (H) 6/17/2013       PAST SURGICAL HISTORY:   Past Surgical History:   Procedure Laterality Date     BIOPSY  annually    prostate biopsy     CARDIAC SURGERY  2012    A fib Ablation     CARDIAC SURGERY      cardioversion     COLONOSCOPY  2007     DAVINCI PROSTATECTOMY N/A 11/20/2015    Procedure: DAVINCI PROSTATECTOMY;  Surgeon: Nahun Hilario MD;  Location: [facility] OR     GENITOURINARY SURGERY      bladder surgery 2011     H ABLATION AV NODE  6/25/13     H ABLATION FOCAL AFIB  6/25/13     HC TOOTH EXTRACTION W/FORCEP       TONSILLECTOMY & ADENOIDECTOMY         FAMILY HISTORY:   Family History    Problem Relation Age of Onset     C.A.D. Father      CHF  at 74     Cerebrovascular Disease Father      C.A.D. Mother      CHF at 91 still living     Cerebrovascular Disease Mother      TIAs     Breast Cancer Mother      HEART DISEASE Son      arrythmia     Family History Negative Sister      Family History Negative Brother      Family History Negative Son        SOCIAL HISTORY:   Social History   Substance Use Topics     Smoking status: Never Smoker     Smokeless tobacco: Never Used     Alcohol use No      Comment: AA since        MEDICATIONS:  Current Outpatient Prescriptions   Medication Sig Dispense Refill     acetaminophen (TYLENOL) 325 MG tablet Take 1,300 mg by mouth 2 times daily        amiodarone (PACERONE/CODARONE) 200 MG tablet Take PO 1/2 tablet daily-Take extra prn per MD recommendations. (Patient taking differently: Take PO 1/2 tablet daily-Take extra prn per MD recommendations) 50 tablet 0     aspirin 81 MG chewable tablet Take 81 mg by mouth daily Start 18       ASPIRIN NOT PRESCRIBED, INTENTIONAL, 1 each daily Antiplatelet medication not prescribed intentionally due to Current anticoagulant therapy (warfarin/enoxaparin) 0 each 0     cetirizine (ZYRTEC) 10 MG tablet Take 1 tablet (10 mg) by mouth daily 90 tablet 1     cholecalciferol (VITAMIN D) 1000 UNIT tablet Take 2 tablets (2,000 Units) by mouth daily 180 tablet 1     digoxin (LANOXIN) 125 MCG tablet Take 1 tablet (125 mcg) by mouth every 48 hours 45 tablet 0     lisinopril (PRINIVIL/ZESTRIL) 2.5 MG tablet Take 1 tablet (2.5 mg) by mouth daily 90 tablet 3     metoprolol succinate (TOPROL-XL) 100 MG 24 hr tablet Take 1 tablet (100 mg) by mouth daily 90 tablet 3     omeprazole (PRILOSEC) 20 MG CR capsule Take 1 capsule (20 mg) by mouth daily 90 capsule 3     potassium chloride (K-TAB,KLOR-CON) 10 MEQ tablet Take 1 tablet (10 mEq) by mouth daily 90 tablet 3     simethicone (MYLICON) 80 MG chewable tablet Take 1 tablet (80 mg) by mouth  "every 6 hours as needed for cramping 180 tablet      simvastatin (ZOCOR) 20 MG tablet Take 1 tablet (20 mg) by mouth At Bedtime 90 tablet 3     torsemide (DEMADEX) 20 MG tablet Take 1 tablet (20 mg) by mouth daily 90 tablet 3     warfarin (COUMADIN) 5 MG tablet 5 mg daily (Patient taking differently: 2.5mg on Mondays and 5mg ROW) 90 tablet 0     polyethylene glycol (MIRALAX/GLYCOLAX) powder Take 17 g by mouth daily as needed for constipation       senna-docusate (SENOKOT-S;PERICOLACE) 8.6-50 MG per tablet Take 2 tablets by mouth daily        sildenafil (VIAGRA) 25 MG tablet Take 25 mg by mouth as needed         Allergies:  Allergies   Allergen Reactions     Latex Rash     Seasonal Allergies      hayfever - wheezing -          ROS: 10 point ROS of systems including Constitutional, Eyes, Respiratory, Cardiovascular, Gastroenterology, Genitourinary, Integumentary, Muscularskeletal, Psychiatric were all negative except for pertinent positives noted in my HPI.      PE:  Blood pressure 138/74, temperature 98.1  F (36.7  C), temperature source Oral, resp. rate 16, height 1.727 m (5' 8\"), weight 109.8 kg (242 lb), SpO2 93 %.  NEUROLOGIC:  -- Awake; Alert; oriented x 3  -- Follows commands briskly  -- +repetition and naming  -- Speech fluent, spontaneous. No aphasia or dysarthria.  -- No gaze preference. No apparent hemineglect.  Cranial Nerves:  -- Visual fields full, PERRL 3-2mm bilat and brisk, extraocular movements intact  -- Face symmetrical, tongue midline  -- Sensory V1-V3 intact bilaterally  -- Palate elevates symmetrically, uvula midline  -- Hearing grossly intact bilat  -- Trapezii 5/5 strength bilat symmetric  -- Cerebellar: Finger nose finger without dysmetria    Motor:  Normal bulk / tone; no tremor, rigidity, or bradykinesia.  No Pronator Drift     Delt Bi Tri FE IP Quad Hamst TibAnt EHL Gastroc    C5 C6 C7 C8/T1 L2 L3 L4-S1 L4 L5 S1   R 5 5 5 5 5 5 5 5 5 5   L 5 5 5 5 5 5 5 5 5 5     Sensory:  intact to " LT    Reflexes:     Bi Tri BR Abdulaziz Pat Ach Bab    C5-6 C7-8 C6 UMN L2-4 S1 UMN   R 2+ 2+ 2+ Norm 2+ 2+ Norm   L 2+ 2+ 2+ Norm 2+ 2+ Norm     Gait: Deferred    No midline neck pain to palpation. No neck pain to bending, extension, or rotation of the cervical spine.     Left forehead hematoma.      ASSESSMENT:  78 year old male with a traumatic head injury. No intracranial pathology on imaging. Cervical spine cleared clinically and radiographically. Old stroke likely silent.      RECOMMENDATIONS:  - No neurosurgical intervention indicated at this time   - No need for repeat imaging  - No follow up with Neurosurgery needed  - Ok from Neurosurgical perspective to move forward with surgery  - Ice for forehead hematoma      Adis Harris MD  Neurosurgery PGY3    The patient was discussed with Dr. Kingsley, neurosurgery chief resident, and he agrees with the above.

## 2018-09-05 NOTE — ED NOTES
Patient fell outside walking into his surgery appt this morning. Writer walked outside to assess the patient's condition, patient was being helped up by security staff. Writer asked patient if he wanted to be evaluated in the ER, patient denied to be evaluated, stating he has surgery this morning. Patient and spouse were escorted up to 3C with security staff to get checked into surgery. Writer told patient and spouse that if they change their mind we would be happy to evaluate him in the ER. Patient and spouse agreeable with plan.

## 2018-09-05 NOTE — ED NOTES
9:20 AM  Patient signed out to me as pending evaluation by trauma surgery and neurosurgery.  Patient was to present to the operating room for TAVR, however, he fell suffering trauma to his face and head.  He is 4-5 days of Coumadin with an INR of 1.77.  He was seen and examined.  He was noted to have a forehead contusion and epistaxis which have since stopped.  The case was discussed at length with trauma surgery.  It was felt that patient could safely be discharged.  He is rescheduling his procedure for next week.     Cal Dutta MD  09/05/18 0942

## 2018-09-05 NOTE — DISCHARGE INSTRUCTIONS
"  Facial Contusion  A contusion is another word for a bruise. It happens when small blood vessels break open and leak blood into the nearby area. A facial contusion can result from a bump, hit, or fall. This may happen during sports or an accident. Symptoms of a contusion often include changes in skin color (bruising), swelling, and pain.   The swelling from the contusion should decrease in a few days. Bruising and pain may take several weeks to go away.   Home care    If you have been prescribed medicines for pain, take them as directed.    To help reduce swelling and pain, wrap a cold pack or bag of frozen peas in a thin towel. Put it on the injured area for up to 20 minutes. Do this a few times a day until the swelling goes down.     If you have scrapes or cuts on your face requiring stiches or other closures, care for them as directed.    For the next 24 hours (or longer if instructed):  ? Don t drink alcohol, or use sedatives or medicines that make you sleepy.  ? Don t drive or operate machinery.  ? Don't do anything strenuous. Don t lift or strain.  ? Don't return to sports or other activity that could result in another head injury.  Note about concussions  Because the injury was to your head, it is possible that a concussion (mild brain injury) could result. Symptoms of a concussion can show up later. Be alert for signs and symptoms of a concussion. Seek emergency medical care if any of these develop over the next hours to days:    Headache    Nausea or vomiting    Dizziness    Sensitivity to light or noise    Unusual sleepiness or grogginess    Trouble falling asleep    Personality changes    Vision changes    Memory loss    Confusion    Trouble walking or clumsiness    Loss of consciousness (even for a short time)    Inability to be awakened    Feeling \"off\" or slow as if in a daze   Follow-up care  Follow up with your healthcare provider, or as directed.  When to seek medical advice  Call your healthcare " provider right away if any of these occur:    Swelling or pain that gets worse, not better    New swelling or pain    Warmth or drainage from the swollen area or from cuts or scrapes    Fluid drainage or bleeding from the nose or ears    Fever of 100.4 F (38 C) or higher, or as directed by your healthcare provider  Call 911  Call 911 if any of the following occur:     Repeated vomiting    Unusual drowsiness or trouble awakening    Fainting or loss of consciousness    Seizure    Worsening confusion, memory loss, dizziness, headache, behavior, speech, or vision  Date Last Reviewed: 5/1/2017 2000-2017 [employer]. [address] [place] [address]. All rights reserved. This information is not intended as a substitute for professional medical care. Always follow your healthcare professional's instructions.      Follow up with your outpatient providers.

## 2018-09-05 NOTE — ED AVS SNAPSHOT
MRN:[id number]                      After Visit Summary   9/5/2018    Shahid Villalta    MRN: [id number]           Thank you!     Thank you for choosing [facility] for your care. Our goal is always to provide you with excellent care. Hearing back from our patients is one way we can continue to improve our services. Please take a few minutes to complete the written survey that you may receive in the mail after you visit with us. Thank you!        Patient Information     Date Of Birth          1940        About your hospital stay     You were admitted on:  September 5, 2018 You last received care in the:  [facility], Emergency Department    You were discharged on:  September 5, 2018       Who to Call     For medical emergencies, please call 911.  For non-urgent questions about your medical care, please call your primary care provider or clinic, [phone]  For questions related to your surgery, please call your surgery clinic        Attending Provider     Provider Specialty    Catia Nation MD Cardiology    [facility], Rand Spence MD Emergency Medicine    Kl, Cal SCHROEDER MD Emergency Medicine       Primary Care Provider Office Phone # Fax #    Gume Brown [phone]      Your next 10 appointments already scheduled     Sep 12, 2018  4:30 PM CDT   Remote ICD Check with STEWART DCR2   [facility] ([facility])    [address] [place], [place] [address]  [facility] [id number] [phone] OPT 2           This appointment is for a remote check of your debrillator.  This is not an appointment at the office.            Nov 09, 2018  9:00 AM CST   Return Visit with RH ONCOLOGY NURSE   [facility] ([facility])    [facility]  [address] [facility] [address]  [place] [address]   [phone]            Nov 14, 2018  9:30 AM CST   Return Visit with Nahun Hilario MD    [facility] ([facility])    [facility]  [address]  [facility] [address] [phone]              Further instructions from your care team         Facial Contusion  A contusion is another word for a bruise. It happens when small blood vessels break open and leak blood into the nearby area. A facial contusion can result from a bump, hit, or fall. This may happen during sports or an accident. Symptoms of a contusion often include changes in skin color (bruising), swelling, and pain.   The swelling from the contusion should decrease in a few days. Bruising and pain may take several weeks to go away.   Home care    If you have been prescribed medicines for pain, take them as directed.    To help reduce swelling and pain, wrap a cold pack or bag of frozen peas in a thin towel. Put it on the injured area for up to 20 minutes. Do this a few times a day until the swelling goes down.     If you have scrapes or cuts on your face requiring stiches or other closures, care for them as directed.    For the next 24 hours (or longer if instructed):  ? Don t drink alcohol, or use sedatives or medicines that make you sleepy.  ? Don t drive or operate machinery.  ? Don't do anything strenuous. Don t lift or strain.  ? Don't return to sports or other activity that could result in another head injury.  Note about concussions  Because the injury was to your head, it is possible that a concussion (mild brain injury) could result. Symptoms of a concussion can show up later. Be alert for signs and symptoms of a concussion. Seek emergency medical care if any of these develop over the next hours to days:    Headache    Nausea or vomiting    Dizziness    Sensitivity to light or noise    Unusual sleepiness or grogginess    Trouble falling asleep    Personality changes    Vision changes    Memory loss    Confusion    Trouble walking or clumsiness    Loss of  "consciousness (even for a short time)    Inability to be awakened    Feeling \"off\" or slow as if in a daze   Follow-up care  Follow up with your healthcare provider, or as directed.  When to seek medical advice  Call your healthcare provider right away if any of these occur:    Swelling or pain that gets worse, not better    New swelling or pain    Warmth or drainage from the swollen area or from cuts or scrapes    Fluid drainage or bleeding from the nose or ears    Fever of 100.4 F (38 C) or higher, or as directed by your healthcare provider  Call 911  Call 911 if any of the following occur:     Repeated vomiting    Unusual drowsiness or trouble awakening    Fainting or loss of consciousness    Seizure    Worsening confusion, memory loss, dizziness, headache, behavior, speech, or vision  Date Last Reviewed: 5/1/2017 2000-2017 [employer]. [address] [place] [address]. All rights reserved. This information is not intended as a substitute for professional medical care. Always follow your healthcare professional's instructions.      Follow up with your outpatient providers.    Pending Results     No orders found from 9/3/2018 to 9/6/2018.            Admission Information     Date & Time Provider Department Dept. Phone    9/5/2018 Cal Dutta MD [facility] [place], Emergency Department [phone]      Your Vitals Were     Blood Pressure Temperature Respirations Height Weight Pulse Oximetry    138/74 98.1  F (36.7  C) (Oral) 16 1.727 m (5' 8\") 109.8 kg (242 lb) 93%    BMI (Body Mass Index)                   36.8 kg/m2           MyChart Information     [employer] gives you secure access to your electronic health record. If you see a primary care provider, you can also send messages to your care team and make appointments. If you have questions, please call your primary care clinic.  If you do not have a primary care provider, please call [phone] and they will assist you.        Care " EveryWhere ID     This is your Care EveryWhere ID. This could be used by other organizations to access your [facility] medical records  [id number]        Equal Access to Services     AUGUSTA SHEARER : Berenice Lopez, duglas desouza, selenagrant avalostara juancarloszonia, waxinder amberin hayaamarvin bauertheresa richmond laJuan Antoniosee garcia. So [facility] [phone].    ATENCIÓN: Si habla español, tiene a irving disposición servicios gratuitos de asistencia lingüística. Llame al [phone].    We comply with applicable federal civil rights laws and [place] laws. We do not discriminate on the basis of race, color, national origin, age, disability, sex, sexual orientation, or gender identity.               Review of your medicines      UNREVIEWED medicines. Ask your doctor about these medicines        Dose / Directions    acetaminophen 325 MG tablet   Commonly known as:  TYLENOL        Dose:  1300 mg   Take 1,300 mg by mouth 2 times daily   Refills:  0       amiodarone 200 MG tablet   Commonly known as:  PACERONE/CODARONE   Used for:  Paroxysmal atrial fibrillation (H)        Take PO 1/2 tablet daily-Take extra prn per MD recommendations.   Quantity:  50 tablet   Refills:  0       aspirin 81 MG chewable tablet        Dose:  81 mg   Take 81 mg by mouth daily Start 8/31/18   Refills:  0       ASPIRIN NOT PRESCRIBED   Commonly known as:  INTENTIONAL   Used for:  Atrial fibrillation, unspecified        Dose:  1 each   1 each daily Antiplatelet medication not prescribed intentionally due to Current anticoagulant therapy (warfarin/enoxaparin)   Quantity:  0 each   Refills:  0       cetirizine 10 MG tablet   Commonly known as:  zyrTEC   Used for:  Allergic rhinitis        Dose:  10 mg   Take 1 tablet (10 mg) by mouth daily   Quantity:  90 tablet   Refills:  1       cholecalciferol 1000 UNIT tablet   Commonly known as:  vitamin D3   Used for:  Vitamin D deficiency, Parathyroid hormone excess (H)        Dose:  2000 Units   Take 2 tablets (2,000 Units) by  mouth daily   Quantity:  180 tablet   Refills:  1       digoxin 125 MCG tablet   Commonly known as:  LANOXIN   Used for:  Chronic systolic heart failure (H)        Dose:  125 mcg   Take 1 tablet (125 mcg) by mouth every 48 hours   Quantity:  45 tablet   Refills:  0       lisinopril 2.5 MG tablet   Commonly known as:  PRINIVIL/Zestril   Used for:  Nonrheumatic aortic valve stenosis        Dose:  2.5 mg   Take 1 tablet (2.5 mg) by mouth daily   Quantity:  90 tablet   Refills:  3       metoprolol succinate 100 MG 24 hr tablet   Commonly known as:  TOPROL-XL   Used for:  Paroxysmal atrial fibrillation (H)        Dose:  100 mg   Take 1 tablet (100 mg) by mouth daily   Quantity:  90 tablet   Refills:  3       omeprazole 20 MG CR capsule   Commonly known as:  priLOSEC   Used for:  Gastroesophageal reflux disease without esophagitis        Dose:  20 mg   Take 1 capsule (20 mg) by mouth daily   Quantity:  90 capsule   Refills:  3       polyethylene glycol powder   Commonly known as:  MIRALAX/GLYCOLAX        Dose:  17 g   Take 17 g by mouth daily as needed for constipation   Refills:  0       potassium chloride 10 MEQ tablet   Commonly known as:  K-TAB,KLOR-CON   Used for:  Chronic systolic heart failure (H)        Dose:  10 mEq   Take 1 tablet (10 mEq) by mouth daily   Quantity:  90 tablet   Refills:  3       senna-docusate 8.6-50 MG per tablet   Commonly known as:  SENOKOT-S;PERICOLACE        Dose:  2 tablet   Take 2 tablets by mouth daily   Refills:  0       simethicone 80 MG chewable tablet   Commonly known as:  MYLICON   Used for:  Abdominal bloating        Dose:  80 mg   Take 1 tablet (80 mg) by mouth every 6 hours as needed for cramping   Quantity:  180 tablet   Refills:  0       simvastatin 20 MG tablet   Commonly known as:  ZOCOR   Used for:  Hyperlipidemia LDL goal <100        Dose:  20 mg   Take 1 tablet (20 mg) by mouth At Bedtime   Quantity:  90 tablet   Refills:  3       torsemide 20 MG tablet   Commonly known  as:  DEMADEX   Used for:  Chronic systolic heart failure (H), Ischemic cardiomyopathy        Dose:  20 mg   Take 1 tablet (20 mg) by mouth daily   Quantity:  90 tablet   Refills:  3       VIAGRA 25 MG tablet   Generic drug:  sildenafil        Dose:  25 mg   Take 25 mg by mouth as needed   Refills:  0       warfarin 5 MG tablet   Commonly known as:  COUMADIN   Used for:  Long-term (current) use of anticoagulants, Paroxysmal ventricular tachycardia (H)        5 mg daily   Quantity:  90 tablet   Refills:  0                Protect others around you: Learn how to safely use, store and throw away your medicines at www.disposemymeds.org.             Medication List: This is a list of all your medications and when to take them. Check marks below indicate your daily home schedule. Keep this list as a reference.      Medications           Morning Afternoon Evening Bedtime As Needed    acetaminophen 325 MG tablet   Commonly known as:  TYLENOL   Take 1,300 mg by mouth 2 times daily                                amiodarone 200 MG tablet   Commonly known as:  PACERONE/CODARONE   Take PO 1/2 tablet daily-Take extra prn per MD recommendations.                                aspirin 81 MG chewable tablet   Take 81 mg by mouth daily Start 8/31/18                                ASPIRIN NOT PRESCRIBED   Commonly known as:  INTENTIONAL   1 each daily Antiplatelet medication not prescribed intentionally due to Current anticoagulant therapy (warfarin/enoxaparin)                                cetirizine 10 MG tablet   Commonly known as:  zyrTEC   Take 1 tablet (10 mg) by mouth daily                                cholecalciferol 1000 UNIT tablet   Commonly known as:  vitamin D3   Take 2 tablets (2,000 Units) by mouth daily                                digoxin 125 MCG tablet   Commonly known as:  LANOXIN   Take 1 tablet (125 mcg) by mouth every 48 hours                                lisinopril 2.5 MG tablet   Commonly known as:   PRINIVIL/Zestril   Take 1 tablet (2.5 mg) by mouth daily                                metoprolol succinate 100 MG 24 hr tablet   Commonly known as:  TOPROL-XL   Take 1 tablet (100 mg) by mouth daily                                omeprazole 20 MG CR capsule   Commonly known as:  priLOSEC   Take 1 capsule (20 mg) by mouth daily                                polyethylene glycol powder   Commonly known as:  MIRALAX/GLYCOLAX   Take 17 g by mouth daily as needed for constipation                                potassium chloride 10 MEQ tablet   Commonly known as:  K-TAB,KLOR-CON   Take 1 tablet (10 mEq) by mouth daily                                senna-docusate 8.6-50 MG per tablet   Commonly known as:  SENOKOT-S;PERICOLACE   Take 2 tablets by mouth daily                                simethicone 80 MG chewable tablet   Commonly known as:  MYLICON   Take 1 tablet (80 mg) by mouth every 6 hours as needed for cramping                                simvastatin 20 MG tablet   Commonly known as:  ZOCOR   Take 1 tablet (20 mg) by mouth At Bedtime                                torsemide 20 MG tablet   Commonly known as:  DEMADEX   Take 1 tablet (20 mg) by mouth daily                                VIAGRA 25 MG tablet   Take 25 mg by mouth as needed   Generic drug:  sildenafil                                warfarin 5 MG tablet   Commonly known as:  COUMADIN   5 mg daily

## 2018-09-05 NOTE — ED PROVIDER NOTES
History     Chief Complaint   Patient presents with     Fall     HPI  Shahid Villalta is a 78 year old male complex past medical history who presents to the ED after a fall.  He is actually coming into the hospital today to have a planned aortic valve replacement.  He unfortunately tripped on the curb and coming to the hospital.  He fell, and struck his forehead on the cement.  He had some bleeding from his nose, and has a lump on his head.  The triage nurse strongly recommended he be seen, though initially he declined, wanted to go up to have a surgery.  However, when he got up there, they told him that they would not continue to process him for surgery until he was evaluated in the ED.  He states he is really not having much pain.  He denies neck pain or back pain.  No chest pain, shortness of breath, abdominal pain.  He was able to walk afterwards, denies any lower extremity symptoms, other than abrasions.  He denies facial pain.  He denies any loose teeth or pain in the mouth.  He had previously been on Coumadin, but is held it since 8/31.    Past Medical History:   Diagnosis Date     Aortic valve disorders      Atrial fibrillation (H)      Automatic implantable cardiac defibrillator in situ      Cancer (H)      Congestive heart failure (H)      Coronary artery disease      Essential hypertension, benign      GERD (gastroesophageal reflux disease) 3/16/2010     History of blood transfusion      Hyperlipidaemia      Hypertension      Obese      Other and unspecified hyperlipidemia      Other primary cardiomyopathies      Pacemaker      Sleep apnea      Small bowel obstruction 12/14/2015     Ventricular tachycardia (H) 6/17/2013       Past Surgical History:   Procedure Laterality Date     BIOPSY  annually    prostate biopsy     CARDIAC SURGERY  2012    A fib Ablation     CARDIAC SURGERY      cardioversion     COLONOSCOPY  2007     DAVINCI PROSTATECTOMY N/A 11/20/2015    Procedure: DAVINCI PROSTATECTOMY;  Surgeon:  "Weight, Nahun Ashraf MD;  Location: [facility] OR     GENITOURINARY SURGERY      bladder surgery      H ABLATION AV NODE  13     H ABLATION FOCAL AFIB  13     HC TOOTH EXTRACTION W/FORCEP       HEART CATH FEMORAL CANNULIZATION WITH OPEN STANDBY REPAIR AORTIC VALVE N/A 2018    Procedure: HEART CATH FEMORAL CANNULIZATION WITH OPEN STANDBY REPAIR AORTIC VALVE;;  Surgeon: Magdiel Ashraf MD;  Location: [facility] OR     TONSILLECTOMY & ADENOIDECTOMY       TRANSCATHETER AORTIC VALVE IMPLANT ANESTHESIA N/A 2018    Procedure: TRANSCATHETER AORTIC VALVE IMPLANT ANESTHESIA;  Transfemoral Approach Aortic (34 mm [employer]onic CoreValve) Valve Replacement, Cardiopulmonary Bypass Standby **Latex Allergy**;  Surgeon: GENERIC ANESTHESIA PROVIDER;  Location: [facility] OR       Family History   Problem Relation Age of Onset     C.A.D. Father      CHF  at 74     Cerebrovascular Disease Father      C.A.D. Mother      CHF at 91 still living     Cerebrovascular Disease Mother      TIAs     Breast Cancer Mother      HEART DISEASE Son      arrythmia     Family History Negative Sister      Family History Negative Brother      Family History Negative Son        Social History   Substance Use Topics     Smoking status: Never Smoker     Smokeless tobacco: Never Used     Alcohol use No      Comment: AA since                I have reviewed the Medications, Allergies, Past Medical and Surgical History, and Social History in the Epic system.    Review of Systems   Constitutional: Negative for fever.   HENT:        Facial trauma   Respiratory: Negative for shortness of breath.    Cardiovascular: Negative for chest pain.   Gastrointestinal: Negative for abdominal pain.   All other systems reviewed and are negative.      Physical Exam   BP: 138/74  Pulse: 63  Heart Rate: 62  Temp: 98.1  F (36.7  C)  Resp: 16  Height: 172.7 cm (5' 8\")  Weight: 109.8 kg (242 lb)  SpO2: 94 %      Physical Exam   Constitutional: He is oriented to person, place, " and time. No distress.   HENT:   Mouth/Throat: Oropharynx is clear and moist. No oropharyngeal exudate.   Hematoma above left eye.  Dried blood seen in left nares.  Small abrasion to the bridge of the nose with dried blood surrounding it.  No other facial tenderness.  Jaw occlusion is intact.  No obvious loose teeth or dental trauma.   Eyes: Pupils are equal, round, and reactive to light. No scleral icterus.   Neck:   No midline C/T/ L spine tenderness   Cardiovascular: Normal heart sounds and intact distal pulses.    Pulmonary/Chest: Breath sounds normal. No respiratory distress.   Abdominal: Soft. Bowel sounds are normal. There is no tenderness.   Musculoskeletal: He exhibits tenderness (Mild tenderness left upper humerus.  Range of motion CMS is intact.). He exhibits no edema.   Neurological: He is alert and oriented to person, place, and time. He exhibits normal muscle tone.   Skin: Skin is warm. No rash noted. He is not diaphoretic.   Superficial abrasions to the knees.       ED Course     ED Course     Procedures             Critical Care time:  none             Labs Ordered and Resulted from Time of ED Arrival Up to the Time of Departure from the ED   CBC WITH PLATELETS DIFFERENTIAL - Abnormal; Notable for the following:        Result Value    RBC Count 4.00 (*)     Hemoglobin 11.9 (*)     Hematocrit 39.2 (*)     MCHC 30.4 (*)     RDW 16.9 (*)     All other components within normal limits   BASIC METABOLIC PANEL - Abnormal; Notable for the following:     Creatinine 1.58 (*)     GFR Estimate 43 (*)     GFR Estimate If Black 52 (*)     All other components within normal limits   INR - Abnormal; Notable for the following:     INR 1.77 (*)     All other components within normal limits       Consults  Neurosurgery: Called (09/05/18 2677)    Assessments & Plan (with Medical Decision Making)   Patient was sent for head CT and C-spine CT, these are negative.  Humerus x-ray and chest x-ray still pending.  INR is  elevated at 1.77.  Neurosurgery saw the patient felt he was clear for surgery, and did not feel he needs a delayed head CT since his INR is below 2.  The CV surgeons initially were still up and go to the OR this morning, though and now decided to cancel the case, they feel it is elective and not worth the risk.  The patient be signed out the oncoming provider pending the humerus x-ray and trauma evaluation.  If this is negative, patient is feeling well, he can be discharged home.    Dictation Disclaimer: Some of this Note has been completed with voice-recognition dictation software. Although errors are generally corrected real-time, there is the potential for a rare error to be present in the completed chart.      I have reviewed the nursing notes.    I have reviewed the findings, diagnosis, plan and need for follow up with the patient.    Discharge Medication List as of 9/5/2018  9:24 AM          Final diagnoses:   Fall, initial encounter   Multiple abrasions   Traumatic hematoma of forehead, initial encounter       8/21/2018   [facility], [place], EMERGENCY DEPARTMENT     Rand Lyons MD  09/26/18 0219

## 2018-09-06 DIAGNOSIS — I35.0 AORTIC STENOSIS: Primary | ICD-10-CM

## 2018-09-06 RX ORDER — CEFAZOLIN SODIUM 2 G/100ML
2 INJECTION, SOLUTION INTRAVENOUS
Status: CANCELLED | OUTPATIENT
Start: 2018-09-06 | End: 2038-09-07

## 2018-09-06 RX ORDER — LIDOCAINE 40 MG/G
CREAM TOPICAL
Status: CANCELLED | OUTPATIENT
Start: 2018-09-06 | End: 2019-01-01

## 2018-09-06 RX ORDER — ASPIRIN 81 MG/1
81 TABLET ORAL DAILY
Status: CANCELLED | OUTPATIENT
Start: 2018-09-06 | End: 2019-01-01

## 2018-09-06 RX ORDER — SODIUM CHLORIDE 9 MG/ML
INJECTION, SOLUTION INTRAVENOUS CONTINUOUS
Status: CANCELLED | OUTPATIENT
Start: 2018-09-06 | End: 2019-01-01

## 2018-09-07 ENCOUNTER — DOCUMENTATION ONLY (OUTPATIENT)
Dept: CARDIOLOGY | Facility: CLINIC | Age: 78
End: 2018-09-07

## 2018-09-07 NOTE — PROGRESS NOTES
Patient's TAVR is scheduled for first case at the [facility] 9/19/18. Arrival time is 5:30. Patient is aware.

## 2018-09-11 RX ORDER — ASPIRIN 81 MG/1
81 TABLET ORAL DAILY
Status: CANCELLED | OUTPATIENT
Start: 2018-09-11 | End: 2019-01-01

## 2018-09-11 RX ORDER — SODIUM CHLORIDE 9 MG/ML
INJECTION, SOLUTION INTRAVENOUS CONTINUOUS
Status: CANCELLED | OUTPATIENT
Start: 2018-09-11 | End: 2019-01-01

## 2018-09-11 RX ORDER — LIDOCAINE 40 MG/G
CREAM TOPICAL
Status: CANCELLED | OUTPATIENT
Start: 2018-09-11 | End: 2019-01-01

## 2018-09-11 RX ORDER — CEFAZOLIN SODIUM 2 G/100ML
2 INJECTION, SOLUTION INTRAVENOUS
Status: CANCELLED | OUTPATIENT
Start: 2018-09-11 | End: 2038-09-12

## 2018-09-12 ENCOUNTER — ALLIED HEALTH/NURSE VISIT (OUTPATIENT)
Dept: CARDIOLOGY | Facility: CLINIC | Age: 78
End: 2018-09-12
Payer: COMMERCIAL

## 2018-09-12 DIAGNOSIS — Z95.810 ICD (IMPLANTABLE CARDIOVERTER-DEFIBRILLATOR) IN PLACE: Primary | ICD-10-CM

## 2018-09-12 DIAGNOSIS — I47.20 VENTRICULAR TACHYCARDIA (H): ICD-10-CM

## 2018-09-12 PROCEDURE — 93295 DEV INTERROG REMOTE 1/2/MLT: CPT | Performed by: INTERNAL MEDICINE

## 2018-09-12 PROCEDURE — 93296 REM INTERROG EVL PM/IDS: CPT | Performed by: INTERNAL MEDICINE

## 2018-09-12 NOTE — MR AVS SNAPSHOT
After Visit Summary   9/12/2018    Shahid Villalta    MRN: [id number]           Patient Information     Date Of Birth          1940        Visit Information        Provider Department      9/12/2018 4:30 PM STEWART JOLENE2 [facility]   [place]        Today's Diagnoses     ICD (implantable cardioverter-defibrillator) in place    -  1    Ventricular tachycardia           Follow-ups after your visit        Additional Services     Follow-Up with Device Clinic                 Your next 10 appointments already scheduled     Sep 12, 2018  4:30 PM CDT   Remote ICD Check with STEWART DCR2   [facility]   [place] ([facility])    [address]  [place] [address]   [phone] OPT 2           This appointment is for a remote check of your debrillator.  This is not an appointment at the office.            Sep 19, 2018   Procedure with GENERIC ANESTHESIA PROVIDER   [facility] (--)    [address] [address]   [phone]            Sep 19, 2018  7:30 AM CDT   Transcath Aortic-Valve Replace with [id number]   [facility],  [facility] Lab ([facility])    [address] [address]   [phone]            Sep 19, 2018  7:30 AM CDT   Ech Complete with [id number]   [facility],  Echocardiography ([facility])    [address] [address]   [phone]           1.  Please bring or wear a comfortable two-piece outfit. 2.  You may eat, drink and take your normal medicines. 3.  For any questions that cannot be answered, please contact the ordering physician 4.  Please do not wear perfumes or scented lotions on the day of your exam.            Nov 09, 2018  9:00 AM CST   Return Visit with RH ONCOLOGY NURSE   [facility] ([facility])    [facility] [place]  [place]  [address]  [facility] [address]   [phone]            Nov 14, 2018  9:30 AM CST   Return Visit with Nahun Hilario MD   [facility] ([facility])    [facility] [place]  [address]  [facility] [address]   [phone]              Future tests that were ordered for you today     Open Future Orders        Priority Expected Expires Ordered    Follow-Up with Device Clinic Routine 12/12/2018 9/12/2019 9/12/2018            Who to contact     If you have questions or need follow up information about today's clinic visit or your schedule please contact [facility] directly at [phone].  Normal or non-critical lab and imaging results will be communicated to you by MyChart, letter or phone within 4 business days after the clinic has received the results. If you do not hear from us within 7 days, please contact the clinic through MyChart or phone. If you have a critical or abnormal lab result, we will notify you by phone as soon as possible.  Submit refill requests through [employer] or call your pharmacy and they will forward the refill request to us. Please allow 3 business days for your refill to be completed.          Additional Information About Your Visit        [employer] Information     [employer] gives you secure access to your electronic health record. If you see a primary care provider, you can also send messages to your care team and make appointments. If you have questions, please call your primary care clinic.  If you do not have a primary care provider, please call [phone] and they will assist you.        Care EveryWhere ID     This is your Care EveryWhere ID. This could be used by other organizations to access your [place] medical records  [id number]         Blood Pressure from Last 3 Encounters:   09/05/18 138/74   08/27/18 126/76   08/02/18 129/79    Weight  from Last 3 Encounters:   09/05/18 109.8 kg (242 lb)   08/27/18 109.1 kg (240 lb 8 oz)   07/09/18 107.9 kg (237 lb 12.8 oz)              We Performed the Following     ICD DEVICE INTERROGAT REMOTE (37421)     INTERROGATION DEVICE EVAL REMOTE, PACER/ICD (60572)          Today's Medication Changes          These changes are accurate as of 9/12/18 10:08 AM.  If you have any questions, ask your nurse or doctor.               These medicines have changed or have updated prescriptions.        Dose/Directions    amiodarone 200 MG tablet   Commonly known as:  PACERONE/CODARONE   This may have changed:  additional instructions   Used for:  Paroxysmal atrial fibrillation (H)        Take PO 1/2 tablet daily-Take extra prn per MD recommendations.   Quantity:  50 tablet   Refills:  0       warfarin 5 MG tablet   Commonly known as:  COUMADIN   This may have changed:  additional instructions   Used for:  Long-term (current) use of anticoagulants, Paroxysmal ventricular tachycardia (H)        5 mg daily   Quantity:  90 tablet   Refills:  0                Primary Care Provider Office Phone # Fax #    Gume Brown [phone] [address] [place] [id number]        Equal Access to Services     JEFFERSON SHEARER AH: Hadii aad alex hadasho Sojovannaali, waaxda luqadaha, qaybta kaalmada adeegyada, emily garcia. So [facility] [phone].    ATENCIÓN: Si habla español, tiene a irving disposición servicios gratuitos de asistencia lingüística. Llame al [phone].    We comply with applicable federal civil rights laws and [place] laws. We do not discriminate on the basis of race, color, national origin, age, disability, sex, sexual orientation, or gender identity.            Thank you!     Thank you for choosing [facility]  for your care. Our goal is always to provide you with excellent care. Hearing back from our patients is one way we can continue to  improve our services. Please take a few minutes to complete the written survey that you may receive in the mail after your visit with us. Thank you!             Your Updated Medication List - Protect others around you: Learn how to safely use, store and throw away your medicines at www.disposemymeds.org.          This list is accurate as of 9/12/18 10:08 AM.  Always use your most recent med list.                   Brand Name Dispense Instructions for use Diagnosis    acetaminophen 325 MG tablet    TYLENOL     Take 1,300 mg by mouth 2 times daily        amiodarone 200 MG tablet    PACERONE/CODARONE    50 tablet    Take PO 1/2 tablet daily-Take extra prn per MD recommendations.    Paroxysmal atrial fibrillation (H)       aspirin 81 MG chewable tablet      Take 81 mg by mouth daily Start 8/31/18        ASPIRIN NOT PRESCRIBED    INTENTIONAL    0 each    1 each daily Antiplatelet medication not prescribed intentionally due to Current anticoagulant therapy (warfarin/enoxaparin)    Atrial fibrillation, unspecified       cetirizine 10 MG tablet    zyrTEC    90 tablet    Take 1 tablet (10 mg) by mouth daily    Allergic rhinitis       cholecalciferol 1000 UNIT tablet    vitamin D3    180 tablet    Take 2 tablets (2,000 Units) by mouth daily    Vitamin D deficiency, Parathyroid hormone excess (H)       digoxin 125 MCG tablet    LANOXIN    45 tablet    Take 1 tablet (125 mcg) by mouth every 48 hours    Chronic systolic heart failure (H)       lisinopril 2.5 MG tablet    PRINIVIL/Zestril    90 tablet    Take 1 tablet (2.5 mg) by mouth daily    Nonrheumatic aortic valve stenosis       metoprolol succinate 100 MG 24 hr tablet    TOPROL-XL    90 tablet    Take 1 tablet (100 mg) by mouth daily    Paroxysmal atrial fibrillation (H)       omeprazole 20 MG CR capsule    priLOSEC    90 capsule    Take 1 capsule (20 mg) by mouth daily    Gastroesophageal reflux disease without esophagitis       polyethylene glycol powder     MIRALAX/GLYCOLAX     Take 17 g by mouth daily as needed for constipation        potassium chloride 10 MEQ tablet    K-TAB,KLOR-CON    90 tablet    Take 1 tablet (10 mEq) by mouth daily    Chronic systolic heart failure (H)       senna-docusate 8.6-50 MG per tablet    SENOKOT-S;PERICOLACE     Take 2 tablets by mouth daily        simethicone 80 MG chewable tablet    MYLICON    180 tablet    Take 1 tablet (80 mg) by mouth every 6 hours as needed for cramping    Abdominal bloating       simvastatin 20 MG tablet    ZOCOR    90 tablet    Take 1 tablet (20 mg) by mouth At Bedtime    Hyperlipidemia LDL goal <100       torsemide 20 MG tablet    DEMADEX    90 tablet    Take 1 tablet (20 mg) by mouth daily    Chronic systolic heart failure (H), Ischemic cardiomyopathy       VIAGRA 25 MG tablet   Generic drug:  sildenafil      Take 25 mg by mouth as needed        warfarin 5 MG tablet    COUMADIN    90 tablet    5 mg daily    Long-term (current) use of anticoagulants, Paroxysmal ventricular tachycardia (H)

## 2018-09-12 NOTE — PROGRESS NOTES
[employer] CRT ICD Remote Device Check  AP: 24 % BVP: 98 %  Mode: DDDR 60-95        Presenting Rhythm: AS/BVP, AP/BVP, PVCs  Heart Rate: good variability   Sensing: WNL    Pacing Threshold: not obtained    Impedance: WNL  Battery Status: 4.5 yrs estimated longevity, 10.3 second charge time   Atrial Arrhythmia: 672 PMT episodes (7 saved in logbook) and 842 ATR episodes (16 saved in logbook) since last remote in May. 5 EGMs available for PMT episodes, all show SR at MTR of 95 bpm. 6 EGMs for ATR episodes, all show ST/atrial tach at -114 with V rates in the 80-90's. MTR set at 95 and mode switch rate at 100 due to history of atrial tachycardia.   Ventricular Arrhythmia: 14 episodes since last remote, 2 with EGMs for review. EGMs show NSVT 5-7 beats in duration, 170-175 bpm.   ATP: none    Shocks: none    Care Plan: annual threshold due in 3 months, entered order for Nell. OV with Dr. Aden due 11/2018. Pt scheduled for TAVR at the  next week. Called pt with results and plan.   SONJA SPAIN

## 2018-09-18 ENCOUNTER — ANESTHESIA EVENT (OUTPATIENT)
Dept: SURGERY | Facility: CLINIC | Age: 78
DRG: 267 | End: 2018-09-18
Payer: MEDICARE

## 2018-09-19 ENCOUNTER — HOSPITAL ENCOUNTER (OUTPATIENT)
Dept: CARDIOLOGY | Facility: CLINIC | Age: 78
DRG: 267 | End: 2018-09-19
Attending: INTERNAL MEDICINE | Admitting: INTERNAL MEDICINE
Payer: MEDICARE

## 2018-09-19 ENCOUNTER — HOSPITAL ENCOUNTER (INPATIENT)
Facility: CLINIC | Age: 78
LOS: 2 days | Discharge: HOME | DRG: 267 | End: 2018-09-21
Attending: INTERNAL MEDICINE | Admitting: INTERNAL MEDICINE
Payer: MEDICARE

## 2018-09-19 ENCOUNTER — ANESTHESIA (OUTPATIENT)
Dept: SURGERY | Facility: CLINIC | Age: 78
DRG: 267 | End: 2018-09-19
Payer: MEDICARE

## 2018-09-19 ENCOUNTER — APPOINTMENT (OUTPATIENT)
Dept: CARDIOLOGY | Facility: CLINIC | Age: 78
DRG: 267 | End: 2018-09-19
Attending: INTERNAL MEDICINE
Payer: MEDICARE

## 2018-09-19 DIAGNOSIS — Z95.2 S/P TAVR (TRANSCATHETER AORTIC VALVE REPLACEMENT): Primary | ICD-10-CM

## 2018-09-19 DIAGNOSIS — I35.0 AORTIC STENOSIS, SEVERE: ICD-10-CM

## 2018-09-19 DIAGNOSIS — I35.0 AORTIC STENOSIS: ICD-10-CM

## 2018-09-19 LAB
ABO + RH BLD: NORMAL
ABO + RH BLD: NORMAL
ALBUMIN SERPL-MCNC: 3.3 G/DL (ref 3.4–5)
ALBUMIN SERPL-MCNC: 3.6 G/DL (ref 3.4–5)
ALP SERPL-CCNC: 75 U/L (ref 40–150)
ALP SERPL-CCNC: 80 U/L (ref 40–150)
ALT SERPL W P-5'-P-CCNC: 15 U/L (ref 0–70)
ALT SERPL W P-5'-P-CCNC: 17 U/L (ref 0–70)
ANION GAP SERPL CALCULATED.3IONS-SCNC: 8 MMOL/L (ref 3–14)
ANION GAP SERPL CALCULATED.3IONS-SCNC: 8 MMOL/L (ref 3–14)
APTT PPP: 27 SEC (ref 22–37)
AST SERPL W P-5'-P-CCNC: 18 U/L (ref 0–45)
AST SERPL W P-5'-P-CCNC: 22 U/L (ref 0–45)
BILIRUB SERPL-MCNC: 0.6 MG/DL (ref 0.2–1.3)
BILIRUB SERPL-MCNC: 0.7 MG/DL (ref 0.2–1.3)
BLD GP AB SCN SERPL QL: NORMAL
BLD PROD TYP BPU: NORMAL
BLD UNIT ID BPU: 0
BLD UNIT ID BPU: 0
BLOOD BANK CMNT PATIENT-IMP: NORMAL
BLOOD BANK CMNT PATIENT-IMP: NORMAL
BLOOD PRODUCT CODE: NORMAL
BLOOD PRODUCT CODE: NORMAL
BPU ID: NORMAL
BPU ID: NORMAL
BUN SERPL-MCNC: 31 MG/DL (ref 7–30)
BUN SERPL-MCNC: 34 MG/DL (ref 7–30)
CALCIUM SERPL-MCNC: 8.8 MG/DL (ref 8.5–10.1)
CALCIUM SERPL-MCNC: 9.6 MG/DL (ref 8.5–10.1)
CHLORIDE SERPL-SCNC: 107 MMOL/L (ref 94–109)
CHLORIDE SERPL-SCNC: 107 MMOL/L (ref 94–109)
CO2 SERPL-SCNC: 24 MMOL/L (ref 20–32)
CO2 SERPL-SCNC: 27 MMOL/L (ref 20–32)
CREAT SERPL-MCNC: 1.39 MG/DL (ref 0.66–1.25)
CREAT SERPL-MCNC: 1.5 MG/DL (ref 0.66–1.25)
ERYTHROCYTE [DISTWIDTH] IN BLOOD BY AUTOMATED COUNT: 16.7 % (ref 10–15)
ERYTHROCYTE [DISTWIDTH] IN BLOOD BY AUTOMATED COUNT: 16.7 % (ref 10–15)
GFR SERPL CREATININE-BSD FRML MDRD: 45 ML/MIN/1.7M2
GFR SERPL CREATININE-BSD FRML MDRD: 50 ML/MIN/1.7M2
GLUCOSE BLDC GLUCOMTR-MCNC: 94 MG/DL (ref 70–99)
GLUCOSE SERPL-MCNC: 94 MG/DL (ref 70–99)
GLUCOSE SERPL-MCNC: 98 MG/DL (ref 70–99)
HCT VFR BLD AUTO: 38.3 % (ref 40–53)
HCT VFR BLD AUTO: 39.2 % (ref 40–53)
HGB BLD-MCNC: 11.6 G/DL (ref 13.3–17.7)
HGB BLD-MCNC: 11.9 G/DL (ref 13.3–17.7)
INR PPP: 1.17 (ref 0.86–1.14)
MAGNESIUM SERPL-MCNC: 2.2 MG/DL (ref 1.6–2.3)
MCH RBC QN AUTO: 29.8 PG (ref 26.5–33)
MCH RBC QN AUTO: 30 PG (ref 26.5–33)
MCHC RBC AUTO-ENTMCNC: 30.3 G/DL (ref 31.5–36.5)
MCHC RBC AUTO-ENTMCNC: 30.4 G/DL (ref 31.5–36.5)
MCV RBC AUTO: 98 FL (ref 78–100)
MCV RBC AUTO: 99 FL (ref 78–100)
MRSA DNA SPEC QL NAA+PROBE: NEGATIVE
NUM BPU REQUESTED: 2
PHOSPHATE SERPL-MCNC: 2.9 MG/DL (ref 2.5–4.5)
PLATELET # BLD AUTO: 227 10E9/L (ref 150–450)
PLATELET # BLD AUTO: 255 10E9/L (ref 150–450)
POTASSIUM SERPL-SCNC: 4.5 MMOL/L (ref 3.4–5.3)
POTASSIUM SERPL-SCNC: 4.8 MMOL/L (ref 3.4–5.3)
PROT SERPL-MCNC: 6.8 G/DL (ref 6.8–8.8)
PROT SERPL-MCNC: 6.9 G/DL (ref 6.8–8.8)
RBC # BLD AUTO: 3.87 10E12/L (ref 4.4–5.9)
RBC # BLD AUTO: 3.99 10E12/L (ref 4.4–5.9)
SODIUM SERPL-SCNC: 139 MMOL/L (ref 133–144)
SODIUM SERPL-SCNC: 142 MMOL/L (ref 133–144)
SPECIMEN EXP DATE BLD: NORMAL
SPECIMEN SOURCE: NORMAL
TRANSFUSION STATUS PATIENT QL: NORMAL
WBC # BLD AUTO: 11 10E9/L (ref 4–11)
WBC # BLD AUTO: 9.1 10E9/L (ref 4–11)

## 2018-09-19 PROCEDURE — A9270 NON-COVERED ITEM OR SERVICE: HCPCS | Mod: GY

## 2018-09-19 PROCEDURE — C1725 CATH, TRANSLUMIN NON-LASER: HCPCS

## 2018-09-19 PROCEDURE — 40000170 ZZH STATISTIC PRE-PROCEDURE ASSESSMENT II

## 2018-09-19 PROCEDURE — 85027 COMPLETE CBC AUTOMATED: CPT

## 2018-09-19 PROCEDURE — 25000132 ZZH RX MED GY IP 250 OP 250 PS 637: Mod: GY | Performed by: INTERNAL MEDICINE

## 2018-09-19 PROCEDURE — 85610 PROTHROMBIN TIME: CPT | Performed by: INTERNAL MEDICINE

## 2018-09-19 PROCEDURE — 37000009 ZZH ANESTHESIA TECHNICAL FEE, EACH ADDTL 15 MIN

## 2018-09-19 PROCEDURE — C1894 INTRO/SHEATH, NON-LASER: HCPCS

## 2018-09-19 PROCEDURE — 25000125 ZZHC RX 250: Performed by: INTERNAL MEDICINE

## 2018-09-19 PROCEDURE — 85730 THROMBOPLASTIN TIME PARTIAL: CPT | Performed by: INTERNAL MEDICINE

## 2018-09-19 PROCEDURE — 80053 COMPREHEN METABOLIC PANEL: CPT | Performed by: INTERNAL MEDICINE

## 2018-09-19 PROCEDURE — 99222 1ST HOSP IP/OBS MODERATE 55: CPT | Mod: 25 | Performed by: INTERNAL MEDICINE

## 2018-09-19 PROCEDURE — 84100 ASSAY OF PHOSPHORUS: CPT

## 2018-09-19 PROCEDURE — 27810169 ZZH OR IMPLANT GENERAL

## 2018-09-19 PROCEDURE — 27210794 ZZH OR GENERAL SUPPLY STERILE

## 2018-09-19 PROCEDURE — 25000128 H RX IP 250 OP 636: Performed by: INTERNAL MEDICINE

## 2018-09-19 PROCEDURE — 36000088 ZZH SURGERY LEVEL 8 EA 15 ADDTL MIN - UMMC

## 2018-09-19 PROCEDURE — 86901 BLOOD TYPING SEROLOGIC RH(D): CPT | Performed by: INTERNAL MEDICINE

## 2018-09-19 PROCEDURE — 37000008 ZZH ANESTHESIA TECHNICAL FEE, 1ST 30 MIN

## 2018-09-19 PROCEDURE — 25000128 H RX IP 250 OP 636: Performed by: NURSE ANESTHETIST, CERTIFIED REGISTERED

## 2018-09-19 PROCEDURE — 20000004 ZZH R&B ICU UMMC

## 2018-09-19 PROCEDURE — 85027 COMPLETE CBC AUTOMATED: CPT | Performed by: INTERNAL MEDICINE

## 2018-09-19 PROCEDURE — A9270 NON-COVERED ITEM OR SERVICE: HCPCS | Mod: GY | Performed by: INTERNAL MEDICINE

## 2018-09-19 PROCEDURE — C1769 GUIDE WIRE: HCPCS

## 2018-09-19 PROCEDURE — P9016 RBC LEUKOCYTES REDUCED: HCPCS | Performed by: PHYSICIAN ASSISTANT

## 2018-09-19 PROCEDURE — 93005 ELECTROCARDIOGRAM TRACING: CPT

## 2018-09-19 PROCEDURE — 93306 TTE W/DOPPLER COMPLETE: CPT

## 2018-09-19 PROCEDURE — 71000016 ZZH RECOVERY PHASE 1 LEVEL 3 FIRST HR

## 2018-09-19 PROCEDURE — 86850 RBC ANTIBODY SCREEN: CPT | Performed by: INTERNAL MEDICINE

## 2018-09-19 PROCEDURE — 93306 TTE W/DOPPLER COMPLETE: CPT | Mod: 26 | Performed by: INTERNAL MEDICINE

## 2018-09-19 PROCEDURE — 87640 STAPH A DNA AMP PROBE: CPT | Performed by: INTERNAL MEDICINE

## 2018-09-19 PROCEDURE — 25000132 ZZH RX MED GY IP 250 OP 250 PS 637: Mod: GY

## 2018-09-19 PROCEDURE — 00000146 ZZHCL STATISTIC GLUCOSE BY METER IP

## 2018-09-19 PROCEDURE — 33361 REPLACE AORTIC VALVE PERQ: CPT | Mod: 62 | Performed by: INTERNAL MEDICINE

## 2018-09-19 PROCEDURE — 36415 COLL VENOUS BLD VENIPUNCTURE: CPT | Performed by: INTERNAL MEDICINE

## 2018-09-19 PROCEDURE — 83735 ASSAY OF MAGNESIUM: CPT

## 2018-09-19 PROCEDURE — 02RF38Z REPLACEMENT OF AORTIC VALVE WITH ZOOPLASTIC TISSUE, PERCUTANEOUS APPROACH: ICD-10-PCS | Performed by: INTERNAL MEDICINE

## 2018-09-19 PROCEDURE — 25000132 ZZH RX MED GY IP 250 OP 250 PS 637: Mod: GY | Performed by: PHYSICIAN ASSISTANT

## 2018-09-19 PROCEDURE — 41000018 ZZH PER-PERFUSION 1ST 30 MIN

## 2018-09-19 PROCEDURE — A9270 NON-COVERED ITEM OR SERVICE: HCPCS | Mod: GY | Performed by: PHYSICIAN ASSISTANT

## 2018-09-19 PROCEDURE — 36000086 ZZH SURGERY LEVEL 8 1ST 30 MIN UMMC

## 2018-09-19 PROCEDURE — 80053 COMPREHEN METABOLIC PANEL: CPT

## 2018-09-19 PROCEDURE — 87641 MR-STAPH DNA AMP PROBE: CPT | Performed by: INTERNAL MEDICINE

## 2018-09-19 PROCEDURE — 86900 BLOOD TYPING SEROLOGIC ABO: CPT | Performed by: INTERNAL MEDICINE

## 2018-09-19 PROCEDURE — 93010 ELECTROCARDIOGRAM REPORT: CPT | Performed by: INTERNAL MEDICINE

## 2018-09-19 DEVICE — VALVE AORTIC EVOLUTR TAVR BIOPROSTH 34MM EVOLUTR-34-US: Type: IMPLANTABLE DEVICE | Status: FUNCTIONAL

## 2018-09-19 RX ORDER — PROTAMINE SULFATE 10 MG/ML
INJECTION, SOLUTION INTRAVENOUS PRN
Status: DISCONTINUED | OUTPATIENT
Start: 2018-09-19 | End: 2018-09-19

## 2018-09-19 RX ORDER — NITROGLYCERIN 10 MG/100ML
INJECTION INTRAVENOUS PRN
Status: DISCONTINUED | OUTPATIENT
Start: 2018-09-19 | End: 2018-09-19

## 2018-09-19 RX ORDER — LIDOCAINE 40 MG/G
CREAM TOPICAL
Status: DISCONTINUED | OUTPATIENT
Start: 2018-09-19 | End: 2018-09-19 | Stop reason: HOSPADM

## 2018-09-19 RX ORDER — IOPAMIDOL 755 MG/ML
INJECTION, SOLUTION INTRAVASCULAR PRN
Status: DISCONTINUED | OUTPATIENT
Start: 2018-09-19 | End: 2018-09-19 | Stop reason: HOSPADM

## 2018-09-19 RX ORDER — ONDANSETRON 2 MG/ML
INJECTION INTRAMUSCULAR; INTRAVENOUS PRN
Status: DISCONTINUED | OUTPATIENT
Start: 2018-09-19 | End: 2018-09-19

## 2018-09-19 RX ORDER — NALOXONE HYDROCHLORIDE 0.4 MG/ML
.1-.4 INJECTION, SOLUTION INTRAMUSCULAR; INTRAVENOUS; SUBCUTANEOUS
Status: ACTIVE | OUTPATIENT
Start: 2018-09-19 | End: 2018-09-20

## 2018-09-19 RX ORDER — NALOXONE HYDROCHLORIDE 0.4 MG/ML
.1-.4 INJECTION, SOLUTION INTRAMUSCULAR; INTRAVENOUS; SUBCUTANEOUS
Status: DISCONTINUED | OUTPATIENT
Start: 2018-09-19 | End: 2018-09-21 | Stop reason: HOSPADM

## 2018-09-19 RX ORDER — ASPIRIN 81 MG/1
81 TABLET ORAL DAILY
Status: DISCONTINUED | OUTPATIENT
Start: 2018-09-19 | End: 2018-09-19 | Stop reason: HOSPADM

## 2018-09-19 RX ORDER — ACETAMINOPHEN 325 MG/1
325-650 TABLET ORAL EVERY 4 HOURS PRN
Status: DISCONTINUED | OUTPATIENT
Start: 2018-09-19 | End: 2018-09-21 | Stop reason: HOSPADM

## 2018-09-19 RX ORDER — SODIUM CHLORIDE, SODIUM LACTATE, POTASSIUM CHLORIDE, CALCIUM CHLORIDE 600; 310; 30; 20 MG/100ML; MG/100ML; MG/100ML; MG/100ML
INJECTION, SOLUTION INTRAVENOUS CONTINUOUS PRN
Status: DISCONTINUED | OUTPATIENT
Start: 2018-09-19 | End: 2018-09-19

## 2018-09-19 RX ORDER — WARFARIN SODIUM 5 MG/1
5 TABLET ORAL
Status: COMPLETED | OUTPATIENT
Start: 2018-09-19 | End: 2018-09-19

## 2018-09-19 RX ORDER — CEFAZOLIN SODIUM 2 G/100ML
2 INJECTION, SOLUTION INTRAVENOUS
Status: COMPLETED | OUTPATIENT
Start: 2018-09-19 | End: 2018-09-19

## 2018-09-19 RX ORDER — SIMVASTATIN 20 MG
20 TABLET ORAL AT BEDTIME
Status: DISCONTINUED | OUTPATIENT
Start: 2018-09-19 | End: 2018-09-21 | Stop reason: HOSPADM

## 2018-09-19 RX ORDER — HEPARIN SODIUM 1000 [USP'U]/ML
INJECTION, SOLUTION INTRAVENOUS; SUBCUTANEOUS PRN
Status: DISCONTINUED | OUTPATIENT
Start: 2018-09-19 | End: 2018-09-19

## 2018-09-19 RX ORDER — SODIUM CHLORIDE 9 MG/ML
INJECTION, SOLUTION INTRAVENOUS CONTINUOUS
Status: DISCONTINUED | OUTPATIENT
Start: 2018-09-19 | End: 2018-09-19 | Stop reason: HOSPADM

## 2018-09-19 RX ORDER — FENTANYL CITRATE 50 UG/ML
INJECTION, SOLUTION INTRAMUSCULAR; INTRAVENOUS PRN
Status: DISCONTINUED | OUTPATIENT
Start: 2018-09-19 | End: 2018-09-19

## 2018-09-19 RX ORDER — CLOPIDOGREL BISULFATE 75 MG/1
75 TABLET ORAL DAILY
Status: DISCONTINUED | OUTPATIENT
Start: 2018-09-20 | End: 2018-09-21 | Stop reason: HOSPADM

## 2018-09-19 RX ORDER — LIDOCAINE HYDROCHLORIDE 10 MG/ML
INJECTION, SOLUTION INFILTRATION; PERINEURAL PRN
Status: DISCONTINUED | OUTPATIENT
Start: 2018-09-19 | End: 2018-09-19 | Stop reason: HOSPADM

## 2018-09-19 RX ORDER — SODIUM CHLORIDE, SODIUM LACTATE, POTASSIUM CHLORIDE, CALCIUM CHLORIDE 600; 310; 30; 20 MG/100ML; MG/100ML; MG/100ML; MG/100ML
INJECTION, SOLUTION INTRAVENOUS CONTINUOUS
Status: DISCONTINUED | OUTPATIENT
Start: 2018-09-19 | End: 2018-09-19 | Stop reason: HOSPADM

## 2018-09-19 RX ORDER — POLYETHYLENE GLYCOL 3350 17 G/17G
17 POWDER, FOR SOLUTION ORAL DAILY PRN
Status: DISCONTINUED | OUTPATIENT
Start: 2018-09-19 | End: 2018-09-21 | Stop reason: HOSPADM

## 2018-09-19 RX ORDER — CETIRIZINE HYDROCHLORIDE 10 MG/1
10 TABLET ORAL DAILY
Status: DISCONTINUED | OUTPATIENT
Start: 2018-09-19 | End: 2018-09-21 | Stop reason: HOSPADM

## 2018-09-19 RX ORDER — FENTANYL CITRATE 50 UG/ML
25-50 INJECTION, SOLUTION INTRAMUSCULAR; INTRAVENOUS
Status: DISCONTINUED | OUTPATIENT
Start: 2018-09-19 | End: 2018-09-19 | Stop reason: HOSPADM

## 2018-09-19 RX ORDER — NITROGLYCERIN 0.4 MG/1
0.4 TABLET SUBLINGUAL EVERY 5 MIN PRN
Status: DISCONTINUED | OUTPATIENT
Start: 2018-09-19 | End: 2018-09-21 | Stop reason: HOSPADM

## 2018-09-19 RX ORDER — AMOXICILLIN 250 MG
2 CAPSULE ORAL EVERY 24 HOURS
Status: DISCONTINUED | OUTPATIENT
Start: 2018-09-19 | End: 2018-09-21 | Stop reason: HOSPADM

## 2018-09-19 RX ORDER — CLOPIDOGREL BISULFATE 75 MG/1
300 TABLET ORAL ONCE
Status: COMPLETED | OUTPATIENT
Start: 2018-09-19 | End: 2018-09-19

## 2018-09-19 RX ORDER — DEXMEDETOMIDINE HYDROCHLORIDE 4 UG/ML
0.2-1.2 INJECTION, SOLUTION INTRAVENOUS CONTINUOUS
Status: DISCONTINUED | OUTPATIENT
Start: 2018-09-19 | End: 2018-09-19 | Stop reason: HOSPADM

## 2018-09-19 RX ORDER — HYDRALAZINE HYDROCHLORIDE 20 MG/ML
INJECTION INTRAMUSCULAR; INTRAVENOUS PRN
Status: DISCONTINUED | OUTPATIENT
Start: 2018-09-19 | End: 2018-09-19

## 2018-09-19 RX ADMIN — FENTANYL CITRATE 25 MCG: 50 INJECTION, SOLUTION INTRAMUSCULAR; INTRAVENOUS at 08:32

## 2018-09-19 RX ADMIN — HYDRALAZINE HYDROCHLORIDE 5 MG: 20 INJECTION INTRAMUSCULAR; INTRAVENOUS at 09:31

## 2018-09-19 RX ADMIN — ASPIRIN 81 MG: 81 TABLET, COATED ORAL at 06:30

## 2018-09-19 RX ADMIN — EPINEPHRINE 0.02 MCG/KG/MIN: 1 INJECTION PARENTERAL at 08:09

## 2018-09-19 RX ADMIN — SIMVASTATIN 20 MG: 20 TABLET, FILM COATED ORAL at 22:02

## 2018-09-19 RX ADMIN — PROTAMINE SULFATE 160 MG: 10 INJECTION, SOLUTION INTRAVENOUS at 09:23

## 2018-09-19 RX ADMIN — NITROGLYCERIN 100 MCG: 10 INJECTION INTRAVENOUS at 09:14

## 2018-09-19 RX ADMIN — HEPARIN SODIUM 1000 UNITS: 1000 INJECTION, SOLUTION INTRAVENOUS; SUBCUTANEOUS at 08:52

## 2018-09-19 RX ADMIN — WARFARIN SODIUM 5 MG: 5 TABLET ORAL at 18:01

## 2018-09-19 RX ADMIN — ONDANSETRON 4 MG: 2 INJECTION INTRAMUSCULAR; INTRAVENOUS at 09:27

## 2018-09-19 RX ADMIN — NITROGLYCERIN 100 MCG: 10 INJECTION INTRAVENOUS at 09:33

## 2018-09-19 RX ADMIN — SODIUM CHLORIDE, POTASSIUM CHLORIDE, SODIUM LACTATE AND CALCIUM CHLORIDE: 600; 310; 30; 20 INJECTION, SOLUTION INTRAVENOUS at 07:24

## 2018-09-19 RX ADMIN — FENTANYL CITRATE 25 MCG: 50 INJECTION, SOLUTION INTRAMUSCULAR; INTRAVENOUS at 08:03

## 2018-09-19 RX ADMIN — HEPARIN SODIUM 15000 UNITS: 1000 INJECTION, SOLUTION INTRAVENOUS; SUBCUTANEOUS at 08:40

## 2018-09-19 RX ADMIN — CLOPIDOGREL 300 MG: 75 TABLET, FILM COATED ORAL at 13:46

## 2018-09-19 RX ADMIN — CETIRIZINE HYDROCHLORIDE 10 MG: 10 TABLET, FILM COATED ORAL at 13:46

## 2018-09-19 RX ADMIN — FENTANYL CITRATE 25 MCG: 50 INJECTION, SOLUTION INTRAMUSCULAR; INTRAVENOUS at 09:10

## 2018-09-19 RX ADMIN — FENTANYL CITRATE 50 MCG: 50 INJECTION, SOLUTION INTRAMUSCULAR; INTRAVENOUS at 07:32

## 2018-09-19 RX ADMIN — FENTANYL CITRATE 25 MCG: 50 INJECTION, SOLUTION INTRAMUSCULAR; INTRAVENOUS at 07:42

## 2018-09-19 RX ADMIN — CEFAZOLIN SODIUM 2 G: 2 INJECTION, SOLUTION INTRAVENOUS at 07:53

## 2018-09-19 RX ADMIN — SODIUM CHLORIDE, POTASSIUM CHLORIDE, SODIUM LACTATE AND CALCIUM CHLORIDE: 600; 310; 30; 20 INJECTION, SOLUTION INTRAVENOUS at 08:09

## 2018-09-19 RX ADMIN — NITROGLYCERIN 100 MCG: 10 INJECTION INTRAVENOUS at 09:28

## 2018-09-19 ASSESSMENT — ACTIVITIES OF DAILY LIVING (ADL)
DRESS: 0-->INDEPENDENT
TOILETING: 0-->INDEPENDENT
NUMBER_OF_TIMES_PATIENT_HAS_FALLEN_WITHIN_LAST_SIX_MONTHS: 1
AMBULATION: 0-->INDEPENDENT
ADLS_ACUITY_SCORE: 12
BATHING: 0-->INDEPENDENT
RETIRED_COMMUNICATION: 0-->UNDERSTANDS/COMMUNICATES WITHOUT DIFFICULTY
COGNITION: 0 - NO COGNITION ISSUES REPORTED
PRIOR_FUNCTIONAL_LEVEL_COMMENT: INDEPENDENT
ADLS_ACUITY_SCORE: 12
FALL_HISTORY_WITHIN_LAST_SIX_MONTHS: YES
ADLS_ACUITY_SCORE: 12
SWALLOWING: 0-->SWALLOWS FOODS/LIQUIDS WITHOUT DIFFICULTY
TRANSFERRING: 0-->INDEPENDENT
RETIRED_EATING: 0-->INDEPENDENT

## 2018-09-19 ASSESSMENT — PAIN DESCRIPTION - DESCRIPTORS: DESCRIPTORS: SORE

## 2018-09-19 NOTE — IP AVS SNAPSHOT
MRN:[id number]                      After Visit Summary   9/19/2018    Shahid Villalta    MRN: [id number]           Thank you!     Thank you for choosing [facility] for your care. Our goal is always to provide you with excellent care. Hearing back from our patients is one way we can continue to improve our services. Please take a few minutes to complete the written survey that you may receive in the mail after you visit with us. Thank you!        Patient Information     Date Of Birth          1940        Designated Caregiver       Most Recent Value    Caregiver    Will someone help with your care after discharge? yes    Name of designated caregiver Gume    Phone number of caregiver [phone]    Caregiver address NA      About your hospital stay     You were admitted on:  September 19, 2018 You last received care in the:  [address] [facility]    You were discharged on:  September 21, 2018        Reason for your hospital stay       You were in the hospital for planned TAVR. 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 To verify we did a transesophageal echocardiogram, this did not show any fistulas.  We would like you to continue to follow up as previously arranged with [facility] for close follow up after valve replacement. Thank you!                  Who to Call     For medical emergencies, please call 911.  For non-urgent questions about your medical care, please call your primary care provider or clinic, [phone]  For questions related to your surgery, please call your surgery clinic        Attending Provider     Provider Catia Sow MD Cardiology       Primary Care Provider Office Phone # Fax #    Gume Brown [phone]      After Care Instructions     Activity       Your activity upon discharge: activity as tolerated            Diet       Follow this diet upon discharge: Orders Placed  This Encounter      Low Saturated Fat Na <2400 mg                  Follow-up Appointments     Adult [facility] Follow-up and recommended labs and tests       Follow up with primary care provider, Gume Brown, within 1-2 weeks, for hospital follow- up. No follow up labs or test are needed.   Follow up with Cardiology Clinic as previously arranged (appointments made for follow up in 1 week and 1 month; at the 1 month appointment you will have another Echocardiogram.     Appointments on [place] and/or [facility] (with [facility] or [facility] provider or service). Call [phone] if you haven't heard regarding these appointments within 7 days of discharge.                  Your next 10 appointments already scheduled     Oct 04, 2018  1:10 PM CDT   Return Visit with Mira Norwood PA-C   [facility] ([facility])    [address]  [place] [address]   [phone]            Nov 09, 2018  9:00 AM CST   Return Visit with  ONCOLOGY NURSE   [facility] ([facility])    [facility]  [address]  [facility] [address]   [phone]            Nov 14, 2018  9:30 AM CST   Return Visit with Nahun Hilario MD   [facility] ([facility])    [facility]  [address]  [facility] [address]   [phone]              Further instructions from your care team         Going home after Transcatheter Aortic Valve Replacement (TAVR)  Femoral Access    You have small procedure sites at both groins, the one on the left is slightly bigger. You may have small amounts of bruising or discomfort, this is expected. If you develop worse swelling, redness and warmth that does not go away, fever and chills, Increasing numbness in your legs, worsening pain at the site then you need to contact your nurse  "coordinator.    You may shower, but do not soak in a bath tub or pool or apply lotions, ointments, powder, etc for 3-5 days or until sites look healed.     Activity: No lifting, pushing, pulling more than 10 pounds (examples to avoid: groceries, vacuuming, gardening, golfing): For one week with a procedure through the groin.    After the initial healing process of the access site, we recommend cardiac rehabilitation for all TAVR patients. Cardiac rehabilitation will help you:  - Rebuild stamina, strength and balance.  - Learn how to participate in activities safely, as well as help you regain confidence to do so.  - Return to activities of daily living and leisure.  Please contact their central scheduling to arrange at [phone]    We prefer you to follow up with your primary care provider within 2 weeks. You will be arranged to see the TAVR clinic in month. At that time you will have a repeat ultrasound of the heart to look at the valve.     Should you have any questions or concerns please contact your assigned TAVR nurse coordinator:  Misti [phone] (at the first prompt enter #1, second prompt enter #3, then ask the triage nurse if you can speak with Misti).      Pending Results     Date and Time Order Name Status Description    9/21/2018 0300 EKG 12-lead, tracing only Preliminary             Statement of Approval     Ordered          09/21/18 0915  I have reviewed and agree with all the recommendations and orders detailed in this document.  EFFECTIVE NOW     Approved and electronically signed by:  Demetrice Perez CNP             Admission Information     Date & Time Provider Department Dept. Phone    9/19/2018 Catia Nation MD [address] [facility] [place] [phone]      Your Vitals Were     Blood Pressure Pulse Temperature Respirations Height Weight    140/76 61 97.9  F (36.6  C) (Axillary) 16 1.727 m (5' 8\") 105.5 kg (232 lb 9.4 oz)    Pulse Oximetry BMI (Body Mass Index)                " 93% 35.36 kg/m2          [employer] Information     [employer] gives you secure access to your electronic health record. If you see a primary care provider, you can also send messages to your care team and make appointments. If you have questions, please call your primary care clinic.  If you do not have a primary care provider, please call [phone] and they will assist you.        Care EveryWhere ID     This is your Care EveryWhere ID. This could be used by other organizations to access your [facility] medical records  [id number]        Equal Access to Services     [facility] : Hadii aad ku hadasho Soomaali, waaxda luqadaha, qaybta kaalmada adeegyada, emily mesa . So [facility] [phone].    ATENCIÓN: Si habla español, tiene a irving disposición servicios gratuitos de asistencia lingüística. Llame al [phone].    We comply with applicable federal civil rights laws and [place] laws. We do not discriminate on the basis of race, color, national origin, age, disability, sex, sexual orientation, or gender identity.               Review of your medicines      START taking        Dose / Directions    clopidogrel 75 MG tablet   Commonly known as:  PLAVIX   Used for:  S/P TAVR (transcatheter aortic valve replacement)        Dose:  75 mg   Take 1 tablet (75 mg) by mouth daily   Quantity:  60 tablet   Refills:  1         CONTINUE these medicines which may have CHANGED, or have new prescriptions. If we are uncertain of the size of tablets/capsules you have at home, strength may be listed as something that might have changed.        Dose / Directions    amiodarone 200 MG tablet   Commonly known as:  PACERONE/CODARONE   This may have changed:  additional instructions   Used for:  Paroxysmal atrial fibrillation (H)        Take PO 1/2 tablet daily-Take extra prn per MD recommendations.   Quantity:  50 tablet   Refills:  0       warfarin 5 MG tablet   Commonly known as:  COUMADIN   This may have  changed:  additional instructions   Used for:  Long-term (current) use of anticoagulants, Paroxysmal ventricular tachycardia (H)        5 mg daily   Quantity:  90 tablet   Refills:  0         CONTINUE these medicines which have NOT CHANGED        Dose / Directions    acetaminophen 325 MG tablet   Commonly known as:  TYLENOL        Dose:  1300 mg   Take 1,300 mg by mouth 2 times daily   Refills:  0       aspirin 81 MG chewable tablet        Dose:  81 mg   Take 81 mg by mouth daily Start 8/31/18   Refills:  0       ASPIRIN NOT PRESCRIBED   Commonly known as:  INTENTIONAL   Used for:  Atrial fibrillation, unspecified        Dose:  1 each   1 each daily Antiplatelet medication not prescribed intentionally due to Current anticoagulant therapy (warfarin/enoxaparin)   Quantity:  0 each   Refills:  0       cetirizine 10 MG tablet   Commonly known as:  zyrTEC   Used for:  Allergic rhinitis        Dose:  10 mg   Take 1 tablet (10 mg) by mouth daily   Quantity:  90 tablet   Refills:  1       cholecalciferol 1000 UNIT tablet   Commonly known as:  vitamin D3   Used for:  Vitamin D deficiency, Parathyroid hormone excess (H)        Dose:  2000 Units   Take 2 tablets (2,000 Units) by mouth daily   Quantity:  180 tablet   Refills:  1       digoxin 125 MCG tablet   Commonly known as:  LANOXIN   Used for:  Chronic systolic heart failure (H)        Dose:  125 mcg   Take 1 tablet (125 mcg) by mouth every 48 hours   Quantity:  45 tablet   Refills:  0       lisinopril 2.5 MG tablet   Commonly known as:  PRINIVIL/Zestril   Used for:  Nonrheumatic aortic valve stenosis        Dose:  2.5 mg   Take 1 tablet (2.5 mg) by mouth daily   Quantity:  90 tablet   Refills:  3       metoprolol succinate 100 MG 24 hr tablet   Commonly known as:  TOPROL-XL   Used for:  Paroxysmal atrial fibrillation (H)        Dose:  100 mg   Take 1 tablet (100 mg) by mouth daily   Quantity:  90 tablet   Refills:  3       omeprazole 20 MG CR capsule   Commonly known as:   priLOSEC   Used for:  Gastroesophageal reflux disease without esophagitis        Dose:  20 mg   Take 1 capsule (20 mg) by mouth daily   Quantity:  90 capsule   Refills:  3       polyethylene glycol powder   Commonly known as:  MIRALAX/GLYCOLAX        Dose:  17 g   Take 17 g by mouth daily as needed for constipation   Refills:  0       potassium chloride 10 MEQ tablet   Commonly known as:  K-TAB,KLOR-CON   Used for:  Chronic systolic heart failure (H)        Dose:  10 mEq   Take 1 tablet (10 mEq) by mouth daily   Quantity:  90 tablet   Refills:  3       senna-docusate 8.6-50 MG per tablet   Commonly known as:  SENOKOT-S;PERICOLACE        Dose:  2 tablet   Take 2 tablets by mouth daily   Refills:  0       simethicone 80 MG chewable tablet   Commonly known as:  MYLICON   Used for:  Abdominal bloating        Dose:  80 mg   Take 1 tablet (80 mg) by mouth every 6 hours as needed for cramping   Quantity:  180 tablet   Refills:  0       simvastatin 20 MG tablet   Commonly known as:  ZOCOR   Used for:  Hyperlipidemia LDL goal <100        Dose:  20 mg   Take 1 tablet (20 mg) by mouth At Bedtime   Quantity:  90 tablet   Refills:  3       torsemide 20 MG tablet   Commonly known as:  DEMADEX   Used for:  Chronic systolic heart failure (H), Ischemic cardiomyopathy        Dose:  20 mg   Take 1 tablet (20 mg) by mouth daily   Quantity:  90 tablet   Refills:  3       VIAGRA 25 MG tablet   Generic drug:  sildenafil        Dose:  25 mg   Take 25 mg by mouth as needed   Refills:  0            Where to get your medicines      These medications were sent to [facility] [place], [place] - [address] [address]     Phone:  [phone]     clopidogrel 75 MG tablet                Protect others around you: Learn how to safely use, store and throw away your medicines at www.disposemymeds.org.             Medication List: This is a list of all your medications and when to take them.  Check marks below indicate your daily home schedule. Keep this list as a reference.      Medications           Morning Afternoon Evening Bedtime As Needed    acetaminophen 325 MG tablet   Commonly known as:  TYLENOL   Take 1,300 mg by mouth 2 times daily   Last time this was given:  650 mg on 9/20/2018  8:40 AM                                amiodarone 200 MG tablet   Commonly known as:  PACERONE/CODARONE   Take PO 1/2 tablet daily-Take extra prn per MD recommendations.   Last time this was given:  100 mg on 9/21/2018  8:40 AM                                aspirin 81 MG chewable tablet   Take 81 mg by mouth daily Start 8/31/18                                ASPIRIN NOT PRESCRIBED   Commonly known as:  INTENTIONAL   1 each daily Antiplatelet medication not prescribed intentionally due to Current anticoagulant therapy (warfarin/enoxaparin)   Last time this was given:  1 each on 9/21/2018  8:40 AM                                cetirizine 10 MG tablet   Commonly known as:  zyrTEC   Take 1 tablet (10 mg) by mouth daily   Last time this was given:  10 mg on 9/21/2018  8:39 AM                                cholecalciferol 1000 UNIT tablet   Commonly known as:  vitamin D3   Take 2 tablets (2,000 Units) by mouth daily                                clopidogrel 75 MG tablet   Commonly known as:  PLAVIX   Take 1 tablet (75 mg) by mouth daily   Last time this was given:  75 mg on 9/21/2018  8:38 AM                                digoxin 125 MCG tablet   Commonly known as:  LANOXIN   Take 1 tablet (125 mcg) by mouth every 48 hours   Last time this was given:  125 mcg on 9/20/2018  8:40 AM                                lisinopril 2.5 MG tablet   Commonly known as:  PRINIVIL/Zestril   Take 1 tablet (2.5 mg) by mouth daily   Last time this was given:  2.5 mg on 9/21/2018  8:38 AM                                metoprolol succinate 100 MG 24 hr tablet   Commonly known as:  TOPROL-XL   Take 1 tablet (100 mg) by mouth daily   Last  time this was given:  100 mg on 9/21/2018  8:39 AM                                omeprazole 20 MG CR capsule   Commonly known as:  priLOSEC   Take 1 capsule (20 mg) by mouth daily   Last time this was given:  20 mg on 9/21/2018  8:38 AM                                polyethylene glycol powder   Commonly known as:  MIRALAX/GLYCOLAX   Take 17 g by mouth daily as needed for constipation                                potassium chloride 10 MEQ tablet   Commonly known as:  K-TAB,KLOR-CON   Take 1 tablet (10 mEq) by mouth daily   Last time this was given:  10 mEq on 9/21/2018  8:40 AM                                senna-docusate 8.6-50 MG per tablet   Commonly known as:  SENOKOT-S;PERICOLACE   Take 2 tablets by mouth daily                                simethicone 80 MG chewable tablet   Commonly known as:  MYLICON   Take 1 tablet (80 mg) by mouth every 6 hours as needed for cramping                                simvastatin 20 MG tablet   Commonly known as:  ZOCOR   Take 1 tablet (20 mg) by mouth At Bedtime   Last time this was given:  20 mg on 9/20/2018  9:55 PM                                torsemide 20 MG tablet   Commonly known as:  DEMADEX   Take 1 tablet (20 mg) by mouth daily   Last time this was given:  20 mg on 9/21/2018  8:40 AM                                VIAGRA 25 MG tablet   Take 25 mg by mouth as needed   Generic drug:  sildenafil                                warfarin 5 MG tablet   Commonly known as:  COUMADIN   5 mg daily   Last time this was given:  5 mg on 9/20/2018  6:21 PM

## 2018-09-19 NOTE — ANESTHESIA CARE TRANSFER NOTE
Patient: Shahid Villalta    Procedure(s):  Transfemoral Approach Aortic (34 mm Medtronic CoreValve) Valve Replacement, Cardiopulmonary Bypass Standby **Latex Allergy** - Wound Class: I-Clean   - Wound Class: I-Clean    Diagnosis: Severe Aortic Stenosis   Diagnosis Additional Information: No value filed.    Anesthesia Type:   MAC     Note:  Airway :Face Mask  Patient transferred to:PACU  Comments: Pt alert, breathing sontaneously on 6L O2 via FM. VSS. Report shared with RN.Handoff Report: Identifed the Patient, Identified the Reponsible Provider, Reviewed the pertinent medical history, Discussed the surgical course, Reviewed Intra-OP anesthesia mangement and issues during anesthesia, Set expectations for post-procedure period and Allowed opportunity for questions and acknowledgement of understanding      Vitals: (Last set prior to Anesthesia Care Transfer)    CRNA VITALS  9/19/2018 0928 - 9/19/2018 1007      9/19/2018             NIBP: 108/65    NIBP Mean: 78    Ht Rate: 74    SpO2: 100 %    EKG: V Paced;PVC's                Electronically Signed By: TONYA Ashby CRNA  September 19, 2018  10:07 AM

## 2018-09-19 NOTE — PROCEDURES
TAVR PROCEDURE REPORT:     PROCEDURES PERFORMED:   1. Temporary pacemaker insertion.  2. Iliofemoral artery angiography.  3. Ascending aorta angiography.  4. Left heart catheterization.  5. Successful transfemoral transcatheter aortic valve replacement with a 34mm Medtronic Evolut R valve.  7. Arteriotomy closure.    PHYSICIANS:  1. Attending Interventional Cardiology Staff: MD Rios and MD Joon  2. Attending Cardiovascular Surgery Staff: MD Andriy  3. Structural Interventional Cardiology Fellow: MOODY Sams MD, PhD    INDICATION:  78 with severe symptomatic aortic stenosis who presents on an elective outpatient basis for transfemoral transcatheter aortic valve replacement with plan for a Medtronic Evolut R valve.    DESCRIPTION:  1. Consent obtained with discussion of risks.  All questions were answered.  2. Sterile prep and procedure per OR protocol.  3. Location: right common femoral arteries and left common femoral vein.  4. Access: Local anesthetic with lidocaine.  A standard (18 g) needle with ultrasound guidance was used to establish vascular access using a modified Seldinger technique.  5. Sheath:  20Fr long sheath in the RCFA, 7Fr long sheath in the LCFA,  6Fr long sheath in the LCFV  6. Catheters: 6Fr angled pigtail and 6Fr AL-1.  7. Estimated blood loss of 30 mL.  8. See below for procedure details.    MEDICATIONS:  1. Sedation per anesthesia.  2. Heparin administered to achieve a goal ACT > 300 sec per anesthesia.   3. Protamine IV.    SUMMARY:  1. Access was obtained in the right and left common femoral arteries and the left common femoral vein by the interventional cardiology team.  The arterial site used for valve delivery was pre-closed with two Perclose Proglide devices.   2. A 5Fr PACEL temporary pacemaker was positioned in the right ventricle and tested for adequate capture.    3. Iliofemoral angiography was used to guide RCFA access for insertion of the valve sheath.  A [employer] wire  was used to support insertion of 20 Fr sheath to allow for delivery of the R Delivery Catheter System.  4. The 6Fr pigtail catheter was positioned in the right-coronary cusp and angiography was used to confirm the optimal implant angle.  The pigtail was then moved to the non-coronary cusp.  5. Access into the LV was obtained using an AL-1 catheter and the j tipped wire crossed the valve. This was exchanged for a pigtail catheter was then positioned in the left ventricle.  The LVEDP was measured with a pressure of 24 mmHg.  The pigtail catheter was used to advance a [employer] wire into in the LV and the pigtail was removed.    6. The 34mm Medtronic Evolut R prosthetic valve was then positioned across the native aortic valve with a final depth of approximately 3mm on both sides (the valve was recaptured one time and redeployed).  Angiography and echocardiography were used to confirm optimal valve position.  7. Subsequent transthoracic echocardiography and an ascending aortogram showed mild paravalvular insufficiency. Repeat LVEDP was 20mmHg.  8. A final iliofemoral angiogram showed no evidence of extravasation or stenosis.  9. The temporary pacemaker was then removed from the common femoral vein access site.    NOTABLE EVENTS:  1. None    COMPLICATIONS:  1. None    SUMMARY:    1. Lifestyle-limiting severe aortic stenosis.  2. Successful transfemoral transcatheter aortic valve replacement with a 34mm Medtronic Evolut R valve.  3. Temporary pacemaker insertion.  4. Final left heart catheterization with LVEDP of 20 mmHg.  6. Right common femoral arteriotoy closed with a closure devices.    PLAN:    1. Warfarin will be restarted today (indefintely given hx of AFib).  2. Plavix 300 mg po today then Plavix 75 mg po daily (No ASA).  3. Bedrest per protocol.  4. Admit to the primary inpatient team for further evaluation and management.  5. Echocardiogram tomorrow.   6. Lifelong antibiotic prophylaxis prior to all dental  procedures.      The attending interventional cardiologists were present and participated in the entire procedure.      MOODY Sams MD, PhD  Structural Cardiology Fellow        I was present for the entire procedure.     Justin Dupree M.D.  Interventional Cardiology  [facility]  Pager: [phone]

## 2018-09-19 NOTE — PLAN OF CARE
Problem: Patient Care Overview  Goal: Plan of Care/Patient Progress Review  Outcome: Improving  Patient arrived to unit at 1145. Pulses WNL, see flowsheet. Bilateral groin sites CDI, WNL. VSS. Off bedrest at 1630. ETCO2 monitor in place. IPI 8 - 10. CO2 35 - 42.

## 2018-09-19 NOTE — OR NURSING
"Patient had fall 2 weeks ago.  Has \"bruised ribs\" and bruise below left eye.Able to ambulate without difficulty.  "

## 2018-09-19 NOTE — H&P
[facility]      CSI History and Physicial  Shahid Villalta MRN: [id number]  1940  Date of Admission:9/19/2018  Primary care provider: Gume Brown         Chief Complaint:   S/p TAVR         History of Present Illness:   Per pre operative note, Shahid Villalta is a 78 year old male with a history of severe aortic stenosis, mitral regurgitation, CAD with known occluded RCA (with left to right collaterals), mild cardiomyopathy, PAfib s/p AV dario ablation and BiV ICD implanation, and CKD stage III. During a routine follow up in May, patient complained of worsening shortness of breath and was found to have severe aortic stenosis on echo. Now s/p TAVR procedure. Feels well. No headache, vision change, no nausea, chest pain, shortness of breath, or abdominal pain.         Review of Systems:    10 point review of systems negative except for stated above in HPI.          Past Medical History:   Medical History reviewed.   Past Medical History:   Diagnosis Date     Aortic valve disorders      Atrial fibrillation (H)      Automatic implantable cardiac defibrillator in situ      Cancer (H)      Congestive heart failure (H)      Coronary artery disease      Essential hypertension, benign      GERD (gastroesophageal reflux disease) 3/16/2010     History of blood transfusion      Hyperlipidaemia      Hypertension      Obese      Other and unspecified hyperlipidemia      Other primary cardiomyopathies      Pacemaker      Sleep apnea      Small bowel obstruction 12/14/2015     Ventricular tachycardia (H) 6/17/2013             Past Surgical History:   Surgical History reviewed.   Past Surgical History:   Procedure Laterality Date     BIOPSY  annually    prostate biopsy     CARDIAC SURGERY  2012    A fib Ablation     CARDIAC SURGERY      cardioversion     COLONOSCOPY  2007     DAVINCI PROSTATECTOMY N/A 11/20/2015    Procedure: DAVINCI PROSTATECTOMY;  Surgeon: Nahun Hilario MD;  Location:  OR      "GENITOURINARY SURGERY      bladder surgery      H ABLATION AV NODE  13     H ABLATION FOCAL AFIB  13     HC TOOTH EXTRACTION W/FORCEP       TONSILLECTOMY & ADENOIDECTOMY               Social History:   Social History reviewed.  Social History   Substance Use Topics     Smoking status: Never Smoker     Smokeless tobacco: Never Used     Alcohol use No      Comment: AA since              Family History:   Family History reviewed.   Family History   Problem Relation Age of Onset     C.A.D. Father      CHF  at 74     Cerebrovascular Disease Father      C.A.D. Mother      CHF at 91 still living     Cerebrovascular Disease Mother      TIAs     Breast Cancer Mother      HEART DISEASE Son      arrythmia     Family History Negative Sister      Family History Negative Brother      Family History Negative Son              Allergies:     Allergies   Allergen Reactions     Latex Rash     Seasonal Allergies      hayfever - wheezing -              Medications:   Medications Reviewed.   Current Facility-Administered Medications   Medication     dexmedetomidine (PRECEDEX) 400 mcg in 0.9% sodium chloride 100 mL     EPINEPHrine (ADRENALIN) 5 mg in sodium chloride 0.9 % 250 mL infusion     fentaNYL (PF) (SUBLIMAZE) injection 25-50 mcg     iopamidol (ISOVUE-370) solution     lactated ringers infusion     lidocaine 1 % injection     norepinephrine (LEVOPHED) 16 mg in D5W 250 mL infusion     prochlorperazine (COMPAZINE) injection 5 mg             Physical Exam:   Vitals were reviewed.  Blood pressure 110/71, pulse 66, temperature 97.2  F (36.2  C), temperature source Skin, resp. rate 16, height 1.727 m (5' 8\"), weight 104.8 kg (231 lb 0.7 oz), SpO2 91 %.    General: AAOx3, NAD  Skin: Not jaundiced, no ecchymoses. Left groin incision with bandage, no hematoma or bruising. Right groin incision closed, soft, no hematoma.  HEENT: MMM, EOM intact  CV: Regular rate, no murmur, clicks, rubs  Resp: Limited exam due to bed " rest. No wheeze, no rhonchi  Abd: Soft, non-tender, BS+, no masses appreciated  Extremities: warm and well perfused, palpable radial and posterior tibial pulses, no edema  Neuro: No lateralizing symptoms or focal neurologic deficits        Labs:   Routine Labs:  Lab Results   Component Value Date    TROPI 0.057 (H) 11/27/2015    TROPI 0.051 (H) 11/26/2015    TROPI 0.051 (H) 11/26/2015    TROPI 0.048 (H) 11/26/2015    TROPI 0.091 (H) 06/18/2013     CMP    Recent Labs  Lab 09/19/18  1030 09/19/18  0638    142   POTASSIUM 4.8 4.5   CHLORIDE 107 107   CO2 24 27   ANIONGAP 8 8   GLC 94 98   BUN 31* 34*   CR 1.39* 1.50*   GFRESTIMATED 50* 45*   GFRESTBLACK 60* 55*   EVA 8.8 9.6   MAG 2.2  --    PHOS 2.9  --    PROTTOTAL 6.8 6.9   ALBUMIN 3.3* 3.6   BILITOTAL 0.6 0.7   ALKPHOS 75 80   AST 22 18   ALT 15 17     CBC    Recent Labs  Lab 09/19/18  1114 09/19/18  0638   WBC 11.0 9.1   RBC 3.87* 3.99*   HGB 11.6* 11.9*   HCT 38.3* 39.2*   MCV 99 98   MCH 30.0 29.8   MCHC 30.3* 30.4*   RDW 16.7* 16.7*    255     INR    Recent Labs  Lab 09/19/18  0638   INR 1.17*           Diagnostics:      Interpreted by Eliud Conklin  Time reviewed: 1150  Atrial fibrillation, Biventricular pacing      Imaging:   Intraoperative DEMETRIUS  Interpretation Summary  Intro-Op TAVR echocardiogram.  Valve well seated. Trace to mild paravalvular aortic regurgitation.     No pericardial effusion is present.       Assessment and Plan:     Shahid Villalta is a 78 year old male with a history of severe aortic stenosis, mitral regurgitation, CAD with known occluded RCA (with left to right collaterals), mild cardiomyopathy, PAfib s/p AV dario ablation and BiV ICD implanation, and CKD stage III admitted for planned TAVR procedure. Patient tolerated procedure well. Intraoperative DEMETRIUS demonstrates trace to mild paravalvular aortic regurgitation. Anticipate patient will need some diuresis, likely to start tomorrow.     1. Severe aortic stenosis, now s/p  Transcatheter Aortic Valve Replacement with a 34mm Medtronic Evolut R. Prior to procedure, pt noted to have a mean gradient of 36 mmHG, FELIZ .6 cm^2, max velocity 41- Sheath sites soft without hematoma. Patient is BiV paced. Temporary pacer removed in OR.  - Bedrest per protocol.    - Neuro checks, per protocol.  - Echocardiogram tomorrow.   - Monitor groin sites.   - EKG in morning.   - Beaulieu can be removed once patient is out of bed.   - Plavix for anti-platelet therapy. Patient on warfarin for a-fib, will not need aspirin.  - Cardiac rehab/PT/OT.  - Diurese as needed/able.   - Daily weights.   - Strict intake/output.   - Lifelong antibiotic prophylaxis for dental procedures    2. Hypertension  - Restart home anti-htn once blood pressure and heart rate are stable, likely tomorrow.     3. A-fib  - pharmacy to dose warfarin, may start tonight      FEN: Diabetic/Cardiac diet  Prophylaxis:  DVT: On warfarin PPI: Omeprazole 20mg daily  Disposition: Obs admission to [facility].   Code Status: FULL CODE      Eliud Conklin PA-C  [facility] Cardiology Team

## 2018-09-19 NOTE — ANESTHESIA POSTPROCEDURE EVALUATION
Patient: Shahid Villalta    Procedure(s):  Transfemoral Approach Aortic (34 mm Medtronic CoreValve) Valve Replacement, Cardiopulmonary Bypass Standby **Latex Allergy** - Wound Class: I-Clean   - Wound Class: I-Clean    Diagnosis:Severe Aortic Stenosis   Diagnosis Additional Information: No value filed.    Anesthesia Type:  MAC    Note:  Anesthesia Post Evaluation    Patient location during evaluation: PACU  Patient participation: Able to fully participate in evaluation  Level of consciousness: awake and alert  Pain management: satisfactory to patient  Cardiovascular status: acceptable  Respiratory status: acceptable  Hydration status: acceptable  PONV: controlled             Last vitals:  Vitals:    09/19/18 1100 09/19/18 1200 09/19/18 1300   BP: 109/60 114/64 118/57   Pulse:      Resp: 16 16    Temp: 36.2  C (97.2  F) 36.3  C (97.3  F)    SpO2: 95% 96%          Electronically Signed By: Carlyle Gómez MD  September 19, 2018  1:24 PM

## 2018-09-19 NOTE — OR NURSING
Dr. Gómez at bedside post-op and confirmed that we did not have to contact the EP RN. They did not manipulate the pacemaker/AICD during the procedure.

## 2018-09-19 NOTE — OR NURSING
Liana with cardiac devices made aware of AICD/pacer.  Will review case.  No action needed at present

## 2018-09-19 NOTE — PHARMACY-ANTICOAGULATION SERVICE
Clinical Pharmacy - Warfarin Dosing Consult     Pharmacy has been consulted to manage this patient s warfarin therapy.  Indication: Atrial Fibrillation  Therapy Goal: INR 2-3  Provider/Team: [facility]  Warfarin Prior to Admission: Yes  Warfarin PTA Regimen: 2.5mg Monday and 5mg all other days of the week  Significant drug interactions: clopidogrel (increased risk of bleeding) amiodarone ordered as an outpatient but not reordered yet as inpatient  Recent documented change in oral intake/nutrition: No    INR   Date Value Ref Range Status   09/19/2018 1.17 (H) 0.86 - 1.14 Final   09/05/2018 1.77 (H) 0.86 - 1.14 Final     Recommend warfarin 5 mg today (as ordered ).  Pharmacy will monitor Shahid Villalta daily and order warfarin doses to achieve specified goal.      Please contact pharmacy as soon as possible if the warfarin needs to be held for a procedure or if the warfarin goals change.

## 2018-09-20 ENCOUNTER — APPOINTMENT (OUTPATIENT)
Dept: OCCUPATIONAL THERAPY | Facility: CLINIC | Age: 78
DRG: 267 | End: 2018-09-20
Payer: MEDICARE

## 2018-09-20 ENCOUNTER — CARE COORDINATION (OUTPATIENT)
Dept: CARDIOLOGY | Facility: CLINIC | Age: 78
End: 2018-09-20

## 2018-09-20 ENCOUNTER — APPOINTMENT (OUTPATIENT)
Dept: CARDIOLOGY | Facility: CLINIC | Age: 78
DRG: 267 | End: 2018-09-20
Payer: MEDICARE

## 2018-09-20 ENCOUNTER — APPOINTMENT (OUTPATIENT)
Dept: CARDIOLOGY | Facility: CLINIC | Age: 78
DRG: 267 | End: 2018-09-20
Attending: NURSE PRACTITIONER
Payer: MEDICARE

## 2018-09-20 ENCOUNTER — APPOINTMENT (OUTPATIENT)
Dept: GENERAL RADIOLOGY | Facility: CLINIC | Age: 78
DRG: 267 | End: 2018-09-20
Payer: MEDICARE

## 2018-09-20 ENCOUNTER — APPOINTMENT (OUTPATIENT)
Dept: GENERAL RADIOLOGY | Facility: CLINIC | Age: 78
DRG: 267 | End: 2018-09-20
Attending: NURSE PRACTITIONER
Payer: MEDICARE

## 2018-09-20 DIAGNOSIS — Z95.2 S/P TAVR (TRANSCATHETER AORTIC VALVE REPLACEMENT): Primary | ICD-10-CM

## 2018-09-20 LAB
ANION GAP SERPL CALCULATED.3IONS-SCNC: 9 MMOL/L (ref 3–14)
BUN SERPL-MCNC: 28 MG/DL (ref 7–30)
CALCIUM SERPL-MCNC: 9.2 MG/DL (ref 8.5–10.1)
CHLORIDE SERPL-SCNC: 103 MMOL/L (ref 94–109)
CO2 SERPL-SCNC: 26 MMOL/L (ref 20–32)
CREAT SERPL-MCNC: 1.27 MG/DL (ref 0.66–1.25)
ERYTHROCYTE [DISTWIDTH] IN BLOOD BY AUTOMATED COUNT: 16.8 % (ref 10–15)
GFR SERPL CREATININE-BSD FRML MDRD: 55 ML/MIN/1.7M2
GLUCOSE SERPL-MCNC: 85 MG/DL (ref 70–99)
HCT VFR BLD AUTO: 38 % (ref 40–53)
HGB BLD-MCNC: 11.3 G/DL (ref 13.3–17.7)
INR PPP: 1.2 (ref 0.86–1.14)
INTERPRETATION ECG - MUSE: NORMAL
INTERPRETATION ECG - MUSE: NORMAL
MAGNESIUM SERPL-MCNC: 2.3 MG/DL (ref 1.6–2.3)
MCH RBC QN AUTO: 29.6 PG (ref 26.5–33)
MCHC RBC AUTO-ENTMCNC: 29.7 G/DL (ref 31.5–36.5)
MCV RBC AUTO: 100 FL (ref 78–100)
PHOSPHATE SERPL-MCNC: 2.7 MG/DL (ref 2.5–4.5)
PLATELET # BLD AUTO: 223 10E9/L (ref 150–450)
POTASSIUM SERPL-SCNC: 4.4 MMOL/L (ref 3.4–5.3)
RBC # BLD AUTO: 3.82 10E12/L (ref 4.4–5.9)
SODIUM SERPL-SCNC: 139 MMOL/L (ref 133–144)
WBC # BLD AUTO: 9.2 10E9/L (ref 4–11)

## 2018-09-20 PROCEDURE — 36415 COLL VENOUS BLD VENIPUNCTURE: CPT | Performed by: INTERNAL MEDICINE

## 2018-09-20 PROCEDURE — 93308 TTE F-UP OR LMTD: CPT | Mod: 26 | Performed by: INTERNAL MEDICINE

## 2018-09-20 PROCEDURE — 71046 X-RAY EXAM CHEST 2 VIEWS: CPT

## 2018-09-20 PROCEDURE — A9270 NON-COVERED ITEM OR SERVICE: HCPCS | Mod: GY | Performed by: NURSE PRACTITIONER

## 2018-09-20 PROCEDURE — 84100 ASSAY OF PHOSPHORUS: CPT | Performed by: INTERNAL MEDICINE

## 2018-09-20 PROCEDURE — 93321 DOPPLER ECHO F-UP/LMTD STD: CPT | Mod: 26 | Performed by: INTERNAL MEDICINE

## 2018-09-20 PROCEDURE — 20000004 ZZH R&B ICU UMMC

## 2018-09-20 PROCEDURE — 93312 ECHO TRANSESOPHAGEAL: CPT | Mod: 26 | Performed by: INTERNAL MEDICINE

## 2018-09-20 PROCEDURE — 93325 DOPPLER ECHO COLOR FLOW MAPG: CPT | Mod: 26 | Performed by: INTERNAL MEDICINE

## 2018-09-20 PROCEDURE — 93005 ELECTROCARDIOGRAM TRACING: CPT

## 2018-09-20 PROCEDURE — 99232 SBSQ HOSP IP/OBS MODERATE 35: CPT | Performed by: NURSE PRACTITIONER

## 2018-09-20 PROCEDURE — 25000125 ZZHC RX 250: Performed by: INTERNAL MEDICINE

## 2018-09-20 PROCEDURE — 25000132 ZZH RX MED GY IP 250 OP 250 PS 637: Mod: GY | Performed by: NURSE PRACTITIONER

## 2018-09-20 PROCEDURE — 93010 ELECTROCARDIOGRAM REPORT: CPT | Performed by: INTERNAL MEDICINE

## 2018-09-20 PROCEDURE — 25000132 ZZH RX MED GY IP 250 OP 250 PS 637: Mod: GY | Performed by: PHYSICIAN ASSISTANT

## 2018-09-20 PROCEDURE — 93320 DOPPLER ECHO COMPLETE: CPT

## 2018-09-20 PROCEDURE — 25000128 H RX IP 250 OP 636: Performed by: INTERNAL MEDICINE

## 2018-09-20 PROCEDURE — 85610 PROTHROMBIN TIME: CPT | Performed by: INTERNAL MEDICINE

## 2018-09-20 PROCEDURE — A9270 NON-COVERED ITEM OR SERVICE: HCPCS | Mod: GY

## 2018-09-20 PROCEDURE — 71045 X-RAY EXAM CHEST 1 VIEW: CPT

## 2018-09-20 PROCEDURE — 97110 THERAPEUTIC EXERCISES: CPT | Mod: GO | Performed by: OCCUPATIONAL THERAPIST

## 2018-09-20 PROCEDURE — 93320 DOPPLER ECHO COMPLETE: CPT | Mod: 26 | Performed by: INTERNAL MEDICINE

## 2018-09-20 PROCEDURE — 25500064 ZZH RX 255 OP 636: Performed by: INTERNAL MEDICINE

## 2018-09-20 PROCEDURE — 40000133 ZZH STATISTIC OT WARD VISIT: Performed by: OCCUPATIONAL THERAPIST

## 2018-09-20 PROCEDURE — 97535 SELF CARE MNGMENT TRAINING: CPT | Mod: GO | Performed by: OCCUPATIONAL THERAPIST

## 2018-09-20 PROCEDURE — 93325 DOPPLER ECHO COLOR FLOW MAPG: CPT

## 2018-09-20 PROCEDURE — 80048 BASIC METABOLIC PNL TOTAL CA: CPT | Performed by: INTERNAL MEDICINE

## 2018-09-20 PROCEDURE — 85027 COMPLETE CBC AUTOMATED: CPT | Performed by: INTERNAL MEDICINE

## 2018-09-20 PROCEDURE — 25000132 ZZH RX MED GY IP 250 OP 250 PS 637: Mod: GY

## 2018-09-20 PROCEDURE — 97165 OT EVAL LOW COMPLEX 30 MIN: CPT | Mod: GO | Performed by: OCCUPATIONAL THERAPIST

## 2018-09-20 PROCEDURE — 25000132 ZZH RX MED GY IP 250 OP 250 PS 637: Mod: GY | Performed by: INTERNAL MEDICINE

## 2018-09-20 PROCEDURE — A9270 NON-COVERED ITEM OR SERVICE: HCPCS | Mod: GY | Performed by: PHYSICIAN ASSISTANT

## 2018-09-20 PROCEDURE — A9270 NON-COVERED ITEM OR SERVICE: HCPCS | Mod: GY | Performed by: INTERNAL MEDICINE

## 2018-09-20 PROCEDURE — 83735 ASSAY OF MAGNESIUM: CPT | Performed by: INTERNAL MEDICINE

## 2018-09-20 RX ORDER — TORSEMIDE 20 MG/1
20 TABLET ORAL DAILY
Status: DISCONTINUED | OUTPATIENT
Start: 2018-09-20 | End: 2018-09-21 | Stop reason: HOSPADM

## 2018-09-20 RX ORDER — WARFARIN SODIUM 5 MG/1
5 TABLET ORAL
Status: COMPLETED | OUTPATIENT
Start: 2018-09-20 | End: 2018-09-20

## 2018-09-20 RX ORDER — DIGOXIN 0.06 MG/1
125 TABLET ORAL
Status: DISCONTINUED | OUTPATIENT
Start: 2018-09-20 | End: 2018-09-21 | Stop reason: HOSPADM

## 2018-09-20 RX ORDER — FENTANYL CITRATE 50 UG/ML
25-50 INJECTION, SOLUTION INTRAMUSCULAR; INTRAVENOUS
Status: DISCONTINUED | OUTPATIENT
Start: 2018-09-20 | End: 2018-09-20 | Stop reason: HOSPADM

## 2018-09-20 RX ORDER — NALOXONE HYDROCHLORIDE 0.4 MG/ML
.1-.4 INJECTION, SOLUTION INTRAMUSCULAR; INTRAVENOUS; SUBCUTANEOUS
Status: DISCONTINUED | OUTPATIENT
Start: 2018-09-20 | End: 2018-09-20 | Stop reason: HOSPADM

## 2018-09-20 RX ORDER — SODIUM CHLORIDE 9 MG/ML
INJECTION, SOLUTION INTRAVENOUS CONTINUOUS PRN
Status: DISCONTINUED | OUTPATIENT
Start: 2018-09-20 | End: 2018-09-20 | Stop reason: HOSPADM

## 2018-09-20 RX ORDER — AMIODARONE HYDROCHLORIDE 100 MG/1
100 TABLET ORAL DAILY
Status: DISCONTINUED | OUTPATIENT
Start: 2018-09-20 | End: 2018-09-21 | Stop reason: HOSPADM

## 2018-09-20 RX ORDER — FENTANYL CITRATE 50 UG/ML
25 INJECTION, SOLUTION INTRAMUSCULAR; INTRAVENOUS
Status: DISCONTINUED | OUTPATIENT
Start: 2018-09-20 | End: 2018-09-20 | Stop reason: HOSPADM

## 2018-09-20 RX ORDER — LIDOCAINE 40 MG/G
CREAM TOPICAL
Status: DISCONTINUED | OUTPATIENT
Start: 2018-09-20 | End: 2018-09-20 | Stop reason: HOSPADM

## 2018-09-20 RX ORDER — METOPROLOL SUCCINATE 100 MG/1
100 TABLET, EXTENDED RELEASE ORAL DAILY
Status: DISCONTINUED | OUTPATIENT
Start: 2018-09-20 | End: 2018-09-21 | Stop reason: HOSPADM

## 2018-09-20 RX ORDER — POTASSIUM CHLORIDE 750 MG/1
10 TABLET, EXTENDED RELEASE ORAL DAILY
Status: DISCONTINUED | OUTPATIENT
Start: 2018-09-20 | End: 2018-09-21 | Stop reason: HOSPADM

## 2018-09-20 RX ORDER — LISINOPRIL 2.5 MG/1
2.5 TABLET ORAL DAILY
Status: DISCONTINUED | OUTPATIENT
Start: 2018-09-20 | End: 2018-09-21 | Stop reason: HOSPADM

## 2018-09-20 RX ORDER — FLUMAZENIL 0.1 MG/ML
0.2 INJECTION, SOLUTION INTRAVENOUS
Status: DISCONTINUED | OUTPATIENT
Start: 2018-09-20 | End: 2018-09-20 | Stop reason: HOSPADM

## 2018-09-20 RX ADMIN — OMEPRAZOLE 20 MG: 20 CAPSULE, DELAYED RELEASE ORAL at 08:41

## 2018-09-20 RX ADMIN — SODIUM CHLORIDE 300 ML: 9 INJECTION, SOLUTION INTRAVENOUS at 16:04

## 2018-09-20 RX ADMIN — ACETAMINOPHEN 650 MG: 325 TABLET, FILM COATED ORAL at 08:40

## 2018-09-20 RX ADMIN — MIDAZOLAM HYDROCHLORIDE 1.5 MG: 2 INJECTION, SOLUTION INTRAMUSCULAR; INTRAVENOUS at 15:25

## 2018-09-20 RX ADMIN — AMIODARONE HYDROCHLORIDE 100 MG: 100 TABLET ORAL at 08:06

## 2018-09-20 RX ADMIN — Medication 1 EACH: at 08:07

## 2018-09-20 RX ADMIN — POTASSIUM CHLORIDE 10 MEQ: 750 TABLET, EXTENDED RELEASE ORAL at 08:06

## 2018-09-20 RX ADMIN — DIGOXIN 125 MCG: 0.06 TABLET ORAL at 08:40

## 2018-09-20 RX ADMIN — TOPICAL ANESTHETIC 0.5 ML: 200 SPRAY DENTAL; PERIODONTAL at 15:09

## 2018-09-20 RX ADMIN — TORSEMIDE 20 MG: 20 TABLET ORAL at 08:07

## 2018-09-20 RX ADMIN — SIMVASTATIN 20 MG: 20 TABLET, FILM COATED ORAL at 21:55

## 2018-09-20 RX ADMIN — FENTANYL CITRATE 25 MCG: 50 INJECTION INTRAMUSCULAR; INTRAVENOUS at 15:21

## 2018-09-20 RX ADMIN — CETIRIZINE HYDROCHLORIDE 10 MG: 10 TABLET, FILM COATED ORAL at 08:41

## 2018-09-20 RX ADMIN — LISINOPRIL 2.5 MG: 2.5 TABLET ORAL at 08:38

## 2018-09-20 RX ADMIN — HUMAN ALBUMIN MICROSPHERES AND PERFLUTREN 4 ML: 10; .22 INJECTION, SOLUTION INTRAVENOUS at 08:00

## 2018-09-20 RX ADMIN — METOPROLOL SUCCINATE 100 MG: 100 TABLET, EXTENDED RELEASE ORAL at 08:41

## 2018-09-20 RX ADMIN — LIDOCAINE HYDROCHLORIDE 30 ML: 20 SOLUTION ORAL; TOPICAL at 15:09

## 2018-09-20 RX ADMIN — WARFARIN SODIUM 5 MG: 5 TABLET ORAL at 18:21

## 2018-09-20 RX ADMIN — CLOPIDOGREL 75 MG: 75 TABLET, FILM COATED ORAL at 08:41

## 2018-09-20 ASSESSMENT — ACTIVITIES OF DAILY LIVING (ADL)
ADLS_ACUITY_SCORE: 12
ADLS_ACUITY_SCORE: 12
PREVIOUS_RESPONSIBILITIES: MEAL PREP;HOUSEKEEPING;SHOPPING;YARDWORK;MEDICATION MANAGEMENT;FINANCES;DRIVING
ADLS_ACUITY_SCORE: 12

## 2018-09-20 ASSESSMENT — PAIN DESCRIPTION - DESCRIPTORS: DESCRIPTORS: OTHER (COMMENT)

## 2018-09-20 NOTE — DISCHARGE INSTRUCTIONS
Going home after Transcatheter Aortic Valve Replacement (TAVR)  Femoral Access    You have small procedure sites at both groins, the one on the left is slightly bigger. You may have small amounts of bruising or discomfort, this is expected. If you develop worse swelling, redness and warmth that does not go away, fever and chills, Increasing numbness in your legs, worsening pain at the site then you need to contact your nurse coordinator.    You may shower, but do not soak in a bath tub or pool or apply lotions, ointments, powder, etc for 3-5 days or until sites look healed.     Activity: No lifting, pushing, pulling more than 10 pounds (examples to avoid: groceries, vacuuming, gardening, golfing): For one week with a procedure through the groin.    After the initial healing process of the access site, we recommend cardiac rehabilitation for all TAVR patients. Cardiac rehabilitation will help you:  - Rebuild stamina, strength and balance.  - Learn how to participate in activities safely, as well as help you regain confidence to do so.  - Return to activities of daily living and leisure.  Please contact their central scheduling to arrange at [phone]    We prefer you to follow up with your primary care provider within 2 weeks. You will be arranged to see the TAVR clinic in month. At that time you will have a repeat ultrasound of the heart to look at the valve.     Should you have any questions or concerns please contact your assigned TAVR nurse coordinator:  Misti [phone] (at the first prompt enter #1, second prompt enter #3, then ask the triage nurse if you can speak with Misti).

## 2018-09-20 NOTE — PLAN OF CARE
Problem: Patient Care Overview  Goal: Plan of Care/Patient Progress Review  Outcome: Improving  NEURO: Patient is alert and orientated. Ambulated in [facility].   CARDIAC: Paced Rhythm @ 64 bpm. BP stable. Bedside ECHO and DEMETRIUS done.   PULM: on RA. IS to 1000ml.   GI: 1 BM.   : Adequate urine output.   SKIN: c/d/i     PLAN: Continue to monitor, progress as tolerated, and update MD as needed.

## 2018-09-20 NOTE — PLAN OF CARE
Problem: Patient Care Overview  Goal: Plan of Care/Patient Progress Review  Outcome: Improving  Q2 Neurovascular exam did not reveal deficits or any other acute issues overnight.   Bilateral femoral groin sites C/D/I no evidence of hematoma.   Bilateral dorsalis pedis pulses +2, posterior tibial pulses found with doppler.     Rib soreness from fall prior to admission but otherwise no c/o pain. Tolerating general diet, up with assist of 1, voiding in urinal.

## 2018-09-20 NOTE — PLAN OF CARE
Problem: Patient Care Overview  Goal: Plan of Care/Patient Progress Review  PT / 4E - Defer/Cancel.  PT orders received and acknowledged.  Per chart review and discussion with OT, no acute IP PT needs identified.  Demonstrating safe ambulation with no additional assistance.  Noted fall per chart reivew, however patient reports falling off of curb versus balance deficit.  Per OT, safe discharge recommendation to home with OP Cardiac Rehab.  PT orders will be completed, thank you for the consult.

## 2018-09-20 NOTE — DISCHARGE SUMMARY
[address] [address]  p: [phone]    Discharge Summary: Cardiology Service    Shahid Villalta MRN# [id number]   YOB: 1940 Age: 78 year old       Admission Date: 9/19/2018  Discharge Date: 09/21/18      Discharge Diagnoses:  1. Aortic stenosis s/p TAVR  2. Hypertension  3. Paroxsymal atrial fibrillation s/p AV dario ablation  4. Chronic Systolic Heart Failure s/p Bi-Ventricular pacemaker    Pertinent Procedures:  1. TAVR    Consults:  Anesthesia  SW  PT  OT  Cardiac Rehab    Imaging with results:  Transthoracic Echocardiogram 9/20/18:  Interpretation Summary  s/p TAVR with Medtronic Corevalve. Valve is well seated.  There is a possible aortic root to RVOT fistula on color doppler, though the  presence of PI clouds the picture. The proximal ascending aortic wall appears  mildly thickened, suggesting hematoma. Recommend further evaluation with DEMETRIUS  +/- CT.  Mean gr is 7 mmHg. Mild-moderate para-valvular AI (12-3 o'clock on SAX view).  Moderately (EF 30-35%) reduced left ventricular function is present.  No pericardial effusion is present.  Dilation of the inferior vena cava is present with normal respiratory  variation in diameter.  No change from prior study.    Transesophageal Echocardiogram 9/20/18:  Interpretation Summary  s/p TAVR with Medtronic Corevalve.There is mild to moderate paravalvular leak.     Mild eccentric pulmonic insufficiency is present.There is no evidence of  fistula to RVOT.  Proximal ascending aortic wall is normal, without any hematoma.     Moderately (EF 30-35%) reduced left ventricular function is present.  Mild to moderate right ventricular dilation is present.Global right  ventricular function is mildly to moderately reduced.  No change from prior.    TTE 5/18/18:  Interpretation Summary     The visual ejection fraction is estimated at 35%.  There is mild to moderate concentric left ventricular  hypertrophy.  Severe mixed aortic stensosis and aortic insufficiency.     There is moderate global hypokinesia of the left ventricle.  The right ventricle is mildly dilated.  There is a catheter/pacemaker lead seen in the right ventricle.  The right ventricular systolic function is mild to moderately reduced.  The left atrium is moderately dilated.  There is moderate to severe mitral annular calcification.  The mean mitral valve gradient is 3mmHg.  Moderate (46-55mmHg) pulmonary hypertension is present.  The mean AoV pressure gradient is 36mmHg.  Moderate to severe valvular aortic stenosis.  There is moderately severe (3+) aortic regurgitation. In the parasternal view  the vena contracta is between 35-50% of the outflow tract daimeter.  There is an eccentric jet of aortic insufficiency directed against the  anterior mitral leaflet.  Mild aortic root dilatation.  The ascending aorta is Mildly dilated.     Compared side by side with the prior study LVEF not definitetely worse, but  appears previous EF is overestimated. Other findings similar.    Coronary Angiogram/Right heart Cath 6/29/18:   Hemodynamics:  Right atrial pressure: Mean of 17  Right ventricular pressure: 62 with an end-diastolic of 17  Pulmonary artery pressure: 65/24 with a mean of 40.  Pulmonary capillary wedge pressure: Mean of 27 with a V-wave to 40  Aortic blood pressure: 100/56 with a mean of 74  Left ventricular pressure: Aortic valve was not crossed    Pulmonary artery saturation: 52%   Femoral artery saturation: 98%.  Yvette cardiac output: 3.3 L/m with an index of: 1.6.  Rhythm: AV sequential pacemaker 60 bpm    Angiographic Results   Right coronary artery is a dominant vessel. It is occluded at the  ostium. There are extensive left to right collaterals.  Left main coronary artery is normal.  Circumflex coronary artery gives rise to 2 very large obtuse marginal  there are minimal luminal irregularities no stenosis greater than  10-20%. Next  Left  anterior descending artery again has only mild luminal  irregularities no stenosis greater than 20%.  The left anterior  descending artery does wrap around the apex of the heart.  No ventriculogram performed.    Conclusion: Chronically occluded right coronary artery with extensive  left to right collaterals. Right dominant circulation. Minimal other  coronary disease.  2.Pulmonary hypertension with a pulmonary artery pressure of 65/24  with a mean of 40. Pulmonary capillary wedge pressure was 27 with a  V-wave to 40. Cardiac output 3.3 L/m with a pulmonary artery  saturation of 52%. Index was 1.6.       Other imaging studies:  EKG 12Lead: AV Paced      Brief HPI:  Shahid Villalta is a 78 year old male with a PMHx of severe aortic stenosis, mitral regurgitation, CAD with known occluded RCA (with left to right collaterals), mild cardiomyopathy, PAfib s/p AV dario ablation and BiV ICD implantation, and CKD stage III. During a routine follow up in May 2018, patient complained of worsening shortness of breath and was found to have severe aortic stenosis on echo. Now s/p TAVR procedure.     Hospital Course by Diagnosis:  #Severe aortic stenosis, now s/p Transcatheter Aortic Valve Replacement with 34mm Evolut. Prior to procedure, pt noted to have a mean gradient 36 mmHG, FELIZ 0.6 Cm^2, peak velocity 4.1 m/s.  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 DEMETRIUS completed to verify findings, no fistula noted.  Sheath sites soft without hematoma. No arrhythmias. Neuro's intact.     - Plavix and Warfarin for anti-platelet therapy  - Outpatient Cardiac rehab  - Lifelong antibiotic prophylaxis for dental procedures  - Continue PTA Lisinopril, Toprol, Torsemide  - Continue PTA Simvastatin      #Controlled Hypertension  Currently Stable  - Continue PTA Lisinopril 2.5 mg daily, Metoprolol  mg daily, Amiodarone 100 mg  daily, Torsemide 20 mg daily  - Follow up with PCP as scheduled     #Paroxsymal A-fib s/p AV dario ablation  Currently stable, % AV paced  - Continue Warfarin, will also start Plavix as listed above     #Chronic Systolic Heart Failure, s/p Biventricular Pacemaker, last EF 35%  - Echocardiogram yesterday EF 30-35%  - Beta Blocker: Continue PTA Metoprolol 100mg daily  - ACEi: Continue PTA Lisinopril 2.5 mg daily  - Diuretic: Continue PTA Torsemide 20 mg daily  - Continue PTA Simvastatin 20mg daily  - Continue to monitor fluid intake, daily weights, and notify provider if weight increases 2 lbs in a day or 5 lbs in one week      Condition on discharge  Temp:  [97.9  F (36.6  C)-98.5  F (36.9  C)] 97.9  F (36.6  C)  Pulse:  [60-65] 61  Heart Rate:  [60-73] 72  Resp:  [14-20] 16  BP: ()/(38-77) 137/69  FiO2 (%):  [21 %] 21 %  SpO2:  [90 %-99 %] 96 %  General: Alert, interactive, NAD  Eyes: sclera anicteric, EOMI  Neck: JVP not appreciated, carotid 2+ bilaterally  Cardiovascular: regular rate and rhythm, normal S1 and S2, no murmurs, gallops, or rubs  Resp: clear to auscultation bilaterally, no rales, wheezes, or rhonchi  GI: Soft, nontender, nondistended. +BS.  No rebound or guarding.  Extremities: no edema, no cyanosis or clubbing, dorsalis pedis and posterior tibialis pulses 2+ bilaterally  Skin: Warm and dry, no jaundice or rash; groin sites L>R soft, non-tender, minimal bruising near right groin, no signs of hematoma  Neuro: CN 2-12 intact, moves all extremities equally  Psych: Alert & oriented x 3      Medication Changes:  - Start Plavix 75 mg daily    Discharge medications:   Current Discharge Medication List      START taking these medications    Details   clopidogrel (PLAVIX) 75 MG tablet Take 1 tablet (75 mg) by mouth daily  Qty: 60 tablet, Refills: 1    Associated Diagnoses: S/P TAVR (transcatheter aortic valve replacement); Aortic stenosis, severe         CONTINUE these medications which have NOT  CHANGED    Details   acetaminophen (TYLENOL) 325 MG tablet Take 1,300 mg by mouth 2 times daily       amiodarone (PACERONE/CODARONE) 200 MG tablet Take PO 1/2 tablet daily-Take extra prn per MD recommendations.  Qty: 50 tablet, Refills: 0    Associated Diagnoses: Paroxysmal atrial fibrillation (H)      aspirin 81 MG chewable tablet Take 81 mg by mouth daily Start 8/31/18      ASPIRIN NOT PRESCRIBED, INTENTIONAL, 1 each daily Antiplatelet medication not prescribed intentionally due to Current anticoagulant therapy (warfarin/enoxaparin)  Qty: 0 each, Refills: 0    Associated Diagnoses: Atrial fibrillation, unspecified      cetirizine (ZYRTEC) 10 MG tablet Take 1 tablet (10 mg) by mouth daily  Qty: 90 tablet, Refills: 1    Associated Diagnoses: Allergic rhinitis      cholecalciferol (VITAMIN D) 1000 UNIT tablet Take 2 tablets (2,000 Units) by mouth daily  Qty: 180 tablet, Refills: 1    Associated Diagnoses: Vitamin D deficiency; Parathyroid hormone excess (H)      digoxin (LANOXIN) 125 MCG tablet Take 1 tablet (125 mcg) by mouth every 48 hours  Qty: 45 tablet, Refills: 0    Comments: Due for annual office visit  Associated Diagnoses: Chronic systolic heart failure (H)      lisinopril (PRINIVIL/ZESTRIL) 2.5 MG tablet Take 1 tablet (2.5 mg) by mouth daily  Qty: 90 tablet, Refills: 3    Associated Diagnoses: Nonrheumatic aortic valve stenosis      metoprolol succinate (TOPROL-XL) 100 MG 24 hr tablet Take 1 tablet (100 mg) by mouth daily  Qty: 90 tablet, Refills: 3    Associated Diagnoses: Paroxysmal atrial fibrillation (H)      omeprazole (PRILOSEC) 20 MG CR capsule Take 1 capsule (20 mg) by mouth daily  Qty: 90 capsule, Refills: 3    Associated Diagnoses: Gastroesophageal reflux disease without esophagitis      polyethylene glycol (MIRALAX/GLYCOLAX) powder Take 17 g by mouth daily as needed for constipation      potassium chloride (K-TAB,KLOR-CON) 10 MEQ tablet Take 1 tablet (10 mEq) by mouth daily  Qty: 90 tablet,  Refills: 3    Associated Diagnoses: Chronic systolic heart failure (H)      senna-docusate (SENOKOT-S;PERICOLACE) 8.6-50 MG per tablet Take 2 tablets by mouth daily       sildenafil (VIAGRA) 25 MG tablet Take 25 mg by mouth as needed      simethicone (MYLICON) 80 MG chewable tablet Take 1 tablet (80 mg) by mouth every 6 hours as needed for cramping  Qty: 180 tablet    Associated Diagnoses: Abdominal bloating      simvastatin (ZOCOR) 20 MG tablet Take 1 tablet (20 mg) by mouth At Bedtime  Qty: 90 tablet, Refills: 3    Associated Diagnoses: Hyperlipidemia LDL goal <100      torsemide (DEMADEX) 20 MG tablet Take 1 tablet (20 mg) by mouth daily  Qty: 90 tablet, Refills: 3    Associated Diagnoses: Chronic systolic heart failure (H); Ischemic cardiomyopathy      warfarin (COUMADIN) 5 MG tablet 5 mg daily  Qty: 90 tablet, Refills: 0    Associated Diagnoses: Long-term (current) use of anticoagulants; Paroxysmal ventricular tachycardia (H)             Labs or imaging requiring follow-up after discharge:  Repeat Echocardiogram in 1 month as previously arranged      Follow-up:  - Cardiology Clinic in 1 week and 1 month for TAVR follow up  - PCP in 1-2 weeks for post hospitalization follow up    Code status:  Full    TONYA Ruiz, CNP  [facility] Cardiology  [phone]

## 2018-09-20 NOTE — PROGRESS NOTES
09/20/18 1000   Quick Adds   Type of Visit Initial Occupational Therapy Evaluation   Living Environment   Lives With alone   Living Arrangements mobile home  (RV)   Home Accessibility stairs (1 railing present);stairs to enter home   Number of Stairs to Enter Home 2   Number of Stairs Within Home 0   Stair Railings at Home outside, present on right side   Transportation Available family or friend will provide;public transportation   Living Environment Comment OT: Pt reported living alone in an RV.    Self-Care   Dominant Hand left   Usual Activity Tolerance fair   Current Activity Tolerance good   Regular Exercise no   Equipment Currently Used at Home other (see comments)  (reacher)   Activity/Exercise/Self-Care Comment OT: Per pt and family report, pt does not formally exercise but walks 2-3,000 steps per day with normal activities.   Functional Level Prior   Ambulation 0-->independent   Transferring 0-->independent   Toileting 0-->independent   Bathing 0-->independent   Dressing 0-->independent   Eating 0-->independent   Communication 0-->understands/communicates without difficulty   Swallowing 0-->swallows foods/liquids without difficulty   Cognition 0 - no cognition issues reported   Fall history within last six months yes   Number of times patient has fallen within last six months 1   Prior Functional Level Comment OT: Fall was from a trip off the curb, otherwise pt reported previous ind in ADLs   General Information   Onset of Illness/Injury or Date of Surgery - Date 09/19/18   Referring Physician Demetrice Perez, CNP   Patient/Family Goals Statement to discharge home   Precautions/Limitations other (see comments)  (TAVR)   Weight-Bearing Status - LUE other (see comments)  (10# lifting restriction)   Weight-Bearing Status - RUE full weight-bearing   Cognitive Status Examination   Orientation orientation to person, place and time   Cognitive Comment OT: Pt's cognition WFL, no concerns noted.   Visual  Perception   Visual Perception Wears glasses   Visual Perception Comments OT: Pt reports vision is WFL with glasses.   Sensory Examination   Sensory Quick Adds No deficits were identified   Pain Assessment   Patient Currently in Pain Yes, see Vital Sign flowsheet   Integumentary/Edema   Integumentary/Edema no deficits were identifed   Range of Motion (ROM)   ROM Comment OT: Pt's ROM WFL   Strength   Strength Comments OT: Not formally tested 2/2 precautions but WFL   Coordination   Upper Extremity Coordination No deficits were identified   Transfer Skills   Transfer Comments SBA STS   Balance   Balance Quick Add No deficits identified   Grooming   Level of [place]: Grooming independent   Instrumental Activities of Daily Living (IADL)   Previous Responsibilities meal prep;housekeeping;shopping;yardwork;medication management;finances;driving   Activities of Daily Living Analysis   Impairments Contributing to Impaired Activities of Daily Living pain;post surgical precautions;strength decreased   General Therapy Interventions   Planned Therapy Interventions ADL retraining;IADL retraining;bed mobility training;strengthening;home program guidelines;transfer training;progressive activity/exercise   Clinical Impression   Criteria for Skilled Therapeutic Interventions Met yes, treatment indicated   OT Diagnosis OT: Pt with decreased ind in ADLs/IADLs   Influenced by the following impairments pain, precautions, fatigue   Assessment of Occupational Performance 1-3 Performance Deficits   Identified Performance Deficits functional ambulation, transfers, bathing   Clinical Decision Making (Complexity) Low complexity   Therapy Frequency daily   Predicted Duration of Therapy Intervention (days/wks) 10/1/18   Anticipated Discharge Disposition Home with Outpatient Therapy;Home with Assist   Risks and Benefits of Treatment have been explained. Yes   Patient, Family & other staff in agreement with plan of care Yes   Clinical  "Impression Comments OT: Pt with decreased ind in ADLs/IADLs and would benefit from skilled OT services.   [facility] AM-PAC  \"6 Clicks\" Daily Activity Inpatient Short Form   1. Putting on and taking off regular lower body clothing? 4 - None   2. Bathing (including washing, rinsing, drying)? 4 - None   3. Toileting, which includes using toilet, bedpan or urinal? 4 - None   4. Putting on and taking off regular upper body clothing? 4 - None   5. Taking care of personal grooming such as brushing teeth? 4 - None   6. Eating meals? 4 - None   Daily Activity Raw Score (Score out of 24.Lower scores equate to lower levels of function) 24   Total Evaluation Time   Total Evaluation Time (Minutes) 5     "

## 2018-09-20 NOTE — PLAN OF CARE
Problem: Patient Care Overview  Goal: Plan of Care/Patient Progress Review  Discharge Planner OT   Patient plan for discharge: Home w/ OP phase II CR  Current status: Eval completed and tx initiated. Pt provided with TAVR folder and educated on contents including discussion about OP CR. Pt ambulated ~400 feet with SBA and no assistive device with 1 standing rest break. Pt asymptomatic throughout with VSS.  Barriers to return to prior living situation: Medical status  Recommendations for discharge: Home w/ OP phase II CR and assist for higher level IADLs  Rationale for recommendations: To further increase pt's ind participation in ADLs/IADLs       Entered by: Audrey Taylor 09/20/2018 11:31 AM

## 2018-09-20 NOTE — OP NOTE
Pt arrived in ECHO department  for scheduled DEMETRIUS.   Procedure explained, questions answered and consent signed. Discharge instructions discussed with patient.  Pt's throat sprayed at 1510, therefore pt will not be able to eat or drink until 2 hours after at 1710 .  Informed pt of this time and encouraged to start with warm fluids and soft foods.    Pt tolerated procedure well, and was given a total of 25 mcg IV fentanyl and 1.5 mg IV versed for conscious sedation.  Pt denied throat or chest pain after DEMETRIUS complete.   DEMETRIUS probe 52 used for procedure.  Pt denied C.P or throat pain after procedure and transported back to unit 4E after awake and VSS accompanied by bryn RN and transport services.  Report given to Castillo MORTON RN.

## 2018-09-20 NOTE — PROGRESS NOTES
[facility]      CSI Progress Note  Shahid Villalta MRN: [id number]  1940  Date of Admission:9/19/2018  Primary care provider: Gume Brown         Interval History:   - No acute events overnight  - s/p 34mm Evolut valve  - Neuro's intact  - Groin site CDI, soft, non-tender, no evidence of hematoma  - No arrhythmias overnight         Review of Systems:    10 point review of systems negative except for stated above in HPI.          Past Medical History:   Medical History reviewed.   Past Medical History:   Diagnosis Date     Aortic valve disorders      Atrial fibrillation (H)      Automatic implantable cardiac defibrillator in situ      Cancer (H)      Congestive heart failure (H)      Coronary artery disease      Essential hypertension, benign      GERD (gastroesophageal reflux disease) 3/16/2010     History of blood transfusion      Hyperlipidaemia      Hypertension      Obese      Other and unspecified hyperlipidemia      Other primary cardiomyopathies      Pacemaker      Sleep apnea      Small bowel obstruction 12/14/2015     Ventricular tachycardia (H) 6/17/2013             Past Surgical History:   Surgical History reviewed.   Past Surgical History:   Procedure Laterality Date     BIOPSY  annually    prostate biopsy     CARDIAC SURGERY  2012    A fib Ablation     CARDIAC SURGERY      cardioversion     COLONOSCOPY  2007     DAVINCI PROSTATECTOMY N/A 11/20/2015    Procedure: DAVINCI PROSTATECTOMY;  Surgeon: Nahun Hilario MD;  Location:  OR     GENITOURINARY SURGERY      bladder surgery 2011     H ABLATION AV NODE  6/25/13     H ABLATION FOCAL AFIB  6/25/13     HC TOOTH EXTRACTION W/FORCEP       HEART CATH FEMORAL CANNULIZATION WITH OPEN STANDBY REPAIR AORTIC VALVE N/A 9/19/2018    Procedure: HEART CATH FEMORAL CANNULIZATION WITH OPEN STANDBY REPAIR AORTIC VALVE;;  Surgeon: Magdiel Ashraf MD;  Location: [facility] OR     TONSILLECTOMY & ADENOIDECTOMY       TRANSCATHETER AORTIC VALVE  IMPLANT ANESTHESIA N/A 2018    Procedure: TRANSCATHETER AORTIC VALVE IMPLANT ANESTHESIA;  Transfemoral Approach Aortic (34 mm Medtronic CoreValve) Valve Replacement, Cardiopulmonary Bypass Standby **Latex Allergy**;  Surgeon: GENERIC ANESTHESIA PROVIDER;  Location: [facility] OR             Social History:   Social History reviewed.  Social History   Substance Use Topics     Smoking status: Never Smoker     Smokeless tobacco: Never Used     Alcohol use No      Comment: AA since              Family History:   Family History reviewed.   Family History   Problem Relation Age of Onset     C.A.D. Father      CHF  at 74     Cerebrovascular Disease Father      C.A.D. Mother      CHF at 91 still living     Cerebrovascular Disease Mother      TIAs     Breast Cancer Mother      HEART DISEASE Son      arrythmia     Family History Negative Sister      Family History Negative Brother      Family History Negative Son              Allergies:     Allergies   Allergen Reactions     Latex Rash     Seasonal Allergies      hayfever - wheezing -              Medications:   Medications Reviewed.   Current Facility-Administered Medications   Medication     acetaminophen (TYLENOL) tablet 325-650 mg     amiodarone (PACERONE/CODARONE) tablet 100 mg     cetirizine (zyrTEC) tablet 10 mg     clopidogrel (PLAVIX) tablet 75 mg     digoxin (LANOXIN) tablet 125 mcg     HOLD:  Metformin and metformin containing medications if patient received IV contrast with acute kidney injury or severe chronic kidney disease (stage IV or stage V; i.e., eGFR less than 30)     lisinopril (PRINIVIL/Zestril) tablet 2.5 mg     metoprolol succinate (TOPROL-XL) 24 hr tablet 100 mg     naloxone (NARCAN) injection 0.1-0.4 mg     naloxone (NARCAN) injection 0.1-0.4 mg     nitroGLYcerin (NITROSTAT) sublingual tablet 0.4 mg     omeprazole (priLOSEC) CR capsule 20 mg     polyethylene glycol (MIRALAX/GLYCOLAX) powder 17 g     potassium chloride (K-TAB,KLOR-CON) CR tablet  "10 mEq     reason aspirin not prescribed (intentional)     senna-docusate (SENOKOT-S;PERICOLACE) 8.6-50 MG per tablet 2 tablet     simvastatin (ZOCOR) tablet 20 mg     torsemide (DEMADEX) tablet 20 mg     warfarin (COUMADIN) tablet 5 mg     Warfarin Therapy Reminder (Check START DATE - warfarin may be starting in the FUTURE)             Physical Exam:   Vitals were reviewed.  Blood pressure 90/62, pulse 66, temperature 98.4  F (36.9  C), temperature source Oral, resp. rate 16, height 1.727 m (5' 8\"), weight 106 kg (233 lb 11 oz), SpO2 90 %.    General: AAOx3, NAD  Skin: Not jaundiced, no rash, no ecchymoses. Groin site CDI R>L, soft, non-tender, no signs of hematoma. No bruits.   HEENT: MMM, PERRLA, EOM intact  CV: RRR, normal S1S2, no murmur, clicks, rubs  Resp: Clear to auscultation bilaterally, no wheezes, rhonchi  Abd: Soft, non-tender, BS+, no masses appreciated  Extremities: warm and well perfused, palpable pulses, no edema  Neuro: No lateralizing symptoms or focal neurologic deficits        Labs:   Routine Labs:  Lab Results   Component Value Date    TROPI 0.057 (H) 11/27/2015    TROPI 0.051 (H) 11/26/2015    TROPI 0.051 (H) 11/26/2015    TROPI 0.048 (H) 11/26/2015    TROPI 0.091 (H) 06/18/2013     CMP  Recent Labs  Lab 09/20/18  0418 09/19/18  1030 09/19/18  0638    139 142   POTASSIUM 4.4 4.8 4.5   CHLORIDE 103 107 107   CO2 26 24 27   ANIONGAP 9 8 8   GLC 85 94 98   BUN 28 31* 34*   CR 1.27* 1.39* 1.50*   GFRESTIMATED 55* 50* 45*   GFRESTBLACK 67 60* 55*   EVA 9.2 8.8 9.6   MAG 2.3 2.2  --    PHOS 2.7 2.9  --    PROTTOTAL  --  6.8 6.9   ALBUMIN  --  3.3* 3.6   BILITOTAL  --  0.6 0.7   ALKPHOS  --  75 80   AST  --  22 18   ALT  --  15 17     CBC  Recent Labs  Lab 09/20/18  0418 09/19/18  1114 09/19/18  0638   WBC 9.2 11.0 9.1   RBC 3.82* 3.87* 3.99*   HGB 11.3* 11.6* 11.9*   HCT 38.0* 38.3* 39.2*    99 98   MCH 29.6 30.0 29.8   MCHC 29.7* 30.3* 30.4*   RDW 16.8* 16.7* 16.7*    227 255 "     INR  Recent Labs  Lab 09/20/18  0418 09/19/18  0638   INR 1.20* 1.17*           Diagnostics:    EKG 12Lead: AV paced    Echo 5/18/18  Interpretation Summary     The visual ejection fraction is estimated at 35%.  There is mild to moderate concentric left ventricular hypertrophy.  Severe mixed aortic stensosis and aortic insufficiency.     There is moderate global hypokinesia of the left ventricle.  The right ventricle is mildly dilated.  There is a catheter/pacemaker lead seen in the right ventricle.  The right ventricular systolic function is mild to moderately reduced.  The left atrium is moderately dilated.  There is moderate to severe mitral annular calcification.  The mean mitral valve gradient is 3mmHg.  Moderate (46-55mmHg) pulmonary hypertension is present.  The mean AoV pressure gradient is 36mmHg.  Moderate to severe valvular aortic stenosis.  There is moderately severe (3+) aortic regurgitation. In the parasternal view  the vena contracta is between 35-50% of the outflow tract daimeter.  There is an eccentric jet of aortic insufficiency directed against the  anterior mitral leaflet.  Mild aortic root dilatation.  The ascending aorta is Mildly dilated.     Compared side by side with the prior study LVEF not definitetely worse, but  appears previous EF is overestimated. Other findings similar.    Left Ventricle  The left ventricle is normal in size. There is mild to moderate concentric  left ventricular hypertrophy. The visual ejection fraction is estimated at  35%. Grade III or advanced diastolic dysfunction. There is moderate global  hypokinesia of the left ventricle.     Right Ventricle  There is a catheter/pacemaker lead seen in the right ventricle. The right  ventricle is mildly dilated. The right ventricular systolic function is mild  to moderately reduced.     Atria  The left atrium is moderately dilated. The right atrium is mildly dilated.  There is no color Doppler evidence of an atrial  shunt.     Mitral Valve  There is moderate to severe mitral annular calcification. There is moderate  (2+) mitral regurgitation. The mean mitral valve gradient is 3mmHg.      Tricuspid Valve  The tricuspid valve is not well visualized, but is grossly normal. There is  mild (1+) tricuspid regurgitation. The right ventricular systolic pressure is  approximated at 42.6 mmHg plus the right atrial pressure. Normal IVC (1.5-  2.5cm) with <50% respiratory collapse; right atrial pressure is estimated at  10-15mmHg. Moderate (46-55mmHg) pulmonary hypertension is present.     Aortic Valve  The aortic valve is not well visualized. There is moderately severe (3+)  aortic regurgitation. There is an eccentric jet of aortic insufficiency  directed against the anterior mitral leaflet. The mean AoV pressure gradient  is 36mmHg. Moderate to severe valvular aortic stenosis.     Pulmonic Valve  The pulmonic valve is not well seen, but is grossly normal. There is moderate  (2+) pulmonic valvular regurgitation.     Vessels  Mild aortic root dilatation. The ascending aorta is Mildly dilated. The  inferior vena cava is not dilated.     Pericardium  There is no pericardial effusion.     Rhythm  Sinus rhythm was noted.    MMode/2D Measurements & Calculations  IVSd: 1.3 cm     LVIDd: 5.7 cm  LVIDs: 4.4 cm  LVPWd: 1.2 cm  IVC diam: 2.4 cm  FS: 21.7 %  LV mass(C)d: 298.6 grams  LV mass(C)dI: 138.2 grams/m2  Ao root diam: 3.9 cm  asc Aorta Diam: 4.1 cm  LVOT diam: 2.4 cm  LVOT area: 4.4 cm2  LA volume (AL/bp): 97.1 cm3     LA Volume Indexed (AL/bp): 44.9 ml/m2  RWT: 0.41  TAPSE: 1.1 cm      Doppler Measurements & Calculations  MV E max mallorie: 139.6 cm/sec  MV A max mallorie: 62.4 cm/sec  MV E/A: 2.2  MV max P.2 mmHg  MV mean P.9 mmHg  MV V2 VTI: 29.7 cm  MVA(VTI): 3.6 cm2  MV dec slope: 733.0 cm/sec2  MV dec time: 0.19 sec  Ao V2 max: 398.0 cm/sec  Ao max P.3 mmHg  Ao V2 mean: 281.2 cm/sec  Ao mean P.5 mmHg  Ao V2 VTI: 85.0  cm  FELIZ(I,D): 1.2 cm2  FELIZ(V,D): 1.3 cm2  AI P1/2t: 347.0 msec  LV V1 max P.3 mmHg  LV V1 max: 114.7 cm/sec  LV V1 VTI: 23.8 cm  SV(LVOT): 105.6 ml  SI(LVOT): 48.8 ml/m2     TR max niko: 326.2 cm/sec  TR max P.6 mmHg  AV Niko Ratio (DI): 0.29  FELIZ Index (cm2/m2): 0.57  E/E' av.6  Lateral E/e': 40.2  Medial E/e': 44.9     Coronary Angiogram/Right heart Cath 18:   Hemodynamics:    Right atrial pressure: Mean of 17  Right ventricular pressure: 62 with an end-diastolic of 17  Pulmonary artery pressure: 65/24 with a mean of 40.  Pulmonary capillary wedge pressure: Mean of 27 with a V-wave to 40  Aortic blood pressure: 100/56 with a mean of 74  Left ventricular pressure: Aortic valve was not crossed      Pulmonary artery saturation: 52%   Femoral artery saturation: 98%.    Yvette cardiac output: 3.3 L/m with an index of: 1.6.    Rhythm: AV sequential pacemaker 60 bpm    Angiographic Results   Right coronary artery is a dominant vessel. It is occluded at the  ostium. There are extensive left to right collaterals.    Left main coronary artery is normal.    Circumflex coronary artery gives rise to 2 very large obtuse marginal  there are minimal luminal irregularities no stenosis greater than  10-20%. Next    Left anterior descending artery again has only mild luminal  irregularities no stenosis greater than 20%.  The left anterior  descending artery does wrap around the apex of the heart.    No ventriculogram performed.    Conclusion: Chronically occluded right coronary artery with extensive  left to right collaterals. Right dominant circulation. Minimal other  coronary disease.  2.Pulmonary hypertension with a pulmonary artery pressure of 65/24  with a mean of 40. Pulmonary capillary wedge pressure was 27 with a  V-wave to 40. Cardiac output 3.3 L/m with a pulmonary artery  saturation of 52%. Index was 1.6.      Assessment and Plan:     Shahid Villalta is a 78 year old male with a PMHx of severe aortic stenosis,  mitral regurgitation, CAD with known occluded RCA (with left to right collaterals), mild cardiomyopathy, PAfib s/p AV dario ablation and BiV ICD implanation, and CKD stage III. During a routine follow up in May 2018, patient complained of worsening shortness of breath and was found to have severe aortic stenosis on echo. Now s/p TAVR procedure.      #Severe aortic stenosis, now s/p Transcatheter Aortic Valve Replacement. Prior to procedure, pt noted to have a mean gradient 36 mmHG, FELIZ 0.6 Cm^2, peak velocity 4.1 m/s- Sheath sites soft without hematoma. No arrhythmias. Neuros intact.    - Ambulate with assist as tolerated  - Neuro checks, per protocol  - Echocardiogram this am, results pending  - Chest xray this am   - Monitor groin sites  - Plavix and Warfarin for anti-platelet therapy  - Cardiac rehab/PT/OT  - Diurese as needed/able  - Daily weights  - Strict intake/output  - Continuous telemetry monitoring  - Lifelong antibiotic prophylaxis for dental procedures  - May resume PTA Lisinopril, Toprol, Torsemide  - Continue PTA Simvastatin     #Controlled Hypertension  Currently Stable  - Resume PTA Lisinopril 2.5 mg daily, Metoprolol  mg daily, Amiodarone 100 mg daily, Torsemide 20 mg daily    #Paroxsymal A-fib s/p AV dario ablation  Currently stable, % AV paced  - Continue Warfarin, will also start Plavix as listed above    #Chronic Systolic Heart Failure, s/p BiVentricular Pacemaker, last EF 35%  - Repeat Echocardiogram as listed above  - Beta Blocker: Continue PTA Metoprolol 100mg daily  - ACEi: Continue PTA Lisinopril 2.5 mg daily  - Diuretic: Continue PTA Torsemide 20 mg daily  - Continue PTA Simvastatin 20mg daily  - Strict I/O, daily weights as listed above    FEN: Cardiac diet  Disposition: Possible home this afternoon pending PT/OT recs  Code Status: FULL CODE      Patient seen and discussed with Dr. Marques, who agrees with plan as outlined above.     TONYA Ruiz, CNP  [facility]  Interventional Cardiology   [phone]

## 2018-09-21 ENCOUNTER — APPOINTMENT (OUTPATIENT)
Dept: OCCUPATIONAL THERAPY | Facility: CLINIC | Age: 78
DRG: 267 | End: 2018-09-21
Attending: INTERNAL MEDICINE
Payer: MEDICARE

## 2018-09-21 VITALS
DIASTOLIC BLOOD PRESSURE: 75 MMHG | BODY MASS INDEX: 35.25 KG/M2 | SYSTOLIC BLOOD PRESSURE: 129 MMHG | OXYGEN SATURATION: 93 % | RESPIRATION RATE: 16 BRPM | HEIGHT: 68 IN | WEIGHT: 232.59 LBS | HEART RATE: 61 BPM | TEMPERATURE: 98 F

## 2018-09-21 LAB
ANION GAP SERPL CALCULATED.3IONS-SCNC: 6 MMOL/L (ref 3–14)
BUN SERPL-MCNC: 26 MG/DL (ref 7–30)
CALCIUM SERPL-MCNC: 9.3 MG/DL (ref 8.5–10.1)
CHLORIDE SERPL-SCNC: 105 MMOL/L (ref 94–109)
CO2 SERPL-SCNC: 30 MMOL/L (ref 20–32)
CREAT SERPL-MCNC: 1.31 MG/DL (ref 0.66–1.25)
ERYTHROCYTE [DISTWIDTH] IN BLOOD BY AUTOMATED COUNT: 16.8 % (ref 10–15)
GFR SERPL CREATININE-BSD FRML MDRD: 53 ML/MIN/1.7M2
GLUCOSE SERPL-MCNC: 82 MG/DL (ref 70–99)
HCT VFR BLD AUTO: 38.2 % (ref 40–53)
HGB BLD-MCNC: 11.5 G/DL (ref 13.3–17.7)
INR PPP: 1.25 (ref 0.86–1.14)
MAGNESIUM SERPL-MCNC: 2.3 MG/DL (ref 1.6–2.3)
MCH RBC QN AUTO: 30.1 PG (ref 26.5–33)
MCHC RBC AUTO-ENTMCNC: 30.1 G/DL (ref 31.5–36.5)
MCV RBC AUTO: 100 FL (ref 78–100)
PHOSPHATE SERPL-MCNC: 2.9 MG/DL (ref 2.5–4.5)
PLATELET # BLD AUTO: 213 10E9/L (ref 150–450)
POTASSIUM SERPL-SCNC: 4.4 MMOL/L (ref 3.4–5.3)
RBC # BLD AUTO: 3.82 10E12/L (ref 4.4–5.9)
SODIUM SERPL-SCNC: 141 MMOL/L (ref 133–144)
WBC # BLD AUTO: 8.2 10E9/L (ref 4–11)

## 2018-09-21 PROCEDURE — 25000132 ZZH RX MED GY IP 250 OP 250 PS 637: Mod: GY | Performed by: NURSE PRACTITIONER

## 2018-09-21 PROCEDURE — 25000132 ZZH RX MED GY IP 250 OP 250 PS 637: Mod: GY | Performed by: PHYSICIAN ASSISTANT

## 2018-09-21 PROCEDURE — 80048 BASIC METABOLIC PNL TOTAL CA: CPT | Performed by: INTERNAL MEDICINE

## 2018-09-21 PROCEDURE — 25000132 ZZH RX MED GY IP 250 OP 250 PS 637: Mod: GY

## 2018-09-21 PROCEDURE — 36415 COLL VENOUS BLD VENIPUNCTURE: CPT | Performed by: INTERNAL MEDICINE

## 2018-09-21 PROCEDURE — 40000133 ZZH STATISTIC OT WARD VISIT

## 2018-09-21 PROCEDURE — A9270 NON-COVERED ITEM OR SERVICE: HCPCS | Mod: GY | Performed by: PHYSICIAN ASSISTANT

## 2018-09-21 PROCEDURE — 97535 SELF CARE MNGMENT TRAINING: CPT | Mod: GO

## 2018-09-21 PROCEDURE — 93010 ELECTROCARDIOGRAM REPORT: CPT | Performed by: INTERNAL MEDICINE

## 2018-09-21 PROCEDURE — A9270 NON-COVERED ITEM OR SERVICE: HCPCS | Mod: GY | Performed by: NURSE PRACTITIONER

## 2018-09-21 PROCEDURE — 84100 ASSAY OF PHOSPHORUS: CPT | Performed by: INTERNAL MEDICINE

## 2018-09-21 PROCEDURE — 99238 HOSP IP/OBS DSCHRG MGMT 30/<: CPT | Performed by: INTERNAL MEDICINE

## 2018-09-21 PROCEDURE — 83735 ASSAY OF MAGNESIUM: CPT | Performed by: INTERNAL MEDICINE

## 2018-09-21 PROCEDURE — A9270 NON-COVERED ITEM OR SERVICE: HCPCS | Mod: GY

## 2018-09-21 PROCEDURE — 40000014 ZZH STATISTIC ARTERIAL MONITORING DAILY

## 2018-09-21 PROCEDURE — 85027 COMPLETE CBC AUTOMATED: CPT | Performed by: INTERNAL MEDICINE

## 2018-09-21 PROCEDURE — 85610 PROTHROMBIN TIME: CPT | Performed by: INTERNAL MEDICINE

## 2018-09-21 PROCEDURE — 93005 ELECTROCARDIOGRAM TRACING: CPT

## 2018-09-21 PROCEDURE — 97530 THERAPEUTIC ACTIVITIES: CPT | Mod: GO

## 2018-09-21 RX ORDER — CLOPIDOGREL BISULFATE 75 MG/1
75 TABLET ORAL DAILY
Qty: 60 TABLET | Refills: 1 | Status: SHIPPED | OUTPATIENT
Start: 2018-09-21 | End: 2018-10-25

## 2018-09-21 RX ORDER — WARFARIN SODIUM 5 MG/1
5 TABLET ORAL
Status: DISCONTINUED | OUTPATIENT
Start: 2018-09-21 | End: 2018-09-21 | Stop reason: HOSPADM

## 2018-09-21 RX ADMIN — POTASSIUM CHLORIDE 10 MEQ: 750 TABLET, EXTENDED RELEASE ORAL at 08:40

## 2018-09-21 RX ADMIN — TORSEMIDE 20 MG: 20 TABLET ORAL at 08:40

## 2018-09-21 RX ADMIN — CLOPIDOGREL 75 MG: 75 TABLET, FILM COATED ORAL at 08:38

## 2018-09-21 RX ADMIN — Medication 1 EACH: at 08:40

## 2018-09-21 RX ADMIN — LISINOPRIL 2.5 MG: 2.5 TABLET ORAL at 08:38

## 2018-09-21 RX ADMIN — CETIRIZINE HYDROCHLORIDE 10 MG: 10 TABLET, FILM COATED ORAL at 08:39

## 2018-09-21 RX ADMIN — METOPROLOL SUCCINATE 100 MG: 100 TABLET, EXTENDED RELEASE ORAL at 08:39

## 2018-09-21 RX ADMIN — AMIODARONE HYDROCHLORIDE 100 MG: 100 TABLET ORAL at 08:40

## 2018-09-21 RX ADMIN — OMEPRAZOLE 20 MG: 20 CAPSULE, DELAYED RELEASE ORAL at 08:38

## 2018-09-21 ASSESSMENT — ACTIVITIES OF DAILY LIVING (ADL)
ADLS_ACUITY_SCORE: 12

## 2018-09-21 NOTE — PLAN OF CARE
Problem: Patient Care Overview  Goal: Plan of Care/Patient Progress Review  Outcome: Improving  Assessment:   Cardiac: 100% Paced and VSS.  Resp: Tolerating RA when awake and CPAP at night.   Neuro: Intact  : Voiding per urinal. .    Interventions: No significant events overnight.     Plan: Will continue to monitor/assess and update MD as needed.    Recommend:  Transfer to

## 2018-09-21 NOTE — PLAN OF CARE
Problem: Patient Care Overview  Goal: Plan of Care/Patient Progress Review  OT/4E - Discharge Planner OT   Patient plan for discharge: home today  Current status: Facilitated functional mobility ~400 feet with CGA-SBA, working to increase activity tolerance needed for ADL/IADL. Reviewed post surgical lifting restriction and recommendation for OP CR. Pt reports no functional concerns with plan for discharge to home today. SpO2 93% on RA, and /75 (MAP 89) following activity.   Barriers to return to prior living situation: lifting restriction, activity tolerance  Recommendations for discharge: home with assist as needed and OP CR  Rationale for recommendations: pt will require assist for heavy lifting and would benefit from OP CR to safely increase activity tolerance following TAVR       Entered by: Shira Murray 09/21/2018 11:15 AM

## 2018-09-21 NOTE — DISCHARGE SUMMARY
Reviewed discharge instructions and medication with patient and patient's sister. Questions addressed and answered.

## 2018-09-21 NOTE — CONSULTS
Social Work was consulted to assess needs of patient following TAVR. Chart was reviewed and discussed with interdisciplinary team. Social work needs are not identified at this time. Pt is scheduled to be discharged to home today. Please re-consult social work if additional needs are identified.       Citlaly Hill, [facility]  ICU   Pager: [phone]

## 2018-09-22 NOTE — PLAN OF CARE
Problem: Patient Care Overview  Goal: Plan of Care/Patient Progress Review  Occupational Therapy Discharge Summary    Reason for therapy discharge:    Discharged to home with outpatient therapy.    Progress towards therapy goal(s). See goals on Care Plan in [facility] electronic health record for goal details.  Goals partially met.  Barriers to achieving goals:   discharge from facility.    Therapy recommendation(s):    Continued therapy is recommended.  Rationale/Recommendations:  continued OP CR to increase ind in ADLS/IADLS .

## 2018-09-24 ENCOUNTER — PATIENT OUTREACH (OUTPATIENT)
Dept: CARE COORDINATION | Facility: CLINIC | Age: 78
End: 2018-09-24

## 2018-09-24 LAB — INTERPRETATION ECG - MUSE: NORMAL

## 2018-09-24 NOTE — PROGRESS NOTES
Left message for patient to call back with any questions or concerns in regards to recent discharge

## 2018-09-27 ENCOUNTER — ANTICOAGULATION THERAPY VISIT (OUTPATIENT)
Dept: NURSING | Facility: CLINIC | Age: 78
End: 2018-09-27
Payer: COMMERCIAL

## 2018-09-27 DIAGNOSIS — Z79.01 LONG-TERM (CURRENT) USE OF ANTICOAGULANTS: ICD-10-CM

## 2018-09-27 LAB — INR POINT OF CARE: 1.6 (ref 0.86–1.14)

## 2018-09-27 PROCEDURE — 85610 PROTHROMBIN TIME: CPT | Mod: QW

## 2018-09-27 PROCEDURE — 36416 COLLJ CAPILLARY BLOOD SPEC: CPT

## 2018-09-27 NOTE — PROGRESS NOTES
ANTICOAGULATION FOLLOW-UP CLINIC VISIT    Patient Name:  Shahid Villalta  Date:  9/27/2018  Contact Type:  Face to Face    SUBJECTIVE:     Patient Findings     Positives Change in medications    Comments Pt recently placed on plavix after heart valve replacement           OBJECTIVE    INR Protime   Date Value Ref Range Status   09/27/2018 1.6 (A) 0.86 - 1.14 Final     Chromogenic Factor 10   Date Value Ref Range Status   07/31/2017 17 (L) 70 - 130 % Final     Comment:     Therapeutic Range:  A Chromogenic Factor 10 level of approximately 20-40%   inversely correlates with an INR of 2-3 for patients receiving Warfarin.   Chromogenic Factor 10 levels below 20% indicate an INR greater than 3 and   levels above 40% indicate an INR less than 2.         ASSESSMENT / PLAN  INR assessment SUB    Recheck INR In: 1 WEEK    INR Location Clinic      Anticoagulation Summary as of 9/27/2018     INR goal 2.0-3.0   Today's INR 1.6!   Warfarin maintenance plan 2.5 mg (5 mg x 0.5) on Mon; 5 mg (5 mg x 1) all other days   Full warfarin instructions 9/27: 10 mg; Otherwise 2.5 mg on Mon; 5 mg all other days   Weekly warfarin total 32.5 mg   Plan last modified Manisha Pierson RN (7/23/2018)   Next INR check 10/4/2018   Target end date     Indications   Long-term (current) use of anticoagulants [Z79.01] [Z79.01]  Paroxysmal ventricular tachycardia (H) (Resolved) [I47.2]         Anticoagulation Episode Summary     INR check location     Preferred lab     Send INR reminders to [facility]    Comments             See the Encounter Report to view Anticoagulation Flowsheet and Dosing Calendar (Go to Encounters tab in chart review, and find the Anticoagulation Therapy Visit)    Dosage adjustment made based on physician directed care plan.    Manisha Pierson, JUDIT

## 2018-09-27 NOTE — OP NOTE
Procedure Date: 2018      PREOPERATIVE DIAGNOSIS:  Severe aortic stenosis.      POSTOPERATIVE DIAGNOSIS:  Severe aortic stenosis.        PROCEDURE:  Transcatheter transfemoral aortic valve replacement with 34 mm Medtronic Evolut valve.      CARDIOLOGY TEAM:  Catia Nation MD and Justin Dupree MD      SURGEON:  Magdiel Mcgee MD      OPERATIVE INDICATIONS:  The patient is a 78-year-old gentleman with severe symptomatic aortic stenosis in whom a decision was made to proceed with transfemoral transcatheter aortic valve replacement.      DESCRIPTION OF PROCEDURE:  The patient was brought to the operating room in stable condition.  Following administration of anesthesia, the patient's chest, abdomen and lower extremities were prepped and draped in the usual sterile manner.  Percutaneous access to the right and left common femoral artery and the left common femoral vein was obtained by the Seldinger technique.  Transvenous pacemaker was placed in the right ventricle and optimal electrical thresholds obtained.  Pigtail catheter was placed in the aortic root and optimal implantation angle obtained.  Access to the left ventricle was obtained using the AL1 catheter and a J tip wire across the valve.  This was exchanged for a pigtail catheter.  The pigtail catheter was used to advance a Safari wire into the left ventricle and the pigtail was removed.  A 34 mm Medtronic Evolut valve was placed at the level of the native aortic valve and deployed appropriately.  There was good position of the valve with only mild paravalvular leak.  Protamine was given and the sheaths were removed.  The patient transferred to recovery room in stable condition.         MAGDIEL MCGEE MD             D: 2018   T: 2018   MT: NTS      Name:     LUCIO VINCENT   MRN:      [id number]        Account:        [id number]   :      1940           Procedure Date: 2018      Document: [id number]       cc: [facility] Surgery Billing

## 2018-09-27 NOTE — MR AVS SNAPSHOT
Shahid Villalta   9/27/2018 10:00 AM   Anticoagulation Therapy Visit    Description:  78 year old male   Provider:   ANTICOAGULATION CLINIC   Department:  Lv Nurse           INR as of 9/27/2018     Today's INR 1.6!      Anticoagulation Summary as of 9/27/2018     INR goal 2.0-3.0   Today's INR 1.6!   Full warfarin instructions 9/27: 10 mg; Otherwise 2.5 mg on Mon; 5 mg all other days   Next INR check 10/4/2018    Indications   Long-term (current) use of anticoagulants [Z79.01] [Z79.01]  Paroxysmal ventricular tachycardia (H) (Resolved) [I47.2]         Contact Numbers     [facility]  Please call [phone] with any problems or questions regarding your therapy, or to cancel or reschedule your appointment.          September 2018 Details    Sun Mon Tue Wed Thu Fri Sat           1                 2               3               4               5               6               7               8                 9               10               11               12               13               14               15                 16               17               18               19               20               21               22                 23               24               25               26               27      10 mg   See details      28      5 mg         29      5 mg           30      5 mg                Date Details   09/27 This INR check               How to take your warfarin dose     To take:  5 mg Take 1 of the 5 mg tablets.    To take:  10 mg Take 2 of the 5 mg tablets.           October 2018 Details    Sun Mon Tue Wed Thu Fri Sat      1      2.5 mg         2      5 mg         3      5 mg         4            5               6                 7               8               9               10               11               12               13                 14               15               16               17               18               19               20                 21                22               23               24               25               26               27                 28               29               30               31                   Date Details   No additional details    Date of next INR:  10/4/2018         How to take your warfarin dose     To take:  2.5 mg Take 0.5 of a 5 mg tablet.    To take:  5 mg Take 1 of the 5 mg tablets.

## 2018-10-04 ENCOUNTER — CARE COORDINATION (OUTPATIENT)
Dept: CARDIOLOGY | Facility: CLINIC | Age: 78
End: 2018-10-04

## 2018-10-05 ENCOUNTER — ANTICOAGULATION THERAPY VISIT (OUTPATIENT)
Dept: NURSING | Facility: CLINIC | Age: 78
End: 2018-10-05
Payer: COMMERCIAL

## 2018-10-05 LAB — INR POINT OF CARE: 3.5 (ref 0.86–1.14)

## 2018-10-05 PROCEDURE — 36416 COLLJ CAPILLARY BLOOD SPEC: CPT

## 2018-10-05 PROCEDURE — 85610 PROTHROMBIN TIME: CPT | Mod: QW

## 2018-10-05 NOTE — MR AVS SNAPSHOT
Shahid Villalta   10/5/2018 8:30 AM   Anticoagulation Therapy Visit    Description:  78 year old male   Provider:  DORI RN   Department:  Lv Nurse           INR as of 10/5/2018     Today's INR 3.5!      Anticoagulation Summary as of 10/5/2018     INR goal 2.0-3.0   Today's INR 3.5!   Full warfarin instructions 2.5 mg on Mon; 5 mg all other days   Next INR check 10/15/2018    Indications   Long-term (current) use of anticoagulants [Z79.01] [Z79.01]  Paroxysmal ventricular tachycardia (H) (Resolved) [I47.2]         Description     Eat some greens today      Your next Anticoagulation Clinic appointment(s)     Oct 15, 2018  9:00 AM CDT   Anticoagulation Visit with [facility] RN   [facility]    [address] [address] [phone]              Contact Numbers     [facility]  Please call [phone] with any problems or questions regarding your therapy, or to cancel or reschedule your appointment.          October 2018 Details    Sun Mon Tue Wed Thu Fri Sat      1               2               3               4               5      5 mg   See details      6      5 mg           7      5 mg         8      2.5 mg         9      5 mg         10      5 mg         11      5 mg         12      5 mg         13      5 mg           14      5 mg         15            16               17               18               19               20                 21               22               23               24               25               26               27                 28               29               30               31                   Date Details   10/05 This INR check       Date of next INR:  10/15/2018         How to take your warfarin dose     To take:  2.5 mg Take 0.5 of a 5 mg tablet.    To take:  5 mg Take 1 of the 5 mg tablets.

## 2018-10-05 NOTE — PROGRESS NOTES
ANTICOAGULATION FOLLOW-UP CLINIC VISIT    Patient Name:  Shahid Villalta  Date:  10/5/2018  Contact Type:  Face to Face    SUBJECTIVE:     Patient Findings     Positives Change in diet/appetite    Comments Less greens than usual in the last week           OBJECTIVE    INR Protime   Date Value Ref Range Status   10/05/2018 3.5 (A) 0.86 - 1.14 Final     Chromogenic Factor 10   Date Value Ref Range Status   07/31/2017 17 (L) 70 - 130 % Final     Comment:     Therapeutic Range:  A Chromogenic Factor 10 level of approximately 20-40%   inversely correlates with an INR of 2-3 for patients receiving Warfarin.   Chromogenic Factor 10 levels below 20% indicate an INR greater than 3 and   levels above 40% indicate an INR less than 2.         ASSESSMENT / PLAN  No question data found.  Anticoagulation Summary as of 10/5/2018     INR goal 2.0-3.0   Today's INR 3.5!   Warfarin maintenance plan 2.5 mg (5 mg x 0.5) on Mon; 5 mg (5 mg x 1) all other days   Full warfarin instructions 2.5 mg on Mon; 5 mg all other days   Weekly warfarin total 32.5 mg   No change documented Manisha Pierson RN   Plan last modified Manisha Pierson RN (7/23/2018)   Next INR check 10/15/2018   Target end date     Indications   Long-term (current) use of anticoagulants [Z79.01] [Z79.01]  Paroxysmal ventricular tachycardia (H) (Resolved) [I47.2]         Anticoagulation Episode Summary     INR check location     Preferred lab     Send INR reminders to [facility]    Comments             See the Encounter Report to view Anticoagulation Flowsheet and Dosing Calendar (Go to Encounters tab in chart review, and find the Anticoagulation Therapy Visit)        Manisha Pierson RN

## 2018-10-15 ENCOUNTER — HOSPITAL ENCOUNTER (OUTPATIENT)
Dept: CARDIAC REHAB | Facility: CLINIC | Age: 78
End: 2018-10-15
Attending: PHYSICIAN ASSISTANT
Payer: MEDICARE

## 2018-10-15 ENCOUNTER — ANTICOAGULATION THERAPY VISIT (OUTPATIENT)
Dept: NURSING | Facility: CLINIC | Age: 78
End: 2018-10-15
Payer: COMMERCIAL

## 2018-10-15 DIAGNOSIS — R79.1 ELEVATED INR: Primary | ICD-10-CM

## 2018-10-15 LAB
INR PPP: 3.27 (ref 0.86–1.14)
INR PPP: 4.4

## 2018-10-15 PROCEDURE — 93798 PHYS/QHP OP CAR RHAB W/ECG: CPT | Performed by: OCCUPATIONAL THERAPIST

## 2018-10-15 PROCEDURE — 40000575 ZZH STATISTIC OP CARDIAC VISIT #2: Performed by: OCCUPATIONAL THERAPIST

## 2018-10-15 PROCEDURE — 93797 PHYS/QHP OP CAR RHAB WO ECG: CPT | Mod: XU | Performed by: OCCUPATIONAL THERAPIST

## 2018-10-15 PROCEDURE — 85610 PROTHROMBIN TIME: CPT | Performed by: FAMILY MEDICINE

## 2018-10-15 PROCEDURE — 36415 COLL VENOUS BLD VENIPUNCTURE: CPT | Performed by: FAMILY MEDICINE

## 2018-10-15 PROCEDURE — 40000116 ZZH STATISTIC OP CR VISIT: Performed by: OCCUPATIONAL THERAPIST

## 2018-10-15 PROCEDURE — 99207 ZZC NO CHARGE NURSE ONLY: CPT

## 2018-10-15 NOTE — MR AVS SNAPSHOT
Shahid Villalta   10/15/2018 9:00 AM   Anticoagulation Therapy Visit    Description:  78 year old male   Provider:  DORI RN   Department:  Lv Nurse           INR as of 10/15/2018     Today's INR 3.27!      Anticoagulation Summary as of 10/15/2018     INR goal 2.0-3.0   Today's INR 3.27!   Full warfarin instructions 10/17: 2.5 mg; Otherwise 2.5 mg on Mon; 5 mg all other days   Next INR check 10/22/2018    Indications   Long-term (current) use of anticoagulants [Z79.01] [Z79.01]  Paroxysmal ventricular tachycardia (H) (Resolved) [I47.2]         Description     Pt has been breaking amiodarone in half and taking 100 mg for several months now       Contact Numbers     [facility]  Please call [phone] with any problems or questions regarding your therapy, or to cancel or reschedule your appointment.          October 2018 Details    Sun Mon Tue Wed Thu Fri Sat      1               2               3               4               5               6                 7               8               9               10               11               12               13                 14               15      2.5 mg   See details      16      5 mg         17      2.5 mg         18      5 mg         19      5 mg         20      5 mg           21      5 mg         22            23               24               25               26               27                 28               29               30               31                   Date Details   10/15 This INR check       Date of next INR:  10/22/2018         How to take your warfarin dose     To take:  2.5 mg Take 0.5 of a 5 mg tablet.    To take:  5 mg Take 1 of the 5 mg tablets.

## 2018-10-15 NOTE — PROGRESS NOTES
ANTICOAGULATION FOLLOW-UP CLINIC VISIT    Patient Name:  Shahid Villalta  Date:  10/15/2018  Contact Type:  [facility]    SUBJECTIVE:     Patient Findings     Positives No Problem Findings           OBJECTIVE    INR   Date Value Ref Range Status   10/15/2018 3.27 (H) 0.86 - 1.14 Final     Chromogenic Factor 10   Date Value Ref Range Status   07/31/2017 17 (L) 70 - 130 % Final     Comment:     Therapeutic Range:  A Chromogenic Factor 10 level of approximately 20-40%   inversely correlates with an INR of 2-3 for patients receiving Warfarin.   Chromogenic Factor 10 levels below 20% indicate an INR greater than 3 and   levels above 40% indicate an INR less than 2.         ASSESSMENT / PLAN  No question data found.  Anticoagulation Summary as of 10/15/2018     INR goal 2.0-3.0   Today's INR 3.27!   Warfarin maintenance plan 2.5 mg (5 mg x 0.5) on Mon; 5 mg (5 mg x 1) all other days   Full warfarin instructions 10/17: 2.5 mg; Otherwise 2.5 mg on Mon; 5 mg all other days   Weekly warfarin total 32.5 mg   Plan last modified Manisha Pierson RN (7/23/2018)   Next INR check 10/22/2018   Target end date     Indications   Long-term (current) use of anticoagulants [Z79.01] [Z79.01]  Paroxysmal ventricular tachycardia (H) (Resolved) [I47.2]         Anticoagulation Episode Summary     INR check location     Preferred lab     Send INR reminders to [facility]    Comments             See the Encounter Report to view Anticoagulation Flowsheet and Dosing Calendar (Go to Encounters tab in chart review, and find the Anticoagulation Therapy Visit)    Dosage adjustment made based on physician directed care plan.    Manisha Pierson RN

## 2018-10-17 ENCOUNTER — HOSPITAL ENCOUNTER (OUTPATIENT)
Dept: CARDIAC REHAB | Facility: CLINIC | Age: 78
End: 2018-10-17
Attending: PHYSICIAN ASSISTANT
Payer: MEDICARE

## 2018-10-17 PROCEDURE — 40000575 ZZH STATISTIC OP CARDIAC VISIT #2: Performed by: REHABILITATION PRACTITIONER

## 2018-10-17 PROCEDURE — 93797 PHYS/QHP OP CAR RHAB WO ECG: CPT | Mod: XU | Performed by: REHABILITATION PRACTITIONER

## 2018-10-17 PROCEDURE — 40000116 ZZH STATISTIC OP CR VISIT

## 2018-10-17 PROCEDURE — 93798 PHYS/QHP OP CAR RHAB W/ECG: CPT

## 2018-10-19 ENCOUNTER — HOSPITAL ENCOUNTER (OUTPATIENT)
Dept: CARDIAC REHAB | Facility: CLINIC | Age: 78
End: 2018-10-19
Attending: PHYSICIAN ASSISTANT
Payer: MEDICARE

## 2018-10-19 PROCEDURE — 93798 PHYS/QHP OP CAR RHAB W/ECG: CPT

## 2018-10-19 PROCEDURE — 40000116 ZZH STATISTIC OP CR VISIT

## 2018-10-22 ENCOUNTER — HOSPITAL ENCOUNTER (OUTPATIENT)
Dept: CARDIAC REHAB | Facility: CLINIC | Age: 78
End: 2018-10-22
Attending: PHYSICIAN ASSISTANT
Payer: MEDICARE

## 2018-10-22 PROCEDURE — 93798 PHYS/QHP OP CAR RHAB W/ECG: CPT

## 2018-10-22 PROCEDURE — 40000116 ZZH STATISTIC OP CR VISIT

## 2018-10-23 ENCOUNTER — MYC REFILL (OUTPATIENT)
Dept: FAMILY MEDICINE | Facility: CLINIC | Age: 78
End: 2018-10-23

## 2018-10-23 ENCOUNTER — MYC MEDICAL ADVICE (OUTPATIENT)
Dept: FAMILY MEDICINE | Facility: CLINIC | Age: 78
End: 2018-10-23

## 2018-10-23 DIAGNOSIS — I50.22 CHRONIC SYSTOLIC HEART FAILURE (H): ICD-10-CM

## 2018-10-23 RX ORDER — DIGOXIN 125 MCG
125 TABLET ORAL
Qty: 45 TABLET | Refills: 0 | Status: SHIPPED | OUTPATIENT
Start: 2018-10-23 | End: 2019-01-29

## 2018-10-23 NOTE — TELEPHONE ENCOUNTER
Routing refill request to provider for review/approval because:  Labs not current:    Pt is out  Marisela Pierson RN, BSN

## 2018-10-23 NOTE — TELEPHONE ENCOUNTER
Message from [employer]:  Original authorizing provider: MD Shahid Delarosa would like a refill of the following medications:  digoxin (LANOXIN) 125 MCG tablet [Gume Brown MD]    Preferred pharmacy: [facility] [place] - [id number] AT [facility]    Comment:

## 2018-10-23 NOTE — TELEPHONE ENCOUNTER
"Requested Prescriptions   Pending Prescriptions Disp Refills     digoxin (LANOXIN) 125 MCG tablet 45 tablet 0    Last Written Prescription Date:  05/14/2018  Last Fill Quantity: 45 tablet,  # refills: 0   Last office visit: 5/31/2018 with prescribing provider:  05/31/2018   Future Office Visit:   Next 5 appointments (look out 90 days)     Oct 25, 2018 12:30 PM CDT   Return Visit with Mira Norwood PA-C   [facility] ([facility])    [address]  [place] [address]   [phone]            Nov 09, 2018  9:00 AM CST   Return Visit with  ONCOLOGY NURSE   [facility] ([facility])    [facility]  [address]  [place] [address]   [phone]            Nov 14, 2018  9:30 AM CST   Return Visit with Nahun Hilario MD   [facility] ([facility])    [facility]  [address]  [place] [address]   [phone]                  Sig: Take 1 tablet (125 mcg) by mouth every 48 hours    Cardiac Glycoside Agents Protocol Failed    10/23/2018  8:26 AM       Failed - Normal digoxin level on file in past 12 mos    Recent Labs   Lab Test  12/11/15   1430   DIGOXIN  0.7            Failed - Normal serum creatinine on file in past 12 mos    Recent Labs   Lab Test  09/21/18   0343   07/19/18   0918   CR  1.31*   < >   --    CREAT   --    --   1.7*    < > = values in this interval not displayed.            Passed - Patient is 18 years of age or older       Passed - Recent (6 mo) or future (30 days) visit within the authorizing provider's specialty    Patient had office visit in the last 6 months or has a visit in the next 30 days with authorizing provider or within the authorizing provider's specialty.  See \"Patient Info\" tab in inbasket, or \"Choose Columns\" in Meds & Orders section of the refill " encounter.              Ethan Lovelace XRT

## 2018-10-24 ENCOUNTER — HOSPITAL ENCOUNTER (OUTPATIENT)
Dept: CARDIAC REHAB | Facility: CLINIC | Age: 78
End: 2018-10-24
Attending: PHYSICIAN ASSISTANT
Payer: MEDICARE

## 2018-10-24 PROCEDURE — 40000575 ZZH STATISTIC OP CARDIAC VISIT #2: Performed by: REHABILITATION PRACTITIONER

## 2018-10-24 PROCEDURE — 40000116 ZZH STATISTIC OP CR VISIT

## 2018-10-24 PROCEDURE — 93797 PHYS/QHP OP CAR RHAB WO ECG: CPT | Mod: XU | Performed by: REHABILITATION PRACTITIONER

## 2018-10-24 PROCEDURE — 93798 PHYS/QHP OP CAR RHAB W/ECG: CPT

## 2018-10-25 ENCOUNTER — MEDICAL CORRESPONDENCE (OUTPATIENT)
Dept: HEALTH INFORMATION MANAGEMENT | Facility: CLINIC | Age: 78
End: 2018-10-25

## 2018-10-25 ENCOUNTER — OFFICE VISIT (OUTPATIENT)
Dept: CARDIOLOGY | Facility: CLINIC | Age: 78
End: 2018-10-25
Attending: INTERNAL MEDICINE
Payer: COMMERCIAL

## 2018-10-25 ENCOUNTER — ANTICOAGULATION THERAPY VISIT (OUTPATIENT)
Dept: FAMILY MEDICINE | Facility: CLINIC | Age: 78
End: 2018-10-25
Payer: COMMERCIAL

## 2018-10-25 ENCOUNTER — HOSPITAL ENCOUNTER (OUTPATIENT)
Dept: CARDIOLOGY | Facility: CLINIC | Age: 78
Discharge: HOME | End: 2018-10-25
Attending: INTERNAL MEDICINE | Admitting: INTERNAL MEDICINE
Payer: MEDICARE

## 2018-10-25 VITALS
DIASTOLIC BLOOD PRESSURE: 58 MMHG | HEART RATE: 70 BPM | HEIGHT: 68 IN | BODY MASS INDEX: 34.1 KG/M2 | SYSTOLIC BLOOD PRESSURE: 116 MMHG | WEIGHT: 225 LBS

## 2018-10-25 DIAGNOSIS — C61 PROSTATE CANCER (H): ICD-10-CM

## 2018-10-25 DIAGNOSIS — I42.9 CARDIOMYOPATHY, UNSPECIFIED TYPE (H): ICD-10-CM

## 2018-10-25 DIAGNOSIS — I25.10 CORONARY ARTERY DISEASE INVOLVING NATIVE CORONARY ARTERY OF NATIVE HEART WITHOUT ANGINA PECTORIS: ICD-10-CM

## 2018-10-25 DIAGNOSIS — Z95.2 S/P TAVR (TRANSCATHETER AORTIC VALVE REPLACEMENT): ICD-10-CM

## 2018-10-25 DIAGNOSIS — Z79.01 LONG TERM CURRENT USE OF ANTICOAGULANT THERAPY: ICD-10-CM

## 2018-10-25 DIAGNOSIS — I48.91 ATRIAL FIBRILLATION (H): Chronic | ICD-10-CM

## 2018-10-25 DIAGNOSIS — Z95.2 S/P TAVR (TRANSCATHETER AORTIC VALVE REPLACEMENT): Primary | ICD-10-CM

## 2018-10-25 DIAGNOSIS — Z79.01 LONG TERM CURRENT USE OF ANTICOAGULANT THERAPY: Chronic | ICD-10-CM

## 2018-10-25 LAB
ANION GAP SERPL CALCULATED.3IONS-SCNC: 8 MMOL/L (ref 3–14)
BUN SERPL-MCNC: 28 MG/DL (ref 7–30)
CALCIUM SERPL-MCNC: 9.1 MG/DL (ref 8.5–10.1)
CHLORIDE SERPL-SCNC: 108 MMOL/L (ref 94–109)
CO2 SERPL-SCNC: 26 MMOL/L (ref 20–32)
CREAT SERPL-MCNC: 1.6 MG/DL (ref 0.66–1.25)
ERYTHROCYTE [DISTWIDTH] IN BLOOD BY AUTOMATED COUNT: 15.7 % (ref 10–15)
GFR SERPL CREATININE-BSD FRML MDRD: 42 ML/MIN/1.7M2
GLUCOSE SERPL-MCNC: 100 MG/DL (ref 70–99)
HCT VFR BLD AUTO: 40.4 % (ref 40–53)
HGB BLD-MCNC: 12.6 G/DL (ref 13.3–17.7)
INR PPP: 3.51 (ref 0.86–1.14)
MCH RBC QN AUTO: 30.2 PG (ref 26.5–33)
MCHC RBC AUTO-ENTMCNC: 31.2 G/DL (ref 31.5–36.5)
MCV RBC AUTO: 97 FL (ref 78–100)
PLATELET # BLD AUTO: 233 10E9/L (ref 150–450)
POTASSIUM SERPL-SCNC: 4.8 MMOL/L (ref 3.4–5.3)
RBC # BLD AUTO: 4.17 10E12/L (ref 4.4–5.9)
SODIUM SERPL-SCNC: 142 MMOL/L (ref 133–144)
WBC # BLD AUTO: 6.5 10E9/L (ref 4–11)

## 2018-10-25 PROCEDURE — 85027 COMPLETE CBC AUTOMATED: CPT | Performed by: PHYSICIAN ASSISTANT

## 2018-10-25 PROCEDURE — 99214 OFFICE O/P EST MOD 30 MIN: CPT | Performed by: PHYSICIAN ASSISTANT

## 2018-10-25 PROCEDURE — 93000 ELECTROCARDIOGRAM COMPLETE: CPT | Performed by: PHYSICIAN ASSISTANT

## 2018-10-25 PROCEDURE — 93306 TTE W/DOPPLER COMPLETE: CPT | Mod: 26 | Performed by: INTERNAL MEDICINE

## 2018-10-25 PROCEDURE — 99207 ZZC NO CHARGE NURSE ONLY: CPT | Performed by: FAMILY MEDICINE

## 2018-10-25 PROCEDURE — 85610 PROTHROMBIN TIME: CPT | Performed by: PHYSICIAN ASSISTANT

## 2018-10-25 PROCEDURE — 93306 TTE W/DOPPLER COMPLETE: CPT

## 2018-10-25 PROCEDURE — 80048 BASIC METABOLIC PNL TOTAL CA: CPT | Performed by: PHYSICIAN ASSISTANT

## 2018-10-25 PROCEDURE — 36415 COLL VENOUS BLD VENIPUNCTURE: CPT | Performed by: PHYSICIAN ASSISTANT

## 2018-10-25 RX ORDER — CLOPIDOGREL BISULFATE 75 MG/1
75 TABLET ORAL DAILY
Qty: 90 TABLET | Refills: 1 | Status: SHIPPED | OUTPATIENT
Start: 2018-10-25 | End: 2019-02-13

## 2018-10-25 NOTE — LETTER
10/25/2018    Gume Brown MD  [id number]  [facility] [address]    RE: Shahid Villalta       Dear Colleague,    I had the pleasure of seeing Shahid Villalta in the [facility].    Please see separate dictation for HPI, PHYSICAL EXAM AND IMPRESSION/PLAN.    CURRENT MEDICATIONS:  Current Outpatient Prescriptions   Medication Sig Dispense Refill     acetaminophen (TYLENOL) 325 MG tablet Take 1,300 mg by mouth 2 times daily        amiodarone (PACERONE/CODARONE) 200 MG tablet Take PO 1/2 tablet daily-Take extra prn per MD recommendations. (Patient taking differently: Take PO 1/2 tablet daily-Take extra prn per MD recommendations) 50 tablet 0     cetirizine (ZYRTEC) 10 MG tablet Take 1 tablet (10 mg) by mouth daily 90 tablet 1     cholecalciferol (VITAMIN D) 1000 UNIT tablet Take 2 tablets (2,000 Units) by mouth daily 180 tablet 1     clopidogrel (PLAVIX) 75 MG tablet Take 1 tablet (75 mg) by mouth daily 60 tablet 1     digoxin (LANOXIN) 125 MCG tablet Take 1 tablet (125 mcg) by mouth every 48 hours 45 tablet 0     lisinopril (PRINIVIL/ZESTRIL) 2.5 MG tablet Take 1 tablet (2.5 mg) by mouth daily 90 tablet 3     metoprolol succinate (TOPROL-XL) 100 MG 24 hr tablet Take 1 tablet (100 mg) by mouth daily 90 tablet 3     omeprazole (PRILOSEC) 20 MG CR capsule Take 1 capsule (20 mg) by mouth daily 90 capsule 3     polyethylene glycol (MIRALAX/GLYCOLAX) powder Take 17 g by mouth daily as needed for constipation       potassium chloride (K-TAB,KLOR-CON) 10 MEQ tablet Take 1 tablet (10 mEq) by mouth daily 90 tablet 3     senna-docusate (SENOKOT-S;PERICOLACE) 8.6-50 MG per tablet Take 2 tablets by mouth daily        sildenafil (VIAGRA) 25 MG tablet Take 25 mg by mouth as needed       simethicone (MYLICON) 80 MG chewable tablet Take 1 tablet (80 mg) by mouth every 6 hours as needed for cramping 180 tablet      simvastatin (ZOCOR) 20 MG tablet Take 1 tablet (20 mg) by mouth At Bedtime 90 tablet 3      torsemide (DEMADEX) 20 MG tablet Take 1 tablet (20 mg) by mouth daily 90 tablet 3     warfarin (COUMADIN) 5 MG tablet 2.5mg on Mondays and 5mg ROW 90 tablet 0     aspirin 81 MG chewable tablet Take 81 mg by mouth daily Start 8/31/18       ASPIRIN NOT PRESCRIBED, INTENTIONAL, 1 each daily Antiplatelet medication not prescribed intentionally due to Current anticoagulant therapy (warfarin/enoxaparin) (Patient not taking: Reported on 10/25/2018) 0 each 0       ALLERGIES:     Allergies   Allergen Reactions     Latex Rash     Seasonal Allergies      hayfever - wheezing -        PAST MEDICAL HISTORY:  Past Medical History:   Diagnosis Date     Aortic valve disorders      Atrial fibrillation (H)      Automatic implantable cardiac defibrillator in situ      Cancer (H)      Congestive heart failure (H)      Coronary artery disease      Essential hypertension, benign      GERD (gastroesophageal reflux disease) 3/16/2010     History of blood transfusion      Hyperlipidaemia      Hypertension      Obese      Other and unspecified hyperlipidemia      Other primary cardiomyopathies      Pacemaker      Sleep apnea      Small bowel obstruction (H) 12/14/2015     Ventricular tachycardia (H) 6/17/2013       PAST SURGICAL HISTORY:  Past Surgical History:   Procedure Laterality Date     BIOPSY  annually    prostate biopsy     CARDIAC SURGERY  2012    A fib Ablation     CARDIAC SURGERY      cardioversion     COLONOSCOPY  2007     DAVINCI PROSTATECTOMY N/A 11/20/2015    Procedure: DAVINCI PROSTATECTOMY;  Surgeon: Nahun Hilario MD;  Location: [facility] OR     GENITOURINARY SURGERY      bladder surgery 2011     H ABLATION AV NODE  6/25/13     H ABLATION FOCAL AFIB  6/25/13     HC TOOTH EXTRACTION W/FORCEP       HEART CATH FEMORAL CANNULIZATION WITH OPEN STANDBY REPAIR AORTIC VALVE N/A 9/19/2018    Procedure: HEART CATH FEMORAL CANNULIZATION WITH OPEN STANDBY REPAIR AORTIC VALVE;;  Surgeon: Magdiel Ashraf MD;  Location: [facility] OR      TONSILLECTOMY & ADENOIDECTOMY       TRANSCATHETER AORTIC VALVE IMPLANT ANESTHESIA N/A 2018    Procedure: TRANSCATHETER AORTIC VALVE IMPLANT ANESTHESIA;  Transfemoral Approach Aortic (34 mm Medtronic CoreValve) Valve Replacement, Cardiopulmonary Bypass Standby **Latex Allergy**;  Surgeon: GENERIC ANESTHESIA PROVIDER;  Location:  OR       SOCIAL HISTORY:  Social History     Social History     Marital status:      Spouse name: N/A     Number of children: 2     Years of education: N/A     Occupational History      Retired     Social History Main Topics     Smoking status: Never Smoker     Smokeless tobacco: Never Used     Alcohol use No      Comment: AA since      Drug use: No     Sexual activity: Not Currently     Partners: Female     Birth control/ protection: Abstinence     Other Topics Concern     Parent/Sibling W/ Cabg, Mi Or Angioplasty Before 65f 55m? No     Caffeine Concern Yes     2-3 cups caffeine per day     Sleep Concern Yes     sleep apnea, wears cpap at night     Stress Concern No     Weight Concern No     weight fluctuates     Special Diet No     Exercise Yes     walking daily     Seat Belt Yes     Social History Narrative       FAMILY HISTORY:  Family History   Problem Relation Age of Onset     C.A.D. Father      CHF  at 74     Cerebrovascular Disease Father      C.A.D. Mother      CHF at 91 still living     Cerebrovascular Disease Mother      TIAs     Breast Cancer Mother      HEART DISEASE Son      arrythmia     Family History Negative Sister      Family History Negative Brother      Family History Negative Son        Review of Systems:  Skin:  Positive for itching     Eyes:  Positive for glasses    ENT:  Positive for hearing loss    Respiratory:  Positive for sleep apnea;CPAP;wheezing;dyspnea on exertion     Cardiovascular:    edema;Positive for    Gastroenterology: Negative for      Genitourinary:  not assessed      Musculoskeletal:  Positive for back pain;arthritis     Neurologic:  Negative for      Psychiatric:  Negative for      Heme/Lymph/Imm:         Endocrine:  not assessed         Reviewed. Remainder of the note dictated.    Mira Norwood PA-C        Service Date: 10/25/2018      HISTORY OF PRESENT ILLNESS:  Mr. Villalta is a pleasant 78-year-old gentleman who presents to the office today for a 1-month post-TAVR followup.      The patient's cardiac history includes aortic stenosis, mitral regurgitation, coronary artery disease with a known totally occluded RCA with left-to-right collaterals, a history of paroxysmal atrial fibrillation and atrial tachycardia for which he is on amiodarone and has previously undergone AV dario ablation and BiV pacemaker implantation, and a history of a mild cardiomyopathy with an EF which previously had run around 45%, but earlier this year was noted to be in the 35%-40% range.  At that time, his aortic valve index was 0.6 cm2.  The mean gradient was 36 mmHg.  He was referred to the TAVR Clinic at that time.  He underwent a TAVR workup including a repeat coronary angiogram which showed stable coronary artery disease with minimal disease in the left coronary system and again a completely occluded RCA at the ostium with extensive left-to-right collaterals.  He ultimately did go on to have TAVR with a 34 mm Medtronic Evolut R valve on 09/19.  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  It was recommended that he undergo a transesophageal echocardiogram.  This showed no evidence of fistula.  His EF was reduced at 30%-35%.  Mild to moderate paravalvular leak was noted.      The patient is a pleasant, but very talkative gentleman.  It was rather difficult to keep him on track today.  I believe some of the shortness of breath he had been experiencing previously has improved some.  He did tell me that he is participating in cardiac  rehab and has not noted any significant limiting cardiovascular symptoms.  He mostly spent time telling me about possible rotator cuff injury.  We also spent an extended amount of time reviewing and talking about his medications.  It does look like several were refilled this week through his primary care provider.  I will provide refills of clopidogrel today.  He is planning on traveling to [place] for the winter in the next couple of months.  Again, he did not seem to have any specific cardiac concerns or questions today.      CURRENT CARDIAC MEDICATIONS:   1.  Amiodarone 200 mg half tablet daily.   2.  Clopidogrel 75 mg daily.   3.  Digoxin 125 mcg every other day.   4.  Lisinopril 2.5 mg daily.   5.  Toprol- mg daily.   6.  Potassium chloride 10 mEq daily.   7.  Simvastatin 20 mg daily.   8.  Torsemide 20 mg daily.   9.  Coumadin as directed.      The remainder of his medications, allergies and review of systems were reviewed and as are documented separately.      PHYSICAL EXAMINATION:   GENERAL:  The patient is a pleasant 78-year-old gentleman who appears his stated age.  He is in no apparent distress.   VITAL SIGNS:  His blood pressure is 116/58, pulse is 50.  Weight is 225 pounds.  This is down 12 pounds on our office scale from when I last saw him in July.     PULMONARY:  Breathing is nonlabored.  Lungs clear to auscultation.   CARDIAC:  Reveals a regular rate and rhythm.  I do not appreciate any significant murmur.   EXTREMITIES:  Lower extremities show trace edema bilaterally.   NEUROLOGIC:  Alert and oriented.      Blood work today shows a sodium of 142, potassium of 4.8, BUN of 28, creatinine of 1.6.  Hemoglobin is 12.6.  His EKG today shows a paced ventricular rhythm.  It appears that the underlying rhythm is atrial fibrillation.      His echocardiogram from earlier today shows mild LV dilatation with an EF in the 35%-40% range.  The biplane EF is calculated at 40%.  He is noted to have advanced  diastolic dysfunction.  He was noted to have a bioprosthetic aortic valve.  The gradient across the valve was within normal limits at 6 mmHg.  He was noted to have mild aortic regurgitation.  Mitral valve was noted to be mildly calcified.  There was mild mitral stenosis with a mean gradient of 4 mmHg.  He had mild to moderate pulmonic valve regurgitation.      ASSESSMENT AND PLAN:  The patient is a pleasant 78-year-old gentleman with a cardiac history as detailed above who recently underwent TAVR with a 34 mm Medtronic Evolut valve due to severe aortic stenosis.  His followup echocardiogram today shows stable gradients across the valve with mild regurgitation and a stable EF in the 35%-40% range.  Overall, he seems to be doing well from a cardiac standpoint.  He has class II New York Heart failure association symptoms.  He will continue his current cardiac medication regimen.  I did refill his Plavix.  He will continue to participate in cardiac rehab.  Again, he is planning on going to [place] for the winter which I encouraged.  I asked him to return to the Cardiology Clinic to see Dr. Aden in approximately April with a repeat echocardiogram prior to that time.  Following our office visit, I did realize that he is due for a check of his TSH in November for amiodarone monitoring, I have asked one of the nurses to call him to remind him.  I am going to have him repeat a basic metabolic panel at that time so we can ensure that his creatinine is stable as well.      Thank you for allowing me to participate in the care of this pleasant patient.         ZULEMA ROLLE PA-C             D: 10/25/2018   T: 10/25/2018   MT: LELE      Name:     LUCIO VINCENT   MRN:      [id number]        Account:      [id number]   :      1940           Service Date: 10/25/2018      Document: [id number]        Thank you for allowing me to participate in the care of your patient.      Sincerely,     Zulema Rolle PA-C      [facility]    cc:   Catia Nation MD  [id number]  [place] [id number]

## 2018-10-25 NOTE — MR AVS SNAPSHOT
Shahid Villalta   10/25/2018   Anticoagulation Therapy Visit    Description:  78 year old male   Provider:  Gume Brown MD   Department:   Family Practice           INR as of 10/25/2018     Today's INR 3.51!      Anticoagulation Summary as of 10/25/2018     INR goal 2.0-3.0   Today's INR 3.51!   Full warfarin instructions 10/25: 2.5 mg; Otherwise 2.5 mg on Mon; 5 mg all other days   Next INR check 11/1/2018    Indications   Long term current use of anticoagulant therapy [Z79.01]  Paroxysmal ventricular tachycardia (H) (Resolved) [I47.2]         October 2018 Details    Sun Mon Tue Wed Thu Fri Sat      1               2               3               4               5               6                 7               8               9               10               11               12               13                 14               15               16               17               18               19               20                 21               22               23               24               25      2.5 mg   See details      26      5 mg         27      5 mg           28      5 mg         29      2.5 mg         30      5 mg         31      5 mg             Date Details   10/25 This INR check               How to take your warfarin dose     To take:  2.5 mg Take 0.5 of a 5 mg tablet.    To take:  5 mg Take 1 of the 5 mg tablets.           November 2018 Details    Sun Mon Tue Wed Thu Fri Sat         1            2               3                 4               5               6               7               8               9               10                 11               12               13               14               15               16               17                 18               19               20               21               22               23               24                 25               26               27               28               29               30                  Date Details   No additional details    Date of next INR:  11/1/2018         How to take your warfarin dose     To take:  5 mg Take 1 of the 5 mg tablets.

## 2018-10-25 NOTE — LETTER
10/25/2018      Gume Brown MD  [id number]  [facility] [address]      RE: Shahid Villalta       Dear Colleague,    I had the pleasure of seeing Shahid Villalta in the [facility].    Service Date: 10/25/2018      HISTORY OF PRESENT ILLNESS:  Mr. Villalta is a pleasant 78-year-old gentleman who presents to the office today for a 1-month post-TAVR followup.      The patient's cardiac history includes aortic stenosis, mitral regurgitation, coronary artery disease with a known totally occluded RCA with left-to-right collaterals, a history of paroxysmal atrial fibrillation and atrial tachycardia for which he is on amiodarone and has previously undergone AV dario ablation and BiV pacemaker implantation, and a history of a mild cardiomyopathy with an EF which previously had run around 45%, but earlier this year was noted to be in the 35%-40% range.  At that time, his aortic valve index was 0.6 cm2.  The mean gradient was 36 mmHg.  He was referred to the TAVR Clinic at that time.  He underwent a TAVR workup including a repeat coronary angiogram which showed stable coronary artery disease with minimal disease in the left coronary system and again a completely occluded RCA at the ostium with extensive left-to-right collaterals.  He ultimately did go on to have TAVR with a 34 mm Medtronic Evolut R valve on 09/19.  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  It was recommended that he undergo a transesophageal echocardiogram.  This showed no evidence of fistula.  His EF was reduced at 30%-35%.  Mild to moderate paravalvular leak was noted.      The patient is a pleasant, but very talkative gentleman.  It was rather difficult to keep him on track today.  I believe some of the shortness of breath he had been experiencing previously has improved some.  He did tell me that he is  participating in cardiac rehab and has not noted any significant limiting cardiovascular symptoms.  He mostly spent time telling me about possible rotator cuff injury.  We also spent an extended amount of time reviewing and talking about his medications.  It does look like several were refilled this week through his primary care provider.  I will provide refills of clopidogrel today.  He is planning on traveling to [place] for the winter in the next couple of months.  Again, he did not seem to have any specific cardiac concerns or questions today.      CURRENT CARDIAC MEDICATIONS:   1.  Amiodarone 200 mg half tablet daily.   2.  Clopidogrel 75 mg daily.   3.  Digoxin 125 mcg every other day.   4.  Lisinopril 2.5 mg daily.   5.  Toprol- mg daily.   6.  Potassium chloride 10 mEq daily.   7.  Simvastatin 20 mg daily.   8.  Torsemide 20 mg daily.   9.  Coumadin as directed.      The remainder of his medications, allergies and review of systems were reviewed and as are documented separately.      PHYSICAL EXAMINATION:   GENERAL:  The patient is a pleasant 78-year-old gentleman who appears his stated age.  He is in no apparent distress.   VITAL SIGNS:  His blood pressure is 116/58, pulse is 50.  Weight is 225 pounds.  This is down 12 pounds on our office scale from when I last saw him in July.     PULMONARY:  Breathing is nonlabored.  Lungs clear to auscultation.   CARDIAC:  Reveals a regular rate and rhythm.  I do not appreciate any significant murmur.   EXTREMITIES:  Lower extremities show trace edema bilaterally.   NEUROLOGIC:  Alert and oriented.      Blood work today shows a sodium of 142, potassium of 4.8, BUN of 28, creatinine of 1.6.  Hemoglobin is 12.6.  His EKG today shows a paced ventricular rhythm.  It appears that the underlying rhythm is atrial fibrillation.      His echocardiogram from earlier today shows mild LV dilatation with an EF in the 35%-40% range.  The biplane EF is calculated at 40%.  He is  noted to have advanced diastolic dysfunction.  He was noted to have a bioprosthetic aortic valve.  The gradient across the valve was within normal limits at 6 mmHg.  He was noted to have mild aortic regurgitation.  Mitral valve was noted to be mildly calcified.  There was mild mitral stenosis with a mean gradient of 4 mmHg.  He had mild to moderate pulmonic valve regurgitation.      ASSESSMENT AND PLAN:  The patient is a pleasant 78-year-old gentleman with a cardiac history as detailed above who recently underwent TAVR with a 34 mm Medtronic Evolut valve due to severe aortic stenosis.  His followup echocardiogram today shows stable gradients across the valve with mild regurgitation and a stable EF in the 35%-40% range.  Overall, he seems to be doing well from a cardiac standpoint.  He has class II New York Heart failure association symptoms.  He will continue his current cardiac medication regimen.  I did refill his Plavix.  He will continue to participate in cardiac rehab.  Again, he is planning on going to [place] for the winter which I encouraged.  I asked him to return to the Cardiology Clinic to see Dr. Aden in approximately April with a repeat echocardiogram prior to that time.  Following our office visit, I did realize that he is due for a check of his TSH in November for amiodarone monitoring, I have asked one of the nurses to call him to remind him.  I am going to have him repeat a basic metabolic panel at that time so we can ensure that his creatinine is stable as well.      Thank you for allowing me to participate in the care of this pleasant patient.         ZULEMA ROLLE PA-C             D: 10/25/2018   T: 10/25/2018   MT: LELE      Name:     LUCIO VINCENT   MRN:      [id number]        Account:      [id number]   :      1940           Service Date: 10/25/2018      Document: [id number]           Outpatient Encounter Prescriptions as of 10/25/2018   Medication Sig Dispense Refill      acetaminophen (TYLENOL) 325 MG tablet Take 1,300 mg by mouth 2 times daily        amiodarone (PACERONE/CODARONE) 200 MG tablet Take PO 1/2 tablet daily-Take extra prn per MD recommendations. (Patient taking differently: Take PO 1/2 tablet daily-Take extra prn per MD recommendations) 50 tablet 0     cetirizine (ZYRTEC) 10 MG tablet Take 1 tablet (10 mg) by mouth daily 90 tablet 1     cholecalciferol (VITAMIN D) 1000 UNIT tablet Take 2 tablets (2,000 Units) by mouth daily 180 tablet 1     clopidogrel (PLAVIX) 75 MG tablet Take 1 tablet (75 mg) by mouth daily 90 tablet 1     digoxin (LANOXIN) 125 MCG tablet Take 1 tablet (125 mcg) by mouth every 48 hours 45 tablet 0     lisinopril (PRINIVIL/ZESTRIL) 2.5 MG tablet Take 1 tablet (2.5 mg) by mouth daily 90 tablet 3     metoprolol succinate (TOPROL-XL) 100 MG 24 hr tablet Take 1 tablet (100 mg) by mouth daily 90 tablet 3     omeprazole (PRILOSEC) 20 MG CR capsule Take 1 capsule (20 mg) by mouth daily 90 capsule 3     polyethylene glycol (MIRALAX/GLYCOLAX) powder Take 17 g by mouth daily as needed for constipation       potassium chloride (K-TAB,KLOR-CON) 10 MEQ tablet Take 1 tablet (10 mEq) by mouth daily 90 tablet 3     senna-docusate (SENOKOT-S;PERICOLACE) 8.6-50 MG per tablet Take 2 tablets by mouth daily        sildenafil (VIAGRA) 25 MG tablet Take 25 mg by mouth as needed       simethicone (MYLICON) 80 MG chewable tablet Take 1 tablet (80 mg) by mouth every 6 hours as needed for cramping 180 tablet      simvastatin (ZOCOR) 20 MG tablet Take 1 tablet (20 mg) by mouth At Bedtime 90 tablet 3     torsemide (DEMADEX) 20 MG tablet Take 1 tablet (20 mg) by mouth daily 90 tablet 3     warfarin (COUMADIN) 5 MG tablet 2.5mg on Mondays and 5mg ROW 90 tablet 0     ASPIRIN NOT PRESCRIBED, INTENTIONAL, 1 each daily Antiplatelet medication not prescribed intentionally due to Current anticoagulant therapy (warfarin/enoxaparin) (Patient not taking: Reported on 10/25/2018) 0 each 0      [DISCONTINUED] aspirin 81 MG chewable tablet Take 81 mg by mouth daily Start 8/31/18       [DISCONTINUED] clopidogrel (PLAVIX) 75 MG tablet Take 1 tablet (75 mg) by mouth daily 60 tablet 1     [DISCONTINUED] digoxin (LANOXIN) 125 MCG tablet Take 1 tablet (125 mcg) by mouth every 48 hours 45 tablet 0     [DISCONTINUED] warfarin (COUMADIN) 5 MG tablet 5 mg daily (Patient taking differently: 2.5mg on Mondays and 5mg ROW) 90 tablet 0     No facility-administered encounter medications on file as of 10/25/2018.        Again, thank you for allowing me to participate in the care of your patient.      Sincerely,    Mira Norwood PA-C     [facility]

## 2018-10-25 NOTE — PATIENT INSTRUCTIONS
Thank you for your [facility] visit today. Your provider has recommended the following:  Medication Changes:  No changes at this time.   I did refill your clopidogrel  Recommendations:  Continue cardiac rehab  Follow-up:  See Dr Aden for cardiology follow up in March or April 2019.    Reminder:  Please bring in your current medication list or your medication, over the counter supplements and vitamin bottles as we will review these at each office visit.

## 2018-10-25 NOTE — PROGRESS NOTES
Please see separate dictation for HPI, PHYSICAL EXAM AND IMPRESSION/PLAN.    CURRENT MEDICATIONS:  Current Outpatient Prescriptions   Medication Sig Dispense Refill     acetaminophen (TYLENOL) 325 MG tablet Take 1,300 mg by mouth 2 times daily        amiodarone (PACERONE/CODARONE) 200 MG tablet Take PO 1/2 tablet daily-Take extra prn per MD recommendations. (Patient taking differently: Take PO 1/2 tablet daily-Take extra prn per MD recommendations) 50 tablet 0     cetirizine (ZYRTEC) 10 MG tablet Take 1 tablet (10 mg) by mouth daily 90 tablet 1     cholecalciferol (VITAMIN D) 1000 UNIT tablet Take 2 tablets (2,000 Units) by mouth daily 180 tablet 1     clopidogrel (PLAVIX) 75 MG tablet Take 1 tablet (75 mg) by mouth daily 60 tablet 1     digoxin (LANOXIN) 125 MCG tablet Take 1 tablet (125 mcg) by mouth every 48 hours 45 tablet 0     lisinopril (PRINIVIL/ZESTRIL) 2.5 MG tablet Take 1 tablet (2.5 mg) by mouth daily 90 tablet 3     metoprolol succinate (TOPROL-XL) 100 MG 24 hr tablet Take 1 tablet (100 mg) by mouth daily 90 tablet 3     omeprazole (PRILOSEC) 20 MG CR capsule Take 1 capsule (20 mg) by mouth daily 90 capsule 3     polyethylene glycol (MIRALAX/GLYCOLAX) powder Take 17 g by mouth daily as needed for constipation       potassium chloride (K-TAB,KLOR-CON) 10 MEQ tablet Take 1 tablet (10 mEq) by mouth daily 90 tablet 3     senna-docusate (SENOKOT-S;PERICOLACE) 8.6-50 MG per tablet Take 2 tablets by mouth daily        sildenafil (VIAGRA) 25 MG tablet Take 25 mg by mouth as needed       simethicone (MYLICON) 80 MG chewable tablet Take 1 tablet (80 mg) by mouth every 6 hours as needed for cramping 180 tablet      simvastatin (ZOCOR) 20 MG tablet Take 1 tablet (20 mg) by mouth At Bedtime 90 tablet 3     torsemide (DEMADEX) 20 MG tablet Take 1 tablet (20 mg) by mouth daily 90 tablet 3     warfarin (COUMADIN) 5 MG tablet 2.5mg on Mondays and 5mg ROW 90 tablet 0     aspirin 81 MG chewable tablet Take 81 mg by mouth  daily Start 8/31/18       ASPIRIN NOT PRESCRIBED, INTENTIONAL, 1 each daily Antiplatelet medication not prescribed intentionally due to Current anticoagulant therapy (warfarin/enoxaparin) (Patient not taking: Reported on 10/25/2018) 0 each 0       ALLERGIES:     Allergies   Allergen Reactions     Latex Rash     Seasonal Allergies      hayfever - wheezing -        PAST MEDICAL HISTORY:  Past Medical History:   Diagnosis Date     Aortic valve disorders      Atrial fibrillation (H)      Automatic implantable cardiac defibrillator in situ      Cancer (H)      Congestive heart failure (H)      Coronary artery disease      Essential hypertension, benign      GERD (gastroesophageal reflux disease) 3/16/2010     History of blood transfusion      Hyperlipidaemia      Hypertension      Obese      Other and unspecified hyperlipidemia      Other primary cardiomyopathies      Pacemaker      Sleep apnea      Small bowel obstruction (H) 12/14/2015     Ventricular tachycardia (H) 6/17/2013       PAST SURGICAL HISTORY:  Past Surgical History:   Procedure Laterality Date     BIOPSY  annually    prostate biopsy     CARDIAC SURGERY  2012    A fib Ablation     CARDIAC SURGERY      cardioversion     COLONOSCOPY  2007     DAVINCI PROSTATECTOMY N/A 11/20/2015    Procedure: DAVINCI PROSTATECTOMY;  Surgeon: Nahun Hilario MD;  Location:  OR     GENITOURINARY SURGERY      bladder surgery 2011     H ABLATION AV NODE  6/25/13     H ABLATION FOCAL AFIB  6/25/13     HC TOOTH EXTRACTION W/FORCEP       HEART CATH FEMORAL CANNULIZATION WITH OPEN STANDBY REPAIR AORTIC VALVE N/A 9/19/2018    Procedure: HEART CATH FEMORAL CANNULIZATION WITH OPEN STANDBY REPAIR AORTIC VALVE;;  Surgeon: Magdiel Ashraf MD;  Location: [facility] OR     TONSILLECTOMY & ADENOIDECTOMY       TRANSCATHETER AORTIC VALVE IMPLANT ANESTHESIA N/A 9/19/2018    Procedure: TRANSCATHETER AORTIC VALVE IMPLANT ANESTHESIA;  Transfemoral Approach Aortic (34 mm Medtronic CoreValve) Valve  Replacement, Cardiopulmonary Bypass Standby **Latex Allergy**;  Surgeon: GENERIC ANESTHESIA PROVIDER;  Location:  OR       SOCIAL HISTORY:  Social History     Social History     Marital status:      Spouse name: N/A     Number of children: 2     Years of education: N/A     Occupational History      Retired     Social History Main Topics     Smoking status: Never Smoker     Smokeless tobacco: Never Used     Alcohol use No      Comment: AA since      Drug use: No     Sexual activity: Not Currently     Partners: Female     Birth control/ protection: Abstinence     Other Topics Concern     Parent/Sibling W/ Cabg, Mi Or Angioplasty Before 65f 55m? No     Caffeine Concern Yes     2-3 cups caffeine per day     Sleep Concern Yes     sleep apnea, wears cpap at night     Stress Concern No     Weight Concern No     weight fluctuates     Special Diet No     Exercise Yes     walking daily     Seat Belt Yes     Social History Narrative       FAMILY HISTORY:  Family History   Problem Relation Age of Onset     C.A.D. Father      CHF  at 74     Cerebrovascular Disease Father      C.A.D. Mother      CHF at 91 still living     Cerebrovascular Disease Mother      TIAs     Breast Cancer Mother      HEART DISEASE Son      arrythmia     Family History Negative Sister      Family History Negative Brother      Family History Negative Son        Review of Systems:  Skin:  Positive for itching     Eyes:  Positive for glasses    ENT:  Positive for hearing loss    Respiratory:  Positive for sleep apnea;CPAP;wheezing;dyspnea on exertion     Cardiovascular:    edema;Positive for    Gastroenterology: Negative for      Genitourinary:  not assessed      Musculoskeletal:  Positive for back pain;arthritis    Neurologic:  Negative for      Psychiatric:  Negative for      Heme/Lymph/Imm:         Endocrine:  not assessed         Reviewed. Remainder of the note dictated.    Mira Norwood PA-C

## 2018-10-25 NOTE — PROGRESS NOTES
ANTICOAGULATION FOLLOW-UP CLINIC VISIT    Patient Name:  Shahid Villalta  Date:  10/25/2018  Contact Type:  Mychart    SUBJECTIVE:     Patient Findings     Positives Unexplained INR or factor level change           OBJECTIVE    INR   Date Value Ref Range Status   10/25/2018 3.51 (H) 0.86 - 1.14 Final     Comment:     Reviewed: OK with previous     Chromogenic Factor 10   Date Value Ref Range Status   07/31/2017 17 (L) 70 - 130 % Final     Comment:     Therapeutic Range:  A Chromogenic Factor 10 level of approximately 20-40%   inversely correlates with an INR of 2-3 for patients receiving Warfarin.   Chromogenic Factor 10 levels below 20% indicate an INR greater than 3 and   levels above 40% indicate an INR less than 2.         ASSESSMENT / PLAN  No question data found.  Anticoagulation Summary as of 10/25/2018     INR goal 2.0-3.0   Today's INR 3.51!   Warfarin maintenance plan 2.5 mg (5 mg x 0.5) on Mon; 5 mg (5 mg x 1) all other days   Full warfarin instructions 10/25: 2.5 mg; Otherwise 2.5 mg on Mon; 5 mg all other days   Weekly warfarin total 32.5 mg   Plan last modified Manisha Pierson RN (7/23/2018)   Next INR check 11/1/2018   Target end date     Indications   Long term current use of anticoagulant therapy [Z79.01]  Paroxysmal ventricular tachycardia (H) (Resolved) [I47.2]         Anticoagulation Episode Summary     INR check location     Preferred lab     Send INR reminders to [facility]    Comments             See the Encounter Report to view Anticoagulation Flowsheet and Dosing Calendar (Go to Encounters tab in chart review, and find the Anticoagulation Therapy Visit)    Dosage adjustment made based on physician directed care plan.    Manisha Pierson RN

## 2018-10-25 NOTE — MR AVS SNAPSHOT
After Visit Summary   10/25/2018    Shahid Villalta    MRN: [id number]           Patient Information     Date Of Birth          1940        Visit Information        Provider Department      10/25/2018 12:30 PM Mira Norwood PA-C [facility]        Today's Diagnoses     S/P TAVR (transcatheter aortic valve replacement)    -  1    Aortic stenosis, severe          Care Instructions    Thank you for your  Heart Care visit today. Your provider has recommended the following:  Medication Changes:  No changes at this time.   I did refill your clopidogrel  Recommendations:  Continue cardiac rehab  Follow-up:  See Dr Aden for cardiology follow up in March or April 2019.    Reminder:  Please bring in your current medication list or your medication, over the counter supplements and vitamin bottles as we will review these at each office visit.                  Follow-ups after your visit        Your next 10 appointments already scheduled     Oct 26, 2018  1:00 PM CDT   Cardiac Treatment with [facility] [address] [place] [address] ([facility])    [address], [address]  [facility] [address]   [phone]            Oct 29, 2018  1:00 PM CDT   Cardiac [facility] [address] [place] [address] ([facility])    [address] [place] [address], [address]  [facility] [address]   [phone]            Oct 31, 2018  1:00 PM CDT   Cardiac Treatment with [facility] 1   [facility] ([facility])    [address] [place] [address], [address]  [facility] [address]   [phone]            Nov 02, 2018  8:00 AM CDT   Cardiac [facility] [address] [place] [address] ([facility])    [address] [place] [address], [address]  [facility] [address]   [phone]            Nov 02, 2018  9:00 AM CDT   CONSULT with [facility] [address] [place] [address]  [facility] ([facility])    [address], [address]  [facility] [address]   [phone]            Nov 05, 2018  1:00 PM CST   Cardiac Treatment with [facility] 1   [facility] ([facility])    [address], [address]  [facility] [address]   [phone]            Nov 07, 2018  1:00 PM CST   Cardiac Treatment with [facility] 1   [facility] ([facility])    [address], [address]  [facility] [address]   [phone]            Nov 09, 2018  8:00 AM CST   Cardiac Treatment with [facility] 1   [facility] ([facility])    [address], [address]  [facility] [address]   [phone]            Nov 09, 2018  9:00 AM CST   Return Visit with RH ONCOLOGY NURSE   [facility] ([facility])    [facility]  [address] [place] [address]  [address]  [facility] [address]   [phone]            Nov 12, 2018  8:00 AM CST   Cardiac Treatment with [facility] 1   [facility] ([facility])    [address], [address]  [facility] [address]   [phone]              Who to contact     If you have questions or need follow up information about today's clinic visit or your schedule please contact [facility] directly at [phone].  Normal or non-critical lab and imaging results will be communicated to you by MyChart, letter or phone within 4 business days after the clinic has received the results. If you do not hear from us within 7 days, please contact the clinic through MyChart or phone. If you have a critical or abnormal lab result, we will notify you by phone as soon as possible.  Submit refill requests through [employer] or call your pharmacy and they will forward the refill request to us. Please allow 3 business  "days for your refill to be completed.          Additional Information About Your Visit        [employer] Information     [employer] gives you secure access to your electronic health record. If you see a primary care provider, you can also send messages to your care team and make appointments. If you have questions, please call your primary care clinic.  If you do not have a primary care provider, please call [phone] and they will assist you.        Care EveryWhere ID     This is your Care EveryWhere ID. This could be used by other organizations to access your [facility] medical records  [id number]        Your Vitals Were     Pulse Height BMI (Body Mass Index)             70 1.727 m (5' 8\") 34.21 kg/m2          Blood Pressure from Last 3 Encounters:   10/25/18 116/58   09/21/18 129/75   09/05/18 138/74    Weight from Last 3 Encounters:   10/25/18 102.1 kg (225 lb)   09/21/18 105.5 kg (232 lb 9.4 oz)   09/05/18 109.8 kg (242 lb)              We Performed the Following     EKG 12-lead complete w/read - Clinics (to be scheduled)     Follow-Up with Cardiac Advanced Practice Provider          Today's Medication Changes          These changes are accurate as of 10/25/18  1:20 PM.  If you have any questions, ask your nurse or doctor.               These medicines have changed or have updated prescriptions.        Dose/Directions    amiodarone 200 MG tablet   Commonly known as:  PACERONE/CODARONE   This may have changed:  additional instructions   Used for:  Paroxysmal atrial fibrillation (H)        Take PO 1/2 tablet daily-Take extra prn per MD recommendations.   Quantity:  50 tablet   Refills:  0       warfarin 5 MG tablet   Commonly known as:  COUMADIN   This may have changed:  additional instructions   Used for:  Paroxysmal ventricular tachycardia (H)   Changed by:  Manisha Pierson RN        2.5mg on Mondays and 5mg ROW   Quantity:  90 tablet   Refills:  0         Stop taking these medicines if you haven't already. Please " contact your care team if you have questions.     aspirin 81 MG chewable tablet   Stopped by:  Mira Norwood PA-C                Where to get your medicines      These medications were sent to [facility] - [place], [place] - [address] [facility] [place]  [address] [place] [address]     Phone:  [phone]     clopidogrel 75 MG tablet    warfarin 5 MG tablet                Primary Care Provider Office Phone # Fax #    Gume Brown [phone] [address]  [facility] [address]        Equal Access to Services     [facility]: Hadii aad ku hadasho Soomaali, waaxda luqadaha, qaybta kaalmada adeegyada, waxay idiin haynuran adetheresa mesa . So [facility] [phone].    ATENCIÓN: Si habla español, tiene a irving disposición servicios gratuitos de asistencia lingüística. [facility] [phone].    We comply with applicable federal civil rights laws and [place] laws. We do not discriminate on the basis of race, color, national origin, age, disability, sex, sexual orientation, or gender identity.            Thank you!     Thank you for choosing [facility]  for your care. Our goal is always to provide you with excellent care. Hearing back from our patients is one way we can continue to improve our services. Please take a few minutes to complete the written survey that you may receive in the mail after your visit with us. Thank you!             Your Updated Medication List - Protect others around you: Learn how to safely use, store and throw away your medicines at www.disposemymeds.org.          This list is accurate as of 10/25/18  1:20 PM.  Always use your most recent med list.                   Brand Name Dispense Instructions for use Diagnosis    acetaminophen 325 MG tablet    TYLENOL     Take 1,300 mg by mouth 2 times daily        amiodarone 200 MG tablet    PACERONE/CODARONE    50  tablet    Take PO 1/2 tablet daily-Take extra prn per MD recommendations.    Paroxysmal atrial fibrillation (H)       ASPIRIN NOT PRESCRIBED    INTENTIONAL    0 each    1 each daily Antiplatelet medication not prescribed intentionally due to Current anticoagulant therapy (warfarin/enoxaparin)    Atrial fibrillation, unspecified       cetirizine 10 MG tablet    zyrTEC    90 tablet    Take 1 tablet (10 mg) by mouth daily    Allergic rhinitis       cholecalciferol 1000 UNIT tablet    vitamin D3    180 tablet    Take 2 tablets (2,000 Units) by mouth daily    Vitamin D deficiency, Parathyroid hormone excess (H)       clopidogrel 75 MG tablet    PLAVIX    90 tablet    Take 1 tablet (75 mg) by mouth daily    S/P TAVR (transcatheter aortic valve replacement), Aortic stenosis, severe       digoxin 125 MCG tablet    LANOXIN    45 tablet    Take 1 tablet (125 mcg) by mouth every 48 hours    Chronic systolic heart failure (H)       lisinopril 2.5 MG tablet    PRINIVIL/Zestril    90 tablet    Take 1 tablet (2.5 mg) by mouth daily    Nonrheumatic aortic valve stenosis       metoprolol succinate 100 MG 24 hr tablet    TOPROL-XL    90 tablet    Take 1 tablet (100 mg) by mouth daily    Paroxysmal atrial fibrillation (H)       omeprazole 20 MG CR capsule    priLOSEC    90 capsule    Take 1 capsule (20 mg) by mouth daily    Gastroesophageal reflux disease without esophagitis       polyethylene glycol powder    MIRALAX/GLYCOLAX     Take 17 g by mouth daily as needed for constipation        potassium chloride 10 MEQ tablet    K-TAB,KLOR-CON    90 tablet    Take 1 tablet (10 mEq) by mouth daily    Chronic systolic heart failure (H)       senna-docusate 8.6-50 MG per tablet    SENOKOT-S;PERICOLACE     Take 2 tablets by mouth daily        simethicone 80 MG chewable tablet    MYLICON    180 tablet    Take 1 tablet (80 mg) by mouth every 6 hours as needed for cramping    Abdominal bloating       simvastatin 20 MG tablet    ZOCOR    90 tablet     Take 1 tablet (20 mg) by mouth At Bedtime    Hyperlipidemia LDL goal <100       torsemide 20 MG tablet    DEMADEX    90 tablet    Take 1 tablet (20 mg) by mouth daily    Chronic systolic heart failure (H), Ischemic cardiomyopathy       VIAGRA 25 MG tablet   Generic drug:  sildenafil      Take 25 mg by mouth as needed        warfarin 5 MG tablet    COUMADIN    90 tablet    2.5mg on Mondays and 5mg ROW    Paroxysmal ventricular tachycardia (H)

## 2018-10-25 NOTE — PROGRESS NOTES
Service Date: 10/25/2018      HISTORY OF PRESENT ILLNESS:  Mr. Villalta is a pleasant 78-year-old gentleman who presents to the office today for a 1-month post-TAVR followup.      The patient's cardiac history includes aortic stenosis, mitral regurgitation, coronary artery disease with a known totally occluded RCA with left-to-right collaterals, a history of paroxysmal atrial fibrillation and atrial tachycardia for which he is on amiodarone and has previously undergone AV dario ablation and BiV pacemaker implantation, and a history of a mild cardiomyopathy with an EF which previously had run around 45%, but earlier this year was noted to be in the 35%-40% range.  At that time, his aortic valve index was 0.6 cm2.  The mean gradient was 36 mmHg.  He was referred to the TAVR Clinic at that time.  He underwent a TAVR workup including a repeat coronary angiogram which showed stable coronary artery disease with minimal disease in the left coronary system and again a completely occluded RCA at the ostium with extensive left-to-right collaterals.  He ultimately did go on to have TAVR with a 34 mm Medtronic Evolut R valve on 09/19.  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  It was recommended that he undergo a transesophageal echocardiogram.  This showed no evidence of fistula.  His EF was reduced at 30%-35%.  Mild to moderate paravalvular leak was noted.      The patient is a pleasant, but very talkative gentleman.  It was rather difficult to keep him on track today.  I believe some of the shortness of breath he had been experiencing previously has improved some.  He did tell me that he is participating in cardiac rehab and has not noted any significant limiting cardiovascular symptoms.  He mostly spent time telling me about possible rotator cuff injury.  We also spent an extended amount of time reviewing and talking  about his medications.  It does look like several were refilled this week through his primary care provider.  I will provide refills of clopidogrel today.  He is planning on traveling to [place] for the winter in the next couple of months.  Again, he did not seem to have any specific cardiac concerns or questions today.      CURRENT CARDIAC MEDICATIONS:   1.  Amiodarone 200 mg half tablet daily.   2.  Clopidogrel 75 mg daily.   3.  Digoxin 125 mcg every other day.   4.  Lisinopril 2.5 mg daily.   5.  Toprol- mg daily.   6.  Potassium chloride 10 mEq daily.   7.  Simvastatin 20 mg daily.   8.  Torsemide 20 mg daily.   9.  Coumadin as directed.      The remainder of his medications, allergies and review of systems were reviewed and as are documented separately.      PHYSICAL EXAMINATION:   GENERAL:  The patient is a pleasant 78-year-old gentleman who appears his stated age.  He is in no apparent distress.   VITAL SIGNS:  His blood pressure is 116/58, pulse is 50.  Weight is 225 pounds.  This is down 12 pounds on our office scale from when I last saw him in July.     PULMONARY:  Breathing is nonlabored.  Lungs clear to auscultation.   CARDIAC:  Reveals a regular rate and rhythm.  I do not appreciate any significant murmur.   EXTREMITIES:  Lower extremities show trace edema bilaterally.   NEUROLOGIC:  Alert and oriented.      Blood work today shows a sodium of 142, potassium of 4.8, BUN of 28, creatinine of 1.6.  Hemoglobin is 12.6.  His EKG today shows a paced ventricular rhythm.  It appears that the underlying rhythm is atrial fibrillation.      His echocardiogram from earlier today shows mild LV dilatation with an EF in the 35%-40% range.  The biplane EF is calculated at 40%.  He is noted to have advanced diastolic dysfunction.  He was noted to have a bioprosthetic aortic valve.  The gradient across the valve was within normal limits at 6 mmHg.  He was noted to have mild aortic regurgitation.  Mitral valve was  noted to be mildly calcified.  There was mild mitral stenosis with a mean gradient of 4 mmHg.  He had mild to moderate pulmonic valve regurgitation.      ASSESSMENT AND PLAN:  The patient is a pleasant 78-year-old gentleman with a cardiac history as detailed above who recently underwent TAVR with a 34 mm Medtronic Evolut valve due to severe aortic stenosis.  His followup echocardiogram today shows stable gradients across the valve with mild regurgitation and a stable EF in the 35%-40% range.  Overall, he seems to be doing well from a cardiac standpoint.  He has class II [place] Heart failure association symptoms.  He will continue his current cardiac medication regimen.  I did refill his Plavix.  He will continue to participate in cardiac rehab.  Again, he is planning on going to [place] for the winter which I encouraged.  I asked him to return to the Cardiology Clinic to see Dr. Aden in approximately April with a repeat echocardiogram prior to that time.  Following our office visit, I did realize that he is due for a check of his TSH in November for amiodarone monitoring, I have asked one of the nurses to call him to remind him.  I am going to have him repeat a basic metabolic panel at that time so we can ensure that his creatinine is stable as well.      Thank you for allowing me to participate in the care of this pleasant patient.         ZULEMA ROLLE PA-C             D: 10/25/2018   T: 10/25/2018   MT: LELE      Name:     LUCIO VINCENT   MRN:      [id number]        Account:      [id number]   :      1940           Service Date: 10/25/2018      Document: [id number]

## 2018-10-26 ENCOUNTER — HOSPITAL ENCOUNTER (OUTPATIENT)
Dept: CARDIAC REHAB | Facility: CLINIC | Age: 78
End: 2018-10-26
Attending: PHYSICIAN ASSISTANT
Payer: MEDICARE

## 2018-10-26 PROCEDURE — 40000116 ZZH STATISTIC OP CR VISIT

## 2018-10-26 PROCEDURE — 93798 PHYS/QHP OP CAR RHAB W/ECG: CPT

## 2018-10-29 ENCOUNTER — HOSPITAL ENCOUNTER (OUTPATIENT)
Dept: CARDIAC REHAB | Facility: CLINIC | Age: 78
End: 2018-10-29
Attending: PHYSICIAN ASSISTANT
Payer: MEDICARE

## 2018-10-29 PROCEDURE — 40000116 ZZH STATISTIC OP CR VISIT: Performed by: OCCUPATIONAL THERAPIST

## 2018-10-29 PROCEDURE — 93798 PHYS/QHP OP CAR RHAB W/ECG: CPT | Performed by: OCCUPATIONAL THERAPIST

## 2018-10-31 ENCOUNTER — HOSPITAL ENCOUNTER (OUTPATIENT)
Dept: CARDIAC REHAB | Facility: CLINIC | Age: 78
End: 2018-10-31
Attending: PHYSICIAN ASSISTANT
Payer: MEDICARE

## 2018-10-31 DIAGNOSIS — Z95.2 STATUS POST TRANSCATHETER AORTIC VALVE REPLACEMENT: ICD-10-CM

## 2018-10-31 DIAGNOSIS — I48.91 ATRIAL FIBRILLATION (H): Primary | ICD-10-CM

## 2018-10-31 PROCEDURE — 93798 PHYS/QHP OP CAR RHAB W/ECG: CPT

## 2018-10-31 PROCEDURE — 40000116 ZZH STATISTIC OP CR VISIT

## 2018-11-02 ENCOUNTER — HOSPITAL ENCOUNTER (OUTPATIENT)
Dept: CARDIAC REHAB | Facility: CLINIC | Age: 78
End: 2018-11-02
Attending: PHYSICIAN ASSISTANT
Payer: MEDICARE

## 2018-11-02 PROCEDURE — 93798 PHYS/QHP OP CAR RHAB W/ECG: CPT

## 2018-11-02 PROCEDURE — 40000116 ZZH STATISTIC OP CR VISIT

## 2018-11-05 ENCOUNTER — HOSPITAL ENCOUNTER (OUTPATIENT)
Dept: CARDIAC REHAB | Facility: CLINIC | Age: 78
End: 2018-11-05
Attending: PHYSICIAN ASSISTANT
Payer: MEDICARE

## 2018-11-05 ENCOUNTER — ANTICOAGULATION THERAPY VISIT (OUTPATIENT)
Dept: NURSING | Facility: CLINIC | Age: 78
End: 2018-11-05
Payer: COMMERCIAL

## 2018-11-05 ENCOUNTER — ALLIED HEALTH/NURSE VISIT (OUTPATIENT)
Dept: NURSING | Facility: CLINIC | Age: 78
End: 2018-11-05
Payer: COMMERCIAL

## 2018-11-05 ENCOUNTER — MYC MEDICAL ADVICE (OUTPATIENT)
Dept: ONCOLOGY | Facility: CLINIC | Age: 78
End: 2018-11-05

## 2018-11-05 DIAGNOSIS — C61 PROSTATE CANCER (H): ICD-10-CM

## 2018-11-05 DIAGNOSIS — R79.1 ELEVATED INR: Primary | ICD-10-CM

## 2018-11-05 DIAGNOSIS — Z23 NEED FOR PROPHYLACTIC VACCINATION AND INOCULATION AGAINST INFLUENZA: Primary | ICD-10-CM

## 2018-11-05 DIAGNOSIS — I35.0 NONRHEUMATIC AORTIC VALVE STENOSIS: ICD-10-CM

## 2018-11-05 DIAGNOSIS — Z79.01 LONG TERM CURRENT USE OF ANTICOAGULANT THERAPY: ICD-10-CM

## 2018-11-05 LAB
INR POINT OF CARE: 3.8 (ref 0.86–1.14)
INR PPP: 2.87
INR PPP: 2.87 (ref 0.86–1.14)

## 2018-11-05 PROCEDURE — 40000116 ZZH STATISTIC OP CR VISIT

## 2018-11-05 PROCEDURE — 84439 ASSAY OF FREE THYROXINE: CPT | Performed by: INTERNAL MEDICINE

## 2018-11-05 PROCEDURE — 85610 PROTHROMBIN TIME: CPT | Performed by: FAMILY MEDICINE

## 2018-11-05 PROCEDURE — 36415 COLL VENOUS BLD VENIPUNCTURE: CPT | Performed by: FAMILY MEDICINE

## 2018-11-05 PROCEDURE — 85610 PROTHROMBIN TIME: CPT | Mod: QW

## 2018-11-05 PROCEDURE — 84153 ASSAY OF PSA TOTAL: CPT | Performed by: INTERNAL MEDICINE

## 2018-11-05 PROCEDURE — 93798 PHYS/QHP OP CAR RHAB W/ECG: CPT

## 2018-11-05 PROCEDURE — 84443 ASSAY THYROID STIM HORMONE: CPT | Performed by: INTERNAL MEDICINE

## 2018-11-05 PROCEDURE — 90662 IIV NO PRSV INCREASED AG IM: CPT

## 2018-11-05 PROCEDURE — G0008 ADMIN INFLUENZA VIRUS VAC: HCPCS

## 2018-11-05 NOTE — MR AVS SNAPSHOT
Shahid Villalta   11/5/2018 2:30 PM   Anticoagulation Therapy Visit    Description:  78 year old male   Provider:  [facility] RN   Department:  [facility] Nurse           INR as of 11/5/2018     Today's INR 3.8!      Anticoagulation Summary as of 11/5/2018     INR goal 2.0-3.0   Today's INR 3.8!   Full warfarin instructions 2.5 mg on Mon; 5 mg all other days   Next INR check 12/28/2018    Indications   Long term current use of anticoagulant therapy [Z79.01]  Paroxysmal ventricular tachycardia (H) (Resolved) [I47.2]         Contact Numbers     [facility]  Please call [phone] with any problems or questions regarding your therapy, or to cancel or reschedule your appointment.          November 2018 Details    Sun Mon Tue Wed Thu Fri Sat         1               2               3                 4               5      2.5 mg   See details      6      5 mg         7      5 mg         8      5 mg         9      5 mg         10      5 mg           11      5 mg         12      2.5 mg         13      5 mg         14      5 mg         15      5 mg         16      5 mg         17      5 mg           18      5 mg         19      2.5 mg         20      5 mg         21      5 mg         22      5 mg         23      5 mg         24      5 mg           25      5 mg         26      2.5 mg         27      5 mg         28      5 mg         29      5 mg         30      5 mg           Date Details   11/05 This INR check               How to take your warfarin dose     To take:  2.5 mg Take 0.5 of a 5 mg tablet.    To take:  5 mg Take 1 of the 5 mg tablets.           December 2018 Details    Sun Mon Tue Wed Thu Fri Sat           1      5 mg           2      5 mg         3      2.5 mg         4      5 mg         5      5 mg         6      5 mg         7      5 mg         8      5 mg           9      5 mg         10      2.5 mg         11      5 mg         12      5 mg         13      5 mg         14      5 mg         15      5 mg           16       5 mg         17      2.5 mg         18      5 mg         19      5 mg         20      5 mg         21      5 mg         22      5 mg           23      5 mg         24      2.5 mg         25      5 mg         26      5 mg         27      5 mg         28            29                 30               31                     Date Details   No additional details    Date of next INR:  12/28/2018         How to take your warfarin dose     To take:  2.5 mg Take 0.5 of a 5 mg tablet.    To take:  5 mg Take 1 of the 5 mg tablets.

## 2018-11-05 NOTE — MR AVS SNAPSHOT
After Visit Summary   11/5/2018    Shahid Villalta    MRN: [id number]           Patient Information     Date Of Birth          1940        Visit Information        Provider Department      11/5/2018 2:30 PM LV MA/LPN [facility]        Today's Diagnoses     Need for prophylactic vaccination and inoculation against influenza    -  1       Follow-ups after your visit        Your next 10 appointments already scheduled     Nov 07, 2018  1:00 PM CST   Cardiac Treatment with Rh Cardiac Rehab 1   [facility] ([facility])    [address], [address]  [facility] [address]   [phone]            Nov 09, 2018  8:00 AM CST   Cardiac Treatment with Rh Cardiac Rehab 1   [facility] ([facility])    [address], [address]  [facility] [address]   [phone]            Nov 09, 2018  9:00 AM CST   Return Visit with RH ONCOLOGY NURSE   [facility] ([facility])    [facility]  [address]  [facility] [address]   [phone]            Nov 12, 2018  8:00 AM CST   Cardiac Treatment with Rh Cardiac Rehab 1   [facility] ([facility])    [address], [address]  [facility] [address]   [phone]            Nov 14, 2018  8:00 AM CST   Cardiac Discharge with RH CARDIAC REHAB 4   [facility] ([facility])    [address], [address]  [facility] [address]   [phone]            Nov 14, 2018  9:30 AM CST   Return Visit with Nahun Hilario MD   [facility] ([facility])    [facility]  [address]  [facility] [address]   [phone]              Who to contact     If you have questions or need follow up information about today's clinic visit or your schedule please contact  [facility] directly at [phone].  Normal or non-critical lab and imaging results will be communicated to you by MyChart, letter or phone within 4 business days after the clinic has received the results. If you do not hear from us within 7 days, please contact the clinic through [employer] or phone. If you have a critical or abnormal lab result, we will notify you by phone as soon as possible.  Submit refill requests through [employer] or call your pharmacy and they will forward the refill request to us. Please allow 3 business days for your refill to be completed.          Additional Information About Your Visit        [employer] Information     [employer] gives you secure access to your electronic health record. If you see a primary care provider, you can also send messages to your care team and make appointments. If you have questions, please call your primary care clinic.  If you do not have a primary care provider, please call [phone] and they will assist you.        Care EveryWhere ID     This is your Care EveryWhere ID. This could be used by other organizations to access your [facility] medical records  [id number]         Blood Pressure from Last 3 Encounters:   10/25/18 116/58   09/21/18 129/75   09/05/18 138/74    Weight from Last 3 Encounters:   10/25/18 225 lb (102.1 kg)   09/21/18 232 lb 9.4 oz (105.5 kg)   09/05/18 242 lb (109.8 kg)              We Performed the Following     FLU VACCINE, INCREASED ANTIGEN, PRESV FREE, AGE 65+ [06031]     Vaccine Administration, Initial [55240]          Today's Medication Changes          These changes are accurate as of 11/5/18  2:32 PM.  If you have any questions, ask your nurse or doctor.               These medicines have changed or have updated prescriptions.        Dose/Directions    amiodarone 200 MG tablet   Commonly known as:  PACERONE/CODARONE   This may have changed:  additional instructions   Used for:  Paroxysmal atrial fibrillation (H)         Take PO 1/2 tablet daily-Take extra prn per MD recommendations.   Quantity:  50 tablet   Refills:  0                Primary Care Provider Office Phone # Fax #    Gume Brown [phone] [address]  [facility] [address]        Equal Access to Services     BLANQUITAJEFFERSON MANFRED : Hadii aad ku hadasho Soomaali, waaxda luqadaha, qaybta kaalmada adeegyada, waxay idiin hayaan adeeg kharash la'aan ah. So [facility] [phone].    ATENCIÓN: Si habla español, tiene a irving disposición servicios gratuitos de asistencia lingüística. Llame al [phone].    We comply with applicable federal civil rights laws and [place] laws. We do not discriminate on the basis of race, color, national origin, age, disability, sex, sexual orientation, or gender identity.            Thank you!     Thank you for choosing [facility]  for your care. Our goal is always to provide you with excellent care. Hearing back from our patients is one way we can continue to improve our services. Please take a few minutes to complete the written survey that you may receive in the mail after your visit with us. Thank you!             Your Updated Medication List - Protect others around you: Learn how to safely use, store and throw away your medicines at www.disposemymeds.org.          This list is accurate as of 11/5/18  2:32 PM.  Always use your most recent med list.                   Brand Name Dispense Instructions for use Diagnosis    acetaminophen 325 MG tablet    TYLENOL     Take 1,300 mg by mouth 2 times daily        amiodarone 200 MG tablet    PACERONE/CODARONE    50 tablet    Take PO 1/2 tablet daily-Take extra prn per MD recommendations.    Paroxysmal atrial fibrillation (H)       ASPIRIN NOT PRESCRIBED    INTENTIONAL    0 each    1 each daily Antiplatelet medication not prescribed intentionally due to Current anticoagulant therapy (warfarin/enoxaparin)    Atrial fibrillation, unspecified       cetirizine 10 MG  tablet    zyrTEC    90 tablet    Take 1 tablet (10 mg) by mouth daily    Allergic rhinitis       cholecalciferol 1000 UNIT tablet    vitamin D3    180 tablet    Take 2 tablets (2,000 Units) by mouth daily    Vitamin D deficiency, Parathyroid hormone excess (H)       clopidogrel 75 MG tablet    PLAVIX    90 tablet    Take 1 tablet (75 mg) by mouth daily    S/P TAVR (transcatheter aortic valve replacement)       digoxin 125 MCG tablet    LANOXIN    45 tablet    Take 1 tablet (125 mcg) by mouth every 48 hours    Chronic systolic heart failure (H)       lisinopril 2.5 MG tablet    PRINIVIL/Zestril    90 tablet    Take 1 tablet (2.5 mg) by mouth daily    Nonrheumatic aortic valve stenosis       metoprolol succinate 100 MG 24 hr tablet    TOPROL-XL    90 tablet    Take 1 tablet (100 mg) by mouth daily    Paroxysmal atrial fibrillation (H)       omeprazole 20 MG CR capsule    priLOSEC    90 capsule    Take 1 capsule (20 mg) by mouth daily    Gastroesophageal reflux disease without esophagitis       polyethylene glycol powder    MIRALAX/GLYCOLAX     Take 17 g by mouth daily as needed for constipation        potassium chloride 10 MEQ tablet    K-TAB,KLOR-CON    90 tablet    Take 1 tablet (10 mEq) by mouth daily    Chronic systolic heart failure (H)       senna-docusate 8.6-50 MG per tablet    SENOKOT-S;PERICOLACE     Take 2 tablets by mouth daily        simethicone 80 MG chewable tablet    MYLICON    180 tablet    Take 1 tablet (80 mg) by mouth every 6 hours as needed for cramping    Abdominal bloating       simvastatin 20 MG tablet    ZOCOR    90 tablet    Take 1 tablet (20 mg) by mouth At Bedtime    Hyperlipidemia LDL goal <100       torsemide 20 MG tablet    DEMADEX    90 tablet    Take 1 tablet (20 mg) by mouth daily    Chronic systolic heart failure (H), Ischemic cardiomyopathy       VIAGRA 25 MG tablet   Generic drug:  sildenafil      Take 25 mg by mouth as needed        warfarin 5 MG tablet    COUMADIN    90 tablet     2.5mg on Mondays and 5mg ROW    Paroxysmal ventricular tachycardia (H)

## 2018-11-05 NOTE — PROGRESS NOTES

## 2018-11-06 ENCOUNTER — ANTICOAGULATION THERAPY VISIT (OUTPATIENT)
Dept: NURSING | Facility: CLINIC | Age: 78
End: 2018-11-06

## 2018-11-06 DIAGNOSIS — I48.91 A-FIB (H): Primary | ICD-10-CM

## 2018-11-06 DIAGNOSIS — Z79.01 LONG TERM CURRENT USE OF ANTICOAGULANT THERAPY: ICD-10-CM

## 2018-11-06 LAB
PSA SERPL-MCNC: 0.03 UG/L (ref 0–4)
T4 FREE SERPL-MCNC: 1.3 NG/DL (ref 0.76–1.46)
TSH SERPL DL<=0.005 MIU/L-ACNC: 0.21 MU/L (ref 0.4–4)

## 2018-11-06 NOTE — PROGRESS NOTES
ANTICOAGULATION FOLLOW-UP CLINIC VISIT    Patient Name:  Shahid Villalta  Date:  11/6/2018  Contact Type:  oRsemaryhart    SUBJECTIVE:     Patient Findings     Positives No Problem Findings           OBJECTIVE    INR   Date Value Ref Range Status   11/05/2018 2.87 (H) 0.86 - 1.14 Final     Chromogenic Factor 10   Date Value Ref Range Status   07/31/2017 17 (L) 70 - 130 % Final     Comment:     Therapeutic Range:  A Chromogenic Factor 10 level of approximately 20-40%   inversely correlates with an INR of 2-3 for patients receiving Warfarin.   Chromogenic Factor 10 levels below 20% indicate an INR greater than 3 and   levels above 40% indicate an INR less than 2.         ASSESSMENT / PLAN  INR assessment THER    Recheck INR In: 4 WEEKS    INR Location Clinic      Anticoagulation Summary as of 11/5/2018     INR goal 2.0-3.0   Today's INR 2.87   Warfarin maintenance plan 2.5 mg (5 mg x 0.5) on Mon; 5 mg (5 mg x 1) all other days   Full warfarin instructions 2.5 mg on Mon; 5 mg all other days   Weekly warfarin total 32.5 mg   No change documented Manisha Pierson RN   Plan last modified Manisha Pierson RN (7/23/2018)   Next INR check 12/3/2018   Target end date     Indications   Long term current use of anticoagulant therapy [Z79.01]  Paroxysmal ventricular tachycardia (H) (Resolved) [I47.2]         Anticoagulation Episode Summary     INR check location     Preferred lab     Send INR reminders to [facility] TRIAGE    Comments             See the Encounter Report to view Anticoagulation Flowsheet and Dosing Calendar (Go to Encounters tab in chart review, and find the Anticoagulation Therapy Visit)        Manisha Pierson RN

## 2018-11-07 ENCOUNTER — HOSPITAL ENCOUNTER (OUTPATIENT)
Dept: CARDIAC REHAB | Facility: CLINIC | Age: 78
End: 2018-11-07
Attending: PHYSICIAN ASSISTANT
Payer: MEDICARE

## 2018-11-07 PROCEDURE — 93798 PHYS/QHP OP CAR RHAB W/ECG: CPT | Performed by: REHABILITATION PRACTITIONER

## 2018-11-07 PROCEDURE — 40000116 ZZH STATISTIC OP CR VISIT: Performed by: REHABILITATION PRACTITIONER

## 2018-11-08 ENCOUNTER — HOSPITAL ENCOUNTER (OUTPATIENT)
Dept: RESPIRATORY THERAPY | Facility: CLINIC | Age: 78
Discharge: HOME | End: 2018-11-08
Attending: INTERNAL MEDICINE | Admitting: INTERNAL MEDICINE
Payer: MEDICARE

## 2018-11-08 DIAGNOSIS — I48.0 PAROXYSMAL ATRIAL FIBRILLATION (H): ICD-10-CM

## 2018-11-08 PROCEDURE — 94726 PLETHYSMOGRAPHY LUNG VOLUMES: CPT

## 2018-11-08 PROCEDURE — 94010 BREATHING CAPACITY TEST: CPT

## 2018-11-08 PROCEDURE — 94729 DIFFUSING CAPACITY: CPT

## 2018-11-08 NOTE — PROGRESS NOTES
PFT Note: Patient completed pulmonary function testing with spirometry, lung volumes and diffusion. Good patient effort and cooperation.The results of this test met the ATS standards for acceptability and repeatability. Hgb result of 12.6 was used from 10/25/18 for Diffusion.

## 2018-11-09 ENCOUNTER — HOSPITAL ENCOUNTER (OUTPATIENT)
Dept: CARDIAC REHAB | Facility: CLINIC | Age: 78
End: 2018-11-09
Attending: PHYSICIAN ASSISTANT
Payer: MEDICARE

## 2018-11-09 ENCOUNTER — TELEPHONE (OUTPATIENT)
Dept: CARDIOLOGY | Facility: CLINIC | Age: 78
End: 2018-11-09

## 2018-11-09 DIAGNOSIS — I35.0 NONRHEUMATIC AORTIC VALVE STENOSIS: ICD-10-CM

## 2018-11-09 LAB
ANION GAP SERPL CALCULATED.3IONS-SCNC: 6 MMOL/L (ref 3–14)
BUN SERPL-MCNC: 35 MG/DL (ref 7–30)
CALCIUM SERPL-MCNC: 9.2 MG/DL (ref 8.5–10.1)
CHLORIDE SERPL-SCNC: 109 MMOL/L (ref 94–109)
CO2 SERPL-SCNC: 26 MMOL/L (ref 20–32)
CREAT SERPL-MCNC: 1.62 MG/DL (ref 0.66–1.25)
GFR SERPL CREATININE-BSD FRML MDRD: 42 ML/MIN/1.7M2
GLUCOSE SERPL-MCNC: 116 MG/DL (ref 70–99)
POTASSIUM SERPL-SCNC: 5 MMOL/L (ref 3.4–5.3)
SODIUM SERPL-SCNC: 141 MMOL/L (ref 133–144)

## 2018-11-09 PROCEDURE — 40000116 ZZH STATISTIC OP CR VISIT

## 2018-11-09 PROCEDURE — 93798 PHYS/QHP OP CAR RHAB W/ECG: CPT

## 2018-11-09 PROCEDURE — 36415 COLL VENOUS BLD VENIPUNCTURE: CPT | Performed by: INTERNAL MEDICINE

## 2018-11-09 PROCEDURE — 80048 BASIC METABOLIC PNL TOTAL CA: CPT | Performed by: INTERNAL MEDICINE

## 2018-11-09 NOTE — TELEPHONE ENCOUNTER
RN will send to GALILEO Norwood for review and recommendation    11/9/18 BMP        Ref Range & Units 9:06 AM  (11/9/18) 2wk ago  (10/25/18) 1mo ago  (9/21/18) 1mo ago  (9/20/18) 1mo ago  (9/19/18) 1mo ago  (9/19/18)      Sodium 133 - 144 mmol/L 141 142 141 139 139 142      Potassium 3.4 - 5.3 mmol/L 5.0 4.8 4.4 4.4 4.8CM 4.5      Chloride 94 - 109 mmol/L 109 108 105 103 107 107      Carbon Dioxide 20 - 32 mmol/L 26 26 30 26 24 27      Anion Gap 3 - 14 mmol/L 6 8 6 9 8 8      Glucose 70 - 99 mg/dL 116 (H) 100 (H) 82 85 94 98      Urea Nitrogen 7 - 30 mg/dL 35 (H) 28 26 28 31 (H) 34 (H)      Creatinine 0.66 - 1.25 mg/dL 1.62 (H) 1.60 (H) 1.31 (H) 1.27 (H) 1.39 (H) 1.50 (H)      GFR Estimate >60 mL/min/1.7m2 42 (L) 42 (L)CM 53 (L)CM 55 (L)CM 50 (L)CM 45 (L)CM     Comments: Non  GFR Calc      GFR Estimate If Black >60 mL/min/1.7m2 50 (L) 51 (L)CM 64CM 67CM 60 (L)CM 55 (L)CM     Comments:  GFR Calc      Calcium 8.5 - 10.1 mg/dL 9.2 9.1 9.3 9.2 8.8 9.6          10/25/18 OV w/ GALILEO Norwood    ASSESSMENT AND PLAN:  The patient is a pleasant 78-year-old gentleman with a cardiac history as detailed above who recently underwent TAVR with a 34 mm Medtronic Evolut valve due to severe aortic stenosis.  His followup echocardiogram today shows stable gradients across the valve with mild regurgitation and a stable EF in the 35%-40% range.  Overall, he seems to be doing well from a cardiac standpoint.  He has class II New York Heart failure association symptoms.  He will continue his current cardiac medication regimen.  I did refill his Plavix.  He will continue to participate in cardiac rehab.  Again, he is planning on going to [place] for the winter which I encouraged.  I asked him to return to the Cardiology Clinic to see Dr. Aden in approximately April with a repeat echocardiogram prior to that time.  Following our office visit, I did realize that he is due for a check of his  TSH in November for amiodarone monitoring, I have asked one of the nurses to call him to remind him.  I am going to have him repeat a basic metabolic panel at that time so we can ensure that his creatinine is stable as well.     ADDENDUM 11/9/2018: Overall stable. No changes recommended at this time. Follow up as planned. Mira Norwood PA-C

## 2018-11-09 NOTE — TELEPHONE ENCOUNTER
RN called patient and left VM with BMP results and GALILEO Mira Norwood's recommendations below. RN advised patient in VM to call clinic with any questions/concerns.       Hey, thanks for the update!   Overall stable. No changes recommended at this time. Follow up as planned.

## 2018-11-12 ENCOUNTER — HOSPITAL ENCOUNTER (OUTPATIENT)
Dept: CARDIAC REHAB | Facility: CLINIC | Age: 78
End: 2018-11-12
Attending: PHYSICIAN ASSISTANT
Payer: MEDICARE

## 2018-11-12 LAB
DLCOCOR-%PRED-PRE: 99 %
DLCOCOR-PRE: 22.6 ML/MIN/MMHG
DLCOUNC-%PRED-PRE: 93 %
DLCOUNC-PRE: 21.21 ML/MIN/MMHG
DLCOUNC-PRED: 22.74 ML/MIN/MMHG
ERV-%PRED-PRE: 144 %
ERV-PRE: 0.26 L
ERV-PRED: 0.18 L
EXPTIME-PRE: 7.19 SEC
FEF2575-%PRED-PRE: 66 %
FEF2575-PRE: 1.22 L/SEC
FEF2575-PRED: 1.83 L/SEC
FEFMAX-%PRED-PRE: 100 %
FEFMAX-PRE: 6.68 L/SEC
FEFMAX-PRED: 6.66 L/SEC
FEV1-%PRED-PRE: 75 %
FEV1-PRE: 1.81 L
FEV1FEV6-PRE: 74 %
FEV1FEV6-PRED: 77 %
FEV1FVC-PRE: 73 %
FEV1FVC-PRED: 72 %
FEV1SVC-PRE: 70 %
FEV1SVC-PRED: 63 %
FIFMAX-PRE: 3.93 L/SEC
FRCPLETH-%PRED-PRE: 91 %
FRCPLETH-PRE: 3.2 L
FRCPLETH-PRED: 3.49 L
FVC-%PRED-PRE: 79 %
FVC-PRE: 2.5 L
FVC-PRED: 3.14 L
GAW-%PRED-PRE: 37 %
GAW-PRE: 0.39 L/S/CMH2O
GAW-PRED: 1.03 L/S/CMH2O
IC-%PRED-PRE: 64 %
IC-PRE: 2.34 L
IC-PRED: 3.64 L
RVPLETH-%PRED-PRE: 111 %
RVPLETH-PRE: 2.94 L
RVPLETH-PRED: 2.64 L
SGAW-%PRED-PRE: 135 %
SGAW-PRE: 0.11 1/CMH2O*S
SGAW-PRED: 0.08 1/CMH2O*S
SRAW-%PRED-PRE: 193 %
SRAW-PRE: 9.22 CMH2O*S
SRAW-PRED: 4.76 CMH2O*S
TLCPLETH-%PRED-PRE: 89 %
TLCPLETH-PRE: 5.53 L
TLCPLETH-PRED: 6.21 L
VA-%PRED-PRE: 78 %
VA-PRE: 4.64 L
VC-%PRED-PRE: 67 %
VC-PRE: 2.6 L
VC-PRED: 3.82 L

## 2018-11-12 PROCEDURE — 93798 PHYS/QHP OP CAR RHAB W/ECG: CPT

## 2018-11-12 PROCEDURE — 40000116 ZZH STATISTIC OP CR VISIT

## 2018-11-12 NOTE — PROCEDURES
Procedure Date: 2018      Please see medical chart for graphs and statistics related to this report.       REFERRING PHYSICIAN:   Adiel Aden              TECHNICIAN:   Clau Monroe   DIAGNOSIS:   On Amiodarone Therapy, CAD, Atrial Fibrillation                                            HEIGHT:  65.50Inches                  WEIGHT:   227.00Lbs      DYSPNEA:  After severe exertion                                                                COUGH:  No cough   WHEEZE:  No wheeze                                                                    TOBACCO PROD:  Never smoked      POST-TEST COMMENTS:   Good patient effort and cooperation. The results of these tests meet ATS criteria for acceptability and repeatability. Hgb result of 12.6 was used from 10/25/2018.       INTERPRETATION:    1. No obstruction   2. No restriction   3. Normal lung volumes   4. Normal flow volume loop         ALIE CHILDERS MD             D: 2018   T: 2018   MT: [id number]      Name:     LUCIO VINCENT   MRN:      [id number]        Account:        [id number]   :      1940           Procedure Date: 2018      Document: [id number]       cc: Gume Aden MD

## 2018-11-14 ENCOUNTER — TELEPHONE (OUTPATIENT)
Dept: CARDIOLOGY | Facility: CLINIC | Age: 78
End: 2018-11-14

## 2018-11-14 ENCOUNTER — ONCOLOGY VISIT (OUTPATIENT)
Dept: ONCOLOGY | Facility: CLINIC | Age: 78
End: 2018-11-14
Attending: UROLOGY
Payer: MEDICARE

## 2018-11-14 ENCOUNTER — HOSPITAL ENCOUNTER (OUTPATIENT)
Dept: CARDIAC REHAB | Facility: CLINIC | Age: 78
End: 2018-11-14
Attending: PHYSICIAN ASSISTANT
Payer: MEDICARE

## 2018-11-14 VITALS
HEIGHT: 68 IN | WEIGHT: 228 LBS | OXYGEN SATURATION: 96 % | TEMPERATURE: 97.3 F | SYSTOLIC BLOOD PRESSURE: 127 MMHG | DIASTOLIC BLOOD PRESSURE: 71 MMHG | HEART RATE: 70 BPM | RESPIRATION RATE: 18 BRPM | BODY MASS INDEX: 34.56 KG/M2

## 2018-11-14 DIAGNOSIS — C61 MALIGNANT NEOPLASM OF PROSTATE (H): Primary | ICD-10-CM

## 2018-11-14 PROCEDURE — 93798 PHYS/QHP OP CAR RHAB W/ECG: CPT | Performed by: OCCUPATIONAL THERAPIST

## 2018-11-14 PROCEDURE — 40000575 ZZH STATISTIC OP CARDIAC VISIT #2: Performed by: OCCUPATIONAL THERAPIST

## 2018-11-14 PROCEDURE — 99213 OFFICE O/P EST LOW 20 MIN: CPT | Performed by: UROLOGY

## 2018-11-14 PROCEDURE — G0463 HOSPITAL OUTPT CLINIC VISIT: HCPCS

## 2018-11-14 PROCEDURE — 93797 PHYS/QHP OP CAR RHAB WO ECG: CPT | Performed by: OCCUPATIONAL THERAPIST

## 2018-11-14 PROCEDURE — 40000116 ZZH STATISTIC OP CR VISIT: Performed by: OCCUPATIONAL THERAPIST

## 2018-11-14 ASSESSMENT — PAIN SCALES - GENERAL: PAINLEVEL: MILD PAIN (2)

## 2018-11-14 NOTE — MR AVS SNAPSHOT
After Visit Summary   11/14/2018    Shahid Villalta    MRN: [id number]           Patient Information     Date Of Birth          1940        Visit Information        Provider Department      11/14/2018 9:30 AM Sulaiman, Nahun Ashraf MD [facility]      Today's Diagnoses     Malignant neoplasm of prostate (H)    -  1      Care Instructions        Follow up in 6 months with Dr. Garcias    PSA a few days before appointment          Follow-ups after your visit        Your next 10 appointments already scheduled     May 01, 2019 10:30 AM CDT   Return Visit with  ONCOLOGY NURSE   [facility] ([facility])    [facility]  [address]  [facility] [address]   [phone]            May 08, 2019 10:00 AM CDT   Return Visit with Doug Benoit MD   [facility] ([facility])    [facility]  [address]  [facility] [address]   [phone]              Who to contact     If you have questions or need follow up information about today's clinic visit or your schedule please contact [facility] directly at [phone].  Normal or non-critical lab and imaging results will be communicated to you by MyChart, letter or phone within 4 business days after the clinic has received the results. If you do not hear from us within 7 days, please contact the clinic through MyChart or phone. If you have a critical or abnormal lab result, we will notify you by phone as soon as possible.  Submit refill requests through [employer] or call your pharmacy and they will forward the refill request to us. Please allow 3 business days for your refill to be completed.          Additional Information About Your Visit        [employer] Information     [employer] gives you secure access to your electronic health record. If  "you see a primary care provider, you can also send messages to your care team and make appointments. If you have questions, please call your primary care clinic.  If you do not have a primary care provider, please call [phone] and they will assist you.        Care EveryWhere ID     This is your Care EveryWhere ID. This could be used by other organizations to access your [facility] medical records  [id number]        Your Vitals Were     Pulse Temperature Respirations Height Pulse Oximetry BMI (Body Mass Index)    70 97.3  F (36.3  C) (Oral) 18 1.727 m (5' 8\") 96% 34.67 kg/m2       Blood Pressure from Last 3 Encounters:   11/14/18 127/71   10/25/18 116/58   09/21/18 129/75    Weight from Last 3 Encounters:   11/14/18 103.4 kg (228 lb)   10/25/18 102.1 kg (225 lb)   09/21/18 105.5 kg (232 lb 9.4 oz)              Today, you had the following     No orders found for display         Today's Medication Changes          These changes are accurate as of 11/14/18 10:52 AM.  If you have any questions, ask your nurse or doctor.               These medicines have changed or have updated prescriptions.        Dose/Directions    amiodarone 200 MG tablet   Commonly known as:  PACERONE/CODARONE   This may have changed:  additional instructions   Used for:  Paroxysmal atrial fibrillation (H)        Take PO 1/2 tablet daily-Take extra prn per MD recommendations.   Quantity:  50 tablet   Refills:  0                Primary Care Provider Office Phone # Fax #    Gume Brown [phone] [address]  [facility] [address]        Equal Access to Services     [facility] MANFRED AH: Hadii tone Lopez, waaxda luqadaha, qaybta kaalmaemily garner. So [facility] [phone].    ATENCIÓN: Si habla español, tiene a irving disposición servicios gratuitos de asistencia lingüística. Llame al [phone].    We comply with applicable federal civil rights laws and [place] laws. " We do not discriminate on the basis of race, color, national origin, age, disability, sex, sexual orientation, or gender identity.            Thank you!     Thank you for choosing [facility]  for your care. Our goal is always to provide you with excellent care. Hearing back from our patients is one way we can continue to improve our services. Please take a few minutes to complete the written survey that you may receive in the mail after your visit with us. Thank you!             Your Updated Medication List - Protect others around you: Learn how to safely use, store and throw away your medicines at www.disposemymeds.org.          This list is accurate as of 11/14/18 10:52 AM.  Always use your most recent med list.                   Brand Name Dispense Instructions for use Diagnosis    acetaminophen 325 MG tablet    TYLENOL     Take 1,300 mg by mouth 2 times daily        amiodarone 200 MG tablet    PACERONE/CODARONE    50 tablet    Take PO 1/2 tablet daily-Take extra prn per MD recommendations.    Paroxysmal atrial fibrillation (H)       ASPIRIN NOT PRESCRIBED    INTENTIONAL    0 each    1 each daily Antiplatelet medication not prescribed intentionally due to Current anticoagulant therapy (warfarin/enoxaparin)    Atrial fibrillation, unspecified       cetirizine 10 MG tablet    zyrTEC    90 tablet    Take 1 tablet (10 mg) by mouth daily    Allergic rhinitis       cholecalciferol 1000 UNIT tablet    vitamin D3    180 tablet    Take 2 tablets (2,000 Units) by mouth daily    Vitamin D deficiency, Parathyroid hormone excess (H)       clopidogrel 75 MG tablet    PLAVIX    90 tablet    Take 1 tablet (75 mg) by mouth daily    S/P TAVR (transcatheter aortic valve replacement)       digoxin 125 MCG tablet    LANOXIN    45 tablet    Take 1 tablet (125 mcg) by mouth every 48 hours    Chronic systolic heart failure (H)       lisinopril 2.5 MG tablet    PRINIVIL/Zestril    90 tablet    Take 1 tablet (2.5  mg) by mouth daily    Nonrheumatic aortic valve stenosis       metoprolol succinate 100 MG 24 hr tablet    TOPROL-XL    90 tablet    Take 1 tablet (100 mg) by mouth daily    Paroxysmal atrial fibrillation (H)       omeprazole 20 MG CR capsule    priLOSEC    90 capsule    Take 1 capsule (20 mg) by mouth daily    Gastroesophageal reflux disease without esophagitis       polyethylene glycol powder    MIRALAX/GLYCOLAX     Take 17 g by mouth daily as needed for constipation        potassium chloride 10 MEQ tablet    K-TAB,KLOR-CON    90 tablet    Take 1 tablet (10 mEq) by mouth daily    Chronic systolic heart failure (H)       senna-docusate 8.6-50 MG per tablet    SENOKOT-S;PERICOLACE     Take 2 tablets by mouth daily        simethicone 80 MG chewable tablet    MYLICON    180 tablet    Take 1 tablet (80 mg) by mouth every 6 hours as needed for cramping    Abdominal bloating       simvastatin 20 MG tablet    ZOCOR    90 tablet    Take 1 tablet (20 mg) by mouth At Bedtime    Hyperlipidemia LDL goal <100       torsemide 20 MG tablet    DEMADEX    90 tablet    Take 1 tablet (20 mg) by mouth daily    Chronic systolic heart failure (H), Ischemic cardiomyopathy       VIAGRA 25 MG tablet   Generic drug:  sildenafil      Take 25 mg by mouth as needed        warfarin 5 MG tablet    COUMADIN    90 tablet    2.5mg on Mondays and 5mg ROW    Paroxysmal ventricular tachycardia (H)

## 2018-11-14 NOTE — TELEPHONE ENCOUNTER
Component      Latest Ref Rng & Units 11/9/2018   Sodium      133 - 144 mmol/L 141   Potassium      3.4 - 5.3 mmol/L 5.0   Chloride      94 - 109 mmol/L 109   Carbon Dioxide      20 - 32 mmol/L 26   Anion Gap      3 - 14 mmol/L 6   Glucose      70 - 99 mg/dL 116 (H)   Urea Nitrogen      7 - 30 mg/dL 35 (H)   Creatinine      0.66 - 1.25 mg/dL 1.62 (H)   GFR Estimate      >60 mL/min/1.7m2 42 (L)   GFR Estimate If Black      >60 mL/min/1.7m2 50 (L)   Calcium      8.5 - 10.1 mg/dL 9.2      Pt on Torsemide 20 mg a day any changes before he goes to [place]. Pt informed PFT normal and TSH normal. RUSSELL Ward RN

## 2018-11-14 NOTE — PATIENT INSTRUCTIONS
Follow up in 6 months with Dr. Garcias-Scheduled for 5/8/19. Deedee BARRETT    PSA a few days before appointment-Scheduled for 5/1/19. Deedee BARRETT  AVS printed and mailed to pt home address. Deedee BARRETT

## 2018-11-14 NOTE — LETTER
11/14/2018         RE: Shahid Villalta  [address] [facility] [facility] [address]        Dear Colleague,    Thank you for referring your patient, Shahid Villalta, to the [facility]. Please see a copy of my visit note below.    Prostate Cancer Follow up after RRP     Shahid Villalta is a very pleasant 75 year old male who presents with a history of prostate cancer.    Initial PSA:29  Pathologic Roni Score was 4 + 3  Biopsy [place] Score was 4 + 3  Pathologic Stage T3b.   Positive Margins: Yes Location:right side apical  Number of Lymph Nodes Removed: 13  Number of Positive Lymph Nodes: 0  Patient is status post robotic radical prostatectomy on 11/20/2015.    Risk Group: High      Risk of PC death is 15% at 10 years  Risk of Mets is 25% at 5 years    These can be reduced into the ~6% range by radiation    No hormones or radiation    Predicted progression free probability at 10 years: 9, i.e. 90% chance that the PSA will rise down the road    ~10% chance of death in the next 5-10 years  (J Clin Oncol. 2005 Oct 1;23(28):700-12.  Http://nomograms.[facility].org/Prostate/PostRadicalProstatectomy.aspx)      PSA   Date Value Ref Range Status   11/05/2018 0.03 0 - 4 ug/L Final     Comment:     Assay Method:  Chemiluminescence using Siemens Vista analyzer   05/04/2018 0.02 0 - 4 ug/L Final     Comment:     Assay Method:  Chemiluminescence using Siemens Vista analyzer   11/06/2017 0.02 0 - 4 ug/L Final     Comment:     Assay Method:  Chemiluminescence using Siemens Vista analyzer   05/08/2017 0.01 0 - 4 ug/L Final     Comment:     Assay Method:  Chemiluminescence using Siemens Vista analyzer   10/28/2016  0 - 4 ug/L Final    <0.01  Assay Method:  Chemiluminescence using Siemens Vista analyzer     06/15/2016  0 - 4 ug/L Final    <0.01  PSA results are about 7% lower than our prior method due to a methodology change   on August 30, 2011.     01/14/2016  0 - 4 ug/L Final    <0.01  PSA results are about 7%  "lower than our prior method due to a methodology change   on August 30, 2011.     05/19/2014 13.50 (H) 0 - 4 ug/L Final   06/15/2012 11.50 (H) 0 - 4 ug/L Final     Comment:     PSA results are about 7% lower than our prior method due to a methodology   change   on August 30, 2011.   07/18/2005 3.6 ng/mL        No incontinence except with certain heavy lifting, but frequency with diuretics, control better and urgency better.    There is evidence of slightly rising PSA but stable    Physical Exam    /71  Pulse 70  Temp 97.3  F (36.3  C) (Oral)  Resp 18  Ht 1.727 m (5' 8\")  Wt 103.4 kg (228 lb)  SpO2 96%  BMI 34.67 kg/m2   Incisions healed well and no sign of hernias      2018  AUASI 5 QOL 1  2018  ROGER 1       Assessment GS 7 prostate cancer with evidence of very low PSA of disease s/p robotic radical prostatectomy 11/2017, high risk decipher score    Plan     Follow up in 6 months      PSA prior to visit     Clinic Exam     Will be gone for 6 months to [place], when he returns I would highly recommend radiation when he returns from [place]      Will follow up with Dr. Benoit up on return         Sold pharmaceuticals in past, also worked as a mortician, lives in [place] during winter    Nahun Hilario MD  Department of Urology  [facility]    Again, thank you for allowing me to participate in the care of your patient.        Sincerely,        Nahun Hilario MD    "

## 2018-11-14 NOTE — TELEPHONE ENCOUNTER
crea 1.6, he has been in that range before. Would be reasonable for routine (not urgent) nephrology consultation and decrease intake of K containing foods. Recommend follow-up BMP in 1m. Can do in [place] and see pmd there.  PFTS normal.

## 2018-11-14 NOTE — PROGRESS NOTES
Prostate Cancer Follow up after RRP     Shahid Villalta is a very pleasant 75 year old male who presents with a history of prostate cancer.    Initial PSA:29  Pathologic Roni Score was 4 + 3  Biopsy Palmer Score was 4 + 3  Pathologic Stage T3b.   Positive Margins: Yes Location:right side apical  Number of Lymph Nodes Removed: 13  Number of Positive Lymph Nodes: 0  Patient is status post robotic radical prostatectomy on 11/20/2015.    Risk Group: High      Risk of PC death is 15% at 10 years  Risk of Mets is 25% at 5 years    These can be reduced into the ~6% range by radiation    No hormones or radiation    Predicted progression free probability at 10 years: 9, i.e. 90% chance that the PSA will rise down the road    ~10% chance of death in the next 5-10 years  (J Clin Oncol. 2005 Oct 1;23(89):8904-12.  Http://nomograms.[facility].org/Prostate/PostRadicalProstatectomy.aspx)      PSA   Date Value Ref Range Status   11/05/2018 0.03 0 - 4 ug/L Final     Comment:     Assay Method:  Chemiluminescence using Siemens Vista analyzer   05/04/2018 0.02 0 - 4 ug/L Final     Comment:     Assay Method:  Chemiluminescence using Siemens Vista analyzer   11/06/2017 0.02 0 - 4 ug/L Final     Comment:     Assay Method:  Chemiluminescence using Siemens Vista analyzer   05/08/2017 0.01 0 - 4 ug/L Final     Comment:     Assay Method:  Chemiluminescence using Siemens Vista analyzer   10/28/2016  0 - 4 ug/L Final    <0.01  Assay Method:  Chemiluminescence using Siemens Vista analyzer     06/15/2016  0 - 4 ug/L Final    <0.01  PSA results are about 7% lower than our prior method due to a methodology change   on August 30, 2011.     01/14/2016  0 - 4 ug/L Final    <0.01  PSA results are about 7% lower than our prior method due to a methodology change   on August 30, 2011.     05/19/2014 13.50 (H) 0 - 4 ug/L Final   06/15/2012 11.50 (H) 0 - 4 ug/L Final     Comment:     PSA results are about 7% lower than our prior method due to a methodology  "  change   on August 30, 2011.   07/18/2005 3.6 ng/mL        No incontinence except with certain heavy lifting, but frequency with diuretics, control better and urgency better.    There is evidence of slightly rising PSA but stable    Physical Exam    /71  Pulse 70  Temp 97.3  F (36.3  C) (Oral)  Resp 18  Ht 1.727 m (5' 8\")  Wt 103.4 kg (228 lb)  SpO2 96%  BMI 34.67 kg/m2   Incisions healed well and no sign of hernias      2018  AUASI 5 QOL 1  2018  ROGER 1       Assessment GS 7 prostate cancer with evidence of very low PSA of disease s/p robotic radical prostatectomy 11/2017, high risk decipher score    Plan     Follow up in 6 months      PSA prior to visit     Clinic Exam     Will be gone for 6 months to [place], when he returns I would highly recommend radiation when he returns from [place]      Will follow up with Dr. 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

## 2018-11-14 NOTE — NURSING NOTE
"Oncology Rooming Note    November 14, 2018 9:33 AM   Shahid Villalta is a 78 year old male who presents for:    Chief Complaint   Patient presents with     Oncology Clinic Visit     Prostate cancer     Initial Vitals: /71  Pulse 70  Temp 97.3  F (36.3  C) (Oral)  Resp 18  Ht 1.727 m (5' 8\")  Wt 103.4 kg (228 lb)  SpO2 96%  BMI 34.67 kg/m2 Estimated body mass index is 34.67 kg/(m^2) as calculated from the following:    Height as of this encounter: 1.727 m (5' 8\").    Weight as of this encounter: 103.4 kg (228 lb). Body surface area is 2.23 meters squared.  Mild Pain (2) Comment: L rotator cuff   No LMP for male patient.  Allergies reviewed: Yes  Medications reviewed: Yes    Medications: Medication refills not needed today.  Pharmacy name entered into [employer]:    [id number] [place], [place] - [address] [facility] [place] [place] [address] [facility]    Clinical concerns: f/u     8 minutes for nursing intake (face to face time)     Peyton Chavarria CMA            DISCHARGE PLAN:  Next appointments: See patient instruction section  Departure Mode: Ambulatory  Accompanied by: self  0 minutes for nursing discharge (face to face time)   Peyton Chavarria CMA      "

## 2018-11-15 NOTE — TELEPHONE ENCOUNTER
Informed Pt of DR Aden's recommendations. Decrease potassium foods, possible nephrology consult in future, and have BMP done ion [place]. RUSSELL Ward RN

## 2018-12-17 DIAGNOSIS — I48.0 PAROXYSMAL ATRIAL FIBRILLATION (H): ICD-10-CM

## 2018-12-17 RX ORDER — AMIODARONE HYDROCHLORIDE 200 MG/1
TABLET ORAL
Qty: 50 TABLET | Refills: 0 | Status: SHIPPED | OUTPATIENT
Start: 2018-12-17 | End: 2019-01-01

## 2018-12-18 ENCOUNTER — MYC MEDICAL ADVICE (OUTPATIENT)
Dept: FAMILY MEDICINE | Facility: CLINIC | Age: 78
End: 2018-12-18

## 2018-12-18 NOTE — LETTER
2018      Shahid Villalta  [address] [facility] [address]        [facility]   Fax [phone]   Phone [phone]         RE: Shahid Villalta  : 1940     Order for INR's to be drawn every 1-4 weeks as advised by [facility].      His INR therapeutic range is 2-3      DX- Long term use of anticoag = Z79.10   Atrial Fibrillation= I48.91     Please fax results ASAP to [phone]      If you have any questions or concerns, please call me or my nurse at [phone].               Gume Brown MD     2018

## 2018-12-18 NOTE — TELEPHONE ENCOUNTER
See my chart -  Letter done     Isela Hua RN      [facility]  Call us at [phone] to make an appointment or walk right in.   Located at [address] [place] [address], [facility], [place], [place].  FAX:  [phone]

## 2019-01-01 ENCOUNTER — ANTICOAGULATION THERAPY VISIT (OUTPATIENT)
Dept: NURSING | Facility: CLINIC | Age: 79
End: 2019-01-01
Payer: COMMERCIAL

## 2019-01-01 ENCOUNTER — TELEPHONE (OUTPATIENT)
Dept: CARDIOLOGY | Facility: CLINIC | Age: 79
End: 2019-01-01

## 2019-01-01 ENCOUNTER — ONCOLOGY VISIT (OUTPATIENT)
Dept: ONCOLOGY | Facility: CLINIC | Age: 79
End: 2019-01-01
Attending: UROLOGY
Payer: COMMERCIAL

## 2019-01-01 ENCOUNTER — MYC REFILL (OUTPATIENT)
Dept: CARDIOLOGY | Facility: CLINIC | Age: 79
End: 2019-01-01

## 2019-01-01 ENCOUNTER — OFFICE VISIT (OUTPATIENT)
Dept: CARDIOLOGY | Facility: CLINIC | Age: 79
End: 2019-01-01
Attending: INTERNAL MEDICINE
Payer: COMMERCIAL

## 2019-01-01 ENCOUNTER — HEALTH MAINTENANCE LETTER (OUTPATIENT)
Age: 79
End: 2019-01-01

## 2019-01-01 ENCOUNTER — OFFICE VISIT (OUTPATIENT)
Dept: FAMILY MEDICINE | Facility: CLINIC | Age: 79
End: 2019-01-01
Payer: COMMERCIAL

## 2019-01-01 ENCOUNTER — OFFICE VISIT (OUTPATIENT)
Dept: SLEEP MEDICINE | Facility: CLINIC | Age: 79
End: 2019-01-01
Payer: COMMERCIAL

## 2019-01-01 ENCOUNTER — DOCUMENTATION ONLY (OUTPATIENT)
Dept: SLEEP MEDICINE | Facility: CLINIC | Age: 79
End: 2019-01-01

## 2019-01-01 ENCOUNTER — TELEPHONE (OUTPATIENT)
Dept: FAMILY MEDICINE | Facility: CLINIC | Age: 79
End: 2019-01-01

## 2019-01-01 ENCOUNTER — THERAPY VISIT (OUTPATIENT)
Dept: SLEEP MEDICINE | Facility: CLINIC | Age: 79
End: 2019-01-01
Payer: COMMERCIAL

## 2019-01-01 ENCOUNTER — ALLIED HEALTH/NURSE VISIT (OUTPATIENT)
Dept: NURSING | Facility: CLINIC | Age: 79
End: 2019-01-01
Payer: COMMERCIAL

## 2019-01-01 ENCOUNTER — HOSPITAL ENCOUNTER (OUTPATIENT)
Dept: CARDIOLOGY | Facility: CLINIC | Age: 79
Discharge: HOME | End: 2019-11-15
Attending: PHYSICIAN ASSISTANT | Admitting: PHYSICIAN ASSISTANT
Payer: COMMERCIAL

## 2019-01-01 ENCOUNTER — HOSPITAL ENCOUNTER (OUTPATIENT)
Facility: CLINIC | Age: 79
Setting detail: SPECIMEN
Discharge: HOME | End: 2019-05-01
Attending: UROLOGY | Admitting: UROLOGY
Payer: COMMERCIAL

## 2019-01-01 ENCOUNTER — HOSPITAL ENCOUNTER (OUTPATIENT)
Facility: CLINIC | Age: 79
Setting detail: SPECIMEN
End: 2019-05-08
Attending: UROLOGY
Payer: COMMERCIAL

## 2019-01-01 ENCOUNTER — TELEPHONE (OUTPATIENT)
Dept: SLEEP MEDICINE | Facility: CLINIC | Age: 79
End: 2019-01-01

## 2019-01-01 ENCOUNTER — TRANSFERRED RECORDS (OUTPATIENT)
Dept: HEALTH INFORMATION MANAGEMENT | Facility: CLINIC | Age: 79
End: 2019-01-01

## 2019-01-01 ENCOUNTER — MYC MEDICAL ADVICE (OUTPATIENT)
Dept: FAMILY MEDICINE | Facility: CLINIC | Age: 79
End: 2019-01-01

## 2019-01-01 ENCOUNTER — HOSPITAL ENCOUNTER (OUTPATIENT)
Facility: CLINIC | Age: 79
Setting detail: SPECIMEN
Discharge: HOME | End: 2019-11-06
Attending: UROLOGY | Admitting: INTERNAL MEDICINE
Payer: COMMERCIAL

## 2019-01-01 VITALS
WEIGHT: 235.6 LBS | HEART RATE: 70 BPM | RESPIRATION RATE: 16 BRPM | TEMPERATURE: 97.4 F | HEIGHT: 68 IN | DIASTOLIC BLOOD PRESSURE: 73 MMHG | SYSTOLIC BLOOD PRESSURE: 128 MMHG | OXYGEN SATURATION: 95 % | BODY MASS INDEX: 35.71 KG/M2

## 2019-01-01 VITALS
WEIGHT: 232 LBS | RESPIRATION RATE: 16 BRPM | HEIGHT: 68 IN | BODY MASS INDEX: 35.16 KG/M2 | OXYGEN SATURATION: 95 % | HEART RATE: 68 BPM | DIASTOLIC BLOOD PRESSURE: 74 MMHG | SYSTOLIC BLOOD PRESSURE: 122 MMHG

## 2019-01-01 VITALS
SYSTOLIC BLOOD PRESSURE: 137 MMHG | HEART RATE: 71 BPM | BODY MASS INDEX: 35.31 KG/M2 | TEMPERATURE: 97.8 F | HEIGHT: 68 IN | DIASTOLIC BLOOD PRESSURE: 63 MMHG | RESPIRATION RATE: 18 BRPM | OXYGEN SATURATION: 95 % | WEIGHT: 233 LBS

## 2019-01-01 VITALS
HEIGHT: 68 IN | SYSTOLIC BLOOD PRESSURE: 129 MMHG | WEIGHT: 232 LBS | BODY MASS INDEX: 35.16 KG/M2 | OXYGEN SATURATION: 95 % | HEART RATE: 70 BPM | RESPIRATION RATE: 18 BRPM | DIASTOLIC BLOOD PRESSURE: 78 MMHG

## 2019-01-01 VITALS
HEART RATE: 71 BPM | HEIGHT: 68 IN | OXYGEN SATURATION: 96 % | SYSTOLIC BLOOD PRESSURE: 120 MMHG | RESPIRATION RATE: 17 BRPM | TEMPERATURE: 98 F | WEIGHT: 230 LBS | BODY MASS INDEX: 34.86 KG/M2 | DIASTOLIC BLOOD PRESSURE: 56 MMHG

## 2019-01-01 VITALS
HEIGHT: 68 IN | TEMPERATURE: 98.6 F | OXYGEN SATURATION: 98 % | SYSTOLIC BLOOD PRESSURE: 110 MMHG | HEART RATE: 70 BPM | WEIGHT: 232 LBS | DIASTOLIC BLOOD PRESSURE: 50 MMHG | RESPIRATION RATE: 18 BRPM | BODY MASS INDEX: 35.16 KG/M2

## 2019-01-01 VITALS
SYSTOLIC BLOOD PRESSURE: 124 MMHG | HEART RATE: 72 BPM | WEIGHT: 237 LBS | DIASTOLIC BLOOD PRESSURE: 76 MMHG | HEIGHT: 68 IN | BODY MASS INDEX: 35.92 KG/M2

## 2019-01-01 DIAGNOSIS — I25.10 CORONARY ARTERY DISEASE INVOLVING NATIVE CORONARY ARTERY OF NATIVE HEART WITHOUT ANGINA PECTORIS: Chronic | ICD-10-CM

## 2019-01-01 DIAGNOSIS — Z79.01 LONG TERM CURRENT USE OF ANTICOAGULANT THERAPY: ICD-10-CM

## 2019-01-01 DIAGNOSIS — I77.810 THORACIC AORTIC ECTASIA (H): ICD-10-CM

## 2019-01-01 DIAGNOSIS — Z95.810 ICD (IMPLANTABLE CARDIOVERTER-DEFIBRILLATOR) IN PLACE: ICD-10-CM

## 2019-01-01 DIAGNOSIS — D64.9 ANEMIA: Primary | Chronic | ICD-10-CM

## 2019-01-01 DIAGNOSIS — Z51.81 MEDICATION MONITORING ENCOUNTER: ICD-10-CM

## 2019-01-01 DIAGNOSIS — D64.9 ANEMIA: Primary | ICD-10-CM

## 2019-01-01 DIAGNOSIS — I48.0 PAROXYSMAL ATRIAL FIBRILLATION (H): Primary | Chronic | ICD-10-CM

## 2019-01-01 DIAGNOSIS — I50.22 CHRONIC SYSTOLIC CONGESTIVE HEART FAILURE (H): Chronic | ICD-10-CM

## 2019-01-01 DIAGNOSIS — Z53.9 ERRONEOUS ENCOUNTER--DISREGARD: Primary | ICD-10-CM

## 2019-01-01 DIAGNOSIS — Z95.2 S/P TAVR (TRANSCATHETER AORTIC VALVE REPLACEMENT): ICD-10-CM

## 2019-01-01 DIAGNOSIS — D64.9 ANEMIA, UNSPECIFIED TYPE: Primary | ICD-10-CM

## 2019-01-01 DIAGNOSIS — G47.33 OSA (OBSTRUCTIVE SLEEP APNEA): Chronic | ICD-10-CM

## 2019-01-01 DIAGNOSIS — G47.33 OSA (OBSTRUCTIVE SLEEP APNEA): Primary | ICD-10-CM

## 2019-01-01 DIAGNOSIS — I50.22 CHRONIC SYSTOLIC HEART FAILURE (H): ICD-10-CM

## 2019-01-01 DIAGNOSIS — I48.91 ATRIAL FIBRILLATION (H): Primary | Chronic | ICD-10-CM

## 2019-01-01 DIAGNOSIS — I35.0 NONRHEUMATIC AORTIC VALVE STENOSIS: ICD-10-CM

## 2019-01-01 DIAGNOSIS — I48.0 PAROXYSMAL ATRIAL FIBRILLATION (H): Chronic | ICD-10-CM

## 2019-01-01 DIAGNOSIS — D64.9 ANEMIA: Chronic | ICD-10-CM

## 2019-01-01 DIAGNOSIS — K21.9 GASTROESOPHAGEAL REFLUX DISEASE WITHOUT ESOPHAGITIS: ICD-10-CM

## 2019-01-01 DIAGNOSIS — I50.22 CHRONIC SYSTOLIC CONGESTIVE HEART FAILURE (H): Primary | Chronic | ICD-10-CM

## 2019-01-01 DIAGNOSIS — E78.5 HYPERLIPIDEMIA LDL GOAL <100: ICD-10-CM

## 2019-01-01 DIAGNOSIS — C61 PROSTATE CANCER (H): ICD-10-CM

## 2019-01-01 DIAGNOSIS — C61 PROSTATE CANCER (H): Primary | ICD-10-CM

## 2019-01-01 DIAGNOSIS — D64.9 ANEMIA, UNSPECIFIED TYPE: ICD-10-CM

## 2019-01-01 DIAGNOSIS — I47.29 PAROXYSMAL VENTRICULAR TACHYCARDIA (H): ICD-10-CM

## 2019-01-01 DIAGNOSIS — N18.30 CKD (CHRONIC KIDNEY DISEASE) STAGE 3, GFR 30-59 ML/MIN (H): Chronic | ICD-10-CM

## 2019-01-01 DIAGNOSIS — E66.01 MORBID OBESITY (H): ICD-10-CM

## 2019-01-01 DIAGNOSIS — I35.2 NONRHEUMATIC AORTIC INSUFFICIENCY WITH AORTIC STENOSIS: Chronic | ICD-10-CM

## 2019-01-01 DIAGNOSIS — I48.0 PAROXYSMAL ATRIAL FIBRILLATION (H): ICD-10-CM

## 2019-01-01 DIAGNOSIS — D64.9 ANEMIA, UNSPECIFIED: ICD-10-CM

## 2019-01-01 DIAGNOSIS — I10 HYPERTENSION GOAL BP (BLOOD PRESSURE) < 140/90: Chronic | ICD-10-CM

## 2019-01-01 DIAGNOSIS — C61 MALIGNANT NEOPLASM OF PROSTATE (H): ICD-10-CM

## 2019-01-01 DIAGNOSIS — I25.5 ISCHEMIC CARDIOMYOPATHY: ICD-10-CM

## 2019-01-01 DIAGNOSIS — G47.33 OSA (OBSTRUCTIVE SLEEP APNEA): ICD-10-CM

## 2019-01-01 DIAGNOSIS — I48.91 ATRIAL FIBRILLATION (H): Chronic | ICD-10-CM

## 2019-01-01 DIAGNOSIS — R79.1 ELEVATED INR (INTERNATIONAL NORMALIZED RATIO): ICD-10-CM

## 2019-01-01 DIAGNOSIS — I27.20 PULMONARY HYPERTENSION (H): Primary | Chronic | ICD-10-CM

## 2019-01-01 LAB
ALBUMIN SERPL-MCNC: 3.9 G/DL (ref 3.4–5)
ALBUMIN SERPL-MCNC: 3.9 G/DL (ref 3.4–5)
ALBUMIN UR-MCNC: 30 MG/DL
ALP SERPL-CCNC: 55 U/L (ref 40–150)
ALP SERPL-CCNC: 58 U/L (ref 40–150)
ALT SERPL W P-5'-P-CCNC: 18 U/L (ref 0–70)
ALT SERPL W P-5'-P-CCNC: 19 U/L (ref 0–70)
ANION GAP SERPL CALCULATED.3IONS-SCNC: 7 MMOL/L (ref 3–14)
ANION GAP SERPL CALCULATED.3IONS-SCNC: 7 MMOL/L (ref 3–14)
APPEARANCE UR: CLEAR
AST SERPL W P-5'-P-CCNC: 16 U/L (ref 0–45)
AST SERPL W P-5'-P-CCNC: 16 U/L (ref 0–45)
BILIRUB DIRECT SERPL-MCNC: 0.2 MG/DL (ref 0–0.2)
BILIRUB SERPL-MCNC: 0.5 MG/DL (ref 0.2–1.3)
BILIRUB SERPL-MCNC: 1 MG/DL (ref 0.2–1.3)
BILIRUB UR QL STRIP: NEGATIVE
BUN SERPL-MCNC: 28 MG/DL (ref 7–30)
BUN SERPL-MCNC: 30 MG/DL (ref 7–30)
CALCIUM SERPL-MCNC: 9.2 MG/DL (ref 8.5–10.1)
CALCIUM SERPL-MCNC: 9.5 MG/DL (ref 8.5–10.1)
CHLORIDE SERPL-SCNC: 107 MMOL/L (ref 94–109)
CHLORIDE SERPL-SCNC: 110 MMOL/L (ref 94–109)
CHOLEST SERPL-MCNC: 103 MG/DL
CO2 SERPL-SCNC: 25 MMOL/L (ref 20–32)
CO2 SERPL-SCNC: 26 MMOL/L (ref 20–32)
COLOR UR AUTO: YELLOW
CREAT SERPL-MCNC: 1.47 MG/DL (ref 0.66–1.25)
CREAT SERPL-MCNC: 1.59 MG/DL (ref 0.66–1.25)
CREAT UR-MCNC: 29 MG/DL
DIGOXIN SERPL-MCNC: 0.7 UG/L (ref 0.5–2)
ERYTHROCYTE [DISTWIDTH] IN BLOOD BY AUTOMATED COUNT: 16.2 % (ref 10–15)
ERYTHROCYTE [DISTWIDTH] IN BLOOD BY AUTOMATED COUNT: 16.4 % (ref 10–15)
FACT X ACT/NOR PPP CHRO: 14 % (ref 70–130)
FERRITIN SERPL-MCNC: 8 NG/ML (ref 26–388)
GFR SERPL CREATININE-BSD FRML MDRD: 41 ML/MIN/{1.73_M2}
GFR SERPL CREATININE-BSD FRML MDRD: 45 ML/MIN/{1.73_M2}
GLUCOSE SERPL-MCNC: 103 MG/DL (ref 70–99)
GLUCOSE SERPL-MCNC: 104 MG/DL (ref 70–99)
GLUCOSE UR STRIP-MCNC: NEGATIVE MG/DL
HCT VFR BLD AUTO: 28.2 % (ref 40–53)
HCT VFR BLD AUTO: 28.8 % (ref 40–53)
HDLC SERPL-MCNC: 41 MG/DL
HEMOCCULT STL QL IA: NEGATIVE
HGB BLD-MCNC: 11.5 G/DL (ref 13.3–17.7)
HGB BLD-MCNC: 12.7 G/DL (ref 13.3–17.7)
HGB BLD-MCNC: 13.5 G/DL (ref 13.3–17.7)
HGB BLD-MCNC: 7.7 G/DL (ref 13.3–17.7)
HGB BLD-MCNC: 8 G/DL (ref 13.3–17.7)
HGB BLD-MCNC: 8.3 G/DL (ref 13.3–17.7)
HGB BLD-MCNC: 8.9 G/DL (ref 13.3–17.7)
HGB BLD-MCNC: 9.9 G/DL (ref 13.3–17.7)
HGB BLD-MCNC: NORMAL G/DL (ref 13.3–17.7)
HGB UR QL STRIP: NEGATIVE
HYALINE CASTS #/AREA URNS LPF: ABNORMAL /LPF
INR POINT OF CARE: 1.5 (ref 0.86–1.14)
INR POINT OF CARE: 1.5 (ref 0.86–1.14)
INR POINT OF CARE: 1.6 (ref 0.86–1.14)
INR POINT OF CARE: 2 (ref 0.86–1.14)
INR POINT OF CARE: 2 (ref 0.86–1.14)
INR POINT OF CARE: 2.2 (ref 0.86–1.14)
INR POINT OF CARE: 2.3 (ref 0.86–1.14)
INR POINT OF CARE: 2.4 (ref 0.86–1.14)
INR POINT OF CARE: 2.6 (ref 0.86–1.14)
INR POINT OF CARE: 2.8 (ref 0.86–1.14)
INR POINT OF CARE: 3.2 (ref 0.86–1.14)
INR POINT OF CARE: 3.6 (ref 0.86–1.14)
INR POINT OF CARE: 4.3 (ref 0.86–1.14)
INR POINT OF CARE: 5.3 (ref 0.86–1.14)
INR POINT OF CARE: 6.1 (ref 0.86–1.14)
INR PPP: 2.8 (ref 0.86–1.14)
IRON SATN MFR SERPL: 6 % (ref 15–46)
IRON SERPL-MCNC: 26 UG/DL (ref 35–180)
KETONES UR STRIP-MCNC: NEGATIVE MG/DL
LDLC SERPL CALC-MCNC: 47 MG/DL
LEUKOCYTE ESTERASE UR QL STRIP: NEGATIVE
MCH RBC QN AUTO: 25.1 PG (ref 26.5–33)
MCH RBC QN AUTO: 26.1 PG (ref 26.5–33)
MCHC RBC AUTO-ENTMCNC: 28.4 G/DL (ref 31.5–36.5)
MCHC RBC AUTO-ENTMCNC: 28.8 G/DL (ref 31.5–36.5)
MCV RBC AUTO: 88 FL (ref 78–100)
MCV RBC AUTO: 91 FL (ref 78–100)
MDC_IDC_EPISODE_DTM: NORMAL
MDC_IDC_EPISODE_DURATION: 16 S
MDC_IDC_EPISODE_DURATION: 17 S
MDC_IDC_EPISODE_DURATION: 18 S
MDC_IDC_EPISODE_DURATION: 1989 S
MDC_IDC_EPISODE_DURATION: 307 S
MDC_IDC_EPISODE_DURATION: 8 S
MDC_IDC_EPISODE_DURATION: 9 S
MDC_IDC_EPISODE_DURATION: NORMAL S
MDC_IDC_EPISODE_ID: NORMAL
MDC_IDC_EPISODE_TYPE: NORMAL
MDC_IDC_LEAD_IMPLANT_DT: NORMAL
MDC_IDC_LEAD_LOCATION: NORMAL
MDC_IDC_LEAD_LOCATION_DETAIL_1: NORMAL
MDC_IDC_LEAD_MFG: NORMAL
MDC_IDC_LEAD_MODEL: NORMAL
MDC_IDC_LEAD_POLARITY_TYPE: NORMAL
MDC_IDC_LEAD_SERIAL: NORMAL
MDC_IDC_MSMT_BATTERY_DTM: NORMAL
MDC_IDC_MSMT_BATTERY_REMAINING_LONGEVITY: 42 MO
MDC_IDC_MSMT_BATTERY_REMAINING_LONGEVITY: NORMAL
MDC_IDC_MSMT_BATTERY_REMAINING_PERCENTAGE: 62 %
MDC_IDC_MSMT_BATTERY_STATUS: NORMAL
MDC_IDC_MSMT_BATTERY_STATUS: NORMAL
MDC_IDC_MSMT_CAP_CHARGE_DTM: NORMAL
MDC_IDC_MSMT_CAP_CHARGE_ENERGY: 41 J
MDC_IDC_MSMT_CAP_CHARGE_ENERGY: 41 J
MDC_IDC_MSMT_CAP_CHARGE_TIME: 10.55 S
MDC_IDC_MSMT_CAP_CHARGE_TIME: 10.7 S
MDC_IDC_MSMT_CAP_CHARGE_TIME: 9.76
MDC_IDC_MSMT_CAP_CHARGE_TIME: 9.8 S
MDC_IDC_MSMT_CAP_CHARGE_TYPE: NORMAL
MDC_IDC_MSMT_LEADCHNL_LV_IMPEDANCE_VALUE: 608 OHM
MDC_IDC_MSMT_LEADCHNL_LV_IMPEDANCE_VALUE: 621 OHM
MDC_IDC_MSMT_LEADCHNL_LV_PACING_THRESHOLD_AMPLITUDE: 1 V
MDC_IDC_MSMT_LEADCHNL_LV_PACING_THRESHOLD_PULSEWIDTH: 0.5 MS
MDC_IDC_MSMT_LEADCHNL_LV_SENSING_INTR_AMPL: NORMAL
MDC_IDC_MSMT_LEADCHNL_RA_IMPEDANCE_VALUE: 594 OHM
MDC_IDC_MSMT_LEADCHNL_RA_IMPEDANCE_VALUE: 606 OHM
MDC_IDC_MSMT_LEADCHNL_RA_SENSING_INTR_AMPL: 2.5 MV
MDC_IDC_MSMT_LEADCHNL_RV_IMPEDANCE_VALUE: 400 OHM
MDC_IDC_MSMT_LEADCHNL_RV_IMPEDANCE_VALUE: 410 OHM
MDC_IDC_MSMT_LEADCHNL_RV_PACING_THRESHOLD_AMPLITUDE: 0.6 V
MDC_IDC_MSMT_LEADCHNL_RV_PACING_THRESHOLD_PULSEWIDTH: 0.5 MS
MDC_IDC_MSMT_LEADCHNL_RV_SENSING_INTR_AMPL: NORMAL
MDC_IDC_PG_IMPLANT_DTM: NORMAL
MDC_IDC_PG_IMPLANT_DTM: NORMAL
MDC_IDC_PG_MFG: NORMAL
MDC_IDC_PG_MFG: NORMAL
MDC_IDC_PG_MODEL: NORMAL
MDC_IDC_PG_MODEL: NORMAL
MDC_IDC_PG_SERIAL: NORMAL
MDC_IDC_PG_SERIAL: NORMAL
MDC_IDC_PG_TYPE: NORMAL
MDC_IDC_PG_TYPE: NORMAL
MDC_IDC_SESS_CLINIC_NAME: NORMAL
MDC_IDC_SESS_CLINIC_NAME: NORMAL
MDC_IDC_SESS_DTM: NORMAL
MDC_IDC_SESS_DTM: NORMAL
MDC_IDC_SESS_TYPE: NORMAL
MDC_IDC_SESS_TYPE: NORMAL
MDC_IDC_SET_BRADY_AT_MODE_SWITCH_MODE: NORMAL
MDC_IDC_SET_BRADY_AT_MODE_SWITCH_MODE: NORMAL
MDC_IDC_SET_BRADY_AT_MODE_SWITCH_RATE: 100 {BEATS}/MIN
MDC_IDC_SET_BRADY_AT_MODE_SWITCH_RATE: 100 {BEATS}/MIN
MDC_IDC_SET_BRADY_LOWRATE: 60 {BEATS}/MIN
MDC_IDC_SET_BRADY_LOWRATE: 60 {BEATS}/MIN
MDC_IDC_SET_BRADY_MAX_SENSOR_RATE: 95 {BEATS}/MIN
MDC_IDC_SET_BRADY_MAX_SENSOR_RATE: 95 {BEATS}/MIN
MDC_IDC_SET_BRADY_MAX_TRACKING_RATE: 95 {BEATS}/MIN
MDC_IDC_SET_BRADY_MAX_TRACKING_RATE: 95 {BEATS}/MIN
MDC_IDC_SET_BRADY_MODE: NORMAL
MDC_IDC_SET_BRADY_MODE: NORMAL
MDC_IDC_SET_BRADY_PAV_DELAY_HIGH: 180 MS
MDC_IDC_SET_BRADY_PAV_DELAY_LOW: 180 MS
MDC_IDC_SET_BRADY_PAV_DELAY_LOW: 180 MS
MDC_IDC_SET_BRADY_SAV_DELAY_HIGH: 120 MS
MDC_IDC_SET_BRADY_SAV_DELAY_LOW: 120 MS
MDC_IDC_SET_BRADY_SAV_DELAY_LOW: 120 MS
MDC_IDC_SET_CRT_LVRV_DELAY: 0 MS
MDC_IDC_SET_CRT_LVRV_DELAY: 0 MS
MDC_IDC_SET_CRT_PACED_CHAMBERS: NORMAL
MDC_IDC_SET_CRT_PACED_CHAMBERS: NORMAL
MDC_IDC_SET_LEADCHNL_LV_PACING_AMPLITUDE: 2 V
MDC_IDC_SET_LEADCHNL_LV_PACING_AMPLITUDE: 2 V
MDC_IDC_SET_LEADCHNL_LV_PACING_ANODE_ELECTRODE_1: NORMAL
MDC_IDC_SET_LEADCHNL_LV_PACING_ANODE_ELECTRODE_1: NORMAL
MDC_IDC_SET_LEADCHNL_LV_PACING_ANODE_LOCATION_1: NORMAL
MDC_IDC_SET_LEADCHNL_LV_PACING_ANODE_LOCATION_1: NORMAL
MDC_IDC_SET_LEADCHNL_LV_PACING_CAPTURE_MODE: NORMAL
MDC_IDC_SET_LEADCHNL_LV_PACING_CATHODE_ELECTRODE_1: NORMAL
MDC_IDC_SET_LEADCHNL_LV_PACING_CATHODE_ELECTRODE_1: NORMAL
MDC_IDC_SET_LEADCHNL_LV_PACING_CATHODE_LOCATION_1: NORMAL
MDC_IDC_SET_LEADCHNL_LV_PACING_CATHODE_LOCATION_1: NORMAL
MDC_IDC_SET_LEADCHNL_LV_PACING_POLARITY: NORMAL
MDC_IDC_SET_LEADCHNL_LV_PACING_PULSEWIDTH: 0.5 MS
MDC_IDC_SET_LEADCHNL_LV_PACING_PULSEWIDTH: 0.5 MS
MDC_IDC_SET_LEADCHNL_LV_SENSING_ADAPTATION_MODE: NORMAL
MDC_IDC_SET_LEADCHNL_LV_SENSING_ADAPTATION_MODE: NORMAL
MDC_IDC_SET_LEADCHNL_LV_SENSING_ANODE_ELECTRODE_1: NORMAL
MDC_IDC_SET_LEADCHNL_LV_SENSING_ANODE_ELECTRODE_1: NORMAL
MDC_IDC_SET_LEADCHNL_LV_SENSING_ANODE_LOCATION_1: NORMAL
MDC_IDC_SET_LEADCHNL_LV_SENSING_ANODE_LOCATION_1: NORMAL
MDC_IDC_SET_LEADCHNL_LV_SENSING_CATHODE_ELECTRODE_1: NORMAL
MDC_IDC_SET_LEADCHNL_LV_SENSING_CATHODE_ELECTRODE_1: NORMAL
MDC_IDC_SET_LEADCHNL_LV_SENSING_CATHODE_LOCATION_1: NORMAL
MDC_IDC_SET_LEADCHNL_LV_SENSING_CATHODE_LOCATION_1: NORMAL
MDC_IDC_SET_LEADCHNL_LV_SENSING_POLARITY: NORMAL
MDC_IDC_SET_LEADCHNL_LV_SENSING_SENSITIVITY: 1 MV
MDC_IDC_SET_LEADCHNL_LV_SENSING_SENSITIVITY: 1 MV
MDC_IDC_SET_LEADCHNL_RA_PACING_AMPLITUDE: 3.2 V
MDC_IDC_SET_LEADCHNL_RA_PACING_AMPLITUDE: 3.2 V
MDC_IDC_SET_LEADCHNL_RA_PACING_ANODE_ELECTRODE_1: NORMAL
MDC_IDC_SET_LEADCHNL_RA_PACING_ANODE_LOCATION_1: NORMAL
MDC_IDC_SET_LEADCHNL_RA_PACING_CAPTURE_MODE: NORMAL
MDC_IDC_SET_LEADCHNL_RA_PACING_CATHODE_ELECTRODE_1: NORMAL
MDC_IDC_SET_LEADCHNL_RA_PACING_CATHODE_LOCATION_1: NORMAL
MDC_IDC_SET_LEADCHNL_RA_PACING_POLARITY: NORMAL
MDC_IDC_SET_LEADCHNL_RA_PACING_POLARITY: NORMAL
MDC_IDC_SET_LEADCHNL_RA_PACING_PULSEWIDTH: 1 MS
MDC_IDC_SET_LEADCHNL_RA_PACING_PULSEWIDTH: 1 MS
MDC_IDC_SET_LEADCHNL_RA_SENSING_ADAPTATION_MODE: NORMAL
MDC_IDC_SET_LEADCHNL_RA_SENSING_ADAPTATION_MODE: NORMAL
MDC_IDC_SET_LEADCHNL_RA_SENSING_ANODE_ELECTRODE_1: NORMAL
MDC_IDC_SET_LEADCHNL_RA_SENSING_ANODE_LOCATION_1: NORMAL
MDC_IDC_SET_LEADCHNL_RA_SENSING_CATHODE_ELECTRODE_1: NORMAL
MDC_IDC_SET_LEADCHNL_RA_SENSING_CATHODE_LOCATION_1: NORMAL
MDC_IDC_SET_LEADCHNL_RA_SENSING_POLARITY: NORMAL
MDC_IDC_SET_LEADCHNL_RA_SENSING_POLARITY: NORMAL
MDC_IDC_SET_LEADCHNL_RA_SENSING_SENSITIVITY: 0.25 MV
MDC_IDC_SET_LEADCHNL_RA_SENSING_SENSITIVITY: 0.25 MV
MDC_IDC_SET_LEADCHNL_RV_PACING_AMPLITUDE: 2 V
MDC_IDC_SET_LEADCHNL_RV_PACING_AMPLITUDE: 2 V
MDC_IDC_SET_LEADCHNL_RV_PACING_ANODE_ELECTRODE_1: NORMAL
MDC_IDC_SET_LEADCHNL_RV_PACING_ANODE_LOCATION_1: NORMAL
MDC_IDC_SET_LEADCHNL_RV_PACING_CAPTURE_MODE: NORMAL
MDC_IDC_SET_LEADCHNL_RV_PACING_CATHODE_ELECTRODE_1: NORMAL
MDC_IDC_SET_LEADCHNL_RV_PACING_CATHODE_LOCATION_1: NORMAL
MDC_IDC_SET_LEADCHNL_RV_PACING_POLARITY: NORMAL
MDC_IDC_SET_LEADCHNL_RV_PACING_POLARITY: NORMAL
MDC_IDC_SET_LEADCHNL_RV_PACING_PULSEWIDTH: 0.5 MS
MDC_IDC_SET_LEADCHNL_RV_PACING_PULSEWIDTH: 0.5 MS
MDC_IDC_SET_LEADCHNL_RV_SENSING_ADAPTATION_MODE: NORMAL
MDC_IDC_SET_LEADCHNL_RV_SENSING_ADAPTATION_MODE: NORMAL
MDC_IDC_SET_LEADCHNL_RV_SENSING_ANODE_ELECTRODE_1: NORMAL
MDC_IDC_SET_LEADCHNL_RV_SENSING_ANODE_LOCATION_1: NORMAL
MDC_IDC_SET_LEADCHNL_RV_SENSING_CATHODE_ELECTRODE_1: NORMAL
MDC_IDC_SET_LEADCHNL_RV_SENSING_CATHODE_LOCATION_1: NORMAL
MDC_IDC_SET_LEADCHNL_RV_SENSING_POLARITY: NORMAL
MDC_IDC_SET_LEADCHNL_RV_SENSING_POLARITY: NORMAL
MDC_IDC_SET_LEADCHNL_RV_SENSING_SENSITIVITY: 0.6 MV
MDC_IDC_SET_LEADCHNL_RV_SENSING_SENSITIVITY: 0.6 MV
MDC_IDC_SET_ZONE_DETECTION_INTERVAL: 250 MS
MDC_IDC_SET_ZONE_DETECTION_INTERVAL: 250 MS
MDC_IDC_SET_ZONE_DETECTION_INTERVAL: 300 MS
MDC_IDC_SET_ZONE_DETECTION_INTERVAL: 300 MS
MDC_IDC_SET_ZONE_DETECTION_INTERVAL: 400 MS
MDC_IDC_SET_ZONE_DETECTION_INTERVAL: 400 MS
MDC_IDC_SET_ZONE_TYPE: NORMAL
MDC_IDC_SET_ZONE_VENDOR_TYPE: NORMAL
MDC_IDC_STAT_BRADY_DTM_END: NORMAL
MDC_IDC_STAT_BRADY_DTM_START: NORMAL
MDC_IDC_STAT_BRADY_RA_PERCENT_PACED: 0 %
MDC_IDC_STAT_BRADY_RA_PERCENT_PACED: 9 %
MDC_IDC_STAT_BRADY_RV_PERCENT_PACED: 98 %
MDC_IDC_STAT_BRADY_RV_PERCENT_PACED: 98 %
MDC_IDC_STAT_CRT_DTM_END: NORMAL
MDC_IDC_STAT_CRT_DTM_START: NORMAL
MDC_IDC_STAT_CRT_LV_PERCENT_PACED: 98 %
MDC_IDC_STAT_CRT_LV_PERCENT_PACED: 98 %
MDC_IDC_STAT_EPISODE_RECENT_COUNT: 0
MDC_IDC_STAT_EPISODE_RECENT_COUNT: 315
MDC_IDC_STAT_EPISODE_RECENT_COUNT: 43
MDC_IDC_STAT_EPISODE_RECENT_COUNT: 7
MDC_IDC_STAT_EPISODE_RECENT_COUNT_DTM_END: NORMAL
MDC_IDC_STAT_EPISODE_RECENT_COUNT_DTM_START: NORMAL
MDC_IDC_STAT_EPISODE_TOTAL_COUNT: 0
MDC_IDC_STAT_EPISODE_TOTAL_COUNT: 1
MDC_IDC_STAT_EPISODE_TOTAL_COUNT: 1636
MDC_IDC_STAT_EPISODE_TOTAL_COUNT_DTM_END: NORMAL
MDC_IDC_STAT_EPISODE_TYPE: NORMAL
MDC_IDC_STAT_EPISODE_VENDOR_TYPE: NORMAL
MDC_IDC_STAT_TACHYTHERAPY_ATP_DELIVERED_RECENT: 0
MDC_IDC_STAT_TACHYTHERAPY_ATP_DELIVERED_RECENT: 0
MDC_IDC_STAT_TACHYTHERAPY_ATP_DELIVERED_TOTAL: 1
MDC_IDC_STAT_TACHYTHERAPY_ATP_DELIVERED_TOTAL: 1
MDC_IDC_STAT_TACHYTHERAPY_RECENT_DTM_END: NORMAL
MDC_IDC_STAT_TACHYTHERAPY_RECENT_DTM_END: NORMAL
MDC_IDC_STAT_TACHYTHERAPY_RECENT_DTM_START: NORMAL
MDC_IDC_STAT_TACHYTHERAPY_RECENT_DTM_START: NORMAL
MDC_IDC_STAT_TACHYTHERAPY_SHOCKS_ABORTED_RECENT: 0
MDC_IDC_STAT_TACHYTHERAPY_SHOCKS_ABORTED_RECENT: 0
MDC_IDC_STAT_TACHYTHERAPY_SHOCKS_ABORTED_TOTAL: 0
MDC_IDC_STAT_TACHYTHERAPY_SHOCKS_ABORTED_TOTAL: 0
MDC_IDC_STAT_TACHYTHERAPY_SHOCKS_DELIVERED_RECENT: 0
MDC_IDC_STAT_TACHYTHERAPY_SHOCKS_DELIVERED_RECENT: 0
MDC_IDC_STAT_TACHYTHERAPY_SHOCKS_DELIVERED_TOTAL: 1
MDC_IDC_STAT_TACHYTHERAPY_SHOCKS_DELIVERED_TOTAL: 1
MDC_IDC_STAT_TACHYTHERAPY_TOTAL_DTM_END: NORMAL
MDC_IDC_STAT_TACHYTHERAPY_TOTAL_DTM_END: NORMAL
MDC_IDC_STAT_TACHYTHERAPY_TOTAL_DTM_START: NORMAL
MICROALBUMIN UR-MCNC: 54 MG/L
MICROALBUMIN/CREAT UR: 185.12 MG/G CR (ref 0–17)
NITRATE UR QL: NEGATIVE
NONHDLC SERPL-MCNC: 62 MG/DL
PH UR STRIP: 5.5 PH (ref 5–7)
PLATELET # BLD AUTO: 310 10E9/L (ref 150–450)
PLATELET # BLD AUTO: 363 10E9/L (ref 150–450)
POTASSIUM SERPL-SCNC: 4.4 MMOL/L (ref 3.4–5.3)
POTASSIUM SERPL-SCNC: 4.6 MMOL/L (ref 3.4–5.3)
PROT SERPL-MCNC: 7.5 G/DL (ref 6.8–8.8)
PROT SERPL-MCNC: 7.5 G/DL (ref 6.8–8.8)
PSA SERPL-MCNC: 0.03 UG/L (ref 0–4)
PSA SERPL-MCNC: 0.04 UG/L (ref 0–4)
RBC # BLD AUTO: 3.18 10E12/L (ref 4.4–5.9)
RBC # BLD AUTO: 3.19 10E12/L (ref 4.4–5.9)
RBC #/AREA URNS AUTO: ABNORMAL /HPF
RETICS # AUTO: 75 10E9/L (ref 25–95)
RETICS/RBC NFR AUTO: 2.2 % (ref 0.5–2)
SLPCOMP: NORMAL
SODIUM SERPL-SCNC: 140 MMOL/L (ref 133–144)
SODIUM SERPL-SCNC: 142 MMOL/L (ref 133–144)
SOURCE: ABNORMAL
SP GR UR STRIP: 1.01 (ref 1–1.03)
T4 FREE SERPL-MCNC: 1.35 NG/DL (ref 0.76–1.46)
TIBC SERPL-MCNC: 444 UG/DL (ref 240–430)
TRIGL SERPL-MCNC: 77 MG/DL
TSH SERPL DL<=0.005 MIU/L-ACNC: 0.26 MU/L (ref 0.4–4)
TSH SERPL DL<=0.005 MIU/L-ACNC: 0.51 MU/L (ref 0.4–4)
UROBILINOGEN UR STRIP-ACNC: 0.2 EU/DL (ref 0.2–1)
VIT B12 SERPL-MCNC: 303 PG/ML (ref 193–986)
WBC # BLD AUTO: 11.3 10E9/L (ref 4–11)
WBC # BLD AUTO: 7.5 10E9/L (ref 4–11)
WBC #/AREA URNS AUTO: ABNORMAL /HPF

## 2019-01-01 PROCEDURE — 36416 COLLJ CAPILLARY BLOOD SPEC: CPT

## 2019-01-01 PROCEDURE — 85018 HEMOGLOBIN: CPT | Performed by: FAMILY MEDICINE

## 2019-01-01 PROCEDURE — 83550 IRON BINDING TEST: CPT | Performed by: FAMILY MEDICINE

## 2019-01-01 PROCEDURE — 85610 PROTHROMBIN TIME: CPT | Mod: QW

## 2019-01-01 PROCEDURE — 93000 ELECTROCARDIOGRAM COMPLETE: CPT | Performed by: INTERNAL MEDICINE

## 2019-01-01 PROCEDURE — 99214 OFFICE O/P EST MOD 30 MIN: CPT | Performed by: FAMILY MEDICINE

## 2019-01-01 PROCEDURE — 93306 TTE W/DOPPLER COMPLETE: CPT

## 2019-01-01 PROCEDURE — 93296 REM INTERROG EVL PM/IDS: CPT | Performed by: INTERNAL MEDICINE

## 2019-01-01 PROCEDURE — 85027 COMPLETE CBC AUTOMATED: CPT | Performed by: FAMILY MEDICINE

## 2019-01-01 PROCEDURE — 36415 COLL VENOUS BLD VENIPUNCTURE: CPT

## 2019-01-01 PROCEDURE — 99207 ZZC NO CHARGE NURSE ONLY: CPT

## 2019-01-01 PROCEDURE — 82043 UR ALBUMIN QUANTITATIVE: CPT | Performed by: FAMILY MEDICINE

## 2019-01-01 PROCEDURE — 84153 ASSAY OF PSA TOTAL: CPT | Performed by: UROLOGY

## 2019-01-01 PROCEDURE — 99214 OFFICE O/P EST MOD 30 MIN: CPT | Mod: 25 | Performed by: INTERNAL MEDICINE

## 2019-01-01 PROCEDURE — 85045 AUTOMATED RETICULOCYTE COUNT: CPT | Performed by: FAMILY MEDICINE

## 2019-01-01 PROCEDURE — 99214 OFFICE O/P EST MOD 30 MIN: CPT | Performed by: INTERNAL MEDICINE

## 2019-01-01 PROCEDURE — 80048 BASIC METABOLIC PNL TOTAL CA: CPT | Performed by: INTERNAL MEDICINE

## 2019-01-01 PROCEDURE — 93284 PRGRMG EVAL IMPLANTABLE DFB: CPT | Mod: 59 | Performed by: INTERNAL MEDICINE

## 2019-01-01 PROCEDURE — 84443 ASSAY THYROID STIM HORMONE: CPT | Performed by: FAMILY MEDICINE

## 2019-01-01 PROCEDURE — 82607 VITAMIN B-12: CPT | Performed by: FAMILY MEDICINE

## 2019-01-01 PROCEDURE — 80076 HEPATIC FUNCTION PANEL: CPT | Performed by: INTERNAL MEDICINE

## 2019-01-01 PROCEDURE — 82274 ASSAY TEST FOR BLOOD FECAL: CPT | Performed by: FAMILY MEDICINE

## 2019-01-01 PROCEDURE — 81001 URINALYSIS AUTO W/SCOPE: CPT | Performed by: FAMILY MEDICINE

## 2019-01-01 PROCEDURE — 99213 OFFICE O/P EST LOW 20 MIN: CPT | Performed by: UROLOGY

## 2019-01-01 PROCEDURE — G0463 HOSPITAL OUTPT CLINIC VISIT: HCPCS

## 2019-01-01 PROCEDURE — 93295 DEV INTERROG REMOTE 1/2/MLT: CPT | Performed by: INTERNAL MEDICINE

## 2019-01-01 PROCEDURE — 85610 PROTHROMBIN TIME: CPT | Performed by: FAMILY MEDICINE

## 2019-01-01 PROCEDURE — 85260 CLOT FACTOR X STUART-POWER: CPT | Performed by: FAMILY MEDICINE

## 2019-01-01 PROCEDURE — 80061 LIPID PANEL: CPT | Performed by: FAMILY MEDICINE

## 2019-01-01 PROCEDURE — 84439 ASSAY OF FREE THYROXINE: CPT | Performed by: FAMILY MEDICINE

## 2019-01-01 PROCEDURE — 36415 COLL VENOUS BLD VENIPUNCTURE: CPT | Performed by: FAMILY MEDICINE

## 2019-01-01 PROCEDURE — 84153 ASSAY OF PSA TOTAL: CPT | Performed by: INTERNAL MEDICINE

## 2019-01-01 PROCEDURE — 80053 COMPREHEN METABOLIC PANEL: CPT | Performed by: FAMILY MEDICINE

## 2019-01-01 PROCEDURE — 95811 POLYSOM 6/>YRS CPAP 4/> PARM: CPT | Performed by: INTERNAL MEDICINE

## 2019-01-01 PROCEDURE — 80162 ASSAY OF DIGOXIN TOTAL: CPT | Performed by: FAMILY MEDICINE

## 2019-01-01 PROCEDURE — 83540 ASSAY OF IRON: CPT | Performed by: FAMILY MEDICINE

## 2019-01-01 PROCEDURE — 82728 ASSAY OF FERRITIN: CPT | Performed by: FAMILY MEDICINE

## 2019-01-01 PROCEDURE — 93306 TTE W/DOPPLER COMPLETE: CPT | Mod: 26 | Performed by: INTERNAL MEDICINE

## 2019-01-01 PROCEDURE — 84443 ASSAY THYROID STIM HORMONE: CPT | Performed by: INTERNAL MEDICINE

## 2019-01-01 RX ORDER — LISINOPRIL 2.5 MG/1
2.5 TABLET ORAL DAILY
Qty: 90 TABLET | Refills: 3 | Status: SHIPPED | OUTPATIENT
Start: 2019-01-01 | End: 2020-01-01

## 2019-01-01 RX ORDER — FERROUS SULFATE 325(65) MG
325 TABLET ORAL
Qty: 90 TABLET | Refills: 1 | Status: SHIPPED | OUTPATIENT
Start: 2019-01-01

## 2019-01-01 RX ORDER — CLOPIDOGREL BISULFATE 75 MG/1
75 TABLET ORAL DAILY
Qty: 90 TABLET | Refills: 3 | Status: SHIPPED | OUTPATIENT
Start: 2019-01-01 | End: 2019-01-01

## 2019-01-01 RX ORDER — WARFARIN SODIUM 5 MG/1
TABLET ORAL
Qty: 90 TABLET | Refills: 0 | Status: SHIPPED | OUTPATIENT
Start: 2019-01-01 | End: 2019-01-01

## 2019-01-01 RX ORDER — AMIODARONE HYDROCHLORIDE 200 MG/1
TABLET ORAL
Qty: 15 TABLET | Refills: 0 | Status: SHIPPED | OUTPATIENT
Start: 2019-01-01 | End: 2019-01-01

## 2019-01-01 RX ORDER — POTASSIUM CHLORIDE 750 MG/1
10 TABLET, EXTENDED RELEASE ORAL DAILY
Qty: 90 TABLET | Refills: 3 | Status: SHIPPED | OUTPATIENT
Start: 2019-01-01 | End: 2020-01-01

## 2019-01-01 RX ORDER — TORSEMIDE 20 MG/1
20 TABLET ORAL DAILY
Qty: 90 TABLET | Refills: 3 | Status: SHIPPED | OUTPATIENT
Start: 2019-01-01 | End: 2020-01-01

## 2019-01-01 RX ORDER — SIMVASTATIN 20 MG
20 TABLET ORAL AT BEDTIME
Qty: 90 TABLET | Refills: 3 | Status: SHIPPED | OUTPATIENT
Start: 2019-01-01 | End: 2020-01-01

## 2019-01-01 RX ORDER — WARFARIN SODIUM 5 MG/1
TABLET ORAL
Qty: 90 TABLET | Refills: 0 | Status: SHIPPED | OUTPATIENT
Start: 2019-01-01 | End: 2020-01-01

## 2019-01-01 RX ORDER — AMIODARONE HYDROCHLORIDE 200 MG/1
TABLET ORAL
Qty: 45 TABLET | Refills: 3 | Status: SHIPPED | OUTPATIENT
Start: 2019-01-01

## 2019-01-01 RX ORDER — AMIODARONE HYDROCHLORIDE 200 MG/1
TABLET ORAL
Qty: 50 TABLET | Refills: 0 | Status: SHIPPED | OUTPATIENT
Start: 2019-01-01 | End: 2019-01-01

## 2019-01-01 RX ORDER — METOPROLOL SUCCINATE 100 MG/1
100 TABLET, EXTENDED RELEASE ORAL DAILY
Qty: 90 TABLET | Refills: 3 | Status: SHIPPED | OUTPATIENT
Start: 2019-01-01 | End: 2020-01-01

## 2019-01-01 RX ORDER — DIGOXIN 125 MCG
125 TABLET ORAL
Qty: 45 TABLET | Refills: 3 | Status: SHIPPED | OUTPATIENT
Start: 2019-01-01

## 2019-01-01 ASSESSMENT — MIFFLIN-ST. JEOR
SCORE: 1751.85
SCORE: 1742.77
SCORE: 1751.85
SCORE: 1768.17
SCORE: 1756.38
SCORE: 1774.52
SCORE: 1751.72

## 2019-01-01 ASSESSMENT — PAIN SCALES - GENERAL
PAINLEVEL: NO PAIN (0)
PAINLEVEL: NO PAIN (0)

## 2019-01-03 ENCOUNTER — ANCILLARY PROCEDURE (OUTPATIENT)
Dept: CARDIOLOGY | Facility: CLINIC | Age: 79
End: 2019-01-03
Attending: INTERNAL MEDICINE
Payer: COMMERCIAL

## 2019-01-03 ENCOUNTER — DOCUMENTATION ONLY (OUTPATIENT)
Dept: CARDIOLOGY | Facility: CLINIC | Age: 79
End: 2019-01-03

## 2019-01-03 DIAGNOSIS — Z95.0 CARDIAC PACEMAKER IN SITU: ICD-10-CM

## 2019-01-03 PROCEDURE — 93295 DEV INTERROG REMOTE 1/2/MLT: CPT | Performed by: INTERNAL MEDICINE

## 2019-01-09 ENCOUNTER — ANTICOAGULATION THERAPY VISIT (OUTPATIENT)
Dept: FAMILY MEDICINE | Facility: CLINIC | Age: 79
End: 2019-01-09

## 2019-01-09 ENCOUNTER — TRANSFERRED RECORDS (OUTPATIENT)
Dept: HEALTH INFORMATION MANAGEMENT | Facility: CLINIC | Age: 79
End: 2019-01-09

## 2019-01-09 DIAGNOSIS — Z79.01 LONG TERM CURRENT USE OF ANTICOAGULANT THERAPY: ICD-10-CM

## 2019-01-09 LAB — INR PPP: 2.7 (ref 0.9–1.1)

## 2019-01-09 PROCEDURE — 99207 ZZC NO CHARGE NURSE ONLY: CPT | Performed by: FAMILY MEDICINE

## 2019-01-09 NOTE — PROGRESS NOTES
ANTICOAGULATION FOLLOW-UP CLINIC VISIT    Patient Name:  Shahid Villalta  Date:  2019  Contact Type:  Rosemaryhart    SUBJECTIVE:     Patient Findings     Positives:   No Problem Findings           OBJECTIVE    INR   Date Value Ref Range Status   2019 2.7 (A) 0.9 - 1.1 Final     Chromogenic Factor 10   Date Value Ref Range Status   2017 17 (L) 70 - 130 % Final     Comment:     Therapeutic Range:  A Chromogenic Factor 10 level of approximately 20-40%   inversely correlates with an INR of 2-3 for patients receiving Warfarin.   Chromogenic Factor 10 levels below 20% indicate an INR greater than 3 and   levels above 40% indicate an INR less than 2.         ASSESSMENT / PLAN  No question data found.  Anticoagulation Summary  As of 2019    INR goal:   2.0-3.0   TTR:   49.7 % (2.9 y)   INR used for dosin.7 (2019)   Warfarin maintenance plan:   2.5 mg (5 mg x 0.5) every Mon; 5 mg (5 mg x 1) all other days   Full warfarin instructions:   2.5 mg every Mon; 5 mg all other days   Weekly warfarin total:   32.5 mg   No change documented:   Manisha Pierson RN   Plan last modified:   Manisha Pierson RN (2018)   Next INR check:   2019   Target end date:       Indications    Long term current use of anticoagulant therapy [Z79.01]  Paroxysmal ventricular tachycardia (H) (Resolved) [I47.2]             Anticoagulation Episode Summary     INR check location:       Preferred lab:       Send INR reminders to:   [facility]    Comments:               See the Encounter Report to view Anticoagulation Flowsheet and Dosing Calendar (Go to Encounters tab in chart review, and find the Anticoagulation Therapy Visit)        Manisha Pierson RN

## 2019-01-10 NOTE — TELEPHONE ENCOUNTER
I would ask MD that supervises device checks if it is OK to wait. It seems reasonable to me to be seen end of feb

## 2019-01-10 NOTE — TELEPHONE ENCOUNTER
Can you schedule appt with EP MD to see if they have different recommendations on this new findings

## 2019-01-29 ENCOUNTER — MYC REFILL (OUTPATIENT)
Dept: FAMILY MEDICINE | Facility: CLINIC | Age: 79
End: 2019-01-29

## 2019-01-29 DIAGNOSIS — I50.22 CHRONIC SYSTOLIC HEART FAILURE (H): ICD-10-CM

## 2019-01-29 RX ORDER — DIGOXIN 125 MCG
125 TABLET ORAL
Qty: 45 TABLET | Refills: 0 | Status: SHIPPED | OUTPATIENT
Start: 2019-01-29 | End: 2019-04-18

## 2019-01-29 NOTE — TELEPHONE ENCOUNTER
"Routing refill request to provider for review/approval because:  Labs not current:  Drug level  Marisela Pierson RN, BSN      Last Written Prescription Date:  10/23/18  Last Fill Quantity: 45,  # refills: 0   Last office visit: 5/31/2018 with prescribing provider:  trevor   Future Office Visit:    Requested Prescriptions   Pending Prescriptions Disp Refills     digoxin (LANOXIN) 125 MCG tablet 45 tablet 0     Sig: Take 1 tablet (125 mcg) by mouth every 48 hours    Cardiac Glycoside Agents Protocol Failed - 1/29/2019 10:42 AM       Failed - Normal digoxin level on file in past 12 mos    Recent Labs   Lab Test 12/11/15  1430   DIGOXIN 0.7            Failed - Normal serum creatinine on file in past 12 mos    Recent Labs   Lab Test 11/09/18  0906  07/19/18  0918   CR 1.62*   < >  --    CREAT  --   --  1.7*    < > = values in this interval not displayed.            Failed - Recent (6 mo) or future (30 days) visit within the authorizing provider's specialty    Patient had office visit in the last 6 months or has a visit in the next 30 days with authorizing provider or within the authorizing provider's specialty.  See \"Patient Info\" tab in inbasket, or \"Choose Columns\" in Meds & Orders section of the refill encounter.           Passed - Medication is active on med list       Passed - Patient is 18 years of age or older          "

## 2019-02-07 ENCOUNTER — TRANSFERRED RECORDS (OUTPATIENT)
Dept: HEALTH INFORMATION MANAGEMENT | Facility: CLINIC | Age: 79
End: 2019-02-07

## 2019-02-12 ENCOUNTER — TRANSFERRED RECORDS (OUTPATIENT)
Dept: HEALTH INFORMATION MANAGEMENT | Facility: CLINIC | Age: 79
End: 2019-02-12

## 2019-02-12 LAB — INR PPP: 4.1 (ref 0.9–1.1)

## 2019-02-15 LAB
MDC_IDC_EPISODE_DTM: NORMAL
MDC_IDC_EPISODE_DURATION: 11 S
MDC_IDC_EPISODE_DURATION: 12 S
MDC_IDC_EPISODE_DURATION: 17 S
MDC_IDC_EPISODE_DURATION: 3767 S
MDC_IDC_EPISODE_DURATION: 5551 S
MDC_IDC_EPISODE_DURATION: 7 S
MDC_IDC_EPISODE_DURATION: 8 S
MDC_IDC_EPISODE_DURATION: 8209 S
MDC_IDC_EPISODE_DURATION: 9 S
MDC_IDC_EPISODE_DURATION: NORMAL S
MDC_IDC_EPISODE_ID: NORMAL
MDC_IDC_EPISODE_TYPE: NORMAL
MDC_IDC_LEAD_IMPLANT_DT: NORMAL
MDC_IDC_LEAD_LOCATION: NORMAL
MDC_IDC_LEAD_LOCATION_DETAIL_1: NORMAL
MDC_IDC_LEAD_LOCATION_DETAIL_1: NORMAL
MDC_IDC_LEAD_MFG: NORMAL
MDC_IDC_LEAD_MODEL: NORMAL
MDC_IDC_LEAD_POLARITY_TYPE: NORMAL
MDC_IDC_LEAD_SERIAL: NORMAL
MDC_IDC_MSMT_BATTERY_DTM: NORMAL
MDC_IDC_MSMT_BATTERY_REMAINING_LONGEVITY: 48 MO
MDC_IDC_MSMT_BATTERY_REMAINING_PERCENTAGE: 76 %
MDC_IDC_MSMT_BATTERY_STATUS: NORMAL
MDC_IDC_MSMT_CAP_CHARGE_DTM: NORMAL
MDC_IDC_MSMT_CAP_CHARGE_DTM: NORMAL
MDC_IDC_MSMT_CAP_CHARGE_ENERGY: 41 J
MDC_IDC_MSMT_CAP_CHARGE_TIME: 10.4 S
MDC_IDC_MSMT_CAP_CHARGE_TIME: 9.8 S
MDC_IDC_MSMT_CAP_CHARGE_TYPE: NORMAL
MDC_IDC_MSMT_CAP_CHARGE_TYPE: NORMAL
MDC_IDC_MSMT_LEADCHNL_LV_IMPEDANCE_VALUE: 671 OHM
MDC_IDC_MSMT_LEADCHNL_LV_PACING_THRESHOLD_AMPLITUDE: 1 V
MDC_IDC_MSMT_LEADCHNL_LV_PACING_THRESHOLD_PULSEWIDTH: 0.5 MS
MDC_IDC_MSMT_LEADCHNL_RA_IMPEDANCE_VALUE: 580 OHM
MDC_IDC_MSMT_LEADCHNL_RA_PACING_THRESHOLD_AMPLITUDE: 1.6 V
MDC_IDC_MSMT_LEADCHNL_RA_PACING_THRESHOLD_PULSEWIDTH: 1 MS
MDC_IDC_MSMT_LEADCHNL_RV_IMPEDANCE_VALUE: 403 OHM
MDC_IDC_MSMT_LEADCHNL_RV_PACING_THRESHOLD_AMPLITUDE: 0.6 V
MDC_IDC_MSMT_LEADCHNL_RV_PACING_THRESHOLD_PULSEWIDTH: 0.5 MS
MDC_IDC_PG_IMPLANT_DTM: NORMAL
MDC_IDC_PG_MFG: NORMAL
MDC_IDC_PG_MODEL: NORMAL
MDC_IDC_PG_SERIAL: NORMAL
MDC_IDC_PG_TYPE: NORMAL
MDC_IDC_SESS_CLINIC_NAME: NORMAL
MDC_IDC_SESS_DTM: NORMAL
MDC_IDC_SESS_TYPE: NORMAL
MDC_IDC_SET_BRADY_AT_MODE_SWITCH_MODE: NORMAL
MDC_IDC_SET_BRADY_AT_MODE_SWITCH_RATE: 100 {BEATS}/MIN
MDC_IDC_SET_BRADY_LOWRATE: 60 {BEATS}/MIN
MDC_IDC_SET_BRADY_MAX_SENSOR_RATE: 95 {BEATS}/MIN
MDC_IDC_SET_BRADY_MAX_TRACKING_RATE: 95 {BEATS}/MIN
MDC_IDC_SET_BRADY_MODE: NORMAL
MDC_IDC_SET_BRADY_PAV_DELAY_LOW: 180 MS
MDC_IDC_SET_BRADY_SAV_DELAY_LOW: 120 MS
MDC_IDC_SET_CRT_LVRV_DELAY: 0 MS
MDC_IDC_SET_CRT_PACED_CHAMBERS: NORMAL
MDC_IDC_SET_LEADCHNL_LV_PACING_AMPLITUDE: 2 V
MDC_IDC_SET_LEADCHNL_LV_PACING_ANODE_ELECTRODE_1: NORMAL
MDC_IDC_SET_LEADCHNL_LV_PACING_ANODE_LOCATION_1: NORMAL
MDC_IDC_SET_LEADCHNL_LV_PACING_CATHODE_ELECTRODE_1: NORMAL
MDC_IDC_SET_LEADCHNL_LV_PACING_CATHODE_LOCATION_1: NORMAL
MDC_IDC_SET_LEADCHNL_LV_PACING_PULSEWIDTH: 0.5 MS
MDC_IDC_SET_LEADCHNL_LV_SENSING_ADAPTATION_MODE: NORMAL
MDC_IDC_SET_LEADCHNL_LV_SENSING_ANODE_ELECTRODE_1: NORMAL
MDC_IDC_SET_LEADCHNL_LV_SENSING_ANODE_LOCATION_1: NORMAL
MDC_IDC_SET_LEADCHNL_LV_SENSING_CATHODE_ELECTRODE_1: NORMAL
MDC_IDC_SET_LEADCHNL_LV_SENSING_CATHODE_LOCATION_1: NORMAL
MDC_IDC_SET_LEADCHNL_LV_SENSING_SENSITIVITY: 1 MV
MDC_IDC_SET_LEADCHNL_RA_PACING_AMPLITUDE: 3.2 V
MDC_IDC_SET_LEADCHNL_RA_PACING_POLARITY: NORMAL
MDC_IDC_SET_LEADCHNL_RA_PACING_PULSEWIDTH: 1 MS
MDC_IDC_SET_LEADCHNL_RA_SENSING_ADAPTATION_MODE: NORMAL
MDC_IDC_SET_LEADCHNL_RA_SENSING_POLARITY: NORMAL
MDC_IDC_SET_LEADCHNL_RA_SENSING_SENSITIVITY: 0.25 MV
MDC_IDC_SET_LEADCHNL_RV_PACING_AMPLITUDE: 2 V
MDC_IDC_SET_LEADCHNL_RV_PACING_POLARITY: NORMAL
MDC_IDC_SET_LEADCHNL_RV_PACING_PULSEWIDTH: 0.5 MS
MDC_IDC_SET_LEADCHNL_RV_SENSING_ADAPTATION_MODE: NORMAL
MDC_IDC_SET_LEADCHNL_RV_SENSING_POLARITY: NORMAL
MDC_IDC_SET_LEADCHNL_RV_SENSING_SENSITIVITY: 0.6 MV
MDC_IDC_SET_ZONE_DETECTION_INTERVAL: 250 MS
MDC_IDC_SET_ZONE_DETECTION_INTERVAL: 300 MS
MDC_IDC_SET_ZONE_DETECTION_INTERVAL: 400 MS
MDC_IDC_SET_ZONE_TYPE: NORMAL
MDC_IDC_SET_ZONE_VENDOR_TYPE: NORMAL
MDC_IDC_STAT_BRADY_DTM_END: NORMAL
MDC_IDC_STAT_BRADY_DTM_START: NORMAL
MDC_IDC_STAT_BRADY_RA_PERCENT_PACED: 15 %
MDC_IDC_STAT_BRADY_RV_PERCENT_PACED: 98 %
MDC_IDC_STAT_CRT_DTM_END: NORMAL
MDC_IDC_STAT_CRT_DTM_START: NORMAL
MDC_IDC_STAT_CRT_LV_PERCENT_PACED: 98 %
MDC_IDC_STAT_EPISODE_RECENT_COUNT: 0
MDC_IDC_STAT_EPISODE_RECENT_COUNT: 3855
MDC_IDC_STAT_EPISODE_RECENT_COUNT: 83
MDC_IDC_STAT_EPISODE_RECENT_COUNT_DTM_END: NORMAL
MDC_IDC_STAT_EPISODE_RECENT_COUNT_DTM_START: NORMAL
MDC_IDC_STAT_EPISODE_TYPE: NORMAL
MDC_IDC_STAT_EPISODE_VENDOR_TYPE: NORMAL
MDC_IDC_STAT_TACHYTHERAPY_ATP_DELIVERED_RECENT: 0
MDC_IDC_STAT_TACHYTHERAPY_ATP_DELIVERED_TOTAL: 1
MDC_IDC_STAT_TACHYTHERAPY_RECENT_DTM_END: NORMAL
MDC_IDC_STAT_TACHYTHERAPY_RECENT_DTM_START: NORMAL
MDC_IDC_STAT_TACHYTHERAPY_SHOCKS_ABORTED_RECENT: 0
MDC_IDC_STAT_TACHYTHERAPY_SHOCKS_ABORTED_TOTAL: 0
MDC_IDC_STAT_TACHYTHERAPY_SHOCKS_DELIVERED_RECENT: 0
MDC_IDC_STAT_TACHYTHERAPY_SHOCKS_DELIVERED_TOTAL: 1
MDC_IDC_STAT_TACHYTHERAPY_TOTAL_DTM_END: NORMAL
MDC_IDC_STAT_TACHYTHERAPY_TOTAL_DTM_START: NORMAL

## 2019-02-27 ENCOUNTER — TRANSFERRED RECORDS (OUTPATIENT)
Dept: HEALTH INFORMATION MANAGEMENT | Facility: CLINIC | Age: 79
End: 2019-02-27

## 2019-02-28 LAB — INR PPP: 3.3 (ref 0.9–1.1)

## 2019-03-04 ENCOUNTER — ANTICOAGULATION THERAPY VISIT (OUTPATIENT)
Dept: FAMILY MEDICINE | Facility: CLINIC | Age: 79
End: 2019-03-04

## 2019-03-04 DIAGNOSIS — Z79.01 LONG TERM CURRENT USE OF ANTICOAGULANT THERAPY: ICD-10-CM

## 2019-03-04 PROCEDURE — 99207 ZZC NO CHARGE NURSE ONLY: CPT | Performed by: FAMILY MEDICINE

## 2019-03-04 NOTE — PROGRESS NOTES
ANTICOAGULATION FOLLOW-UP CLINIC VISIT    Patient Name:  Shahid Villalta  Date:  3/4/2019  Contact Type:  Telephone    SUBJECTIVE:     Patient Findings     Positives:   Unexplained INR or factor level change    Comments:   Patient is in [place] and has had more sun exposure and less greens.  Pt will try to stay steady on the greens and recheck in 2 weeks           OBJECTIVE    INR   Date Value Ref Range Status   02/27/2019 3.3  Final     Chromogenic Factor 10   Date Value Ref Range Status   07/31/2017 17 (L) 70 - 130 % Final     Comment:     Therapeutic Range:  A Chromogenic Factor 10 level of approximately 20-40%   inversely correlates with an INR of 2-3 for patients receiving Warfarin.   Chromogenic Factor 10 levels below 20% indicate an INR greater than 3 and   levels above 40% indicate an INR less than 2.         ASSESSMENT / PLAN  No question data found.  Anticoagulation Summary  As of 3/4/2019    INR goal:   2.0-3.0   TTR:   49.7 % (3.1 y)   INR used for dosing:   3.3! (2/27/2019)   Warfarin maintenance plan:   2.5 mg (5 mg x 0.5) every Mon; 5 mg (5 mg x 1) all other days   Full warfarin instructions:   2.5 mg every Mon; 5 mg all other days   Weekly warfarin total:   32.5 mg   No change documented:   Manisha Pierson RN   Plan last modified:   Manisha Pierson RN (7/23/2018)   Next INR check:   3/13/2019   Target end date:       Indications    Long term current use of anticoagulant therapy [Z79.01]  Paroxysmal ventricular tachycardia (H) (Resolved) [I47.2]             Anticoagulation Episode Summary     INR check location:       Preferred lab:       Send INR reminders to:   [facility]    Comments:               See the Encounter Report to view Anticoagulation Flowsheet and Dosing Calendar (Go to Encounters tab in chart review, and find the Anticoagulation Therapy Visit)        Manisha Pierson RN

## 2019-03-05 DIAGNOSIS — I25.10 CORONARY ARTERY DISEASE: Primary | Chronic | ICD-10-CM

## 2019-03-15 ENCOUNTER — TRANSFERRED RECORDS (OUTPATIENT)
Dept: HEALTH INFORMATION MANAGEMENT | Facility: CLINIC | Age: 79
End: 2019-03-15

## 2019-03-16 LAB — INR PPP: 3.4 (ref 0.9–1.1)

## 2019-03-18 ENCOUNTER — ANTICOAGULATION THERAPY VISIT (OUTPATIENT)
Dept: FAMILY MEDICINE | Facility: CLINIC | Age: 79
End: 2019-03-18
Payer: COMMERCIAL

## 2019-03-18 ENCOUNTER — TELEPHONE (OUTPATIENT)
Dept: FAMILY MEDICINE | Facility: CLINIC | Age: 79
End: 2019-03-18

## 2019-03-18 DIAGNOSIS — Z79.01 LONG TERM CURRENT USE OF ANTICOAGULANT THERAPY: ICD-10-CM

## 2019-03-18 DIAGNOSIS — I48.0 PAROXYSMAL ATRIAL FIBRILLATION (H): Primary | Chronic | ICD-10-CM

## 2019-03-18 DIAGNOSIS — Z79.01 ANTICOAGULATION MONITORING, INR RANGE 2-3: Chronic | ICD-10-CM

## 2019-03-18 PROCEDURE — 99207 ZZC NO CHARGE NURSE ONLY: CPT | Performed by: FAMILY MEDICINE

## 2019-03-18 NOTE — TELEPHONE ENCOUNTER
Has the patient previously taken warfarin? yes  If yes, for what indication? A-fib    Does the patient have any of the following indications for a higher range of 2.5-3.5:    Mitral position mechanical valve? no    Cuco-Shiley, Ball and Cage or Monoleaflet valve (regardless of position) no    Other (if yes, please explain) no    Marisela Pierson RN, BSN

## 2019-03-18 NOTE — PROGRESS NOTES
ANTICOAGULATION FOLLOW-UP CLINIC VISIT    Patient Name:  Shahid Villalta  Date:  3/18/2019  Contact Type:  fastDovehart    SUBJECTIVE:     Patient Findings     Comments:   The patient was assessed for diet, medication, and activity level changes, missed or extra doses, bruising or bleeding, with no problem findings.             OBJECTIVE    INR   Date Value Ref Range Status   03/15/2019 3.4  Final     Chromogenic Factor 10   Date Value Ref Range Status   07/31/2017 17 (L) 70 - 130 % Final     Comment:     Therapeutic Range:  A Chromogenic Factor 10 level of approximately 20-40%   inversely correlates with an INR of 2-3 for patients receiving Warfarin.   Chromogenic Factor 10 levels below 20% indicate an INR greater than 3 and   levels above 40% indicate an INR less than 2.         ASSESSMENT / PLAN  No question data found.  Anticoagulation Summary  As of 3/18/2019    INR goal:   2.0-3.0   TTR:   47.5 % (3.1 y)   INR used for dosing:   3.4! (3/15/2019)   Warfarin maintenance plan:   2.5 mg (5 mg x 0.5) every Mon; 5 mg (5 mg x 1) all other days   Full warfarin instructions:   2.5 mg every Mon; 5 mg all other days   Weekly warfarin total:   32.5 mg   No change documented:   Manisha Pierson RN   Plan last modified:   Manisha Pierson RN (7/23/2018)   Next INR check:   3/29/2019   Target end date:       Indications    Long term current use of anticoagulant therapy [Z79.01]  Paroxysmal ventricular tachycardia (H) (Resolved) [I47.2]             Anticoagulation Episode Summary     INR check location:       Preferred lab:       Send INR reminders to:   [facility]    Comments:               See the Encounter Report to view Anticoagulation Flowsheet and Dosing Calendar (Go to Encounters tab in chart review, and find the Anticoagulation Therapy Visit)        Manisha Pierson RN

## 2019-04-04 ENCOUNTER — MYC MEDICAL ADVICE (OUTPATIENT)
Dept: NURSING | Facility: CLINIC | Age: 79
End: 2019-04-04

## 2019-04-16 ENCOUNTER — TRANSFERRED RECORDS (OUTPATIENT)
Dept: HEALTH INFORMATION MANAGEMENT | Facility: CLINIC | Age: 79
End: 2019-04-16

## 2019-04-16 LAB — INR PPP: 6.1 (ref 0.9–1.1)

## 2019-04-17 ENCOUNTER — ANCILLARY PROCEDURE (OUTPATIENT)
Dept: CARDIOLOGY | Facility: CLINIC | Age: 79
End: 2019-04-17
Payer: COMMERCIAL

## 2019-04-17 ENCOUNTER — DOCUMENTATION ONLY (OUTPATIENT)
Dept: CARDIOLOGY | Facility: CLINIC | Age: 79
End: 2019-04-17

## 2019-04-17 DIAGNOSIS — Z95.810 ICD (IMPLANTABLE CARDIOVERTER-DEFIBRILLATOR) IN PLACE: ICD-10-CM

## 2019-04-17 DIAGNOSIS — Z95.810 ICD (IMPLANTABLE CARDIOVERTER-DEFIBRILLATOR) IN PLACE: Primary | ICD-10-CM

## 2019-04-17 PROCEDURE — 93296 REM INTERROG EVL PM/IDS: CPT | Performed by: INTERNAL MEDICINE

## 2019-04-17 PROCEDURE — 93295 DEV INTERROG REMOTE 1/2/MLT: CPT | Performed by: INTERNAL MEDICINE

## 2019-04-17 NOTE — TELEPHONE ENCOUNTER
Reviewed episode with Dr Hien kelley for EP to see him. Order placed and appointment scheduled for same day he's having his annual device check. He's going to be down in [place] for a bit longer.  SK

## 2019-04-17 NOTE — TELEPHONE ENCOUNTER
NSVT episode logged - short bursts of NSVT with BiVP in between. Total episode logged as lasting ~15 seconds. Rates in the 150s. Denies symptoms. Hx of AVNA. He does have a tachy setting at 150 with ATP, but this episode did not receive therapy. Sent to Dr Aden to review.

## 2019-04-18 ENCOUNTER — ANTICOAGULATION THERAPY VISIT (OUTPATIENT)
Dept: FAMILY MEDICINE | Facility: CLINIC | Age: 79
End: 2019-04-18
Payer: COMMERCIAL

## 2019-04-18 DIAGNOSIS — Z79.01 LONG TERM CURRENT USE OF ANTICOAGULANT THERAPY: ICD-10-CM

## 2019-04-18 DIAGNOSIS — I50.22 CHRONIC SYSTOLIC HEART FAILURE (H): ICD-10-CM

## 2019-04-18 DIAGNOSIS — I47.29 PAROXYSMAL VENTRICULAR TACHYCARDIA (H): ICD-10-CM

## 2019-04-18 LAB
MDC_IDC_EPISODE_DTM: NORMAL
MDC_IDC_EPISODE_DURATION: 14 S
MDC_IDC_EPISODE_DURATION: 15 S
MDC_IDC_EPISODE_DURATION: 16 S
MDC_IDC_EPISODE_DURATION: 2337 S
MDC_IDC_EPISODE_DURATION: 3550 S
MDC_IDC_EPISODE_DURATION: 6 S
MDC_IDC_EPISODE_DURATION: 65 S
MDC_IDC_EPISODE_DURATION: 7 S
MDC_IDC_EPISODE_DURATION: 7762 S
MDC_IDC_EPISODE_DURATION: 8 S
MDC_IDC_EPISODE_DURATION: 8 S
MDC_IDC_EPISODE_DURATION: NORMAL S
MDC_IDC_EPISODE_ID: NORMAL
MDC_IDC_EPISODE_TYPE: NORMAL
MDC_IDC_LEAD_IMPLANT_DT: NORMAL
MDC_IDC_LEAD_LOCATION: NORMAL
MDC_IDC_LEAD_LOCATION_DETAIL_1: NORMAL
MDC_IDC_LEAD_LOCATION_DETAIL_1: NORMAL
MDC_IDC_LEAD_MFG: NORMAL
MDC_IDC_LEAD_MODEL: NORMAL
MDC_IDC_LEAD_POLARITY_TYPE: NORMAL
MDC_IDC_LEAD_SERIAL: NORMAL
MDC_IDC_MSMT_BATTERY_DTM: NORMAL
MDC_IDC_MSMT_BATTERY_REMAINING_LONGEVITY: 42 MO
MDC_IDC_MSMT_BATTERY_REMAINING_PERCENTAGE: 72 %
MDC_IDC_MSMT_BATTERY_STATUS: NORMAL
MDC_IDC_MSMT_CAP_CHARGE_DTM: NORMAL
MDC_IDC_MSMT_CAP_CHARGE_DTM: NORMAL
MDC_IDC_MSMT_CAP_CHARGE_ENERGY: 41 J
MDC_IDC_MSMT_CAP_CHARGE_TIME: 10.5 S
MDC_IDC_MSMT_CAP_CHARGE_TIME: 9.8 S
MDC_IDC_MSMT_CAP_CHARGE_TYPE: NORMAL
MDC_IDC_MSMT_CAP_CHARGE_TYPE: NORMAL
MDC_IDC_MSMT_LEADCHNL_LV_IMPEDANCE_VALUE: 624 OHM
MDC_IDC_MSMT_LEADCHNL_RA_IMPEDANCE_VALUE: 583 OHM
MDC_IDC_MSMT_LEADCHNL_RV_IMPEDANCE_VALUE: 384 OHM
MDC_IDC_PG_IMPLANT_DTM: NORMAL
MDC_IDC_PG_MFG: NORMAL
MDC_IDC_PG_MODEL: NORMAL
MDC_IDC_PG_SERIAL: NORMAL
MDC_IDC_PG_TYPE: NORMAL
MDC_IDC_SESS_CLINIC_NAME: NORMAL
MDC_IDC_SESS_DTM: NORMAL
MDC_IDC_SESS_TYPE: NORMAL
MDC_IDC_SET_BRADY_AT_MODE_SWITCH_MODE: NORMAL
MDC_IDC_SET_BRADY_AT_MODE_SWITCH_RATE: 100 {BEATS}/MIN
MDC_IDC_SET_BRADY_LOWRATE: 60 {BEATS}/MIN
MDC_IDC_SET_BRADY_MAX_SENSOR_RATE: 95 {BEATS}/MIN
MDC_IDC_SET_BRADY_MAX_TRACKING_RATE: 95 {BEATS}/MIN
MDC_IDC_SET_BRADY_MODE: NORMAL
MDC_IDC_SET_BRADY_PAV_DELAY_LOW: 180 MS
MDC_IDC_SET_BRADY_SAV_DELAY_LOW: 120 MS
MDC_IDC_SET_CRT_LVRV_DELAY: 0 MS
MDC_IDC_SET_CRT_PACED_CHAMBERS: NORMAL
MDC_IDC_SET_LEADCHNL_LV_PACING_AMPLITUDE: 2 V
MDC_IDC_SET_LEADCHNL_LV_PACING_ANODE_ELECTRODE_1: NORMAL
MDC_IDC_SET_LEADCHNL_LV_PACING_ANODE_LOCATION_1: NORMAL
MDC_IDC_SET_LEADCHNL_LV_PACING_CATHODE_ELECTRODE_1: NORMAL
MDC_IDC_SET_LEADCHNL_LV_PACING_CATHODE_LOCATION_1: NORMAL
MDC_IDC_SET_LEADCHNL_LV_PACING_PULSEWIDTH: 0.5 MS
MDC_IDC_SET_LEADCHNL_LV_SENSING_ADAPTATION_MODE: NORMAL
MDC_IDC_SET_LEADCHNL_LV_SENSING_ANODE_ELECTRODE_1: NORMAL
MDC_IDC_SET_LEADCHNL_LV_SENSING_ANODE_LOCATION_1: NORMAL
MDC_IDC_SET_LEADCHNL_LV_SENSING_CATHODE_ELECTRODE_1: NORMAL
MDC_IDC_SET_LEADCHNL_LV_SENSING_CATHODE_LOCATION_1: NORMAL
MDC_IDC_SET_LEADCHNL_LV_SENSING_SENSITIVITY: 1 MV
MDC_IDC_SET_LEADCHNL_RA_PACING_AMPLITUDE: 3.2 V
MDC_IDC_SET_LEADCHNL_RA_PACING_POLARITY: NORMAL
MDC_IDC_SET_LEADCHNL_RA_PACING_PULSEWIDTH: 1 MS
MDC_IDC_SET_LEADCHNL_RA_SENSING_ADAPTATION_MODE: NORMAL
MDC_IDC_SET_LEADCHNL_RA_SENSING_POLARITY: NORMAL
MDC_IDC_SET_LEADCHNL_RA_SENSING_SENSITIVITY: 0.25 MV
MDC_IDC_SET_LEADCHNL_RV_PACING_AMPLITUDE: 2 V
MDC_IDC_SET_LEADCHNL_RV_PACING_POLARITY: NORMAL
MDC_IDC_SET_LEADCHNL_RV_PACING_PULSEWIDTH: 0.5 MS
MDC_IDC_SET_LEADCHNL_RV_SENSING_ADAPTATION_MODE: NORMAL
MDC_IDC_SET_LEADCHNL_RV_SENSING_POLARITY: NORMAL
MDC_IDC_SET_LEADCHNL_RV_SENSING_SENSITIVITY: 0.6 MV
MDC_IDC_SET_ZONE_DETECTION_INTERVAL: 250 MS
MDC_IDC_SET_ZONE_DETECTION_INTERVAL: 300 MS
MDC_IDC_SET_ZONE_DETECTION_INTERVAL: 400 MS
MDC_IDC_SET_ZONE_TYPE: NORMAL
MDC_IDC_SET_ZONE_VENDOR_TYPE: NORMAL
MDC_IDC_STAT_BRADY_DTM_END: NORMAL
MDC_IDC_STAT_BRADY_DTM_START: NORMAL
MDC_IDC_STAT_BRADY_RA_PERCENT_PACED: 11 %
MDC_IDC_STAT_BRADY_RV_PERCENT_PACED: 98 %
MDC_IDC_STAT_CRT_DTM_END: NORMAL
MDC_IDC_STAT_CRT_DTM_START: NORMAL
MDC_IDC_STAT_CRT_LV_PERCENT_PACED: 98 %
MDC_IDC_STAT_EPISODE_RECENT_COUNT: 0
MDC_IDC_STAT_EPISODE_RECENT_COUNT: 269
MDC_IDC_STAT_EPISODE_RECENT_COUNT: 4127
MDC_IDC_STAT_EPISODE_RECENT_COUNT_DTM_END: NORMAL
MDC_IDC_STAT_EPISODE_RECENT_COUNT_DTM_START: NORMAL
MDC_IDC_STAT_EPISODE_TYPE: NORMAL
MDC_IDC_STAT_EPISODE_VENDOR_TYPE: NORMAL
MDC_IDC_STAT_TACHYTHERAPY_ATP_DELIVERED_RECENT: 0
MDC_IDC_STAT_TACHYTHERAPY_ATP_DELIVERED_TOTAL: 1
MDC_IDC_STAT_TACHYTHERAPY_RECENT_DTM_END: NORMAL
MDC_IDC_STAT_TACHYTHERAPY_RECENT_DTM_START: NORMAL
MDC_IDC_STAT_TACHYTHERAPY_SHOCKS_ABORTED_RECENT: 0
MDC_IDC_STAT_TACHYTHERAPY_SHOCKS_ABORTED_TOTAL: 0
MDC_IDC_STAT_TACHYTHERAPY_SHOCKS_DELIVERED_RECENT: 0
MDC_IDC_STAT_TACHYTHERAPY_SHOCKS_DELIVERED_TOTAL: 1
MDC_IDC_STAT_TACHYTHERAPY_TOTAL_DTM_END: NORMAL
MDC_IDC_STAT_TACHYTHERAPY_TOTAL_DTM_START: NORMAL

## 2019-04-18 PROCEDURE — 99207 ZZC NO CHARGE NURSE ONLY: CPT | Performed by: FAMILY MEDICINE

## 2019-04-18 RX ORDER — WARFARIN SODIUM 5 MG/1
TABLET ORAL
Qty: 90 TABLET | Refills: 0 | Status: SHIPPED | OUTPATIENT
Start: 2019-04-18 | End: 2019-01-01

## 2019-04-18 NOTE — PROGRESS NOTES
ANTICOAGULATION FOLLOW-UP CLINIC VISIT    Patient Name:  Shahid Villalta  Date:  2019  Contact Type:  Telephone with pt as he is still in [place]      SUBJECTIVE:   Pt denies any bruising or bleeding.   He is feeling well and has had no changes in med's or vitamins.         OBJECTIVE    INR   Date Value Ref Range Status   2019 6.1 (A) 0.9 - 1.4 Final     Chromogenic Factor 10   Date Value Ref Range Status   2017 17 (L) 70 - 130 % Final     Comment:     Therapeutic Range:  A Chromogenic Factor 10 level of approximately 20-40%   inversely correlates with an INR of 2-3 for patients receiving Warfarin.   Chromogenic Factor 10 levels below 20% indicate an INR greater than 3 and   levels above 40% indicate an INR less than 2.         ASSESSMENT / PLAN    Copy of lab to abstraction -     Will check INR Monday and request STAT/same day result. If he has any bruising or bleeding will be seen in ER.     He will increase his greens as well.     Pt expressed understanding and acceptance of the plan.  Pt had no further questions at this time.  Advised can call back to clinic at any time with concerns.     .  Anticoagulation Summary  As of 2019    INR goal:   2.0-3.0   TTR:   46.2 % (3.2 y)   INR used for dosin.1! (2019)   Warfarin maintenance plan:   2.5 mg (5 mg x 0.5) every Mon; 5 mg (5 mg x 1) all other days   Full warfarin instructions:   2.5 mg every Mon; 5 mg all other days   Weekly warfarin total:   32.5 mg   Plan last modified:   Manisha Pierson RN (2018)   Next INR check:      Target end date:       Indications    Long term current use of anticoagulant therapy [Z79.01]  Paroxysmal ventricular tachycardia (H) (Resolved) [I47.2]             Anticoagulation Episode Summary     INR check location:       Preferred lab:       Send INR reminders to:   [facility]    Comments:               See the Encounter Report to view Anticoagulation Flowsheet and Dosing Calendar (Go to Encounters tab in chart  review, and find the Anticoagulation Therapy Visit)    Dosage adjustment made based on physician directed care plan.    Isela Hua RN

## 2019-04-18 NOTE — TELEPHONE ENCOUNTER
[facility] is calling in patient's INR which was 6.1. They will be sending over the fax, this was verified twice with them.  Liana Delong

## 2019-04-18 NOTE — TELEPHONE ENCOUNTER
"Requested Prescriptions   Pending Prescriptions Disp Refills     warfarin (COUMADIN) 5 MG tablet 90 tablet 0     Sig: TAKE 1 TABLET BY MOUTH DAILY GENERIC EQUIVALENT FOR COUMADIN     Last Written Prescription Date:  12/11/2018  Last Fill Quantity: 90 tablet,  # refills: 0   Last office visit: 5/31/2018 with prescribing provider:  05/31/2018   Future Office Visit:   Next 5 appointments (look out 90 days)    May 01, 2019 10:30 AM CDT  Return Visit with  ONCOLOGY NURSE  [facility] ([facility]) [facility]  [address]  [facility] [address]  [phone]   May 08, 2019 10:00 AM CDT  Return Visit with Doug Benoit MD  [facility] ([facility]) [facility]  [address]  [facility] [address]  [phone]               Vitamin K Antagonists Failed - 4/18/2019  1:31 PM        Failed - INR is within goal in the past 6 weeks     Confirm INR is within goal in the past 6 weeks.     Recent Labs   Lab Test 04/16/19   INR 6.1*                       Passed - Recent (12 mo) or future (30 days) visit within the authorizing provider's specialty     Patient had office visit in the last 12 months or has a visit in the next 30 days with authorizing provider or within the authorizing provider's specialty.  See \"Patient Info\" tab in inbasket, or \"Choose Columns\" in Meds & Orders section of the refill encounter.              Passed - Medication is active on med list        Passed - Patient is 18 years of age or older            digoxin (LANOXIN) 125 MCG tablet 45 tablet 0     Sig: Take 1 tablet (125 mcg) by mouth every 48 hours     Last Written Prescription Date:  01/29/2019  Last Fill Quantity: 45 tablet,  # refills: 0   Last office visit: 5/31/2018 with prescribing provider:  05/31/2018   Future Office Visit:   Next 5 appointments (look out 90 days)    May 01, 2019 10:30 " "AM CDT  Return Visit with [facility] ONCOLOGY NURSE  [facility] ([facility]) [facility]  [address]  [place] [address]  [phone]   May 08, 2019 10:00 AM CDT  Return Visit with Doug Benoit MD  [facility] ([facility]) [facility]  [address]  [place] [address]  [phone]               Cardiac Glycoside Agents Protocol Failed - 4/18/2019  1:31 PM        Failed - Normal digoxin level on file in past 12 mos     Recent Labs   Lab Test 12/11/15  1430   DIGOXIN 0.7             Failed - Normal serum creatinine on file in past 12 mos     Recent Labs   Lab Test 11/09/18  0906  07/19/18  0918   CR 1.62*   < >  --    CREAT  --   --  1.7*    < > = values in this interval not displayed.             Failed - Recent (6 mo) or future (30 days) visit within the authorizing provider's specialty     Patient had office visit in the last 6 months or has a visit in the next 30 days with authorizing provider or within the authorizing provider's specialty.  See \"Patient Info\" tab in inbasket, or \"Choose Columns\" in Meds & Orders section of the refill encounter.            Passed - Medication is active on med list        Passed - Patient is 18 years of age or older        Ethan GARCIAT  "

## 2019-04-18 NOTE — TELEPHONE ENCOUNTER
OK for RF on digoxin     Sent pt my chart that he needs OV     Prescription approved per [facility] Refill Protocol.  Isela Hua RN

## 2019-04-19 RX ORDER — DIGOXIN 125 MCG
125 TABLET ORAL
Qty: 45 TABLET | Refills: 0 | Status: SHIPPED | OUTPATIENT
Start: 2019-04-19 | End: 2019-01-01

## 2019-04-22 ENCOUNTER — ANTICOAGULATION THERAPY VISIT (OUTPATIENT)
Dept: FAMILY MEDICINE | Facility: CLINIC | Age: 79
End: 2019-04-22
Payer: COMMERCIAL

## 2019-04-22 ENCOUNTER — TRANSFERRED RECORDS (OUTPATIENT)
Dept: HEALTH INFORMATION MANAGEMENT | Facility: CLINIC | Age: 79
End: 2019-04-22

## 2019-04-22 DIAGNOSIS — Z79.01 LONG TERM CURRENT USE OF ANTICOAGULANT THERAPY: ICD-10-CM

## 2019-04-22 LAB — INR PPP: 2.1 (ref 0.9–1.1)

## 2019-04-22 PROCEDURE — 99207 ZZC NO CHARGE NURSE ONLY: CPT | Performed by: FAMILY MEDICINE

## 2019-04-22 NOTE — PROGRESS NOTES
ANTICOAGULATION FOLLOW-UP CLINIC VISIT    Patient Name:  Shahid Villalta  Date:  4/22/2019  Contact Type:  Telephone/ with pt     SUBJECTIVE:     Patient Findings     Comments:   The patient was assessed for diet, medication, and activity level changes, missed or extra doses, bruising or bleeding, with no problem findings.  Pt will be driving back to [place] starting later this week and into next week.            OBJECTIVE    INR   Date Value Ref Range Status   04/16/2019 6.1 (A) 0.9 - 1.4 Final     Chromogenic Factor 10   Date Value Ref Range Status   07/31/2017 17 (L) 70 - 130 % Final     Comment:     Therapeutic Range:  A Chromogenic Factor 10 level of approximately 20-40%   inversely correlates with an INR of 2-3 for patients receiving Warfarin.   Chromogenic Factor 10 levels below 20% indicate an INR greater than 3 and   levels above 40% indicate an INR less than 2.         ASSESSMENT / PLAN    Pt is now therapeutic with hold and increase of greens.   He will be driving back to [place] starting this week and into next.   He has been supra for the last several INR so will keep dosage in the range it is now with only slight increase to account for the increased greens so that pt will be on the higher side of therapeutic range while on long car trip.      Pt will call next week when back in [place] for INR check in clinic.     Pt expressed understanding and acceptance of the plan.  Pt had no further questions at this time.  Advised can call back to clinic at any time with concerns.     Anticoagulation Summary  As of 4/22/2019    INR goal:   2.0-3.0   TTR:   46.2 % (3.2 y)   INR used for dosing:      Warfarin maintenance plan:   2.5 mg (5 mg x 0.5) every Mon; 5 mg (5 mg x 1) all other days   Full warfarin instructions:   4/24: 2.5 mg; 4/25: 2.5 mg; 4/26: 2.5 mg; 4/27: 2.5 mg; 4/28: 2.5 mg; 5/1: 2.5 mg; Otherwise 2.5 mg every Mon; 5 mg all other days   Weekly warfarin total:   32.5 mg   Plan last modified:   Manisha Pierson RN  (7/23/2018)   Next INR check:   5/2/2019   Target end date:       Indications    Long term current use of anticoagulant therapy [Z79.01]  Paroxysmal ventricular tachycardia (H) (Resolved) [I47.2]             Anticoagulation Episode Summary     INR check location:       Preferred lab:       Send INR reminders to:   [facility]    Comments:               See the Encounter Report to view Anticoagulation Flowsheet and Dosing Calendar (Go to Encounters tab in chart review, and find the Anticoagulation Therapy Visit)    Dosage adjustment made based on physician directed care plan.    Isela Hua RN

## 2019-04-30 NOTE — PROGRESS NOTES
ANTICOAGULATION FOLLOW-UP CLINIC VISIT    Patient Name:  Shahid Villalta  Date:  2019  Contact Type:  Face to Face    SUBJECTIVE:     Patient Findings     Positives:   Change in diet/appetite    Comments:   Pt was instructed to take 2.5 mg daily except for Tuesday but took 5 mg instead except for Monday he took 2.5mg.  He had increased his greens when he was elevated but over the last 3 days he didn't eat greens due to traveling           OBJECTIVE    INR Protime   Date Value Ref Range Status   2019 4.3 (A) 0.86 - 1.14 Final     Chromogenic Factor 10   Date Value Ref Range Status   2017 17 (L) 70 - 130 % Final     Comment:     Therapeutic Range:  A Chromogenic Factor 10 level of approximately 20-40%   inversely correlates with an INR of 2-3 for patients receiving Warfarin.   Chromogenic Factor 10 levels below 20% indicate an INR greater than 3 and   levels above 40% indicate an INR less than 2.         ASSESSMENT / PLAN  INR assessment SUPRA    Recheck INR In: 1 WEEK    INR Location Clinic      Anticoagulation Summary  As of 2019    INR goal:   2.0-3.0   TTR:   46.1 % (3.2 y)   INR used for dosin.3! (2019)   Warfarin maintenance plan:   2.5 mg (5 mg x 0.5) every Mon; 5 mg (5 mg x 1) all other days   Full warfarin instructions:   : Hold; /: 2.5 mg; Otherwise 2.5 mg every Mon; 5 mg all other days   Weekly warfarin total:   32.5 mg   Plan last modified:   Manisha Pierson RN (2018)   Next INR check:   2019   Target end date:       Indications    Long term current use of anticoagulant therapy [Z79.01]  Paroxysmal ventricular tachycardia (H) (Resolved) [I47.2]             Anticoagulation Episode Summary     INR check location:       Preferred lab:       Send INR reminders to:   [facility]    Comments:               See the Encounter Report to view Anticoagulation Flowsheet and Dosing Calendar (Go to Encounters tab in chart review, and find the Anticoagulation Therapy  Visit)    Dosage adjustment made based on physician directed care plan.    Manisha Pierson, RN

## 2019-05-01 NOTE — NURSING NOTE
Medical Assistant Note:  Shahid Villalta presents today for blood draw.    Patient seen by provider today: No.   present during visit today: Not Applicable.    Concerns: No Concerns.    Procedure:  Lab draw site: left arm, Needle type: butterfly, Gauge: 21.    Post Assessment:  Labs drawn without difficulty: Yes.    Discharge Plan:  Departure Mode: Ambulatory.    Face to Face Time: 10.    Stephy UmañaJames E. [facility]

## 2019-05-01 NOTE — LETTER
5/1/2019         RE: Shahid Villalta  [address] [facility] [address]        Dear Colleague,    Thank you for referring your patient, Shahid Villalta, to the [facility]. Please see a copy of my visit note below.    See Nursing Note.    Stephy Umaña CMA on 5/1/2019 at 10:13 AM      Again, thank you for allowing me to participate in the care of your patient.        Sincerely,        Nikki Oncology Nurse

## 2019-05-07 NOTE — PROGRESS NOTES
ANTICOAGULATION FOLLOW-UP CLINIC VISIT    Patient Name:  Shahid Villalta  Date:  2019  Contact Type:  Face to Face    SUBJECTIVE:     Patient Findings     Comments:   The patient was assessed for diet, medication, and activity level changes, missed or extra doses, bruising or bleeding, with no problem findings.  Patient denies any identifiable changes that caused the supratherapeutic INR.   Dosing discussed with Mira Conklin PharmD           OBJECTIVE    INR Protime   Date Value Ref Range Status   2019 6.1 (A) 0.86 - 1.14 Final     Chromogenic Factor 10   Date Value Ref Range Status   2017 17 (L) 70 - 130 % Final     Comment:     Therapeutic Range:  A Chromogenic Factor 10 level of approximately 20-40%   inversely correlates with an INR of 2-3 for patients receiving Warfarin.   Chromogenic Factor 10 levels below 20% indicate an INR greater than 3 and   levels above 40% indicate an INR less than 2.         ASSESSMENT / PLAN  INR assessment SUPRA    Recheck INR In: 4 DAYS    INR Location Clinic      Anticoagulation Summary  As of 2019    INR goal:   2.0-3.0   TTR:   45.8 % (3.3 y)   INR used for dosin.1! (2019)   Warfarin maintenance plan:   2.5 mg (5 mg x 0.5) every Mon; 5 mg (5 mg x 1) all other days   Full warfarin instructions:   : Hold; : Hold; : 2.5 mg; Otherwise 2.5 mg every Mon; 5 mg all other days   Weekly warfarin total:   32.5 mg   Plan last modified:   Manisha Pierson RN (2018)   Next INR check:   5/10/2019   Target end date:       Indications    Long term current use of anticoagulant therapy [Z79.01]  Paroxysmal ventricular tachycardia (H) (Resolved) [I47.2]             Anticoagulation Episode Summary     INR check location:       Preferred lab:       Send INR reminders to:   [facility]    Comments:               See the Encounter Report to view Anticoagulation Flowsheet and Dosing Calendar (Go to Encounters tab in chart review, and find the Anticoagulation  Therapy Visit)    Dosage adjustment made based on physician directed care plan.    Manisha Pierson RN

## 2019-05-08 NOTE — NURSING NOTE
"Oncology Rooming Note    May 8, 2019 9:53 AM   Shahid Villalta is a 79 year old male who presents for:    Chief Complaint   Patient presents with     Oncology Clinic Visit     Prostate cancer      Initial Vitals: /63   Pulse 71   Temp 97.8  F (36.6  C) (Oral)   Resp 18   Ht 1.727 m (5' 8\")   Wt 105.7 kg (233 lb)   SpO2 95%   BMI 35.43 kg/m   Estimated body mass index is 35.43 kg/m  as calculated from the following:    Height as of this encounter: 1.727 m (5' 8\").    Weight as of this encounter: 105.7 kg (233 lb). Body surface area is 2.25 meters squared.  No Pain (0) Comment: Data Unavailable   No LMP for male patient.  Allergies reviewed: Yes  Medications reviewed: Yes    Medications: Medication refills not needed today.  Pharmacy name entered into [employer]:    [id number] - [place], [place] - [address] [facility] [address] [place] [facility] [address] [facility] [address] [facility]    Clinical concerns: f/u       Peyton Chavarria CMA              "

## 2019-05-08 NOTE — PROGRESS NOTES
"  CHIEF COMPLAINT  It was my pleasure to see Shahid Villalta who is a 79 year old male for follow-up of Prostate Cancer.      HPI  Shahid Villalta is a very pleasant 79 year old male who presents with a history of prostate cancer.  RALP 11/2015, T3b with positive margin. No hormones or radiation. Minimal incontinence. High-risk Decipher score and PSA rising. Has previously been recommended by Dr. Hilario to consider salvage radiotherapy.    Initial PSA:29  Pathologic [place] Score was 4 + 3  Biopsy Roni Score was 4 + 3  Pathologic Stage T3b.   Positive Margins: Yes Location:right side apical  Number of Lymph Nodes Removed: 13  Number of Positive Lymph Nodes: 0  Patient is status post robotic radical prostatectomy on 11/20/2015.    Risk Group: High    PSA  5/1/2019 - 0.03  11/5/2018 - 0.03  5/4/2018 - 0.02  11/6/2017 - 0.02  5/8/2017 - 0.01  10/28/2016 - <0.01    PHYSICAL EXAM  Patient is a 79 year old  male   Vitals: Blood pressure 137/63, pulse 71, temperature 97.8  F (36.6  C), temperature source Oral, resp. rate 18, height 1.727 m (5' 8\"), weight 105.7 kg (233 lb), SpO2 95 %.  General Appearance Adult: Body mass index is 35.43 kg/m .  Alert, no acute distress, oriented  HENT: throat/mouth:normal, good dentition  Lungs: no respiratory distress, or pursed lip breathing  Heart: No obvious jugular venous distension present  Abdomen: soft, nontender, no organomegaly or masses  Back: no CVAT  Musculoskeltal: extremities normal, no peripheral edema  Skin: no suspicious lesions or rashes  Neuro: Alert, oriented, speech and mentation normal  Psych: affect and mood normal  Gait: Normal    ASSESSMENT and PLAN  79 year old male with Roni 4+3 prostate cancer s/p RALP 2015, now with rising PSA. High-risk Decipher score. We again discussed his options in this circumstance. While his PSA is no longer undetectable, it has not changed since November and remains 0.03. We discussed the option of salvage radiotherapy and the date " suggesting survival benefit to this, particularly with his high-risk Decipher score. He expresses concern about possible side effects and a desire to avoid radiation. At this point, given his PSA has not changed in the last 6 months, we will continue to observe for now, and revisit this discussion in six months, particularly if PSA rises further.    - Follow-up 6 months with PSA    I spent over 15 minutes with the patient.  Over half this time was spent on counseling regarding Prostate Cancer.    Doug Benoit MD  Urology  [facility]  Clinic Phone [phone]

## 2019-05-10 NOTE — PROGRESS NOTES
ANTICOAGULATION FOLLOW-UP CLINIC VISIT    Patient Name:  Shahid Villalta  Date:  5/10/2019  Contact Type:  Face to Face    SUBJECTIVE:  Patient Findings     Comments:   Pt on hold due to supra INR         Clinical Outcomes     Comments:   Pt on hold due to supra INR            OBJECTIVE    INR Protime   Date Value Ref Range Status   05/10/2019 3.6 (A) 0.86 - 1.14 Final     Chromogenic Factor 10   Date Value Ref Range Status   07/31/2017 17 (L) 70 - 130 % Final     Comment:     Therapeutic Range:  A Chromogenic Factor 10 level of approximately 20-40%   inversely correlates with an INR of 2-3 for patients receiving Warfarin.   Chromogenic Factor 10 levels below 20% indicate an INR greater than 3 and   levels above 40% indicate an INR less than 2.         ASSESSMENT / PLAN  No question data found.  Anticoagulation Summary  As of 5/10/2019    INR goal:   2.0-3.0   TTR:   45.7 % (3.3 y)   INR used for dosing:   3.6! (5/10/2019)   Warfarin maintenance plan:   2.5 mg (5 mg x 0.5) every Mon; 5 mg (5 mg x 1) all other days   Full warfarin instructions:   5/10: 2.5 mg; Otherwise 2.5 mg every Mon; 5 mg all other days   Weekly warfarin total:   32.5 mg   Plan last modified:   Manisha Pierson RN (7/23/2018)   Next INR check:   5/14/2019   Target end date:       Indications    Long term current use of anticoagulant therapy [Z79.01]  Paroxysmal ventricular tachycardia (H) (Resolved) [I47.2]             Anticoagulation Episode Summary     INR check location:       Preferred lab:       Send INR reminders to:   [facility]    Comments:               See the Encounter Report to view Anticoagulation Flowsheet and Dosing Calendar (Go to Encounters tab in chart review, and find the Anticoagulation Therapy Visit)    Dosage adjustment made based on physician directed care plan.    Isela Hua, RN

## 2019-05-14 NOTE — PROGRESS NOTES
ANTICOAGULATION FOLLOW-UP CLINIC VISIT    Patient Name:  Shahid Villalta  Date:  2019  Contact Type:  Face to Face    SUBJECTIVE:  Patient Findings     Comments:   The patient was assessed for diet, medication, and activity level changes, missed or extra doses, bruising or bleeding, with no problem findings.        Clinical Outcomes     Comments:   The patient was assessed for diet, medication, and activity level changes, missed or extra doses, bruising or bleeding, with no problem findings.           OBJECTIVE    INR Protime   Date Value Ref Range Status   2019 2.2 (A) 0.86 - 1.14 Final     Chromogenic Factor 10   Date Value Ref Range Status   2017 17 (L) 70 - 130 % Final     Comment:     Therapeutic Range:  A Chromogenic Factor 10 level of approximately 20-40%   inversely correlates with an INR of 2-3 for patients receiving Warfarin.   Chromogenic Factor 10 levels below 20% indicate an INR greater than 3 and   levels above 40% indicate an INR less than 2.         ASSESSMENT / PLAN  No question data found.  Anticoagulation Summary  As of 2019    INR goal:   2.0-3.0   TTR:   45.7 % (3.3 y)   INR used for dosin.2 (2019)   Warfarin maintenance plan:   2.5 mg (5 mg x 0.5) every Mon; 5 mg (5 mg x 1) all other days   Full warfarin instructions:   5/15: 2.5 mg; 5/16: 2.5 mg; 5/17: 2.5 mg; 5/18: 2.5 mg; 5/19: 2.5 mg; Otherwise 2.5 mg every Mon; 5 mg all other days   Weekly warfarin total:   32.5 mg   Plan last modified:   Manisha Pierson RN (2018)   Next INR check:   2019   Target end date:       Indications    Long term current use of anticoagulant therapy [Z79.01]  Paroxysmal ventricular tachycardia (H) (Resolved) [I47.2]             Anticoagulation Episode Summary     INR check location:       Preferred lab:       Send INR reminders to:   [facility]    Comments:               See the Encounter Report to view Anticoagulation Flowsheet and Dosing Calendar (Go to Encounters tab in  chart review, and find the Anticoagulation Therapy Visit)    Dosage adjustment made based on physician directed care plan.    Isela Hua RN

## 2019-05-21 NOTE — PROGRESS NOTES
ANTICOAGULATION FOLLOW-UP CLINIC VISIT    Patient Name:  Shahid Villalta  Date:  2019  Contact Type:  Face to Face    SUBJECTIVE:  Patient Findings     Comments:   Intentional dose change was done due to elevated INR.  Dosed pt in the middle of what he had and his maintenance dose with a recheck in 1 week.        Clinical Outcomes     Comments:   Intentional dose change was done due to elevated INR.  Dosed pt in the middle of what he had and his maintenance dose with a recheck in 1 week.           OBJECTIVE    INR Protime   Date Value Ref Range Status   2019 1.5 (A) 0.86 - 1.14 Final     Chromogenic Factor 10   Date Value Ref Range Status   2017 17 (L) 70 - 130 % Final     Comment:     Therapeutic Range:  A Chromogenic Factor 10 level of approximately 20-40%   inversely correlates with an INR of 2-3 for patients receiving Warfarin.   Chromogenic Factor 10 levels below 20% indicate an INR greater than 3 and   levels above 40% indicate an INR less than 2.         ASSESSMENT / PLAN  No question data found.  Anticoagulation Summary  As of 2019    INR goal:   2.0-3.0   TTR:   45.6 % (3.3 y)   INR used for dosin.5! (2019)   Warfarin maintenance plan:   2.5 mg (5 mg x 0.5) every Mon; 5 mg (5 mg x 1) all other days   Full warfarin instructions:   : 2.5 mg; : 2.5 mg; Otherwise 2.5 mg every Mon; 5 mg all other days   Weekly warfarin total:   32.5 mg   Plan last modified:   Manisha Pierson RN (2018)   Next INR check:   2019   Target end date:       Indications    Long term current use of anticoagulant therapy [Z79.01]  Paroxysmal ventricular tachycardia (H) (Resolved) [I47.2]             Anticoagulation Episode Summary     INR check location:       Preferred lab:       Send INR reminders to:   [facility]    Comments:               See the Encounter Report to view Anticoagulation Flowsheet and Dosing Calendar (Go to Encounters tab in chart review, and find the Anticoagulation  Therapy Visit)    Dosage adjustment made based on physician directed care plan.    Manisha Pierson RN

## 2019-05-28 PROBLEM — I77.810 THORACIC AORTIC ECTASIA (H): Status: ACTIVE | Noted: 2019-01-01

## 2019-05-28 PROBLEM — E66.01 MORBID OBESITY (H): Status: ACTIVE | Noted: 2019-01-01

## 2019-05-28 PROBLEM — Z95.2 S/P TAVR (TRANSCATHETER AORTIC VALVE REPLACEMENT): Status: ACTIVE | Noted: 2019-01-01

## 2019-05-28 NOTE — PROGRESS NOTES
ANTICOAGULATION FOLLOW-UP CLINIC VISIT    Patient Name:  Shahid Villalta  Date:  2019  Contact Type:  Face to Face    SUBJECTIVE:  Patient Findings     Comments:   The patient was assessed for diet, medication, and activity level changes, missed or extra doses, bruising or bleeding, with no problem findings.          Clinical Outcomes     Comments:   The patient was assessed for diet, medication, and activity level changes, missed or extra doses, bruising or bleeding, with no problem findings.             OBJECTIVE    INR Protime   Date Value Ref Range Status   2019 2.8 (A) 0.86 - 1.14 Final     Chromogenic Factor 10   Date Value Ref Range Status   2017 17 (L) 70 - 130 % Final     Comment:     Therapeutic Range:  A Chromogenic Factor 10 level of approximately 20-40%   inversely correlates with an INR of 2-3 for patients receiving Warfarin.   Chromogenic Factor 10 levels below 20% indicate an INR greater than 3 and   levels above 40% indicate an INR less than 2.         ASSESSMENT / PLAN  No question data found.  Anticoagulation Summary  As of 2019    INR goal:   2.0-3.0   TTR:   45.7 % (3.3 y)   INR used for dosin.8 (2019)   Warfarin maintenance plan:   2.5 mg (5 mg x 0.5) every Mon; 5 mg (5 mg x 1) all other days   Full warfarin instructions:   : 2.5 mg; : 2.5 mg; /: 2.5 mg; /: 2.5 mg; Otherwise 2.5 mg every Mon; 5 mg all other days   Weekly warfarin total:   32.5 mg   Plan last modified:   Manisha Pierson RN (2018)   Next INR check:   2019   Target end date:       Indications    Long term current use of anticoagulant therapy [Z79.01]  Paroxysmal ventricular tachycardia (H) (Resolved) [I47.2]             Anticoagulation Episode Summary     INR check location:       Preferred lab:       Send INR reminders to:   [facility]    Comments:               See the Encounter Report to view Anticoagulation Flowsheet and Dosing Calendar (Go to Encounters tab in chart review,  and find the Anticoagulation Therapy Visit)        Manisha Pierson RN

## 2019-05-28 NOTE — PROGRESS NOTES
"SUBJECTIVE:   Shahid Villalta is a 79 year old male who presents for Preventive Visit.  {PVP to remind patient that this is not necessarily a physical exam; physical exam may or may not be done:451999::\"click delete button to remove this line now\"}  {PVP to inform patient that additional E&M charge may apply, if additional problems addressed:350450::\"click delete button to remove this line now\"}  Are you in the first 12 months of your Medicare coverage?  { :160230::\"No\"}    HPI  Do you feel safe in your environment? { :268790}    Do you have a Health Care Directive? { :661016}    {Hearing Test Done (Optional):619552}  Fall risk  { :675731}  {If any of the above assessments are answered yes, consider ordering appropriate referrals (Optional):768536::\"click delete button to remove this line now\"}  Cognitive Screening { :567691}    {Do you have sleep apnea, excessive snoring or daytime drowsiness? (Optional):197787}    Reviewed and updated as needed this visit by clinical staff  Tobacco  Allergies  Med Hx  Surg Hx  Fam Hx  Soc Hx        Reviewed and updated as needed this visit by Provider        Social History     Tobacco Use     Smoking status: Never Smoker     Smokeless tobacco: Never Used   Substance Use Topics     Alcohol use: No     Comment: AA since 1975     {Rooming Staff- Complete this question if Prescreen response is not shown below for today's visit. If you drink alcohol do you typically have >3 drinks per day or >7 drinks per week? (Optional):179849}    No flowsheet data found.{add AUDIT responses (Optional) (A score of 7 for adult men is an indication of hazardous drinking; a score of 8 or more is an indication of an alcohol use disorder.  A score of 7 or more for adult women is an indication of hazardous drinking or an alchohol use disorder):317093}    {Outside tests to abstract? :028867}    {additional problems to add (Optional):723016}    Current providers sharing in care for this patient include: " "{Rooming staff:  Please update Care Team in Rooming Activity, refresh this note and then delete this statement}  Patient Care Team:  Gume Brown MD as PCP - General (Family Practice)  Sulaiman, Nahun Ashraf MD as MD (Urology)  Belinda Tijerina, RN as Registered Nurse (Urology)  Self, Referred, MD as Referring Physician  Gume Brown MD as Assigned PCP    The following health maintenance items are reviewed in Epic and correct as of today:  Health Maintenance   Topic Date Due     HF ACTION PLAN  1940     ZOSTER IMMUNIZATION (2 of 3) 10/02/2009     MEDICARE ANNUAL WELLNESS VISIT  2011     OP ANNUAL INR REFERRAL  2015     LIPID  10/15/2015     DIGOXIN  2016     MICROALBUMIN  2018     FALL RISK ASSESSMENT  2018     PHQ-2  2019     BMP  2019     ALT  2019     CBC  10/25/2019     PSA  2020     DTAP/TDAP/TD IMMUNIZATION (5 - Td) 2023     ADVANCED DIRECTIVE PLANNING  2023     INFLUENZA VACCINE  Completed     IPV IMMUNIZATION  Aged Out     MENINGITIS IMMUNIZATION  Aged Out     {Chronicprobdata (optional):412658}  {Decision Support (Optional):666124}    Review of Systems  {ROS COMP (Optional):698738}    OBJECTIVE:   There were no vitals taken for this visit. Estimated body mass index is 35.43 kg/m  as calculated from the following:    Height as of 19: 1.727 m (5' 8\").    Weight as of 19: 105.7 kg (233 lb).  Physical Exam  {Exam (Optional) :436580}    {Diagnostic Test Results (Optional):702421::\"Diagnostic Test Results:\",\"Labs reviewed in Epic\"}    ASSESSMENT / PLAN:   {Diag Picklist:061719}    End of Life Planning:  Patient currently has an advanced directive: { :606060}    COUNSELING:  {Medicare Counselin}    Estimated body mass index is 35.43 kg/m  as calculated from the following:    Height as of 19: 1.727 m (5' 8\").    Weight as of 19: 105.7 kg (233 lb).    {Weight Management Plan (ACO) Complete if BMI is " abnormal-  Ages 18-64  BMI >24.9.  Age 65+ with BMI <23 or >30 (Optional):610131}     reports that he has never smoked. He has never used smokeless tobacco.  {Tobacco Cessation -- Complete if patient is a smoker (Optional):181161}    Appropriate preventive services were discussed with this patient, including applicable screening as appropriate for cardiovascular disease, diabetes, osteopenia/osteoporosis, and glaucoma.  As appropriate for age/gender, discussed screening for colorectal cancer, prostate cancer, breast cancer, and cervical cancer. Checklist reviewing preventive services available has been given to the patient.    Reviewed patients plan of care and provided an AVS. The {CarePlan:807532} for Shahid meets the Care Plan requirement. This Care Plan has been established and reviewed with the {PATIENT, FAMILY MEMBER, CAREGIVER:046445}.    Counseling Resources:  ATP IV Guidelines  Pooled Cohorts Equation Calculator  Breast Cancer Risk Calculator  FRAX Risk Assessment  ICSI Preventive Guidelines  Dietary Guidelines for Americans, 2010  USDA's MyPlate  ASA Prophylaxis  Lung CA Screening    Gume Brown MD  [facility]    Identified Health Risks:

## 2019-05-28 NOTE — PATIENT INSTRUCTIONS
Patient Education   Personalized Prevention Plan  You are due for the preventive services outlined below.  Your care team is available to assist you in scheduling these services.  If you have already completed any of these items, please share that information with your care team to update in your medical record.  Health Maintenance Due   Topic Date Due     Heart Failure Action Plan  1940     Zoster (Shingles) Vaccine (2 of 3) 10/02/2009     Annual Wellness Visit  07/07/2011     INR CLINIC REFERRAL - yearly  05/22/2015     Cholesterol Lab  10/15/2015     Digoxin Lab  12/11/2016     Kidney Function Urine Test  05/19/2018     FALL RISK ASSESSMENT  05/19/2018     PHQ-2  01/01/2019     Basic Metabolic Panel  05/09/2019

## 2019-05-28 NOTE — PROGRESS NOTES
Subjective     Shahid Villalta is a 79 year old male who presents to clinic today for the following health issues:    HPI     Patient with history of moderate to severe aortic stenosis, status post TAVR with a 34 mm Medtronic Evolut R valve in 9/2018. Followed by cardiology.     Patient with history of chronic systolic CHF with echocardiogram 10/2018 showing reduced EF at 35-40%. Biventricular ICD in place.     History of coronary artery disease with occluded right coronary artery and ischemic cardiomyopathy. The repeat coronary angiogram showed stable coronary artery disease with minimal disease in the left coronary system and again a completely occluded RCA at the ostium with extensive left-to-right collaterals.      History of atrial fibrillation currently rate controlled on metoprolol and on Coumadin for thrombosis prevention. Denies heart palpitations.      History of obstructive sleep apnea. He is in the process of getting a new CPAP machine.      History of prostate cancer status post prostatectomy with Dr. Hilario. Following with oncology.     Patient Active Problem List   Diagnosis     Atrial fibrillation (H)     Anticoagulation monitoring, INR range 2-3     Hyperlipidemia LDL goal <100     GERD (gastroesophageal reflux disease)     Prostate cancer     Impaired fasting glucose     Vitamin D deficiency     Aortic insufficiency with aortic stenosis     Hypertension goal BP (blood pressure) < 140/90     Health Care Home     CKD (chronic kidney disease) stage 3, GFR 30-59 ml/min (H)     Automatic implantable cardioverter-defibrillator in situ     Aortic valve stenosis     Anemia     Coronary artery disease     Long term current use of anticoagulant therapy     Chronic systolic congestive heart failure (H)     Pulmonary hypertension (H)     Nonrheumatic aortic valve insufficiency     SHIMON (obstructive sleep apnea)     Aortic stenosis, severe     Obesity (BMI 35.0-39.9) with comorbidity (H)     Past Surgical History:    Procedure Laterality Date     BIOPSY  annually    prostate biopsy     CARDIAC SURGERY  2012    A fib Ablation     CARDIAC SURGERY      cardioversion     COLONOSCOPY       DAVINCI PROSTATECTOMY N/A 2015    Procedure: DAVINCI PROSTATECTOMY;  Surgeon: Nahun Hilario MD;  Location:  OR     GENITOURINARY SURGERY      bladder surgery      H ABLATION AV NODE  13     H ABLATION FOCAL AFIB  13     HC TOOTH EXTRACTION W/FORCEP       HEART CATH FEMORAL CANNULIZATION WITH OPEN STANDBY REPAIR AORTIC VALVE N/A 2018    Procedure: HEART CATH FEMORAL CANNULIZATION WITH OPEN STANDBY REPAIR AORTIC VALVE;;  Surgeon: Magdiel Ashraf MD;  Location: [facility] OR     TONSILLECTOMY & ADENOIDECTOMY       TRANSCATHETER AORTIC VALVE IMPLANT ANESTHESIA N/A 2018    Procedure: TRANSCATHETER AORTIC VALVE IMPLANT ANESTHESIA;  Transfemoral Approach Aortic (34 mm Medtronic CoreValve) Valve Replacement, Cardiopulmonary Bypass Standby **Latex Allergy**;  Surgeon: GENERIC ANESTHESIA PROVIDER;  Location:  OR       Social History     Tobacco Use     Smoking status: Never Smoker     Smokeless tobacco: Never Used   Substance Use Topics     Alcohol use: No     Comment: AA since      Family History   Problem Relation Age of Onset     C.A.D. Father         CHF  at 74     Cerebrovascular Disease Father      C.A.D. Mother         CHF at 91 still living     Cerebrovascular Disease Mother         TIAs     Breast Cancer Mother      Heart Disease Son         arrythmia     Family History Negative Sister      Family History Negative Brother      Family History Negative Son          Reviewed and updated as needed this visit by Provider       Review of Systems   ROS COMP: RESP: NEGATIVE for significant cough or SOB  CV: NEGATIVE for chest pain, palpitations or peripheral edema  : positive for Hx of prostate cancer    This document serves as a record of the services and decisions personally performed and made by Gume  "MD Stephanie. It was created on his behalf by Mariel Carcamo, a trained medical scribe. The creation of this document is based on the provider's statements to the medical scribe.  Mariel Carcamo 7:51 AM May 28, 2019      Objective    /50 (BP Location: Right arm, Patient Position: Chair, Cuff Size: Adult Large)   Pulse 70   Temp 98.6  F (37  C) (Oral)   Resp 18   Ht 1.727 m (5' 8\")   Wt 105.2 kg (232 lb)   SpO2 98%   BMI 35.28 kg/m    Body mass index is 35.28 kg/m .  Physical Exam   GENERAL: healthy, alert and no distress  EYES: Eyes grossly normal to inspection, PERRL and conjunctivae and sclerae normal  HENT: mouth without ulcers or lesions, oropharynx clear and oral mucous membranes moist  NECK: no adenopathy, no asymmetry, masses, or scars  RESP: lungs clear to auscultation - no rales, rhonchi or wheezes  CV: 1+ edema bilaterally to mid shin, regular rates and rhythm, normal S1 S2, no S3 or S4, no murmur, click or rub and peripheral pulses strong  ABDOMEN: soft, nontender, no hepatosplenomegaly, no masses and bowel sounds normal    Diagnostic Test Results:  Labs reviewed in Epic  No results found. However, due to the size of the patient record, not all encounters were searched. Please check Results Review for a complete set of results.      Assessment & Plan   1. Chronic systolic congestive heart failure (H)  Stable.  Continue current medications.  Continue follow-up with cardiology.  - TSH with free T4 reflex  - CBC with platelets  - Comprehensive metabolic panel  - Digoxin level    2. Paroxysmal atrial fibrillation (H)  Continue current medications.    3. SHIMON (obstructive sleep apnea)  Patient not using CPAP regularly as his machine is broken.  He states he is working with medical supply on this issue.    4. Coronary artery disease involving native coronary artery of native heart without angina pectoris  Continue current medications.  - Lipid panel reflex to direct LDL Fasting    5. Hypertension goal " "BP (blood pressure) < 140/90  Currently well controlled, continue medication.    6. CKD (chronic kidney disease) stage 3, GFR 30-59 ml/min (H)  Stable.  - Albumin Random Urine Quantitative with Creat Ratio    7. Morbid obesity (H)  Discussed improving diet, eating fruits, vegetables, and whole grains, as well as increasing physical activity as able.    8. Medication monitoring encounter  - TSH with free T4 reflex    9. S/P TAVR (transcatheter aortic valve replacement)     BMI:   Estimated body mass index is 35.28 kg/m  as calculated from the following:    Height as of this encounter: 1.727 m (5' 8\").    Weight as of this encounter: 105.2 kg (232 lb).   Weight management plan: Discussed healthy diet and exercise guidelines    Return in about 1 year (around 6/2/2020) for Annual Wellness Visit.    The information in this document, created by the medical scribe for me, accurately reflects the services I personally performed and the decisions made by me. I have reviewed and approved this document for accuracy prior to leaving the patient care area.  May 28, 2019 8:25 AM    Gume Brown MD  [facility]        "

## 2019-05-28 NOTE — LETTER
My Heart Failure Action Plan   Name: Shahid Villalta    YOB: 1940   Date: 5/28/2019    My doctor: Gume Brown     [facility]     [id number] [place] [id number] [phone]  My Diagnosis: Systolic Heart Failure   My Ejection Fraction: 35% - 39%    My Exercise Goal: 30 minutes daily  .     My Weight Goal: 232  Wt Readings from Last 2 Encounters:   05/28/19 105.2 kg (232 lb)   05/08/19 105.7 kg (233 lb)     Weigh yourself daily using the same scale. If you gain more than 2 pounds in 24 hours or 5 pounds in a week increase your diuretic to torsemide 20 mg 2 times daily for 5 days    My Diet Goal: 1,500 mg of sodium    Emergency Room Visits:    Our goal is to improve your quality of life and help you avoid a visit to the emergency room or hospital.  If we work together, we can achieve this goal. But, if you feel you need to call 911 or go to the emergency room, please do so.  If you go to the emergency room, please bring your list of medicines and your daily weight chart with you.       GREEN ZONE     Doing well today    Weight gained is no more than 2 pounds a day or 5 pounds a week.    No swelling in feet, ankles, legs or stomach.    No more swelling than usual.    No more trouble breathing than usual.    No change in my sleep.    No other problems. Actions:    I am doing fine.  I will take my medicine, follow my diet, see my doctor, exercise, and watch for symptoms.           YELLOW ZONE         Having a bad day or flare up    Weight gain of more than 2 pounds in one day or 5 pounds in one week.    New swelling in ankle, leg, knee or thigh.    Bloating in belly, pants feel tighter.    Swelling in hands or face.    Coughing or trouble breathing while walking or talking.    Harder to breathe last night.    Have trouble sleeping, wake up short of breath.    Much more tired than usual.    Not eating.    Pain in my chest or bad leg cramps.    Feel weak or dizzy.  Actions:    I need to take action and call my doctor or nurse today.                 RED ZONE         Need medical care now    Weight gain of 5 pounds overnight.    Chest pain or pressure that does not go away.    Feel less alert.    Wheezing or have trouble breathing when at rest.    Cannot sleep lying down.    Cannot take my water pill.    Pass out or faint. Actions:    I need to call my doctor or nurse now!    Call 911 if I have chest pain or cannot breathe.

## 2019-05-29 NOTE — TELEPHONE ENCOUNTER
Spoke to patient. Patient stated he would like to come in on 6/11/19 because that is when he is coming for INR and it difficult for him to get to clinic. Is it ok for him to wait till 6/11/19 for lab?    Pt expressed understanding and acceptance of the plan.  Pt had no further questions at this time.  Advised can call back to clinic at any time with concerns.       Emma James RN Flex

## 2019-05-29 NOTE — TELEPHONE ENCOUNTER
Patient with low hemoglobin lower than his prior levels at 8.3.  Recommend repeat hemoglobin and further work-up for cause of anemia including blood work and FIT test.  This should be rechecked within the week to make sure it is stable or improving.

## 2019-06-11 NOTE — PROGRESS NOTES
ANTICOAGULATION FOLLOW-UP CLINIC VISIT    Patient Name:  Shahid Villalta  Date:  2019  Contact Type:  Face to Face    SUBJECTIVE:  Patient Findings     Comments:   The patient was assessed for diet, medication, and activity level changes, missed or extra doses, bruising or bleeding, with no problem findings.        Clinical Outcomes     Comments:   The patient was assessed for diet, medication, and activity level changes, missed or extra doses, bruising or bleeding, with no problem findings.           OBJECTIVE    INR Protime   Date Value Ref Range Status   2019 5.3 (A) 0.86 - 1.14 Final     Chromogenic Factor 10   Date Value Ref Range Status   2017 17 (L) 70 - 130 % Final     Comment:     Therapeutic Range:  A Chromogenic Factor 10 level of approximately 20-40%   inversely correlates with an INR of 2-3 for patients receiving Warfarin.   Chromogenic Factor 10 levels below 20% indicate an INR greater than 3 and   levels above 40% indicate an INR less than 2.         ASSESSMENT / PLAN    Will check CF10 today with other future labs pt is to have done.   Advised if he does not hear back from us today to take normal dosing for tonight.     Will f/u via phone call once CF10 is back and send calendar for pt.     Anticoagulation Summary  As of 2019    INR goal:   2.0-3.0   TTR:   45.3 % (3.4 y)   INR used for dosin.3! (2019)   Warfarin maintenance plan:   2.5 mg (5 mg x 0.5) every Mon; 5 mg (5 mg x 1) all other days   Full warfarin instructions:   2.5 mg every Mon; 5 mg all other days   Weekly warfarin total:   32.5 mg   Plan last modified:   Manisha Pierson RN (2018)   Next INR check:      Target end date:       Indications    Long term current use of anticoagulant therapy [Z79.01]  Paroxysmal ventricular tachycardia (H) (Resolved) [I47.2]             Anticoagulation Episode Summary     INR check location:       Preferred lab:       Send INR reminders to:   [facility]    Comments:                See the Encounter Report to view Anticoagulation Flowsheet and Dosing Calendar (Go to Encounters tab in chart review, and find the Anticoagulation Therapy Visit)    Dosage adjustment made based on physician directed care plan.    Isela Hua RN

## 2019-06-13 NOTE — TELEPHONE ENCOUNTER
Can pt be worked in on Friday ?   We can change INR appt time if needed.     Would you prefer to call and discuss labs?    Isela Hua RN

## 2019-06-13 NOTE — TELEPHONE ENCOUNTER
Yes can fit in.  I have tried to call him to discuss but have not reached him.    May need to stop coumadin.  Will recheck Hgb tomorrow.  Needs FIT, may need endoscopy.    Needs to take ferrous sulfate 325 mg 3 times daily if tolerated.

## 2019-06-14 NOTE — PROGRESS NOTES
Subjective     Shahid Villalta is a 79 year old male who presents to clinic today for the following health issues:    HPI     Patient here for follow-up of low hemoglobin. Denies aspirin use. Denies NSAIDs use. Denies fatigue, chest pain, shortness of breath, lightheadedness, blood in stools, hematuria.     Patient with history of moderate to severe aortic stenosis, status post TAVR with a 34 mm Medtronic Evolut R valve in 9/2018. Followed by cardiology.      Patient with history of chronic systolic CHF with echocardiogram 10/2018 showing reduced EF at 35-40%. Biventricular ICD in place.     History of coronary artery disease with occluded right coronary artery and ischemic cardiomyopathy. The repeat coronary angiogram showed stable coronary artery disease with minimal disease in the left coronary system and again a completely occluded RCA at the ostium with extensive left-to-right collaterals.      History of atrial fibrillation currently rate controlled on metoprolol and on Coumadin for thrombosis prevention. Denies heart palpitations.      History of obstructive sleep apnea. He is in the process of getting a new CPAP machine.      History of prostate cancer status post prostatectomy with Dr. Hilario. Following with oncology.     Patient Active Problem List   Diagnosis     Atrial fibrillation (H)     Anticoagulation monitoring, INR range 2-3     Hyperlipidemia LDL goal <100     GERD (gastroesophageal reflux disease)     Prostate cancer     Impaired fasting glucose     Vitamin D deficiency     Aortic insufficiency with aortic stenosis     Hypertension goal BP (blood pressure) < 140/90     Health Care Home     CKD (chronic kidney disease) stage 3, GFR 30-59 ml/min (H)     Automatic implantable cardioverter-defibrillator in situ     Aortic valve stenosis     Anemia     Coronary artery disease     Long term current use of anticoagulant therapy     Chronic systolic congestive heart failure (H)     Pulmonary hypertension  (H)     Nonrheumatic aortic valve insufficiency     SHIMON (obstructive sleep apnea)     Aortic stenosis, severe     Obesity (BMI 35.0-39.9) with comorbidity (H)     S/P TAVR (transcatheter aortic valve replacement)     Thoracic aortic ectasia (H)     Past Surgical History:   Procedure Laterality Date     BIOPSY  annually    prostate biopsy     CARDIAC SURGERY      A fib Ablation     CARDIAC SURGERY      cardioversion     COLONOSCOPY       DAVINCI PROSTATECTOMY N/A 2015    Procedure: DAVINCI PROSTATECTOMY;  Surgeon: Nahun Hilario MD;  Location: [facility] OR     GENITOURINARY SURGERY      bladder surgery      H ABLATION AV NODE  13     H ABLATION FOCAL AFIB  13     HC TOOTH EXTRACTION W/FORCEP       HEART CATH FEMORAL CANNULIZATION WITH OPEN STANDBY REPAIR AORTIC VALVE N/A 2018    Procedure: HEART CATH FEMORAL CANNULIZATION WITH OPEN STANDBY REPAIR AORTIC VALVE;;  Surgeon: Magdiel Ashraf MD;  Location: [facility]     TONSILLECTOMY & ADENOIDECTOMY       TRANSCATHETER AORTIC VALVE IMPLANT ANESTHESIA N/A 2018    Procedure: TRANSCATHETER AORTIC VALVE IMPLANT ANESTHESIA;  Transfemoral Approach Aortic (34 mm Medtronic CoreValve) Valve Replacement, Cardiopulmonary Bypass Standby **Latex Allergy**;  Surgeon: GENERIC ANESTHESIA PROVIDER;  Location: [facility]       Social History     Tobacco Use     Smoking status: Never Smoker     Smokeless tobacco: Never Used   Substance Use Topics     Alcohol use: No     Comment: AA since      Family History   Problem Relation Age of Onset     C.A.D. Father         CHF  at 74     Cerebrovascular Disease Father      C.A.D. Mother         CHF at 91 still living     Cerebrovascular Disease Mother         TIAs     Breast Cancer Mother      Heart Disease Son         arrythmia     Family History Negative Sister      Family History Negative Brother      Family History Negative Son          Reviewed and updated as needed this visit by Provider  Tobacco   "Allergies  Meds  Problems  Med Hx  Surg Hx  Fam Hx       Review of Systems   ROS COMP: CONSTITUTIONAL:NEGATIVE  for fatigue  RESP: NEGATIVE for significant cough or SOB  CV: NEGATIVE for chest pain, palpitations or peripheral edema  GI: NEGATIVE for nausea, abdominal pain, heartburn, blood in stools, or change in bowel habits  : negative for dysuria, hematuria, decreased urinary stream  NEURO: NEGATIVE for weakness, dizziness or paresthesias  HEME/ALLERGY/IMMUNE: NEGATIVE for bleeding problems    This document serves as a record of the services and decisions personally performed and made by Gume Brown MD. It was created on his behalf by Mariel Carcamo, a trained medical scribe. The creation of this document is based on the provider's statements to the medical scribe.  Mariel Carcamo 10:32 AM June 14, 2019      Objective    /56 (BP Location: Right arm, Patient Position: Chair, Cuff Size: Adult Regular)   Pulse 71   Temp 98  F (36.7  C) (Oral)   Resp 17   Ht 1.727 m (5' 8\")   Wt 104.3 kg (230 lb)   SpO2 96%   BMI 34.97 kg/m    Body mass index is 34.97 kg/m .  Physical Exam   GENERAL: healthy, alert and no distress    Diagnostic Test Results:  Labs reviewed in Epic  Results for orders placed or performed in visit on 06/14/19 (from the past 24 hour(s))   Hemoglobin   Result Value Ref Range    Hemoglobin 7.7 (LL) 13.3 - 17.7 g/dL     *Note: Due to a large number of results and/or encounters for the requested time period, some results have not been displayed. A complete set of results can be found in Results Review.       Assessment & Plan     1. Anemia, unspecified type  Significant iron deficiency anemia noted incidentally with labs.  Patient still denies any symptoms.  Denies any chest pain.  No heart failure symptoms at this time.  Denies blood in his stool or blood in his urine.  No other blood loss.  Discussed possibility of needing a transfusion.  After discussion with cardiology team we " "have decided to hold off with risk of CHF exacerbation and other risks of transfusion awaiting benefit at this level.  We will recheck hemoglobin in 3 days.  He will take ferrous sulfate 300 mg 3 times daily.  Stop Plavix.  I think he can stop the Plavix long-term now at this point with history of TAVR.  We need him to stay on his Coumadin but goal INR 2.0-2.3.  Patient understands if he has chest pain or shortness of breath, lightheadedness, dizziness he will go to ER.  Also go to ER if significant GI bleeding.  FIT test is pending, may need colonoscopy.  - Hemoglobin  - ferrous sulfate (FEROSUL) 325 (65 Fe) MG tablet; Take 1 tablet (325 mg) by mouth daily (with breakfast)  Dispense: 90 tablet; Refill: 1     BMI:   Estimated body mass index is 34.97 kg/m  as calculated from the following:    Height as of this encounter: 1.727 m (5' 8\").    Weight as of this encounter: 104.3 kg (230 lb).   Weight management plan: Discussed healthy diet and exercise guidelines    Return in about 3 days (around 6/17/2019) for recheck Hgb.    The information in this document, created by the medical scribe for me, accurately reflects the services I personally performed and the decisions made by me. I have reviewed and approved this document for accuracy prior to leaving the patient care area.  June 14, 2019 11:15 AM    uGme Brown MD  [facility]        "

## 2019-06-14 NOTE — PROGRESS NOTES
ANTICOAGULATION FOLLOW-UP CLINIC VISIT    Patient Name:  Shahid Villalta  Date:  2019  Contact Type:  Face to Face    SUBJECTIVE:  Patient Findings     Positives:   Change in health    Comments:   Pt has been diagnosed with anemia.  Dr Brown spoke with on call cardiologist and pt is being taken off Plavix and started on iron supplement.  They would like pt to be on the lower end of INR range for now while the cause of the anemia is being worked up.  They would like him between 2.0-2.3ish.  Dose held today with follow up on Monday along with Hgb check        Clinical Outcomes     Comments:   Pt has been diagnosed with anemia.  Dr Brown spoke with on call cardiologist and pt is being taken off Plavix and started on iron supplement.  They would like pt to be on the lower end of INR range for now while the cause of the anemia is being worked up.  They would like him between 2.0-2.3ish.  Dose held today with follow up on Monday along with Hgb check           OBJECTIVE    INR Protime   Date Value Ref Range Status   2019 2.8 (A) 0.86 - 1.14 Final     Chromogenic Factor 10   Date Value Ref Range Status   2019 14 (L) 70 - 130 % Final     Comment:     Therapeutic Range:  A Chromogenic Factor 10 level of approximately 20-40%   inversely correlates with an INR of 2-3 for patients receiving Warfarin.   Chromogenic Factor 10 levels below 20% indicate an INR greater than 3 and   levels above 40% indicate an INR less than 2.         ASSESSMENT / PLAN  No question data found.  Anticoagulation Summary  As of 2019    INR goal:   2.0-3.0   TTR:   45.2 % (3.4 y)   INR used for dosin.8 (2019)   Warfarin maintenance plan:   2.5 mg (5 mg x 0.5) every Mon, Wed, Fri; 5 mg (5 mg x 1) all other days   Full warfarin instructions:   : Hold; Otherwise 2.5 mg every Mon, Wed, Fri; 5 mg all other days   Weekly warfarin total:   27.5 mg   Plan last modified:   Manisha Pierson RN (2019)   Next INR check:    6/17/2019   Target end date:       Indications    Long term current use of anticoagulant therapy [Z79.01]  Paroxysmal ventricular tachycardia (H) (Resolved) [I47.2]             Anticoagulation Episode Summary     INR check location:       Preferred lab:       Send INR reminders to:   [facility]    Comments:               See the Encounter Report to view Anticoagulation Flowsheet and Dosing Calendar (Go to Encounters tab in chart review, and find the Anticoagulation Therapy Visit)    Dosage adjustment made based on physician directed care plan.    Manisha Pierson RN

## 2019-06-20 NOTE — PROGRESS NOTES
ANTICOAGULATION FOLLOW-UP CLINIC VISIT    Patient Name:  Shahid Villalta  Date:  2019  Contact Type:  Face to Face    SUBJECTIVE:  Patient Findings     Comments:   The patient was assessed for diet, medication, and activity level changes, missed or extra doses, bruising or bleeding, with no problem findings.        Clinical Outcomes     Comments:   The patient was assessed for diet, medication, and activity level changes, missed or extra doses, bruising or bleeding, with no problem findings.           OBJECTIVE    INR Protime   Date Value Ref Range Status   2019 2.0 (A) 0.86 - 1.14 Final     Chromogenic Factor 10   Date Value Ref Range Status   2019 14 (L) 70 - 130 % Final     Comment:     Therapeutic Range:  A Chromogenic Factor 10 level of approximately 20-40%   inversely correlates with an INR of 2-3 for patients receiving Warfarin.   Chromogenic Factor 10 levels below 20% indicate an INR greater than 3 and   levels above 40% indicate an INR less than 2.         ASSESSMENT / PLAN    Pt's Hgb is improving.  Continue with iron at this time.   Pt with INR of 2 will increase dosing slightly to cover him as he does he greens.     Pt will return in 5 days to make sure staying in the 2-2.5 range and recheck Hgb if needed.       Anticoagulation Summary  As of 2019    INR goal:   2.0-3.0   TTR:   45.4 % (3.4 y)   INR used for dosin.0 (2019)   Warfarin maintenance plan:   2.5 mg (5 mg x 0.5) every Mon, Wed, Fri; 5 mg (5 mg x 1) all other days   Full warfarin instructions:   : 2.5 mg; : 2.5 mg; Otherwise 2.5 mg every Mon, Wed, Fri; 5 mg all other days   Weekly warfarin total:   27.5 mg   Plan last modified:   Manisha Pierson RN (2019)   Next INR check:   2019   Target end date:       Indications    Long term current use of anticoagulant therapy [Z79.01]  Paroxysmal ventricular tachycardia (H) (Resolved) [I47.2]             Anticoagulation Episode Summary     INR check  location:       Preferred lab:       Send INR reminders to:   [facility]    Comments:               See the Encounter Report to view Anticoagulation Flowsheet and Dosing Calendar (Go to Encounters tab in chart review, and find the Anticoagulation Therapy Visit)    Dosage adjustment made based on physician directed care plan.    Isela Hua RN

## 2019-06-21 NOTE — PROGRESS NOTES
Note to Dr Aden;: Remote ICD transmission demonstrates the following history since 9-2018.        Atrial Arrhythmia: 5 PMT logged since last check in Sept 2018. Avail tracings suggest ST with P waves in refractory; another with no correlation AV and no correction with PMT algorithm (mode switch). Persistent atrial flutter onset early October 2018 with a short termination early December; arrhythmia persists. VR controlled in the 70's. (h/o CHB) Pt continues on Warfarin SueLangenbrunnerRN   Breathing spontaneous and unlabored. Breath sounds clear and equal bilaterally with regular rhythm.

## 2019-06-25 NOTE — PROGRESS NOTES
ANTICOAGULATION FOLLOW-UP CLINIC VISIT    Patient Name:  Shahid Villalta  Date:  2019  Contact Type:  Face to Face    SUBJECTIVE:  Patient Findings     Comments:   Pt had intentional dose change to keep him between 2.0-2.3 per PCP for now due to anemia.  Dose adjusted slightly to get him into the lower range        Clinical Outcomes     Comments:   Pt had intentional dose change to keep him between 2.0-2.3 per PCP for now due to anemia.  Dose adjusted slightly to get him into the lower range           OBJECTIVE    INR Protime   Date Value Ref Range Status   2019 1.6 (A) 0.86 - 1.14 Final     Chromogenic Factor 10   Date Value Ref Range Status   2019 14 (L) 70 - 130 % Final     Comment:     Therapeutic Range:  A Chromogenic Factor 10 level of approximately 20-40%   inversely correlates with an INR of 2-3 for patients receiving Warfarin.   Chromogenic Factor 10 levels below 20% indicate an INR greater than 3 and   levels above 40% indicate an INR less than 2.         ASSESSMENT / PLAN  INR assessment SUB    Recheck INR In: 1 WEEK    INR Location Clinic      Anticoagulation Summary  As of 2019    INR goal:   2.0-3.0   TTR:   45.3 % (3.4 y)   INR used for dosin.6! (2019)   Warfarin maintenance plan:   2.5 mg (5 mg x 0.5) every Mon, Wed, Fri; 5 mg (5 mg x 1) all other days   Full warfarin instructions:   : 2.5 mg; : 2.5 mg; Otherwise 2.5 mg every Mon, Wed, Fri; 5 mg all other days   Weekly warfarin total:   27.5 mg   Plan last modified:   Manisha Pierson RN (2019)   Next INR check:   2019   Target end date:       Indications    Long term current use of anticoagulant therapy [Z79.01]  Paroxysmal ventricular tachycardia (H) (Resolved) [I47.2]             Anticoagulation Episode Summary     INR check location:       Preferred lab:       Send INR reminders to:   [facility]    Comments:               See the Encounter Report to view Anticoagulation Flowsheet and Dosing Calendar  (Go to Encounters tab in chart review, and find the Anticoagulation Therapy Visit)    Dosage adjustment made based on physician directed care plan.    Manisha Pierson RN

## 2019-07-02 NOTE — PROGRESS NOTES
ANTICOAGULATION FOLLOW-UP CLINIC VISIT    Patient Name:  Shahid Villalta  Date:  2019  Contact Type:  Face to Face    SUBJECTIVE:  Patient Findings     Comments:   Intentional dose changes per PCP to get pt into the lower range but pt is too low so will have him go to his previous maintenance dose and recheck next week with hgb        Clinical Outcomes     Comments:   Intentional dose changes per PCP to get pt into the lower range but pt is too low so will have him go to his previous maintenance dose and recheck next week with hgb           OBJECTIVE    INR Protime   Date Value Ref Range Status   2019 1.5 (A) 0.86 - 1.14 Final     Chromogenic Factor 10   Date Value Ref Range Status   2019 14 (L) 70 - 130 % Final     Comment:     Therapeutic Range:  A Chromogenic Factor 10 level of approximately 20-40%   inversely correlates with an INR of 2-3 for patients receiving Warfarin.   Chromogenic Factor 10 levels below 20% indicate an INR greater than 3 and   levels above 40% indicate an INR less than 2.         ASSESSMENT / PLAN  INR assessment SUB    Recheck INR In: 1 WEEK    INR Location Clinic      Anticoagulation Summary  As of 2019    INR goal:   2.0-3.0   TTR:   45.0 % (3.4 y)   INR used for dosin.5! (2019)   Warfarin maintenance plan:   2.5 mg (5 mg x 0.5) every Mon, Wed, Fri; 5 mg (5 mg x 1) all other days   Full warfarin instructions:   2.5 mg every Mon, Wed, Fri; 5 mg all other days   Weekly warfarin total:   27.5 mg   No change documented:   Manisha Pierson RN   Plan last modified:   Manisha Pierson RN (2019)   Next INR check:   2019   Target end date:       Indications    Long term current use of anticoagulant therapy [Z79.01]  Paroxysmal ventricular tachycardia (H) (Resolved) [I47.2]             Anticoagulation Episode Summary     INR check location:       Preferred lab:       Send INR reminders to:   [facility]    Comments:               See the Encounter Report to  view Anticoagulation Flowsheet and Dosing Calendar (Go to Encounters tab in chart review, and find the Anticoagulation Therapy Visit)    Dosage adjustment made based on physician directed care plan.    Manisha Pierson RN

## 2019-07-09 NOTE — PROGRESS NOTES
ANTICOAGULATION FOLLOW-UP CLINIC VISIT    Patient Name:  Shahid Villalta  Date:  2019  Contact Type:  Face to Face    SUBJECTIVE:  Patient Findings     Comments:   Per PCP since Hgb is going up pt is ok to return to his normal INR range        Clinical Outcomes     Comments:   Per PCP since Hgb is going up pt is ok to return to his normal INR range           OBJECTIVE    INR Protime   Date Value Ref Range Status   2019 2.0 (A) 0.86 - 1.14 Final     Chromogenic Factor 10   Date Value Ref Range Status   2019 14 (L) 70 - 130 % Final     Comment:     Therapeutic Range:  A Chromogenic Factor 10 level of approximately 20-40%   inversely correlates with an INR of 2-3 for patients receiving Warfarin.   Chromogenic Factor 10 levels below 20% indicate an INR greater than 3 and   levels above 40% indicate an INR less than 2.         ASSESSMENT / PLAN  INR assessment THER    Recheck INR In: 2 WEEKS    INR Location Clinic      Anticoagulation Summary  As of 2019    INR goal:   2.0-3.0   TTR:   44.8 % (3.4 y)   INR used for dosin.0 (2019)   Warfarin maintenance plan:   2.5 mg (5 mg x 0.5) every Mon, Wed, Fri; 5 mg (5 mg x 1) all other days   Full warfarin instructions:   2.5 mg every Mon, Wed, Fri; 5 mg all other days   Weekly warfarin total:   27.5 mg   No change documented:   Manisha Pierson RN   Plan last modified:   Manisha Pierson RN (2019)   Next INR check:   2019   Target end date:       Indications    Long term current use of anticoagulant therapy [Z79.01]  Paroxysmal ventricular tachycardia (H) (Resolved) [I47.2]             Anticoagulation Episode Summary     INR check location:       Preferred lab:       Send INR reminders to:   [facility]    Comments:               See the Encounter Report to view Anticoagulation Flowsheet and Dosing Calendar (Go to Encounters tab in chart review, and find the Anticoagulation Therapy Visit)        Manisha Pierson RN

## 2019-07-23 NOTE — PROGRESS NOTES
ANTICOAGULATION FOLLOW-UP CLINIC VISIT    Patient Name:  Shahid Villalta  Date:  7/23/2019  Contact Type:  Face to Face    SUBJECTIVE:  Patient Findings     Comments:   Patient denies any identifiable changes that caused the supratherapeutic INR.           Clinical Outcomes     Comments:   Patient denies any identifiable changes that caused the supratherapeutic INR.              OBJECTIVE    INR Protime   Date Value Ref Range Status   07/23/2019 3.2 (A) 0.86 - 1.14 Final     Chromogenic Factor 10   Date Value Ref Range Status   06/11/2019 14 (L) 70 - 130 % Final     Comment:     Therapeutic Range:  A Chromogenic Factor 10 level of approximately 20-40%   inversely correlates with an INR of 2-3 for patients receiving Warfarin.   Chromogenic Factor 10 levels below 20% indicate an INR greater than 3 and   levels above 40% indicate an INR less than 2.         ASSESSMENT / PLAN  INR assessment THER    Recheck INR In: 2 WEEKS    INR Location Clinic      Anticoagulation Summary  As of 7/23/2019    INR goal:   2.0-3.0   TTR:   45.2 % (3.5 y)   INR used for dosing:   3.2! (7/23/2019)   Warfarin maintenance plan:   2.5 mg (5 mg x 0.5) every Mon, Wed, Fri; 5 mg (5 mg x 1) all other days   Full warfarin instructions:   2.5 mg every Mon, Wed, Fri; 5 mg all other days   Weekly warfarin total:   27.5 mg   Plan last modified:   Manisha Pierson RN (6/11/2019)   Next INR check:   8/6/2019   Target end date:       Indications    Long term current use of anticoagulant therapy [Z79.01]  Paroxysmal ventricular tachycardia (H) (Resolved) [I47.2]             Anticoagulation Episode Summary     INR check location:       Preferred lab:       Send INR reminders to:   [facility]    Comments:               See the Encounter Report to view Anticoagulation Flowsheet and Dosing Calendar (Go to Encounters tab in chart review, and find the Anticoagulation Therapy Visit)        Manisha Pierson RN

## 2019-07-26 NOTE — NURSING NOTE
"Chief Complaint   Patient presents with     CPAP Follow Up     Follow up yocasta, would like a cpap Rx       Initial /74   Pulse 68   Resp 16   Ht 1.727 m (5' 7.99\")   Wt 105.2 kg (232 lb)   SpO2 95%   BMI 35.28 kg/m   Estimated body mass index is 35.28 kg/m  as calculated from the following:    Height as of this encounter: 1.727 m (5' 7.99\").    Weight as of this encounter: 105.2 kg (232 lb).    Medication Reconciliation: complete    ESS 6    Patsy Malone MA    "

## 2019-07-26 NOTE — PROGRESS NOTES
Obstructive Sleep Apnea - PAP Follow-Up Visit:    Chief Complaint   Patient presents with     CPAP Follow Up     Follow up yocasta, would like a cpap Rx       Shahid Villalta is currently on ASV therapy for complex sleep apnea.     PSG was performed on 7/16/2013 after consultation with Dr. Davey at our clinic. AHI was 50.6 (26 obstructive apneas, 17 central apneas and 75 hypopneas) at baseline. Treatment emergent central apneas were seen with CPAP.     Patient has heart failure with reduced ejection fraction. Previously he had made an informed decision to continue ASV therapy.     Last echo from 10/25/2018 showed Left ventricular ejection fraction 35-40%.     Past Medical History:   Diagnosis Date     Aortic valve disorders      Atrial fibrillation (H)      Automatic implantable cardiac defibrillator in situ      Cancer (H)      Congestive heart failure (H)      Coronary artery disease      Essential hypertension, benign      GERD (gastroesophageal reflux disease) 3/16/2010     History of blood transfusion      Hyperlipidaemia      Hypertension      Obese      Other and unspecified hyperlipidemia      Other primary cardiomyopathies      Pacemaker      Sleep apnea      Small bowel obstruction (H) 12/14/2015     Ventricular tachycardia (H) 6/17/2013       Respironics    ASV min EPAP 5 cm, max EPAP 15, min PS 0, max PS 20, max pressure 25 cmH2O download:  30/30 total days of use. 0 nonuse days.  Average use 4 hours 42 minutes per day.  70% days with >4 hours use.  Large leak 1 hrs 34 mins per day average. EPAP 90% pressure 5.8cm. AHI 3.7          Reviewed by team: Tobacco  Allergies  Meds       Reviewed by provider:        Problem List:  Patient Active Problem List    Diagnosis Date Noted     Prostate cancer 05/14/2010     Priority: High     Stage T1c prostate cancer - followed by Urology Dr. Abarca       Obesity (BMI 35.0-39.9) with comorbidity (H) 05/28/2019     Priority: Medium     S/P TAVR (transcatheter aortic  valve replacement) 05/28/2019     Priority: Medium     s/p TAVR with a 34 mm Medtronic Evolut R valve in 9/2018       Thoracic aortic ectasia (H) 05/28/2019     Priority: Medium     Aortic stenosis, severe 09/19/2018     Priority: Medium     SHIMON (obstructive sleep apnea) 05/31/2018     Priority: Medium     Using CPAP nightly       Nonrheumatic aortic valve insufficiency 11/17/2017     Priority: Medium     Pulmonary hypertension (H) 11/15/2017     Priority: Medium     Per 11/13/17 echo, moderate pulmonary HTN (46-56 mmHg)       Chronic systolic congestive heart failure (H) 05/19/2017     Priority: Medium     As of 06/2018, EF of 35-40%       Long term current use of anticoagulant therapy 12/21/2015     Priority: Medium     Coronary artery disease      Priority: Medium     Occluded right coronary artery    Coronary Angiogram 06/2018: Chronically occluded right coronary artery with extensive  left to right collaterals. Right dominant circulation. Minimal other  coronary disease.  2.Pulmonary hypertension with a pulmonary artery pressure of 65/24  with a mean of 40. Pulmonary capillary wedge pressure was 27 with a  V-wave to 40. Cardiac output 3.3 L/m with a pulmonary artery  saturation of 52%. Index was 1.6.       Anemia 11/14/2015     Priority: Medium     Aortic valve stenosis 10/29/2014     Priority: Medium     Severe, followed with echocardiogram every 6 months, per 11/13/2017 echo, mean Aov gradient 36 mmHg    Severe valvular aortic stenosis noted and the calculated aortic valve area is  0.7 - 0.8 cm2. The mean AoV pressure gradient is 35mmHg. There is mild to  moderate (1-2+) central aortic regurgitation.  The aortic Sinus(es) of Valsalva are mildly dilated. They are 3.9 cm. The  aortic annulus is about 2.5 to 2.6 cm.  Left ventricular systolic function is moderately reduced. The visual ejection  fraction is estimated at 35-40%, possibly slightly better.. There is moderate  concentric left ventricular hypertrophy.  "Echo findings are not consistent with  left ventricular outflow obstruction. The left ventricle is borderline  dilated.  Immobile mitral valve posterior leaflet is noted but without mitral stenosis  (by CW Doppler) There is mild (1+) mitral regurgitation.       Automatic implantable cardioverter-defibrillator in situ      Priority: Medium     CKD (chronic kidney disease) stage 3, GFR 30-59 ml/min (H) 06/06/2014     Priority: Medium     Aortic insufficiency with aortic stenosis 07/18/2011     Priority: Medium     Hypertension goal BP (blood pressure) < 140/90 07/18/2011     Priority: Medium     Vitamin D deficiency 04/25/2011     Priority: Medium     Impaired fasting glucose 03/20/2011     Priority: Medium     GERD (gastroesophageal reflux disease) 03/16/2010     Priority: Medium     Hyperlipidemia LDL goal <100 08/07/2009     Priority: Medium     Atrial fibrillation (H) 10/08/2008     Priority: Medium     Anticoagulation monitoring, INR range 2-3 10/08/2008     Priority: Medium     Health Care Home 07/20/2011     Priority: Low     Jacitna Nowak RN-BSN, [facility]  [phone].  FPA / [facility]                  /74   Pulse 68   Resp 16   Ht 1.727 m (5' 7.99\")   Wt 105.2 kg (232 lb)   SpO2 95%   BMI 35.28 kg/m      Impression/Plan:    1. Complex sleep apnea syndrome  2. Heart failure with reduce ejection fraction   3. Paroxysmal atrial fibrillation   4. Coronary artery disease   5. Hypertension     - Patient is on Adaptive servo ventilation therapy for complex sleep apnea. Baseline AHI was 50.6 per hour in 2013 with majority obstructive events. Treatment emergent central apneas occurred with CPAP. lft ventricular ejection fraction has dropped to 35-40% amd ASV therpay is contraindicated based on the SERVE- HF trial.  (Homero Starr et al; Adaptive Servo-Ventilation for Central Sleep Apnea in Systolic Heart Failure; N Engl J Med. 2015 Sep 17; 373(12): 4764-2551.)     - We had " discussion regarding the study findings and further management. After an informed discussion we decided to stop ASV therapy and assess if his sleep disordered breathing could respond to CPAP therapy.     Plan:     1. Titration PSG for CPAP titration   2. Follow up after PSG     Twenty-five minutes spent with patient, all of which were spent face-to-face counseling, consulting, coordinating plan of care.      Nadir Ashraf MD, MD    CC:  Gume Brown,

## 2019-07-26 NOTE — PATIENT INSTRUCTIONS
Your BMI is Body mass index is 35.28 kg/m .  Weight management is a personal decision.  If you are interested in exploring weight loss strategies, the following discussion covers the approaches that may be successful. Body mass index (BMI) is one way to tell whether you are at a healthy weight, overweight, or obese. It measures your weight in relation to your height.  A BMI of 18.5 to 24.9 is in the healthy range. A person with a BMI of 25 to 29.9 is considered overweight, and someone with a BMI of 30 or greater is considered obese. More than two-thirds of American adults are considered overweight or obese.  Being overweight or obese increases the risk for further weight gain. Excess weight may lead to heart disease and diabetes.  Creating and following plans for healthy eating and physical activity may help you improve your health.  Weight control is part of healthy lifestyle and includes exercise, emotional health, and healthy eating habits. Careful eating habits lifelong are the mainstay of weight control. Though there are significant health benefits from weight loss, long-term weight loss with diet alone may be very difficult to achieve- studies show long-term success with dietary management in less than 10% of people. Attaining a healthy weight may be especially difficult to achieve in those with severe obesity. In some cases, medications, devices and surgical management might be considered.  What can you do?  If you are overweight or obese and are interested in methods for weight loss, you should discuss this with your provider.     Consider reducing daily calorie intake by 500 calories.     Keep a food journal.     Avoiding skipping meals, consider cutting portions instead.    Diet combined with exercise helps maintain muscle while optimizing fat loss. Strength training is particularly important for building and maintaining muscle mass. Exercise helps reduce stress, increase energy, and improves fitness.  Increasing exercise without diet control, however, may not burn enough calories to loose weight.       Start walking three days a week 10-20 minutes at a time    Work towards walking thirty minutes five days a week     Eventually, increase the speed of your walking for 1-2 minutes at time    In addition, we recommend that you review healthy lifestyles and methods for weight loss available through the National Institutes of Health patient information sites:  http://win.niddk.nih.gov/publications/index.htm    And look into health and wellness programs that may be available through your health insurance provider, employer, local community center, or farzaneh club.    Weight management plan: Patient was referred to their PCP to discuss a diet and exercise plan.        Your Body mass index is 35.28 kg/m .  Weight management is a personal decision.  If you are interested in exploring weight loss strategies, the following discussion covers the approaches that may be successful. Body mass index (BMI) is one way to tell whether you are at a healthy weight, overweight, or obese. It measures your weight in relation to your height.  A BMI of 18.5 to 24.9 is in the healthy range. A person with a BMI of 25 to 29.9 is considered overweight, and someone with a BMI of 30 or greater is considered obese. More than two-thirds of American adults are considered overweight or obese.  Being overweight or obese increases the risk for further weight gain. Excess weight may lead to heart disease and diabetes.  Creating and following plans for healthy eating and physical activity may help you improve your health.  Weight control is part of healthy lifestyle and includes exercise, emotional health, and healthy eating habits. Careful eating habits lifelong are the mainstay of weight control. Though there are significant health benefits from weight loss, long-term weight loss with diet alone may be very difficult to achieve- studies show long-term  success with dietary management in less than 10% of people. Attaining a healthy weight may be especially difficult to achieve in those with severe obesity. In some cases, medications, devices and surgical management might be considered.  What can you do?  If you are overweight or obese and are interested in methods for weight loss, you should discuss this with your provider.     Consider reducing daily calorie intake by 500 calories.     Keep a food journal.     Avoiding skipping meals, consider cutting portions instead.    Diet combined with exercise helps maintain muscle while optimizing fat loss. Strength training is particularly important for building and maintaining muscle mass. Exercise helps reduce stress, increase energy, and improves fitness. Increasing exercise without diet control, however, may not burn enough calories to loose weight.       Start walking three days a week 10-20 minutes at a time    Work towards walking thirty minutes five days a week     Eventually, increase the speed of your walking for 1-2 minutes at time    In addition, we recommend that you review healthy lifestyles and methods for weight loss available through the National Institutes of Health patient information sites:  http://win.niddk.nih.gov/publications/index.htm    And look into health and wellness programs that may be available through your health insurance provider, employer, local community center, or farzaneh club.    Weight management plan: Patient was referred to their PCP to discuss a diet and exercise plan.

## 2019-07-31 NOTE — LETTER
7/31/2019    Gume Brown MD  [id number]  [facility] [address]    RE: Shahid Villalta       Dear Colleague,    I had the pleasure of seeing Shahid Villalta in the [facility].    HPI and Plan:   See dictation    Orders Placed This Encounter   Procedures     EKG 12-lead complete w/read - Clinics (performed today)       No orders of the defined types were placed in this encounter.      There are no discontinued medications.      Encounter Diagnoses   Name Primary?     ICD (implantable cardioverter-defibrillator) in place      Paroxysmal atrial fibrillation (H) Yes     Nonrheumatic aortic insufficiency with aortic stenosis      Coronary artery disease involving native coronary artery of native heart without angina pectoris      Chronic systolic congestive heart failure (H)      Nonrheumatic aortic valve stenosis        CURRENT MEDICATIONS:  Current Outpatient Medications   Medication Sig Dispense Refill     acetaminophen (TYLENOL) 325 MG tablet Take 1,300 mg by mouth 2 times daily        amiodarone (PACERONE/CODARONE) 200 MG tablet Take PO 1/2 tablet daily-Take extra prn per MD recommendations. 15 tablet 0     cetirizine (ZYRTEC) 10 MG tablet Take 1 tablet (10 mg) by mouth daily 90 tablet 1     cholecalciferol (VITAMIN D) 1000 UNIT tablet Take 2 tablets (2,000 Units) by mouth daily 180 tablet 1     digoxin (LANOXIN) 125 MCG tablet Take 1 tablet (125 mcg) by mouth every 48 hours 45 tablet 3     ferrous sulfate (FEROSUL) 325 (65 Fe) MG tablet Take 1 tablet (325 mg) by mouth daily (with breakfast) 90 tablet 1     lisinopril (PRINIVIL/ZESTRIL) 2.5 MG tablet Take 1 tablet (2.5 mg) by mouth daily 90 tablet 3     metoprolol succinate ER (TOPROL-XL) 100 MG 24 hr tablet Take 1 tablet (100 mg) by mouth daily 90 tablet 3     omeprazole (PRILOSEC) 20 MG DR capsule Take 1 capsule (20 mg) by mouth daily 90 capsule 3     polyethylene glycol (MIRALAX/GLYCOLAX) powder Take 17 g by mouth daily as  needed for constipation       potassium chloride ER (K-TAB/KLOR-CON) 10 MEQ CR tablet Take 1 tablet (10 mEq) by mouth daily 90 tablet 3     senna-docusate (SENOKOT-S;PERICOLACE) 8.6-50 MG per tablet Take 2 tablets by mouth daily        simethicone (MYLICON) 80 MG chewable tablet Take 1 tablet (80 mg) by mouth every 6 hours as needed for cramping 180 tablet      simvastatin (ZOCOR) 20 MG tablet Take 1 tablet (20 mg) by mouth At Bedtime 90 tablet 3     torsemide (DEMADEX) 20 MG tablet Take 1 tablet (20 mg) by mouth daily 90 tablet 3     warfarin (COUMADIN) 5 MG tablet 2.5mg on Mondays and 5mg ROW 90 tablet 0     ASPIRIN NOT PRESCRIBED, INTENTIONAL, 1 each daily Antiplatelet medication not prescribed intentionally due to Current anticoagulant therapy (warfarin/enoxaparin) (Patient not taking: Reported on 7/31/2019) 0 each 0     sildenafil (VIAGRA) 25 MG tablet Take 25 mg by mouth as needed         ALLERGIES     Allergies   Allergen Reactions     Latex Rash     Seasonal Allergies      hayfever - wheezing -        PAST MEDICAL HISTORY:  Past Medical History:   Diagnosis Date     Aortic valve disorders      Atrial fibrillation (H)      Automatic implantable cardiac defibrillator in situ      Cancer (H)      Congestive heart failure (H)      Coronary artery disease      Essential hypertension, benign      GERD (gastroesophageal reflux disease) 3/16/2010     History of blood transfusion      Hyperlipidaemia      Hypertension      Obese      Other and unspecified hyperlipidemia      Other primary cardiomyopathies      Pacemaker      Sleep apnea      Small bowel obstruction (H) 12/14/2015     Ventricular tachycardia (H) 6/17/2013       PAST SURGICAL HISTORY:  Past Surgical History:   Procedure Laterality Date     BIOPSY  annually    prostate biopsy     CARDIAC SURGERY  2012    A fib Ablation     CARDIAC SURGERY      cardioversion     COLONOSCOPY  2007     DAVINCI PROSTATECTOMY N/A 11/20/2015    Procedure: DAVINCI  PROSTATECTOMY;  Surgeon: Nahun Hilario MD;  Location: [facility] OR     GENITOURINARY SURGERY      bladder surgery      H ABLATION AV NODE  13     H ABLATION FOCAL AFIB  13     HC TOOTH EXTRACTION W/FORCEP       HEART CATH FEMORAL CANNULIZATION WITH OPEN STANDBY REPAIR AORTIC VALVE N/A 2018    Procedure: HEART CATH FEMORAL CANNULIZATION WITH OPEN STANDBY REPAIR AORTIC VALVE;;  Surgeon: Magdiel Ashraf MD;  Location: [facility] OR     TONSILLECTOMY & ADENOIDECTOMY       TRANSCATHETER AORTIC VALVE IMPLANT ANESTHESIA N/A 2018    Procedure: TRANSCATHETER AORTIC VALVE IMPLANT ANESTHESIA;  Transfemoral Approach Aortic (34 mm Medtronic CoreValve) Valve Replacement, Cardiopulmonary Bypass Standby **Latex Allergy**;  Surgeon: GENERIC ANESTHESIA PROVIDER;  Location: [facility] OR       FAMILY HISTORY:  Family History   Problem Relation Age of Onset     C.A.D. Father         CHF  at 74     Cerebrovascular Disease Father      C.A.D. Mother         CHF at 91 still living     Cerebrovascular Disease Mother         TIAs     Breast Cancer Mother      Heart Disease Son         arrythmia     Family History Negative Sister      Family History Negative Brother      Family History Negative Son        SOCIAL HISTORY:  Social History     Socioeconomic History     Marital status:      Spouse name: None     Number of children: 2     Years of education: None     Highest education level: None   Occupational History     Employer: RETIRED   Social Needs     Financial resource strain: None     Food insecurity:     Worry: None     Inability: None     Transportation needs:     Medical: None     Non-medical: None   Tobacco Use     Smoking status: Never Smoker     Smokeless tobacco: Never Used   Substance and Sexual Activity     Alcohol use: No     Comment: AA since      Drug use: No     Sexual activity: Not Currently     Partners: Female     Birth control/protection: Abstinence   Lifestyle     Physical activity:     Days  "per week: None     Minutes per session: None     Stress: None   Relationships     Social connections:     Talks on phone: None     Gets together: None     Attends Yazdanism service: None     Active member of club or organization: None     Attends meetings of clubs or organizations: None     Relationship status: None     Intimate partner violence:     Fear of current or ex partner: None     Emotionally abused: None     Physically abused: None     Forced sexual activity: None   Other Topics Concern     Parent/sibling w/ CABG, MI or angioplasty before 65F 55M? No      Service Not Asked     Blood Transfusions Not Asked     Caffeine Concern Yes     Comment: 2-3 cups caffeine per day     Occupational Exposure Not Asked     Hobby Hazards Not Asked     Sleep Concern Yes     Comment: sleep apnea, wears cpap at night     Stress Concern No     Weight Concern No     Comment: weight fluctuates     Special Diet No     Back Care Not Asked     Exercise Yes     Comment: walking daily     Bike Helmet Not Asked     Seat Belt Yes     Self-Exams Not Asked   Social History Narrative     None       Review of Systems:  Skin:  Negative       Eyes:  Positive for glasses;cataracts    ENT:  Positive for hearing loss wears hearing aids  Respiratory:  Positive for sleep apnea BIPAP   Cardiovascular:    Positive for possible edema per pt  Gastroenterology: Positive for heartburn    Genitourinary:  Positive for   prostate removed  Musculoskeletal:  Negative      Neurologic:  Negative      Psychiatric:  not assessed      Heme/Lymph/Imm:  Positive for allergies    Endocrine:  Negative        Physical Exam:  Vitals: /76 (BP Location: Right arm, Patient Position: Chair, Cuff Size: Adult Large)   Pulse 72   Ht 1.727 m (5' 8\")   Wt 107.5 kg (237 lb)   BMI 36.04 kg/m       Constitutional:    overweight      Skin:  warm and dry to the touch          Head:  normocephalic        Eyes:  pupils equal and round        Lymph:      ENT:  no " pallor or cyanosis        Neck:  carotid pulses are full and equal bilaterally;JVP normal        Respiratory:  clear to auscultation         Cardiac: normal S1 and S2   soft or inaudible A2   systolic ejection murmur;crescendo;grade 3;radiation to the carotid   diastolic murmur;early diastolic murmur;grade 2;RUSB    not assessed this visit                                        GI:  abdomen soft;BS normoactive        Extremities and Muscular Skeletal:  no edema   trace;bilateral LE edema          Neurological:  no gross motor deficits        Psych:  Alert and Oriented x 3        CC  Warren Stanford MD  [address]  [id number]  [place] [id number]                Thank you for allowing me to participate in the care of your patient.      Sincerely,     Warren Stanford MD     [facility]    cc:   Warren Stanford MD  [address]  [id number]  [place] [id number]

## 2019-07-31 NOTE — LETTER
7/31/2019      Gume Brown MD  [id number]  [facility] [address]      RE: Shahid PRICE Pawan       Dear Colleague,    I had the pleasure of seeing Shahid Villalta in the [facility].    Service Date: 07/31/2019      HISTORY OF PRESENT ILLNESS:  I saw Mr. Villalta for followup of cardiomyopathy, atrial fibrillation, atrial tachycardia and VT.  He initially presented with atrial fibrillation with tachycardia-induced cardiomyopathy.  The patient underwent atrial fibrillation ablation on 10/19/2012.  Afterwards he had normalization of the LV function.  The patient developed recurrent atrial tachycardia and multiple morphologies of ventricular tachycardia.  He had reduction of the LV function again.  The patient underwent an EP study on 06/19/2013.  He was found to have a right atrial tachycardia and underwent linear ablation in the right atrium.  He received a [employer] CRT-D device for VT and a reduced ejection fraction.  A few days later, he presented back with recurrent atrial tachycardia and Dr. Bella performed AV node ablation on 06/25/2013.  He has been on amiodarone for ventricular tachycardia.  The patient has been in atypical atrial flutter over the last few years with complete AV block and regular BiV pacing.      The patient received TAVR 09/19/2019.  He does have coronary artery disease with moderate stenosis.      At the present time, he is on metoprolol, digoxin and torsemide.  He has not had apparent side effects from amiodarone 100 mg once a day.  It is my knowledge that he has not received any ICD shocks.      The patient was recently hospitalized for anemia.  At that time he was on Plavix plus warfarin.  Plavix has been stopped and he is on warfarin at the present time.  He is on iron supplement and hemoglobin is up to about 11.5.  The etiology of the hemoglobin drop is suspected to be secondary to GI bleeding.  Plavix was added because of TAVR.        PHYSICAL  EXAMINATION:   VITAL SIGNS:  Blood pressure was 124/76, heart rate 72 beats per minute, body weight 237 pounds.   HEENT:  Eyes and ENT were unremarkable.   CHEST:  The ICD site looked well.   LUNGS:  Clear.   CARDIAC:  Rhythm was regular and heart sounds were normal without murmur.   ABDOMEN:  Showed moderate obesity.   EXTREMITIES:  He has trace bilateral leg edema.      Recent ICD transmission showed short runs of asymptomatic relatively slow nonsustained VT.  He is in persistent atrial flutter which is likely atypical.      ASSESSMENT AND RECOMMENDATIONS:  Mr. Villalta is doing reasonably well symptomatically.  I agree for him to continue the current heart failure medications.  He may need titration of the diuretics in the near future because he still has some trace leg edema.      I do not recommend intervention for occasional nonsustained VT.  Since he is in complete AV block, I would not intervene on his atrial tachyarrhythmia.      I agree for permanent discontinuation of Plavix.  I think the warfarin can protect him for both atrial fibrillation and TAVR.  If his hemoglobin remains stable without any further drop, we can continue warfarin indefinitely.  The patient may want to see me for annual followup in [place] in the future.  If that is the case, I will be happy to assist them.  Otherwise, he can keep the regular Heart Failure Clinic visit.      cc:      Gume Brown MD    [facility]   [address] [place] [address]         ANITA ALAN MD             D: 2019   T: 2019   MT: TAYLOR      Name:     LUCIO VILLALTA   MRN:      [id number]        Account:      [id number]   :      1940           Service Date: 2019      Document: [id number]           Outpatient Encounter Medications as of 2019   Medication Sig Dispense Refill     acetaminophen (TYLENOL) 325 MG tablet Take 1,300 mg by mouth 2 times daily        cetirizine (ZYRTEC) 10 MG tablet Take 1 tablet (10 mg)  by mouth daily 90 tablet 1     cholecalciferol (VITAMIN D) 1000 UNIT tablet Take 2 tablets (2,000 Units) by mouth daily 180 tablet 1     digoxin (LANOXIN) 125 MCG tablet Take 1 tablet (125 mcg) by mouth every 48 hours 45 tablet 3     ferrous sulfate (FEROSUL) 325 (65 Fe) MG tablet Take 1 tablet (325 mg) by mouth daily (with breakfast) 90 tablet 1     lisinopril (PRINIVIL/ZESTRIL) 2.5 MG tablet Take 1 tablet (2.5 mg) by mouth daily 90 tablet 3     metoprolol succinate ER (TOPROL-XL) 100 MG 24 hr tablet Take 1 tablet (100 mg) by mouth daily 90 tablet 3     omeprazole (PRILOSEC) 20 MG DR capsule Take 1 capsule (20 mg) by mouth daily 90 capsule 3     polyethylene glycol (MIRALAX/GLYCOLAX) powder Take 17 g by mouth daily as needed for constipation       potassium chloride ER (K-TAB/KLOR-CON) 10 MEQ CR tablet Take 1 tablet (10 mEq) by mouth daily 90 tablet 3     senna-docusate (SENOKOT-S;PERICOLACE) 8.6-50 MG per tablet Take 2 tablets by mouth daily        simethicone (MYLICON) 80 MG chewable tablet Take 1 tablet (80 mg) by mouth every 6 hours as needed for cramping 180 tablet      simvastatin (ZOCOR) 20 MG tablet Take 1 tablet (20 mg) by mouth At Bedtime 90 tablet 3     torsemide (DEMADEX) 20 MG tablet Take 1 tablet (20 mg) by mouth daily 90 tablet 3     warfarin (COUMADIN) 5 MG tablet 2.5mg on Mondays and 5mg ROW 90 tablet 0     [DISCONTINUED] amiodarone (PACERONE/CODARONE) 200 MG tablet Take PO 1/2 tablet daily-Take extra prn per MD recommendations. 15 tablet 0     ASPIRIN NOT PRESCRIBED, INTENTIONAL, 1 each daily Antiplatelet medication not prescribed intentionally due to Current anticoagulant therapy (warfarin/enoxaparin) (Patient not taking: Reported on 7/31/2019) 0 each 0     sildenafil (VIAGRA) 25 MG tablet Take 25 mg by mouth as needed       No facility-administered encounter medications on file as of 7/31/2019.            Again, thank you for allowing me to participate in the care of your patient.       Sincerely,    Warren Stanford MD     [facility]

## 2019-07-31 NOTE — PROGRESS NOTES
Service Date: 07/31/2019      HISTORY OF PRESENT ILLNESS:  I saw Mr. Villalta for followup of cardiomyopathy, atrial fibrillation, atrial tachycardia and VT.  He initially presented with atrial fibrillation with tachycardia-induced cardiomyopathy.  The patient underwent atrial fibrillation ablation on 10/19/2012.  Afterwards he had normalization of the LV function.  The patient developed recurrent atrial tachycardia and multiple morphologies of ventricular tachycardia.  He had reduction of the LV function again.  The patient underwent an EP study on 06/19/2013.  He was found to have a right atrial tachycardia and underwent linear ablation in the right atrium.  He received a [employer] CRT-D device for VT and a reduced ejection fraction.  A few days later, he presented back with recurrent atrial tachycardia and Dr. Bella performed AV node ablation on 06/25/2013.  He has been on amiodarone for ventricular tachycardia.  The patient has been in atypical atrial flutter over the last few years with complete AV block and regular BiV pacing.      The patient received TAVR 09/19/2019.  He does have coronary artery disease with moderate stenosis.      At the present time, he is on metoprolol, digoxin and torsemide.  He has not had apparent side effects from amiodarone 100 mg once a day.  It is my knowledge that he has not received any ICD shocks.      The patient was recently hospitalized for anemia.  At that time he was on Plavix plus warfarin.  Plavix has been stopped and he is on warfarin at the present time.  He is on iron supplement and hemoglobin is up to about 11.5.  The etiology of the hemoglobin drop is suspected to be secondary to GI bleeding.  Plavix was added because of TAVR.        PHYSICAL EXAMINATION:   VITAL SIGNS:  Blood pressure was 124/76, heart rate 72 beats per minute, body weight 237 pounds.   HEENT:  Eyes and ENT were unremarkable.   CHEST:  The ICD site looked well.   LUNGS:  Clear.   CARDIAC:   Rhythm was regular and heart sounds were normal without murmur.   ABDOMEN:  Showed moderate obesity.   EXTREMITIES:  He has trace bilateral leg edema.      Recent ICD transmission showed short runs of asymptomatic relatively slow nonsustained VT.  He is in persistent atrial flutter which is likely atypical.      ASSESSMENT AND RECOMMENDATIONS:  Mr. Villalta is doing reasonably well symptomatically.  I agree for him to continue the current heart failure medications.  He may need titration of the diuretics in the near future because he still has some trace leg edema.      I do not recommend intervention for occasional nonsustained VT.  Since he is in complete AV block, I would not intervene on his atrial tachyarrhythmia.      I agree for permanent discontinuation of Plavix.  I think the warfarin can protect him for both atrial fibrillation and TAVR.  If his hemoglobin remains stable without any further drop, we can continue warfarin indefinitely.  The patient may want to see me for annual followup in [place] in the future.  If that is the case, I will be happy to assist them.  Otherwise, he can keep the regular Heart Failure Clinic visit.      cc:      Gume Brown MD    [facility]   [address] [place] [address]         ANITA ALAN MD             D: 2019   T: 2019   MT: TAYLOR      Name:     LUCIO VILLALTA   MRN:      [id number]        Account:      [id number]   :      1940           Service Date: 2019      Document: [id number]

## 2019-07-31 NOTE — PROGRESS NOTES
HPI and Plan:   See dictation    Orders Placed This Encounter   Procedures     EKG 12-lead complete w/read - Clinics (performed today)       No orders of the defined types were placed in this encounter.      There are no discontinued medications.      Encounter Diagnoses   Name Primary?     ICD (implantable cardioverter-defibrillator) in place      Paroxysmal atrial fibrillation (H) Yes     Nonrheumatic aortic insufficiency with aortic stenosis      Coronary artery disease involving native coronary artery of native heart without angina pectoris      Chronic systolic congestive heart failure (H)      Nonrheumatic aortic valve stenosis        CURRENT MEDICATIONS:  Current Outpatient Medications   Medication Sig Dispense Refill     acetaminophen (TYLENOL) 325 MG tablet Take 1,300 mg by mouth 2 times daily        amiodarone (PACERONE/CODARONE) 200 MG tablet Take PO 1/2 tablet daily-Take extra prn per MD recommendations. 15 tablet 0     cetirizine (ZYRTEC) 10 MG tablet Take 1 tablet (10 mg) by mouth daily 90 tablet 1     cholecalciferol (VITAMIN D) 1000 UNIT tablet Take 2 tablets (2,000 Units) by mouth daily 180 tablet 1     digoxin (LANOXIN) 125 MCG tablet Take 1 tablet (125 mcg) by mouth every 48 hours 45 tablet 3     ferrous sulfate (FEROSUL) 325 (65 Fe) MG tablet Take 1 tablet (325 mg) by mouth daily (with breakfast) 90 tablet 1     lisinopril (PRINIVIL/ZESTRIL) 2.5 MG tablet Take 1 tablet (2.5 mg) by mouth daily 90 tablet 3     metoprolol succinate ER (TOPROL-XL) 100 MG 24 hr tablet Take 1 tablet (100 mg) by mouth daily 90 tablet 3     omeprazole (PRILOSEC) 20 MG DR capsule Take 1 capsule (20 mg) by mouth daily 90 capsule 3     polyethylene glycol (MIRALAX/GLYCOLAX) powder Take 17 g by mouth daily as needed for constipation       potassium chloride ER (K-TAB/KLOR-CON) 10 MEQ CR tablet Take 1 tablet (10 mEq) by mouth daily 90 tablet 3     senna-docusate (SENOKOT-S;PERICOLACE) 8.6-50 MG per tablet Take 2 tablets by  mouth daily        simethicone (MYLICON) 80 MG chewable tablet Take 1 tablet (80 mg) by mouth every 6 hours as needed for cramping 180 tablet      simvastatin (ZOCOR) 20 MG tablet Take 1 tablet (20 mg) by mouth At Bedtime 90 tablet 3     torsemide (DEMADEX) 20 MG tablet Take 1 tablet (20 mg) by mouth daily 90 tablet 3     warfarin (COUMADIN) 5 MG tablet 2.5mg on Mondays and 5mg ROW 90 tablet 0     ASPIRIN NOT PRESCRIBED, INTENTIONAL, 1 each daily Antiplatelet medication not prescribed intentionally due to Current anticoagulant therapy (warfarin/enoxaparin) (Patient not taking: Reported on 7/31/2019) 0 each 0     sildenafil (VIAGRA) 25 MG tablet Take 25 mg by mouth as needed         ALLERGIES     Allergies   Allergen Reactions     Latex Rash     Seasonal Allergies      hayfever - wheezing -        PAST MEDICAL HISTORY:  Past Medical History:   Diagnosis Date     Aortic valve disorders      Atrial fibrillation (H)      Automatic implantable cardiac defibrillator in situ      Cancer (H)      Congestive heart failure (H)      Coronary artery disease      Essential hypertension, benign      GERD (gastroesophageal reflux disease) 3/16/2010     History of blood transfusion      Hyperlipidaemia      Hypertension      Obese      Other and unspecified hyperlipidemia      Other primary cardiomyopathies      Pacemaker      Sleep apnea      Small bowel obstruction (H) 12/14/2015     Ventricular tachycardia (H) 6/17/2013       PAST SURGICAL HISTORY:  Past Surgical History:   Procedure Laterality Date     BIOPSY  annually    prostate biopsy     CARDIAC SURGERY  2012    A fib Ablation     CARDIAC SURGERY      cardioversion     COLONOSCOPY  2007     DAVINCI PROSTATECTOMY N/A 11/20/2015    Procedure: DAVINCI PROSTATECTOMY;  Surgeon: Nahun Hilario MD;  Location: [facility] OR     GENITOURINARY SURGERY      bladder surgery 2011     H ABLATION AV NODE  6/25/13     H ABLATION FOCAL AFIB  6/25/13     HC TOOTH EXTRACTION W/FORCEP        HEART CATH FEMORAL CANNULIZATION WITH OPEN STANDBY REPAIR AORTIC VALVE N/A 2018    Procedure: HEART CATH FEMORAL CANNULIZATION WITH OPEN STANDBY REPAIR AORTIC VALVE;;  Surgeon: Magdiel Ashraf MD;  Location:  OR     TONSILLECTOMY & ADENOIDECTOMY       TRANSCATHETER AORTIC VALVE IMPLANT ANESTHESIA N/A 2018    Procedure: TRANSCATHETER AORTIC VALVE IMPLANT ANESTHESIA;  Transfemoral Approach Aortic (34 mm [employer] CoreValve) Valve Replacement, Cardiopulmonary Bypass Standby **Latex Allergy**;  Surgeon: GENERIC ANESTHESIA PROVIDER;  Location:  OR       FAMILY HISTORY:  Family History   Problem Relation Age of Onset     C.A.D. Father         CHF  at 74     Cerebrovascular Disease Father      C.A.D. Mother         CHF at 91 still living     Cerebrovascular Disease Mother         TIAs     Breast Cancer Mother      Heart Disease Son         arrythmia     Family History Negative Sister      Family History Negative Brother      Family History Negative Son        SOCIAL HISTORY:  Social History     Socioeconomic History     Marital status:      Spouse name: None     Number of children: 2     Years of education: None     Highest education level: None   Occupational History     Employer: RETIRED   Social Needs     Financial resource strain: None     Food insecurity:     Worry: None     Inability: None     Transportation needs:     Medical: None     Non-medical: None   Tobacco Use     Smoking status: Never Smoker     Smokeless tobacco: Never Used   Substance and Sexual Activity     Alcohol use: No     Comment: AA since      Drug use: No     Sexual activity: Not Currently     Partners: Female     Birth control/protection: Abstinence   Lifestyle     Physical activity:     Days per week: None     Minutes per session: None     Stress: None   Relationships     Social connections:     Talks on phone: None     Gets together: None     Attends Orthodox service: None     Active member of club or organization:  "None     Attends meetings of clubs or organizations: None     Relationship status: None     Intimate partner violence:     Fear of current or ex partner: None     Emotionally abused: None     Physically abused: None     Forced sexual activity: None   Other Topics Concern     Parent/sibling w/ CABG, MI or angioplasty before 65F 55M? No      Service Not Asked     Blood Transfusions Not Asked     Caffeine Concern Yes     Comment: 2-3 cups caffeine per day     Occupational Exposure Not Asked     Hobby Hazards Not Asked     Sleep Concern Yes     Comment: sleep apnea, wears cpap at night     Stress Concern No     Weight Concern No     Comment: weight fluctuates     Special Diet No     Back Care Not Asked     Exercise Yes     Comment: walking daily     Bike Helmet Not Asked     Seat Belt Yes     Self-Exams Not Asked   Social History Narrative     None       Review of Systems:  Skin:  Negative       Eyes:  Positive for glasses;cataracts    ENT:  Positive for hearing loss wears hearing aids  Respiratory:  Positive for sleep apnea BIPAP   Cardiovascular:    Positive for possible edema per pt  Gastroenterology: Positive for heartburn    Genitourinary:  Positive for   prostate removed  Musculoskeletal:  Negative      Neurologic:  Negative      Psychiatric:  not assessed      Heme/Lymph/Imm:  Positive for allergies    Endocrine:  Negative        Physical Exam:  Vitals: /76 (BP Location: Right arm, Patient Position: Chair, Cuff Size: Adult Large)   Pulse 72   Ht 1.727 m (5' 8\")   Wt 107.5 kg (237 lb)   BMI 36.04 kg/m      Constitutional:    overweight      Skin:  warm and dry to the touch          Head:  normocephalic        Eyes:  pupils equal and round        Lymph:      ENT:  no pallor or cyanosis        Neck:  carotid pulses are full and equal bilaterally;JVP normal        Respiratory:  clear to auscultation         Cardiac: normal S1 and S2   soft or inaudible A2   systolic ejection murmur;crescendo;grade " 3;radiation to the carotid   diastolic murmur;early diastolic murmur;grade 2;RUSB    not assessed this visit                                        GI:  abdomen soft;BS normoactive        Extremities and Muscular Skeletal:  no edema   trace;bilateral LE edema          Neurological:  no gross motor deficits        Psych:  Alert and Oriented x 3        CC  Warren Stanford MD  [address]  [address]  [place] [address]

## 2019-08-01 NOTE — TELEPHONE ENCOUNTER
Prescription approved per [facility] [place].  Isela Hua RN

## 2019-08-01 NOTE — TELEPHONE ENCOUNTER
"Requested Prescriptions   Pending Prescriptions Disp Refills     warfarin (COUMADIN) 5 MG tablet [Pharmacy Med Name: WARFARIN SOD 5MG TABLETS (PEACH)] 90 tablet 0     Sig: TAKE 1 TABLET BY MOUTH DAILY     Last Written Prescription Date:  10/25/2018  Last Fill Quantity: 90 tablet,  # refills: 0   Last office visit: 6/14/2019 with prescribing provider:  06/14/2019   Future Office Visit:          Vitamin K Antagonists Failed - 8/1/2019 11:32 AM        Failed - INR is within goal in the past 6 weeks     Confirm INR is within goal in the past 6 weeks.     Recent Labs   Lab Test 07/23/19   INR 3.2*                       Passed - Recent (12 mo) or future (30 days) visit within the authorizing provider's specialty     Patient had office visit in the last 12 months or has a visit in the next 30 days with authorizing provider or within the authorizing provider's specialty.  See \"Patient Info\" tab in inbasket, or \"Choose Columns\" in Meds & Orders section of the refill encounter.              Passed - Medication is active on med list        Passed - Patient is 18 years of age or older        Ethan GARCIAT  "

## 2019-08-06 NOTE — PROGRESS NOTES
ANTICOAGULATION FOLLOW-UP CLINIC VISIT    Patient Name:  Shahid Villalta  Date:  2019  Contact Type:  Face to Face    SUBJECTIVE:  Patient Findings     Positives:   Change in medications    Comments:   Cardiology agrees off Plavix long term.   Otherwise no issues         Clinical Outcomes     Comments:   Cardiology agrees off Plavix long term.   Otherwise no issues            OBJECTIVE    INR Protime   Date Value Ref Range Status   2019 2.6 (A) 0.86 - 1.14 Final     Chromogenic Factor 10   Date Value Ref Range Status   2019 14 (L) 70 - 130 % Final     Comment:     Therapeutic Range:  A Chromogenic Factor 10 level of approximately 20-40%   inversely correlates with an INR of 2-3 for patients receiving Warfarin.   Chromogenic Factor 10 levels below 20% indicate an INR greater than 3 and   levels above 40% indicate an INR less than 2.         ASSESSMENT / PLAN  No question data found.  Anticoagulation Summary  As of 2019    INR goal:   2.0-3.0   TTR:   45.4 % (3.5 y)   INR used for dosin.6 (2019)   Warfarin maintenance plan:   2.5 mg (5 mg x 0.5) every Mon, Wed, Fri; 5 mg (5 mg x 1) all other days   Full warfarin instructions:   2.5 mg every Mon, Wed, Fri; 5 mg all other days   Weekly warfarin total:   27.5 mg   Plan last modified:   Manisha Pierson RN (2019)   Next INR check:   2019   Target end date:       Indications    Long term current use of anticoagulant therapy [Z79.01]  Paroxysmal ventricular tachycardia (H) (Resolved) [I47.2]             Anticoagulation Episode Summary     INR check location:       Preferred lab:       Send INR reminders to:   [facility]    Comments:               See the Encounter Report to view Anticoagulation Flowsheet and Dosing Calendar (Go to Encounters tab in chart review, and find the Anticoagulation Therapy Visit)        Isela Hua RN

## 2019-08-16 NOTE — PROCEDURES
" SLEEP STUDY INTERPRETATION  TITRATION STUDY      Patient: LUCIO VINCENT  YOB: 1940  Study Date: 8/13/2019  MRN: [id number]  Referring Provider: -  Ordering Provider: Nadir Ashraf MD    Indications for Polysomnography: The patient is a 79 y old Male who is 5' 8\" and weighs 232.0 lbs. His BMI is 35.6, [place] sleepiness scale 6.0 and neck circumference is 0.0 cm. Relevant medical history includes hypertension, atrial fibrillation, heart failure with reduced ejection fraction (35-40%) and complex sleep apnea. A polysomnogram with PAP titration was performed to correct for sleep apnea.    Polysomnogram Data: A full night polysomnogram recorded the standard physiologic parameters including EEG, EOG, EMG, ECG, nasal and oral airflow. Respiratory parameters of chest and abdominal movements were recorded with respiratory inductance plethysmography. Oxygen saturation was recorded by pulse oximetry. Hypopnea scoring rule used: 1B 4%.    Treatment PSG  Sleep Architecture:   The total recording time of the polysomnogram was 489.0 minutes. The total sleep time was 392.5 minutes. Sleep latency was normal at 18.4 minutes without the use of a sleep aid. REM latency was 96.0 minutes. Arousal index was increased at 18.2 arousals per hour. Sleep efficiency was decreased at 80.3%. Wake after sleep onset was 77.5 minutes. The patient spent 13.8% of total sleep time in Stage N1, 41.4% in Stage N2, 26.1% in Stage N3, and 18.7% in REM. Time in REM supine was 54.5 minutes.    Respiration:     The patient was titrated at pressures ranging from CPAP 5 cmH2O up to CPAP 14 cmH2O. The final pressure achieved was CPAP 14 cmH2O with a residual AHI of 0.3 events per hour. Time in REM supine on final pressure was 35.0 minutes.     This titration was considered :  - Optimal (residual AHI < 5 events per hour and including REM supine sleep at final pressure).     Snoring - was reported as absent on treatment.    Respiratory rate and " pattern - was notable for normal respiratory rate and pattern.    Sustained Sleep Associated Hypoventilation - Transcutaneous carbon dioxide monitoring was not used; however significant hypoventilation was not suggested by oximetry.    Sleep Associated Hypoxemia - (Greater than 5 minutes O2 sat at or below 88%) was not present. Baseline oxygen saturation was 95.7%. Lowest oxygen saturation was 72.2%. Time spent less than or equal to 88% was 2.5 minutes. Time spent less than or equal to 89% was 4.7 minutes.    Movement Activity: There was an increased frequency of periodic limb movements of sleep. However, majority of movements were not associated with arousals.     Periodic Limb Activity - There were 197 PLMs during the entire study. The PLM index was 30.1 movements per hour. The PLM Arousal Index was 0.9 per hour.    REM EMG Activity - Excessive transient/sustained muscle activity was not present.    Nocturnal Behavior - Abnormal sleep related behaviors were not noted during/arising out of NREM / REM sleep.     Bruxism - None apparent.    Cardiac Summary: Paced rhythm.   The average pulse rate was 69.7 bpm. The minimum pulse rate was 37.4 bpm while the maximum pulse rate was 81.9 bpm. Arrhythmias were not noted.      Assessment:      Patient s sleep disordered breathing was treated effectively with CPAP. A CPAP pressure of 14 cm H2O was optimal. There was no significant degree of central apneas on CPAP. This titration PSG was performed as patient is no longer a candidate for ASV for complex sleep apnea.     Recommendations:    Treatment of SHIMON with Auto?titrating PAP therapy with a range of 10 cmH2O to 15 cmH2O. Recommend clinical follow up with sleep management team, including review of compliance measures.    Weight management.     Diagnostic Codes:   Obstructive Sleep Apnea G47.33  Central Sleep Apnea G47.31    8/13/2019 [place] Titration Sleep Study (232.0 lbs) - Treatment was titrated to a pressure of CPAP 14  with an AHI of 0.3. Time Spent in REM supine at this pressure was 35.0.      _____________________________________   Electronically Signed By: Nadir Ashraf MD 8/15/2019

## 2019-08-27 NOTE — PROGRESS NOTES
ANTICOAGULATION FOLLOW-UP CLINIC VISIT    Patient Name:  Shahid Villalta  Date:  2019  Contact Type:  Face to Face    SUBJECTIVE:  Patient Findings     Comments:   The patient was assessed for diet, medication, and activity level changes, missed or extra doses, bruising or bleeding, with no problem findings.        Clinical Outcomes     Comments:   The patient was assessed for diet, medication, and activity level changes, missed or extra doses, bruising or bleeding, with no problem findings.           OBJECTIVE    INR Protime   Date Value Ref Range Status   2019 2.4 (A) 0.86 - 1.14 Final     Chromogenic Factor 10   Date Value Ref Range Status   2019 14 (L) 70 - 130 % Final     Comment:     Therapeutic Range:  A Chromogenic Factor 10 level of approximately 20-40%   inversely correlates with an INR of 2-3 for patients receiving Warfarin.   Chromogenic Factor 10 levels below 20% indicate an INR greater than 3 and   levels above 40% indicate an INR less than 2.         ASSESSMENT / PLAN  No question data found.  Anticoagulation Summary  As of 2019    INR goal:   2.0-3.0   TTR:   46.3 % (3.6 y)   INR used for dosin.4 (2019)   Warfarin maintenance plan:   2.5 mg (5 mg x 0.5) every Mon, Wed, Fri; 5 mg (5 mg x 1) all other days   Full warfarin instructions:   2.5 mg every Mon, Wed, Fri; 5 mg all other days   Weekly warfarin total:   27.5 mg   Plan last modified:   Manisha Pierson RN (2019)   Next INR check:   10/1/2019   Target end date:       Indications    Long term current use of anticoagulant therapy [Z79.01]  Paroxysmal ventricular tachycardia (H) (Resolved) [I47.2]             Anticoagulation Episode Summary     INR check location:       Preferred lab:       Send INR reminders to:   [facility]    Comments:               See the Encounter Report to view Anticoagulation Flowsheet and Dosing Calendar (Go to Encounters tab in chart review, and find the Anticoagulation Therapy  Visit)        Isela Hua RN

## 2019-10-02 NOTE — PROGRESS NOTES
Sleep Study Follow-Up Visit:    Date on this visit: 10/2/2019    Shahid Villalta comes in today for follow-up of his titration sleep study done on 8/13/2019 at the [facility] [place] for PAP titration for complex sleep apnea.    Patient has severe sleep apnea at baseline with AHI 50.6 in 2013. He has been on ASV therapy for complex sleep apnea which is no longer an option due to low EF.       Sleep latency 18.4 minutes without Ambien.  REM achieved.   REM latency 96 minutes.  Sleep efficiency 80.3%. Total sleep time 392.5 minutes.    CPAP titration:  CPAP was titrated to 14 cm with  elimination of apneas, hypopneas and desaturations. Supine REM was noted at this pressure.  Periodic Limb Movement Index 30/hour.       Meanwhile, he has remained on ASV.     Overall, he rates the experience with PAP as 10 (0 poor, 10 great). The mask is comfortable. The mask is not leaking.  He is not snoring with the mask on. He is not having gasp arousals.  He is not having significant oral/nasal dryness. The pressure settings are comfortable.     He uses full-face mask.       He does feel rested in the morning.    [place] Sleepiness Scale: 4/24      Respironics    ASV min EPAP 5 cmH2O, max EPAP 15 cm, min PS 0, max PS 20, max pressure 25 cm H2O download:  30/30 total days of use. 0 nonuse days.  Average use 6 hours 39 minutes per day.  93% days with >4 hours use.  Large leak 3 hrs per day average. EPAP 90% pressure 5.2 cm. AHI 3.6       These findings were reviewed with patient.     Past medical/surgical history, family history, social history, medications and allergies were reviewed.      Problem List:  Patient Active Problem List    Diagnosis Date Noted     Prostate cancer 05/14/2010     Priority: High     Stage T1c prostate cancer - followed by Urology Dr. 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      Priority: Medium     s/p TAVR with a 34 mm Medtronic Evolut R valve in 9/2018       Thoracic aortic ectasia (H) 05/28/2019     Priority: Medium     Aortic stenosis, severe 09/19/2018     Priority: Medium     SHIMON (obstructive sleep apnea) 05/31/2018     Priority: Medium     Using CPAP nightly       Nonrheumatic aortic valve insufficiency 11/17/2017     Priority: Medium     Pulmonary hypertension (H) 11/15/2017     Priority: Medium     Per 11/13/17 echo, moderate pulmonary HTN (46-56 mmHg)       Chronic systolic congestive heart failure (H) 05/19/2017     Priority: Medium     As of 06/2018, EF of 35-40%       Long term current use of anticoagulant therapy 12/21/2015     Priority: Medium     Coronary artery disease      Priority: Medium     Occluded right coronary artery    Coronary Angiogram 06/2018: Chronically occluded right coronary artery with extensive  left to right collaterals. Right dominant circulation. Minimal other  coronary disease.  2.Pulmonary hypertension with a pulmonary artery pressure of 65/24  with a mean of 40. Pulmonary capillary wedge pressure was 27 with a  V-wave to 40. Cardiac output 3.3 L/m with a pulmonary artery  saturation of 52%. Index was 1.6.       Anemia 11/14/2015     Priority: Medium     Aortic valve stenosis 10/29/2014     Priority: Medium     Severe, followed with echocardiogram every 6 months, per 11/13/2017 echo, mean Aov gradient 36 mmHg    Severe valvular aortic stenosis noted and the calculated aortic valve area is  0.7 - 0.8 cm2. The mean AoV pressure gradient is 35mmHg. There is mild to  moderate (1-2+) central aortic regurgitation.  The aortic Sinus(es) of Valsalva are mildly dilated. They are 3.9 cm. The  aortic annulus is about 2.5 to 2.6 cm.  Left ventricular systolic function is moderately reduced. The visual ejection  fraction is estimated at 35-40%, possibly slightly better.. There is moderate  concentric left ventricular hypertrophy. Echo findings are not consistent  with  left ventricular outflow obstruction. The left ventricle is borderline  dilated.  Immobile mitral valve posterior leaflet is noted but without mitral stenosis  (by CW Doppler) There is mild (1+) mitral regurgitation.       Automatic implantable cardioverter-defibrillator in situ      Priority: Medium     CKD (chronic kidney disease) stage 3, GFR 30-59 ml/min (H) 06/06/2014     Priority: Medium     Aortic insufficiency with aortic stenosis 07/18/2011     Priority: Medium     Hypertension goal BP (blood pressure) < 140/90 07/18/2011     Priority: Medium     Vitamin D deficiency 04/25/2011     Priority: Medium     Impaired fasting glucose 03/20/2011     Priority: Medium     GERD (gastroesophageal reflux disease) 03/16/2010     Priority: Medium     Hyperlipidemia LDL goal <100 08/07/2009     Priority: Medium     Atrial fibrillation (H) 10/08/2008     Priority: Medium     Anticoagulation monitoring, INR range 2-3 10/08/2008     Priority: Medium     Health Care Home 07/20/2011     Priority: Low     Jacinta Nowak RN-BSN, [facility]  [phone].  A / G [facility]                Impression/Plan:    1. Complex sleep apnea syndrome  2. Heart failure with reduce ejection fraction   3. Paroxysmal atrial fibrillation   4. Coronary artery disease   5. Hypertension     - Patient is no longer a candidate for ASV therapy due to declining left ventricular EF. He had adequate treatment at CPAP pressure of 14 cm H2O, without significant central apneas.     - I have changed settings on his device to auto PAP 10-15 cm H2O. He can obtain a new device through DME.     Plan:     1. Therapy changed to auto PAP 10-15 cm H2O  2. Follow up in 3-6 months       Twenty-five minutes spent with patient, all of which were spent face-to-face counseling, consulting, coordinating plan of care.      Dr. Nadir Ashraf     CC: Gume Brown

## 2019-10-02 NOTE — PATIENT INSTRUCTIONS
Your BMI is Body mass index is 35.28 kg/m .  Weight management is a personal decision.  If you are interested in exploring weight loss strategies, the following discussion covers the approaches that may be successful. Body mass index (BMI) is one way to tell whether you are at a healthy weight, overweight, or obese. It measures your weight in relation to your height.  A BMI of 18.5 to 24.9 is in the healthy range. A person with a BMI of 25 to 29.9 is considered overweight, and someone with a BMI of 30 or greater is considered obese. More than two-thirds of American adults are considered overweight or obese.  Being overweight or obese increases the risk for further weight gain. Excess weight may lead to heart disease and diabetes.  Creating and following plans for healthy eating and physical activity may help you improve your health.  Weight control is part of healthy lifestyle and includes exercise, emotional health, and healthy eating habits. Careful eating habits lifelong are the mainstay of weight control. Though there are significant health benefits from weight loss, long-term weight loss with diet alone may be very difficult to achieve- studies show long-term success with dietary management in less than 10% of people. Attaining a healthy weight may be especially difficult to achieve in those with severe obesity. In some cases, medications, devices and surgical management might be considered.  What can you do?  If you are overweight or obese and are interested in methods for weight loss, you should discuss this with your provider.     Consider reducing daily calorie intake by 500 calories.     Keep a food journal.     Avoiding skipping meals, consider cutting portions instead.    Diet combined with exercise helps maintain muscle while optimizing fat loss. Strength training is particularly important for building and maintaining muscle mass. Exercise helps reduce stress, increase energy, and improves fitness.  Increasing exercise without diet control, however, may not burn enough calories to loose weight.       Start walking three days a week 10-20 minutes at a time    Work towards walking thirty minutes five days a week     Eventually, increase the speed of your walking for 1-2 minutes at time    In addition, we recommend that you review healthy lifestyles and methods for weight loss available through the National Institutes of Health patient information sites:  http://win.niddk.nih.gov/publications/index.htm    And look into health and wellness programs that may be available through your health insurance provider, employer, local community center, or farzaneh club.    Weight management plan: Patient was referred to their PCP to discuss a diet and exercise plan.

## 2019-10-02 NOTE — TELEPHONE ENCOUNTER
Standing lab orders previously placed for INR     Mira Conklin PharmD BCACP  Anticoagulation Clinical Pharmacist

## 2019-10-02 NOTE — NURSING NOTE
"Chief Complaint   Patient presents with     Sleep Study     Follow up psg, cpap check       Initial /78   Pulse 70   Resp 18   Ht 1.727 m (5' 8\")   Wt 105.2 kg (232 lb)   SpO2 95%   BMI 35.28 kg/m   Estimated body mass index is 35.28 kg/m  as calculated from the following:    Height as of this encounter: 1.727 m (5' 8\").    Weight as of this encounter: 105.2 kg (232 lb).    Medication Reconciliation: complete    ESS 4    Patsy Malone MA    "

## 2019-10-07 NOTE — PROGRESS NOTES
ANTICOAGULATION FOLLOW-UP CLINIC VISIT    Patient Name:  Shahid Villalta  Date:  10/7/2019  Contact Type:  Face to Face    SUBJECTIVE:  Patient Findings     Comments:   The patient was assessed for diet, medication, and activity level changes, missed or extra doses, bruising or bleeding, with no problem findings.  Patient plans to check next INR in 5 weeks in the lab when he comes in for his next appt with Dr Brown. Plans to go to [place] the week before Thanks. He already has standing INR lab orders entered. He will let us know what lab he will be checking at in [place] so the orders can be faxed there - may be [facility] or may be a Clinic lab where he will be seeing a cardiologist.         Clinical Outcomes     Comments:   The patient was assessed for diet, medication, and activity level changes, missed or extra doses, bruising or bleeding, with no problem findings.  Patient plans to check next INR in 5 weeks in the lab when he comes in for his next appt with Dr Brown. Plans to go to [place] the week before Thanks. He already has standing INR lab orders entered. He will let us know what lab he will be checking at in [place] so the orders can be faxed there - may be [facility] or may be a Clinic lab where he will be seeing a cardiologist.            OBJECTIVE    INR Protime   Date Value Ref Range Status   10/07/2019 2.3 (A) 0.86 - 1.14 Final     Chromogenic Factor 10   Date Value Ref Range Status   2019 14 (L) 70 - 130 % Final     Comment:     Therapeutic Range:  A Chromogenic Factor 10 level of approximately 20-40%   inversely correlates with an INR of 2-3 for patients receiving Warfarin.   Chromogenic Factor 10 levels below 20% indicate an INR greater than 3 and   levels above 40% indicate an INR less than 2.         ASSESSMENT / PLAN  No question data found.  Anticoagulation Summary  As of 10/7/2019    INR goal:   2.0-3.0   TTR:   47.9 % (3.7 y)   INR used for dosin.3 (10/7/2019)   Warfarin maintenance plan:   2.5 mg  (5 mg x 0.5) every Mon, Wed, Fri; 5 mg (5 mg x 1) all other days   Full warfarin instructions:   2.5 mg every Mon, Wed, Fri; 5 mg all other days   Weekly warfarin total:   27.5 mg   No change documented:   Shannan Rollins RN   Plan last modified:   Manisha Pierson RN (6/11/2019)   Next INR check:   11/12/2019   Target end date:       Indications    Long term current use of anticoagulant therapy [Z79.01]  Paroxysmal ventricular tachycardia (H) (Resolved) [I47.2]             Anticoagulation Episode Summary     INR check location:       Preferred lab:       Send INR reminders to:   [facility]    Comments:               See the Encounter Report to view Anticoagulation Flowsheet and Dosing Calendar (Go to Encounters tab in chart review, and find the Anticoagulation Therapy Visit)    Shannan Rollins, JUDIT

## 2019-10-15 NOTE — TELEPHONE ENCOUNTER
"Requested Prescriptions   Pending Prescriptions Disp Refills     warfarin ANTICOAGULANT (COUMADIN) 5 MG tablet\  Last Written Prescription Date:  10/25/2018  Last Fill Quantity: 90 tablet,  # refills: 0   Last office visit: 2019 with prescribing provider:  Stephanie   Future Office Visit:   Next 5 appointments (look out 90 days)    2019 10:00 AM CST  Return Visit with  ONCOLOGY NURSE  [facility]  [address]  [facility] [address]  [phone]   2019 10:00 AM CST  Return Visit with Doug Benoit MD  [facility]  [address]  [facility] [place] [address]  [phone]          90 tablet 0     Si.5mg on  and 5mg ROW       Vitamin K Antagonists Failed - 10/15/2019  4:58 PM        Failed - INR is within goal in the past 6 weeks     Confirm INR is within goal in the past 6 weeks.     Recent Labs   Lab Test 10/07/19   INR 2.3*                       Failed - Medication is active on med list        Passed - Recent (12 mo) or future (30 days) visit within the authorizing provider's specialty     Patient has had an office visit with the authorizing provider or a provider within the authorizing providers department within the previous 12 mos or has a future within next 30 days. See \"Patient Info\" tab in inbasket, or \"Choose Columns\" in Meds & Orders section of the refill encounter.              Passed - Patient is 18 years of age or older          "

## 2019-10-16 NOTE — TELEPHONE ENCOUNTER
Called patient to schedule appointment. He had questions about meeting [place] Transferred patient to DME Coordinator.

## 2019-11-05 NOTE — TELEPHONE ENCOUNTER
Pt called in went over doing labs which Pt will have done by oncology and Pt needs to still do echo. RUSSELL gill RN

## 2019-11-06 NOTE — TELEPHONE ENCOUNTER
Labs for review in Epic. Echo being done 11/15. Pt is leaving for his winter home soon. Pt missed getting testing this last spring. Pt had seen EP and did not realize he was to still see regular cardiologist. Did arrange testing , but no OV.  Pt now on diuretics and potassium supplements. See NP OV note 10/25/19. RUSSELL Ward RN

## 2019-11-06 NOTE — PROGRESS NOTES
Medical Assistant Note:  Shahid Villalta presents today for blood draw.    Patient seen by provider today: No.   present during visit today: Not Applicable.    Concerns: No Concerns.    Procedure:  Lab draw site: right antecub, Needle type: butterfly, Gauge: 23.    Post Assessment:  Labs drawn without difficulty: Yes.    Discharge Plan:  Departure Mode: Ambulatory.    Face to Face Time: 10.    Peyton Chavarria, CMA

## 2019-11-06 NOTE — LETTER
11/6/2019         RE: Shahid [place]alta  [address] [place] [facility] [place] [facility] [address]        Dear Colleague,    Thank you for referring your patient, Shahid Villalta, to the [facility]. Please see a copy of my visit note below.    Medical Assistant Note:  Shahid Villalta presents today for blood draw.    Patient seen by provider today: No.   present during visit today: Not Applicable.    Concerns: No Concerns.    Procedure:  Lab draw site: right antecub, Needle type: butterfly, Gauge: 23.    Post Assessment:  Labs drawn without difficulty: Yes.    Discharge Plan:  Departure Mode: Ambulatory.    Face to Face Time: 10.    Peyton Chavarria CMA            Again, thank you for allowing me to participate in the care of your patient.        Sincerely,        [facility] Oncology Nurse

## 2019-11-12 NOTE — TELEPHONE ENCOUNTER
Anticoagulation Management    Unable to reach Shahid today.    Today's INR result of 2.8 is therapeutic (goal INR of 2.0-3.0).  Result received from: Clinic Lab    Follow up required to confirm warfarin dose taken and assess for changes    Left message to continue current dose of warfarin 5 mg tonight.      Anticoagulation clinic to follow up    Shannan Barba RN

## 2019-11-12 NOTE — TELEPHONE ENCOUNTER
INR results 2.80, range 2.0-3.0    Left message for patient to return call to assess for any changes and verify dosing. Need to also assess if patient needs standing orders faxed to [place].     Litzy Ching RN  Anticoagulation nurse-[facility]  [phone]

## 2019-11-13 NOTE — TELEPHONE ENCOUNTER
To PCP:  Patient is leaving for [place] for the winter.  He is requesting a signed INR lab order to take with.  He will have his INRs done at an outside lab while in [place] and managed here at [facility].  Please see pended Lab Order Under Letters.    Please sign and have someone Mail to Patient's Home [place] Address.  Mail will be forwarded to patient.    Thank you.  Nicole Ramirez RN  Anticoagulation Nurse - [facility], [place]

## 2019-11-13 NOTE — TELEPHONE ENCOUNTER
Patient will be leaving for [place] this weekend. He will have INRs done by outside lab.  Patient requesting inr order to bring with (see separate encounter).    ANTICOAGULATION MANAGEMENT     Patient Name:  Shahid Villalta  Date:  2019    ASSESSMENT /SUBJECTIVE:      Today's INR result of 2.8 is therapeutic. Goal INR of 2.0-3.0      Warfarin dose taken: Warfarin taken as previously instructed    Diet: No new diet changes affecting INR    Medication changes/ interactions: No new medications/supplements affecting INR    Previous INR: Therapeutic     S/S of bleeding or thromboembolism: No    New injury or illness:  No    Upcoming surgery, procedure or cardioversion:  No    Additional findings: Patient leaving for [place] on Friday.      PLAN:    Spoke with Shahid regarding INR result and instructed:     Warfarin Dosing Instructions: Continue your current warfarin dose    Instructed patient to follow up no later than: 6 weeks    Education provided: No      Shahid, verbalizes understanding and agrees to warfarin dosing plan.    Instructed to call the Anticoagulation Clinic for any changes, questions or concerns. (#[phone])        OBJECTIVE:  INR   Date Value Ref Range Status   2019 2.80 (H) 0.86 - 1.14 Final     Comment:     This test is intended for monitoring Coumadin therapy.  Results are not   accurate in patients with prolonged INR due to factor deficiency.       Chromogenic Factor 10   Date Value Ref Range Status   2019 14 (L) 70 - 130 % Final     Comment:     Therapeutic Range:  A Chromogenic Factor 10 level of approximately 20-40%   inversely correlates with an INR of 2-3 for patients receiving Warfarin.   Chromogenic Factor 10 levels below 20% indicate an INR greater than 3 and   levels above 40% indicate an INR less than 2.               Anticoagulation Summary  As of 2019    INR goal:   2.0-3.0   TTR:   49.3 % (3.8 y)   INR used for dosin.80 (2019)   Warfarin maintenance  plan:   2.5 mg (5 mg x 0.5) every Mon, Wed, Fri; 5 mg (5 mg x 1) all other days   Full warfarin instructions:   2.5 mg every Mon, Wed, Fri; 5 mg all other days   Weekly warfarin total:   27.5 mg   Plan last modified:   Manisha Pierson RN (6/11/2019)   Next INR check:   12/24/2019   Priority:   INR   Target end date:       Indications    Long term current use of anticoagulant therapy [Z79.01]  Paroxysmal ventricular tachycardia (H) (Resolved) [I47.2]             Anticoagulation Episode Summary     INR check location:       Preferred lab:   EXTERNAL LAB    Send INR reminders to:   KAL RAMSEY    Comments:   Detailed message okay be patient; patient travels via [employer]. Stays in [place] during winter.  Will have INRs at outside lab.

## 2019-11-13 NOTE — LETTER
[address] [address]  Phone: [phone]    LAB ORDERS      DATE & TIME ISSUED: 2019, 3:57 PM  PATIENT NAME: Shahid Villalta  : 1940   [facility] MR# [if applicable]: [id number]  DIAGNOSIS:  Long term current use of anticoagulant therapy [Z79.01];  Paroxysmal ventricular tachycardia (H) [I47.2]    Lab and Collection:       INR [[id number]] (Order [id number]):    External System:   External ID:  EXTEAP    INR  Outbound LAB   INR  [facility]    8847  [facility]    8847  [facility]    8847  [facility]    [id number]    Standing order information:  Remaining Occurrences:     Interval:    PRN     Expires:    2020      PLEASE FAX RESULTS TO: [phone]    Order Providers: Gume Brown MD    Ordering Provider's NPI: [id number]  Gume Brown    Please call with any questions:  [phone]      _________________________________________________  Gume Brown MD

## 2019-11-13 NOTE — NURSING NOTE
"Oncology Rooming Note    November 13, 2019 10:29 AM   Shahid Villalta is a 79 year old male who presents for:    Chief Complaint   Patient presents with     Oncology Clinic Visit     Follow-up, Six months     Initial Vitals: Resp 16   Ht 1.727 m (5' 8\")   Wt 106.9 kg (235 lb 9.6 oz)   BMI 35.82 kg/m   Estimated body mass index is 35.82 kg/m  as calculated from the following:    Height as of this encounter: 1.727 m (5' 8\").    Weight as of this encounter: 106.9 kg (235 lb 9.6 oz). Body surface area is 2.26 meters squared.  No Pain (0) Comment: Data Unavailable   No LMP for male patient.  Allergies reviewed: Yes  Medications reviewed: Yes    Medications: Medication refills not needed today.  Pharmacy name entered into F[facility]:    [id number] - [place], [place] - [address] [facility] [address] [place] [address] AT [facility]    Clinical concerns: Follow-up   DISCHARGE PLAN:  Next appointments: See patient instruction section  Departure Mode: Ambulatory  Accompanied by: self  0 minutes for nursing discharge (face to face time)   EDWARD Goodwin              "

## 2019-11-13 NOTE — PROGRESS NOTES
"  CHIEF COMPLAINT   It was my pleasure to see Shahid Villalta who is a 79 year old male for follow-up of Prostate Cancer.      HPI  Shahid Villalta is a very pleasant 79 year old male who presents with a history of prostate cancer.  RALP 11/2015, T3b with positive margin. No hormones or radiation. Minimal incontinence. High-risk Decipher score and PSA rising. Has previously been recommended by Dr. Hilario to consider salvage radiotherapy. Here for PSA check, which continues to slowly rise.     Initial PSA:29  Pathologic [place] Score was 4 + 3  Biopsy [place] Score was 4 + 3  Pathologic Stage T3b.   Positive Margins: Yes Location:right side apical  Number of Lymph Nodes Removed: 13  Number of Positive Lymph Nodes: 0  Patient is status post robotic radical prostatectomy on 11/20/2015.    Risk Group: High     PSA  11/13/2019 - 0.04  5/1/2019 - 0.03  11/5/2018 - 0.03  5/4/2018 - 0.02  11/6/2017 - 0.02  5/8/2017 - 0.01  10/28/2016 - <0.01    PHYSICAL EXAM  Patient is a 79 year old  male   Vitals: Blood pressure 128/73, pulse 70, temperature 97.4  F (36.3  C), temperature source Tympanic, resp. rate 16, height 1.727 m (5' 8\"), weight 106.9 kg (235 lb 9.6 oz), SpO2 95 %.  General Appearance Adult: Body mass index is 35.82 kg/m .  Alert, no acute distress, oriented  HENT: throat/mouth:normal, good dentition  Lungs: no respiratory distress, or pursed lip breathing  Heart: No obvious jugular venous distension present  Abdomen: soft, nontender, no organomegaly or masses  Back: no CVAT  Musculoskeltal: extremities normal, no peripheral edema  Skin: no suspicious lesions or rashes  Neuro: Alert, oriented, speech and mentation normal  Psych: affect and mood normal  Gait: Normal    ASSESSMENT and PLAN  79 year old male with Roni 4+3 prostate cancer s/p RALP 2015, now with rising PSA. High-risk Decipher score. PSA continues to rise slowly, with doubling time of about 2 years. We again discussed his options in this circumstance.  We " discussed the option of salvage radiotherapy and the date suggesting survival benefit to this, particularly with his high-risk Decipher score. He expresses concern about possible side effects and a strong desire to avoid radiation. At this point, given his PSA has changed minimally, we will continue to observe for now, and revisit this discussion in six months, particularly if PSA continues to rise.     - Follow-up 6 months with PSA    I spent over 15 minutes with the patient.  Over half this time was spent on counseling regarding Prostate Cancer.    Doug Benoit MD  Urology  [facility]

## 2019-12-16 NOTE — PROGRESS NOTES
Patient was offered choice of vendor and chose [facility].  Patient Shahid Villalta was set up at [facility] on October 23rd, 2019. Patient received a Resmed AirSense 10 Auto. Pressures were set at 10-15 cm H2O.   Patient s ramp is 5 cm H2O for Auto and FLEX/EPR is 2.  Patient received a Resmed Airfit F20   Full Face mask size Large, heated tubing and heated humidifier.  Patient is enrolled in the [facility] Program and does need to meet compliance. Patient has a follow up on 05/05/2020 with Dr. Ashraf but he was made aware that he needs another follow up visit sooner as he will leave for [place]. Patient understood.   ESTELA ABERNATHY

## 2019-12-18 NOTE — TELEPHONE ENCOUNTER
Spoke w/ pt regarding Echo from 11/15/19. Attempted to call pt multiple times to review. Pt lives in [place] in the winter. Aortic valve is working well.     Pt is overdue for follow up. Does not return from [place] until 5/2020. Pt has scheduling number to call to set up visit w/  once he is back. No further questions. JUDIT Ro

## 2020-01-01 ENCOUNTER — HEALTH MAINTENANCE LETTER (OUTPATIENT)
Age: 80
End: 2020-01-01

## 2020-01-01 ENCOUNTER — TELEPHONE (OUTPATIENT)
Dept: FAMILY MEDICINE | Facility: CLINIC | Age: 80
End: 2020-01-01

## 2020-01-01 ENCOUNTER — ANCILLARY PROCEDURE (OUTPATIENT)
Dept: CARDIOLOGY | Facility: CLINIC | Age: 80
End: 2020-01-01
Attending: INTERNAL MEDICINE
Payer: COMMERCIAL

## 2020-01-01 ENCOUNTER — MYC MEDICAL ADVICE (OUTPATIENT)
Dept: FAMILY MEDICINE | Facility: CLINIC | Age: 80
End: 2020-01-01

## 2020-01-01 ENCOUNTER — TRANSFERRED RECORDS (OUTPATIENT)
Dept: HEALTH INFORMATION MANAGEMENT | Facility: CLINIC | Age: 80
End: 2020-01-01

## 2020-01-01 DIAGNOSIS — Z79.01 LONG TERM CURRENT USE OF ANTICOAGULANT THERAPY: ICD-10-CM

## 2020-01-01 DIAGNOSIS — I47.29 PAROXYSMAL VENTRICULAR TACHYCARDIA (H): ICD-10-CM

## 2020-01-01 DIAGNOSIS — Z95.810 ICD (IMPLANTABLE CARDIOVERTER-DEFIBRILLATOR) IN PLACE: ICD-10-CM

## 2020-01-01 LAB
INR PPP: 2 (ref 0.9–1.1)
INR PPP: 2.9 (ref 0.9–1.1)
MDC_IDC_EPISODE_DTM: NORMAL
MDC_IDC_EPISODE_DURATION: 10 S
MDC_IDC_EPISODE_DURATION: 20 S
MDC_IDC_EPISODE_DURATION: 30 S
MDC_IDC_EPISODE_DURATION: 30 S
MDC_IDC_EPISODE_DURATION: 33 S
MDC_IDC_EPISODE_DURATION: 40 S
MDC_IDC_EPISODE_DURATION: 4362 S
MDC_IDC_EPISODE_DURATION: 4378 S
MDC_IDC_EPISODE_DURATION: 4837 S
MDC_IDC_EPISODE_DURATION: 6 S
MDC_IDC_EPISODE_DURATION: 6 S
MDC_IDC_EPISODE_DURATION: 8 S
MDC_IDC_EPISODE_DURATION: NORMAL S
MDC_IDC_EPISODE_ID: NORMAL
MDC_IDC_EPISODE_TYPE: NORMAL
MDC_IDC_LEAD_IMPLANT_DT: NORMAL
MDC_IDC_LEAD_LOCATION: NORMAL
MDC_IDC_LEAD_LOCATION_DETAIL_1: NORMAL
MDC_IDC_LEAD_MFG: NORMAL
MDC_IDC_LEAD_MODEL: NORMAL
MDC_IDC_LEAD_POLARITY_TYPE: NORMAL
MDC_IDC_LEAD_SERIAL: NORMAL
MDC_IDC_MSMT_BATTERY_DTM: NORMAL
MDC_IDC_MSMT_BATTERY_REMAINING_LONGEVITY: 36 MO
MDC_IDC_MSMT_BATTERY_REMAINING_PERCENTAGE: 57 %
MDC_IDC_MSMT_BATTERY_STATUS: NORMAL
MDC_IDC_MSMT_CAP_CHARGE_DTM: NORMAL
MDC_IDC_MSMT_CAP_CHARGE_DTM: NORMAL
MDC_IDC_MSMT_CAP_CHARGE_ENERGY: 41 J
MDC_IDC_MSMT_CAP_CHARGE_TIME: 10.8 S
MDC_IDC_MSMT_CAP_CHARGE_TIME: 9.8 S
MDC_IDC_MSMT_CAP_CHARGE_TYPE: NORMAL
MDC_IDC_MSMT_CAP_CHARGE_TYPE: NORMAL
MDC_IDC_MSMT_LEADCHNL_LV_IMPEDANCE_VALUE: 617 OHM
MDC_IDC_MSMT_LEADCHNL_LV_PACING_THRESHOLD_AMPLITUDE: 1 V
MDC_IDC_MSMT_LEADCHNL_LV_PACING_THRESHOLD_PULSEWIDTH: 0.5 MS
MDC_IDC_MSMT_LEADCHNL_RA_IMPEDANCE_VALUE: 605 OHM
MDC_IDC_MSMT_LEADCHNL_RA_PACING_THRESHOLD_AMPLITUDE: 1.6 V
MDC_IDC_MSMT_LEADCHNL_RA_PACING_THRESHOLD_PULSEWIDTH: 1 MS
MDC_IDC_MSMT_LEADCHNL_RV_IMPEDANCE_VALUE: 407 OHM
MDC_IDC_MSMT_LEADCHNL_RV_PACING_THRESHOLD_AMPLITUDE: 0.6 V
MDC_IDC_MSMT_LEADCHNL_RV_PACING_THRESHOLD_PULSEWIDTH: 0.5 MS
MDC_IDC_PG_IMPLANT_DTM: NORMAL
MDC_IDC_PG_MFG: NORMAL
MDC_IDC_PG_MODEL: NORMAL
MDC_IDC_PG_SERIAL: NORMAL
MDC_IDC_PG_TYPE: NORMAL
MDC_IDC_SESS_CLINIC_NAME: NORMAL
MDC_IDC_SESS_DTM: NORMAL
MDC_IDC_SESS_TYPE: NORMAL
MDC_IDC_SET_BRADY_AT_MODE_SWITCH_MODE: NORMAL
MDC_IDC_SET_BRADY_AT_MODE_SWITCH_RATE: 100 {BEATS}/MIN
MDC_IDC_SET_BRADY_LOWRATE: 60 {BEATS}/MIN
MDC_IDC_SET_BRADY_MAX_SENSOR_RATE: 95 {BEATS}/MIN
MDC_IDC_SET_BRADY_MAX_TRACKING_RATE: 95 {BEATS}/MIN
MDC_IDC_SET_BRADY_MODE: NORMAL
MDC_IDC_SET_BRADY_PAV_DELAY_LOW: 180 MS
MDC_IDC_SET_BRADY_SAV_DELAY_LOW: 120 MS
MDC_IDC_SET_CRT_LVRV_DELAY: 0 MS
MDC_IDC_SET_CRT_PACED_CHAMBERS: NORMAL
MDC_IDC_SET_LEADCHNL_LV_PACING_AMPLITUDE: 2 V
MDC_IDC_SET_LEADCHNL_LV_PACING_ANODE_ELECTRODE_1: NORMAL
MDC_IDC_SET_LEADCHNL_LV_PACING_ANODE_LOCATION_1: NORMAL
MDC_IDC_SET_LEADCHNL_LV_PACING_CATHODE_ELECTRODE_1: NORMAL
MDC_IDC_SET_LEADCHNL_LV_PACING_CATHODE_LOCATION_1: NORMAL
MDC_IDC_SET_LEADCHNL_LV_PACING_PULSEWIDTH: 0.5 MS
MDC_IDC_SET_LEADCHNL_LV_SENSING_ADAPTATION_MODE: NORMAL
MDC_IDC_SET_LEADCHNL_LV_SENSING_ANODE_ELECTRODE_1: NORMAL
MDC_IDC_SET_LEADCHNL_LV_SENSING_ANODE_LOCATION_1: NORMAL
MDC_IDC_SET_LEADCHNL_LV_SENSING_CATHODE_ELECTRODE_1: NORMAL
MDC_IDC_SET_LEADCHNL_LV_SENSING_CATHODE_LOCATION_1: NORMAL
MDC_IDC_SET_LEADCHNL_LV_SENSING_SENSITIVITY: 1 MV
MDC_IDC_SET_LEADCHNL_RA_PACING_AMPLITUDE: 3.2 V
MDC_IDC_SET_LEADCHNL_RA_PACING_POLARITY: NORMAL
MDC_IDC_SET_LEADCHNL_RA_PACING_PULSEWIDTH: 1 MS
MDC_IDC_SET_LEADCHNL_RA_SENSING_ADAPTATION_MODE: NORMAL
MDC_IDC_SET_LEADCHNL_RA_SENSING_POLARITY: NORMAL
MDC_IDC_SET_LEADCHNL_RA_SENSING_SENSITIVITY: 0.25 MV
MDC_IDC_SET_LEADCHNL_RV_PACING_AMPLITUDE: 2 V
MDC_IDC_SET_LEADCHNL_RV_PACING_POLARITY: NORMAL
MDC_IDC_SET_LEADCHNL_RV_PACING_PULSEWIDTH: 0.5 MS
MDC_IDC_SET_LEADCHNL_RV_SENSING_ADAPTATION_MODE: NORMAL
MDC_IDC_SET_LEADCHNL_RV_SENSING_POLARITY: NORMAL
MDC_IDC_SET_LEADCHNL_RV_SENSING_SENSITIVITY: 0.6 MV
MDC_IDC_SET_ZONE_DETECTION_INTERVAL: 250 MS
MDC_IDC_SET_ZONE_DETECTION_INTERVAL: 300 MS
MDC_IDC_SET_ZONE_DETECTION_INTERVAL: 400 MS
MDC_IDC_SET_ZONE_TYPE: NORMAL
MDC_IDC_SET_ZONE_VENDOR_TYPE: NORMAL
MDC_IDC_STAT_BRADY_DTM_END: NORMAL
MDC_IDC_STAT_BRADY_DTM_START: NORMAL
MDC_IDC_STAT_BRADY_RA_PERCENT_PACED: 0 %
MDC_IDC_STAT_BRADY_RV_PERCENT_PACED: 98 %
MDC_IDC_STAT_CRT_DTM_END: NORMAL
MDC_IDC_STAT_CRT_DTM_START: NORMAL
MDC_IDC_STAT_CRT_LV_PERCENT_PACED: 98 %
MDC_IDC_STAT_EPISODE_RECENT_COUNT: 0
MDC_IDC_STAT_EPISODE_RECENT_COUNT: 117
MDC_IDC_STAT_EPISODE_RECENT_COUNT: 26
MDC_IDC_STAT_EPISODE_RECENT_COUNT_DTM_END: NORMAL
MDC_IDC_STAT_EPISODE_RECENT_COUNT_DTM_START: NORMAL
MDC_IDC_STAT_EPISODE_TYPE: NORMAL
MDC_IDC_STAT_EPISODE_VENDOR_TYPE: NORMAL
MDC_IDC_STAT_TACHYTHERAPY_ATP_DELIVERED_RECENT: 0
MDC_IDC_STAT_TACHYTHERAPY_ATP_DELIVERED_TOTAL: 1
MDC_IDC_STAT_TACHYTHERAPY_RECENT_DTM_END: NORMAL
MDC_IDC_STAT_TACHYTHERAPY_RECENT_DTM_START: NORMAL
MDC_IDC_STAT_TACHYTHERAPY_SHOCKS_ABORTED_RECENT: 0
MDC_IDC_STAT_TACHYTHERAPY_SHOCKS_ABORTED_TOTAL: 0
MDC_IDC_STAT_TACHYTHERAPY_SHOCKS_DELIVERED_RECENT: 0
MDC_IDC_STAT_TACHYTHERAPY_SHOCKS_DELIVERED_TOTAL: 1
MDC_IDC_STAT_TACHYTHERAPY_TOTAL_DTM_END: NORMAL
MDC_IDC_STAT_TACHYTHERAPY_TOTAL_DTM_START: NORMAL

## 2020-01-01 PROCEDURE — 93295 DEV INTERROG REMOTE 1/2/MLT: CPT | Performed by: INTERNAL MEDICINE

## 2020-01-01 RX ORDER — WARFARIN SODIUM 5 MG/1
TABLET ORAL
Qty: 30 TABLET | Refills: 0 | Status: SHIPPED | OUTPATIENT
Start: 2020-01-01 | End: 2020-01-01

## 2020-01-02 NOTE — TELEPHONE ENCOUNTER
Anticoagulation Monitoring Return Date Recheck   Latest Ref Rng & Units     11/13/2019 12/24/2019      Anticoagulation Monitoring Instructions   Latest Ref Rng & Units    11/13/2019 2.5 mg every Mon, Wed, Fri; 5 mg all other days   Patient is overdue for INR.  Medication is being filled for 1 time refill only due to:  Patient needs labs INR.

## 2020-01-10 NOTE — TELEPHONE ENCOUNTER
Fax from [facility] with INR from 12/31     Faxed to  on 1/10/20    INR 2.9     Copy of lab sent to [facility]     Isela Hua RN

## 2020-01-10 NOTE — TELEPHONE ENCOUNTER
Anticoagulation Management    Unable to reach Shahid today.    12/31/19s INR result of 2.9 is therapeutic (goal INR of 2.0-3.0).  Result received from: External Lab    Follow up required to confirm warfarin dose taken and assess for changes    Left message for patient to return call to verify dosing and to have patient recheck in 6 weeks     Left message with dosing details, continue current dosing and recheck in six weeks if no changes or concerns      Anticoagulation clinic to follow up    Beatriz Randall RN

## 2020-01-13 NOTE — TELEPHONE ENCOUNTER
Anticoagulation Management    carol  ANTICOAGULATION MANAGEMENT     Patient Name:  Shahid Villalta  Date:  2020    ASSESSMENT /SUBJECTIVE:      Today's INR result of 2.9 is therapeutic. Goal INR of 2.0-3.0      Warfarin dose taken: Warfarin taken as previously instructed    Diet: No new diet changes affecting INR    Medication changes/ interactions: No new medications/supplements affecting INR    Previous INR: Therapeutic     S/S of bleeding or thromboembolism: No    New injury or illness:  No    Upcoming surgery, procedure or cardioversion:  No    Additional findings: None      PLAN:    Left detailed message for Shahid regarding INR result and instructed:     Warfarin Dosing Instructions: Continue your current warfarin dose    Instructed patient to follow up no later than: 6 weeks    Education provided: No    Left detailed message for Shahid to return call to INR clinic if any changes or taking warfarin differently than advised.      Instructed to call the Anticoagulation Clinic for any changes, questions or concerns. (#[phone])        OBJECTIVE:  INR   Date Value Ref Range Status   2019 2.9 (A) 0.90 - 1.10 Final     Chromogenic Factor 10   Date Value Ref Range Status   2019 14 (L) 70 - 130 % Final     Comment:     Therapeutic Range:  A Chromogenic Factor 10 level of approximately 20-40%   inversely correlates with an INR of 2-3 for patients receiving Warfarin.   Chromogenic Factor 10 levels below 20% indicate an INR greater than 3 and   levels above 40% indicate an INR less than 2.               Anticoagulation Summary  As of 1/10/2020    INR goal:   2.0-3.0   TTR:   54.9 % (11.8 mo)   INR used for dosin.9 (2019)   Warfarin maintenance plan:   2.5 mg (5 mg x 0.5) every Mon, Wed, Fri; 5 mg (5 mg x 1) all other days   Full warfarin instructions:   2.5 mg every Mon, Wed, Fri; 5 mg all other days   Weekly warfarin total:   27.5 mg   Plan last modified:   Manisha Pierson RN (2019)    Next INR check:   2/11/2020   Priority:   High   Target end date:       Indications    Long term current use of anticoagulant therapy [Z79.01]  Paroxysmal ventricular tachycardia (H) (Resolved) [I47.2]             Anticoagulation Episode Summary     INR check location:       Preferred lab:   EXTERNAL LAB    Send INR reminders to:   KAL RAMSEY    Comments:   Detailed message okay be patient; patient travels via [employer]. Stays in [place] during winter.  Will have INRs at outside lab.             Anticoagulation clinic to follow up    Beatriz Randall RN

## 2020-01-13 NOTE — TELEPHONE ENCOUNTER
Patient called back to confirm dosing and denies any overall changes.  He will follow up in 6 weeks with new INR.  He is currently in [place].  Nicole Ramirez RN  Anticoagulation Nurse - [facility], [place]

## 2020-03-04 NOTE — TELEPHONE ENCOUNTER
Left detailed message for patietn to have INR checked as soon as possible as patient is overdue.    Beatriz Randall RN  Anticoagulation Clinic

## 2020-03-05 NOTE — TELEPHONE ENCOUNTER
Patient returned call, scheduled to have INR checked on tues in [place] 3/10/20.    Beatriz Randall RN  Anticoagulation Clinic

## 2020-03-31 NOTE — TELEPHONE ENCOUNTER
ANTICOAGULATION MANAGEMENT     Patient Name:  Shahid Villalta  Date:  3/31/2020    ASSESSMENT /SUBJECTIVE:      Today's INR result of 2.0 is therapeutic. Goal INR of 2.0-3.0      Warfarin dose taken: Warfarin taken as previously instructed    Diet: No new diet changes affecting INR    Medication changes/ interactions: No new medications/supplements affecting INR    Previous INR: Therapeutic     S/S of bleeding or thromboembolism: No    New injury or illness:  No    Upcoming surgery, procedure or cardioversion:  No    Additional findings: none      PLAN:    Spoke with Shahid regarding INR result and instructed:     Warfarin Dosing Instructions: Continue your current warfarin dose 2.5 mg on Mon/Wed/Fri and 5 mg all other days    Instructed patient to follow up no later than: 7 weeks  Patient using outside facility for labs    Education provided: No      Shahid verbalizes understanding and agrees to warfarin dosing plan.    Instructed to call the Anticoagulation Clinic for any changes, questions or concerns. (#[phone])        OBJECTIVE:  INR   Date Value Ref Range Status   2020 2.0 (A) 0.90 - 1.10 Final     Chromogenic Factor 10   Date Value Ref Range Status   2019 14 (L) 70 - 130 % Final     Comment:     Therapeutic Range:  A Chromogenic Factor 10 level of approximately 20-40%   inversely correlates with an INR of 2-3 for patients receiving Warfarin.   Chromogenic Factor 10 levels below 20% indicate an INR greater than 3 and   levels above 40% indicate an INR less than 2.               Anticoagulation Summary  As of 3/31/2020    INR goal:   2.0-3.0   TTR:   68.8 % (9.1 mo)   INR used for dosin.0 (3/31/2020)   Warfarin maintenance plan:   2.5 mg (5 mg x 0.5) every Mon, Wed, Fri; 5 mg (5 mg x 1) all other days   Full warfarin instructions:   2.5 mg every Mon, Wed, Fri; 5 mg all other days   Weekly warfarin total:   27.5 mg   Plan last modified:   Manisha Pierson RN (2019)   Next INR check:       Priority:   Maintenance   Target end date:       Indications    Long term current use of anticoagulant therapy [Z79.01]  Paroxysmal ventricular tachycardia (H) (Resolved) [I47.2]             Anticoagulation Episode Summary     INR check location:       Preferred lab:   EXTERNAL LAB    Send INR reminders to:   KAL RAMSEY    Comments:   Detailed message okay be patient; patient travels via [employer]. Stays in [place] during winter.  Will have INRs at outside lab.

## 2020-04-24 NOTE — TELEPHONE ENCOUNTER
Reason for call:  Other   Patient called regarding (reason for call): FYJUAN  Additional comments: Son called to cancel all future appointments and stated that they are taking patient off life support today.    Phone number to reach caller:  Other phone number:  [phone]    Best Time:  any    Can we leave a detailed message on this number?  Not Applicable    Travel screening: Not Applicable     Joselin ARAUJO  Central Scheduler

## 2020-06-16 ENCOUNTER — DOCUMENTATION ONLY (OUTPATIENT)
Dept: FAMILY MEDICINE | Facility: CLINIC | Age: 80
End: 2020-06-16

## 2020-06-16 DIAGNOSIS — I48.0 PAROXYSMAL ATRIAL FIBRILLATION (H): Primary | Chronic | ICD-10-CM

## 2020-06-16 NOTE — PROGRESS NOTES
ANTICOAGULATION MANAGEMENT      Shahid Villalta due for annual renewal of referral to anticoagulation monitoring. Order pended for your review and signature.      ANTICOAGULATION SUMMARY      Warfarin indication(s)     Heart Valve Replacement  paroxysmal ventricular tachycardia    Heart valve present?  Bioprosthetic AVR       Current INR goal   2.0-3.0     INR goal appropriate for indication? Yes, 2-3 appropriate for hx of DVT, PE, Afib, LVAD, or bileaflet AVR without risk factors     Current duration of therapy Indefinite/long term therapy   Time in Therapeutic Range (TTR)  (Goal > 60%) 87 %     Office visit with referring provider's group within last year No, last visit on 6/14/19       Aletha Nicole RN

## 2020-06-30 ENCOUNTER — TELEPHONE (OUTPATIENT)
Dept: FAMILY MEDICINE | Facility: CLINIC | Age: 80
End: 2020-06-30

## 2020-06-30 DIAGNOSIS — Z79.01 LONG TERM CURRENT USE OF ANTICOAGULANT THERAPY: ICD-10-CM

## 2020-06-30 DIAGNOSIS — I48.0 PAROXYSMAL ATRIAL FIBRILLATION (H): ICD-10-CM

## 2020-06-30 NOTE — TELEPHONE ENCOUNTER
"Son received INR overdue letter for patient. He states Patient passed away in Mid April.  Chart updated.    Obituary from [facility]:  \"...Shahid Villalta, of [place], [place],  2020, at [facility] in [place], [place], after a brief illness. ...\"    ACC Chart Updated.    Nicole Ramirez RN  Anticoagulation Nurse - [facility], [place]    "

## 2020-07-22 ENCOUNTER — DOCUMENTATION ONLY (OUTPATIENT)
Dept: SLEEP MEDICINE | Facility: CLINIC | Age: 80
End: 2020-07-22

## 2025-02-27 NOTE — TELEPHONE ENCOUNTER
TIFFANIEI to PCP -  Spoke with pt's son.  He states pt is still in [place].     He got pneumonia and ended up on life support.  They are removing him.      Writer spoke with device RN at cardiology clinic and they will take care of the cardiac devices.     TIFFANIEI to PCP      Isela Hua RN     [Capable of only limited self care, confined to bed or chair more than 50% of waking hours] : Status 3- Capable of only limited self care, confined to bed or chair more than 50% of waking hours [Normal] : normal appearance, no rash, nodules, vesicles, ulcers, erythema [de-identified] : s/p right thumb fracture

## 2025-06-11 NOTE — PROGRESS NOTES
ANTICOAGULATION FOLLOW-UP CLINIC VISIT    Patient Name:  Shahid Villalta  Date:  2/27/2017  Contact Type:  Rosemaryhart    SUBJECTIVE:     Patient Findings     Positives Change in medications    Comments Pt restarted on amio after missing about a week's worth           OBJECTIVE    INR   Date Value Ref Range Status   02/24/2017 1.9  Final       ASSESSMENT / PLAN  INR assessment THER    Recheck INR In: 1 WEEK    INR Location Outside lab      Anticoagulation Summary as of 2/27/2017     INR goal 2.0-3.0   Today's INR 1.9! (2/24/2017)   Maintenance plan 2.5 mg (5 mg x 0.5) on Wed; 5 mg (5 mg x 1) all other days   Full instructions 2.5 mg on Wed; 5 mg all other days   Weekly total 32.5 mg   No change documented Manisha Pierson RN   Plan last modified Page, Isela NEGRETE RN (4/20/2016)   Next INR check 3/3/2017   Target end date     Indications   Long-term (current) use of anticoagulants [Z79.01] [Z79.01]  Paroxysmal ventricular tachycardia (H) [I47.2]         Anticoagulation Episode Summary     INR check location     Preferred lab     Send INR reminders to [facility]    Comments             See the Encounter Report to view Anticoagulation Flowsheet and Dosing Calendar (Go to Encounters tab in chart review, and find the Anticoagulation Therapy Visit)        Manisha Pierson RN                face to face

## (undated) DEVICE — SOL NACL 0.9% IRRIG 1000ML BOTTLE 2F7124

## (undated) DEVICE — SHEATH INTRODUCER DRYSEAL FLEX W/HYDRO CT 20FRX33CM DSF2033

## (undated) DEVICE — SYR VALVE LOW VISCOSITY 100ML ACIST A2000V

## (undated) DEVICE — BASIN EMESIS STERILE  SSK9005A

## (undated) DEVICE — CONNECTOR STOPCOCK 3 WAY MALE LL HI-FLO MX9311L

## (undated) DEVICE — CATH DELIVERY SYS ENVEO PRO FOR 16FR COREVALVE ENVPRO-16-US

## (undated) DEVICE — SYR 10ML LL W/O NDL 302995

## (undated) DEVICE — WIRE GUIDE LUNDERQUIST 0.035"X260CM DBL CVD

## (undated) DEVICE — SUCTION MANIFOLD DORNOCH ULTRA CART UL-CL500

## (undated) DEVICE — SYR ANGIOGRAPHY MULTIUSE KIT ACIST 014612

## (undated) DEVICE — CATH ANGIO SUPERTORQUE PLUS AL2 6FRX100CM 533646

## (undated) DEVICE — WIRE GUIDE 0.035"X260CM SAFE-T-J EXCHANGE TSCF-35-260-3-BH

## (undated) DEVICE — LINEN TOWEL PACK X5 5464

## (undated) DEVICE — SU PLEDGET SOFT TFE 3/8"X3/26"X1/16" PCP40

## (undated) DEVICE — DRSG TEGADERM 8X12" 1629

## (undated) DEVICE — DRSG TEGADERM 4X4 3/4" 1626W

## (undated) DEVICE — RAD KNIFE HANDLE W/11 BLADE DISPOSABLE 371611

## (undated) DEVICE — BOWL 32OZ STERILE 01232

## (undated) DEVICE — COVER ULTRASOUND PROBE W/GEL FLEXI-FEEL 6"X58" LF  25-FF658

## (undated) DEVICE — WIPES FOLEY CARE SURESTEP PROVON DFC100

## (undated) DEVICE — TUBING PRESSURE 30"

## (undated) DEVICE — KIT HAND CONTROL ACIST 014644 AR-P54

## (undated) DEVICE — DRAPE SHEET REV FOLD 3/4 9349

## (undated) DEVICE — INTRO SHEATH 7FRX25CM PINNACLE RSS706

## (undated) DEVICE — SPONGE RAY-TEC 4X8" 7318

## (undated) DEVICE — PREP CHLORAPREP 26ML TINTED ORANGE  260815

## (undated) DEVICE — INTRO SHEATH 6FRX10CM PINNACLE RSS602

## (undated) DEVICE — DRAPE IOBAN INCISE 23X17" 6650EZ

## (undated) DEVICE — TUBING SUCTION 10'X3/16" N510

## (undated) DEVICE — ESU GROUND PAD ADULT REM W/15' CORD E7507DB

## (undated) DEVICE — DRAPE MAYO STAND 23X54 8337

## (undated) DEVICE — PACK HEART LEFT CUSTOM

## (undated) DEVICE — PROTECTOR ARM ONE-STEP TRENDELENBURG 40418

## (undated) DEVICE — WIRE GUIDE 0.035"X150CM EMERALD STR 502542

## (undated) DEVICE — INTRO TERUMO 6FRX25CM W/MARKER RSB603

## (undated) DEVICE — WIRE GUIDE 0.035"X150CM EMERALD J TIP 502521

## (undated) DEVICE — WIRE GUIDE 0.04"X300CM GRANDSLAM J TP AG141302J

## (undated) DEVICE — INTRO SHEATH MICRO PLATINUM TIP 4FRX40CM 7274

## (undated) DEVICE — CATH BALLOON Z-MED II 9FR 22MMX4X100CM 611761

## (undated) DEVICE — SET INTRODUCER SHEATH 14FR 914ES

## (undated) DEVICE — DRAPE STERI FLUOROSCOPE 35X43"1012 LATEX FREE

## (undated) DEVICE — INTRO SHEATH 4FRX25CM PINNACLE MARKER RSB403

## (undated) DEVICE — KIT HAND CONTROL ANGIOTOUCH ACIST 65CM AT-P65

## (undated) DEVICE — CLOSURE DEVICE 6FR VASC PROGLIDE MEDICATED SUTURE 12673-03

## (undated) DEVICE — DRAPE SPECIAL PROCEDURE ANGIO-SHIELD D2224

## (undated) DEVICE — Device

## (undated) DEVICE — SYR 50ML LL W/O NDL 309653

## (undated) DEVICE — DEFIB PADPRO CONMED ADULT/CHILD 2001Z-C

## (undated) DEVICE — DRSG PRIMAPORE 02X3" 7133

## (undated) DEVICE — CATH ANGIO INFINITI PIGTAIL 145 6 SH 6FRX110CM  534-652S

## (undated) DEVICE — PREP CHLORHEXIDINE 4% 4OZ (HIBICLENS) 57504

## (undated) DEVICE — TAPE MEDIPORE 4"X2YD 2864

## (undated) DEVICE — DRAPE CV SPLIT II 147X106" 9158

## (undated) DEVICE — DEVICE LOADING SYS ENVEO PRO FOR 16 EVOLUTR L-ENVPRO-16-US

## (undated) DEVICE — LIGHT HANDLE X1 31140133

## (undated) DEVICE — GUIDEWIRE VASC SAFARI2 0.035X275CM H74939406XS1

## (undated) DEVICE — GLOVE NEOLON 2G NEOPRENE PF 7.5 LATEX FREE MSG6075

## (undated) DEVICE — LINEN TOWEL PACK X6 WHITE 5487

## (undated) DEVICE — LINEN TOWEL PACK X30 5481

## (undated) RX ORDER — HEPARIN SODIUM 1000 [USP'U]/ML
INJECTION, SOLUTION INTRAVENOUS; SUBCUTANEOUS
Status: DISPENSED
Start: 2018-09-19

## (undated) RX ORDER — LIDOCAINE HYDROCHLORIDE 40 MG/ML
SOLUTION TOPICAL
Status: DISPENSED
Start: 2018-06-29

## (undated) RX ORDER — HYDRALAZINE HYDROCHLORIDE 20 MG/ML
INJECTION INTRAMUSCULAR; INTRAVENOUS
Status: DISPENSED
Start: 2018-09-19

## (undated) RX ORDER — FLUMAZENIL 0.1 MG/ML
INJECTION, SOLUTION INTRAVENOUS
Status: DISPENSED
Start: 2018-06-29

## (undated) RX ORDER — GLYCOPYRROLATE 0.2 MG/ML
INJECTION, SOLUTION INTRAMUSCULAR; INTRAVENOUS
Status: DISPENSED
Start: 2018-09-05

## (undated) RX ORDER — ETOMIDATE 2 MG/ML
INJECTION INTRAVENOUS
Status: DISPENSED
Start: 2018-09-05

## (undated) RX ORDER — PHENYLEPHRINE HCL IN 0.9% NACL 1 MG/10 ML
SYRINGE (ML) INTRAVENOUS
Status: DISPENSED
Start: 2018-08-27

## (undated) RX ORDER — CEFAZOLIN SODIUM 2 G/100ML
INJECTION, SOLUTION INTRAVENOUS
Status: DISPENSED
Start: 2018-09-19

## (undated) RX ORDER — FENTANYL CITRATE 50 UG/ML
INJECTION, SOLUTION INTRAMUSCULAR; INTRAVENOUS
Status: DISPENSED
Start: 2018-09-05

## (undated) RX ORDER — DEXAMETHASONE SODIUM PHOSPHATE 4 MG/ML
INJECTION, SOLUTION INTRA-ARTICULAR; INTRALESIONAL; INTRAMUSCULAR; INTRAVENOUS; SOFT TISSUE
Status: DISPENSED
Start: 2018-08-27

## (undated) RX ORDER — PHENYLEPHRINE HCL IN 0.9% NACL 1 MG/10 ML
SYRINGE (ML) INTRAVENOUS
Status: DISPENSED
Start: 2018-09-05

## (undated) RX ORDER — FENTANYL CITRATE 50 UG/ML
INJECTION, SOLUTION INTRAMUSCULAR; INTRAVENOUS
Status: DISPENSED
Start: 2018-09-20

## (undated) RX ORDER — HEPARIN SODIUM 1000 [USP'U]/ML
INJECTION, SOLUTION INTRAVENOUS; SUBCUTANEOUS
Status: DISPENSED
Start: 2018-06-29

## (undated) RX ORDER — EPHEDRINE SULFATE 50 MG/ML
INJECTION, SOLUTION INTRAMUSCULAR; INTRAVENOUS; SUBCUTANEOUS
Status: DISPENSED
Start: 2018-08-27

## (undated) RX ORDER — PROTAMINE SULFATE 10 MG/ML
INJECTION, SOLUTION INTRAVENOUS
Status: DISPENSED
Start: 2018-09-19

## (undated) RX ORDER — ONDANSETRON 2 MG/ML
INJECTION INTRAMUSCULAR; INTRAVENOUS
Status: DISPENSED
Start: 2018-08-27

## (undated) RX ORDER — GLYCOPYRROLATE 0.2 MG/ML
INJECTION, SOLUTION INTRAMUSCULAR; INTRAVENOUS
Status: DISPENSED
Start: 2018-06-29

## (undated) RX ORDER — ASPIRIN 81 MG/1
TABLET ORAL
Status: DISPENSED
Start: 2018-09-19

## (undated) RX ORDER — ONDANSETRON 2 MG/ML
INJECTION INTRAMUSCULAR; INTRAVENOUS
Status: DISPENSED
Start: 2018-09-19

## (undated) RX ORDER — DEXAMETHASONE SODIUM PHOSPHATE 4 MG/ML
INJECTION, SOLUTION INTRA-ARTICULAR; INTRALESIONAL; INTRAMUSCULAR; INTRAVENOUS; SOFT TISSUE
Status: DISPENSED
Start: 2018-09-05

## (undated) RX ORDER — ASPIRIN 325 MG
TABLET ORAL
Status: DISPENSED
Start: 2018-06-29

## (undated) RX ORDER — ONDANSETRON 2 MG/ML
INJECTION INTRAMUSCULAR; INTRAVENOUS
Status: DISPENSED
Start: 2018-09-05

## (undated) RX ORDER — NALOXONE HYDROCHLORIDE 0.4 MG/ML
INJECTION, SOLUTION INTRAMUSCULAR; INTRAVENOUS; SUBCUTANEOUS
Status: DISPENSED
Start: 2018-06-29

## (undated) RX ORDER — FENTANYL CITRATE 50 UG/ML
INJECTION, SOLUTION INTRAMUSCULAR; INTRAVENOUS
Status: DISPENSED
Start: 2018-09-19

## (undated) RX ORDER — LIDOCAINE HYDROCHLORIDE 10 MG/ML
INJECTION, SOLUTION EPIDURAL; INFILTRATION; INTRACAUDAL; PERINEURAL
Status: DISPENSED
Start: 2018-06-29

## (undated) RX ORDER — FENTANYL CITRATE 50 UG/ML
INJECTION, SOLUTION INTRAMUSCULAR; INTRAVENOUS
Status: DISPENSED
Start: 2018-06-29

## (undated) RX ORDER — PROPOFOL 10 MG/ML
INJECTION, EMULSION INTRAVENOUS
Status: DISPENSED
Start: 2018-08-27

## (undated) RX ORDER — GLYCOPYRROLATE 0.2 MG/ML
INJECTION, SOLUTION INTRAMUSCULAR; INTRAVENOUS
Status: DISPENSED
Start: 2018-08-27

## (undated) RX ORDER — EPHEDRINE SULFATE 50 MG/ML
INJECTION, SOLUTION INTRAMUSCULAR; INTRAVENOUS; SUBCUTANEOUS
Status: DISPENSED
Start: 2018-09-05